# Patient Record
Sex: FEMALE | Race: WHITE | NOT HISPANIC OR LATINO | Employment: OTHER | URBAN - METROPOLITAN AREA
[De-identification: names, ages, dates, MRNs, and addresses within clinical notes are randomized per-mention and may not be internally consistent; named-entity substitution may affect disease eponyms.]

---

## 2018-04-02 ENCOUNTER — DOCUMENTATION ONLY (OUTPATIENT)
Dept: FAMILY MEDICINE | Facility: CLINIC | Age: 68
End: 2018-04-02

## 2018-04-11 ENCOUNTER — DOCUMENTATION ONLY (OUTPATIENT)
Dept: FAMILY MEDICINE | Facility: CLINIC | Age: 68
End: 2018-04-11

## 2018-04-11 NOTE — PROGRESS NOTES
Health Maintenance Due   Topic Date Due    Hepatitis C Screening  1950    Lipid Panel  1950    TETANUS VACCINE  06/01/1966    Mammogram  06/01/1988    DEXA SCAN  06/01/1988    Colonoscopy  06/01/1998    Zoster Vaccine  06/01/2008    Pneumococcal (65+) (1 of 2 - PCV13) 06/01/2013    Influenza Vaccine  08/01/2017

## 2018-04-12 ENCOUNTER — OFFICE VISIT (OUTPATIENT)
Dept: FAMILY MEDICINE | Facility: CLINIC | Age: 68
End: 2018-04-12
Payer: MEDICARE

## 2018-04-12 VITALS
TEMPERATURE: 98 F | HEIGHT: 68 IN | DIASTOLIC BLOOD PRESSURE: 84 MMHG | SYSTOLIC BLOOD PRESSURE: 126 MMHG | HEART RATE: 80 BPM | WEIGHT: 214.06 LBS | BODY MASS INDEX: 32.44 KG/M2 | RESPIRATION RATE: 16 BRPM | OXYGEN SATURATION: 97 %

## 2018-04-12 DIAGNOSIS — I10 ESSENTIAL HYPERTENSION: Primary | ICD-10-CM

## 2018-04-12 DIAGNOSIS — J20.9 ACUTE BRONCHITIS, UNSPECIFIED ORGANISM: ICD-10-CM

## 2018-04-12 DIAGNOSIS — E78.5 HYPERLIPIDEMIA, UNSPECIFIED HYPERLIPIDEMIA TYPE: ICD-10-CM

## 2018-04-12 DIAGNOSIS — Z78.0 POSTMENOPAUSAL: ICD-10-CM

## 2018-04-12 DIAGNOSIS — Z11.59 ENCOUNTER FOR HEPATITIS C SCREENING TEST FOR LOW RISK PATIENT: ICD-10-CM

## 2018-04-12 DIAGNOSIS — I48.20 CHRONIC ATRIAL FIBRILLATION: ICD-10-CM

## 2018-04-12 DIAGNOSIS — E11.9 CONTROLLED TYPE 2 DIABETES MELLITUS WITHOUT COMPLICATION, WITHOUT LONG-TERM CURRENT USE OF INSULIN: ICD-10-CM

## 2018-04-12 PROCEDURE — 3074F SYST BP LT 130 MM HG: CPT | Mod: CPTII,S$GLB,, | Performed by: INTERNAL MEDICINE

## 2018-04-12 PROCEDURE — 99203 OFFICE O/P NEW LOW 30 MIN: CPT | Mod: S$GLB,,, | Performed by: INTERNAL MEDICINE

## 2018-04-12 PROCEDURE — 3079F DIAST BP 80-89 MM HG: CPT | Mod: CPTII,S$GLB,, | Performed by: INTERNAL MEDICINE

## 2018-04-12 RX ORDER — ATORVASTATIN CALCIUM 80 MG/1
1 TABLET, FILM COATED ORAL DAILY
COMMUNITY
Start: 2018-03-06 | End: 2018-09-11 | Stop reason: SDUPTHER

## 2018-04-12 RX ORDER — APIXABAN 5 MG/1
1 TABLET, FILM COATED ORAL 2 TIMES DAILY
COMMUNITY
Start: 2018-03-21 | End: 2019-02-18 | Stop reason: SDUPTHER

## 2018-04-12 RX ORDER — TRIAMTERENE/HYDROCHLOROTHIAZID 37.5-25 MG
1 TABLET ORAL DAILY
Qty: 90 TABLET | Refills: 1 | Status: SHIPPED | OUTPATIENT
Start: 2018-04-12 | End: 2018-07-12 | Stop reason: SDUPTHER

## 2018-04-12 NOTE — PATIENT INSTRUCTIONS
Lose 5 pounds.  Suggest [facility]      Low-Salt Diet  This diet removes foods that are high in salt. It also limits the amount of salt you use when cooking. It is most often used for people with high blood pressure, edema (fluid retention), and kidney, liver, or heart disease.  Table salt contains the mineral sodium. Your body needs sodium to work normally. But too much sodium can make your health problems worse. Your healthcare provider is recommending a low-salt (also called low-sodium) diet for you. Your total daily allowance of salt is 1,500 to 2,300 milligrams (mg). It is less than 1 teaspoon of table salt. This means you can have only about 500 to 700 mg of sodium at each meal. People with certain health problems should limit salt intake to the lower end of the recommended range.    When you cook, dont add much salt. If you can cook without using salt, even better. Dont add salt to your food at the table.  When shopping, read food labels. Salt is often called sodium on the label. Choose foods that are salt-free, low salt, or very low salt. Note that foods with reduced salt may not lower your salt intake enough.    Beans, potatoes, and pasta  Ok: Dry beans, split peas, lentils, potatoes, rice, macaroni, pasta, spaghetti without added salt  Avoid: Potato chips, tortilla chips, and similar products  Breads and cereals  Ok: Low-sodium breads, rolls, cereals, and cakes; low-salt crackers, matzo crackers  Avoid: Salted crackers, pretzels, popcorn, Romanian toast, pancakes, muffins  Dairy  Ok: Milk, chocolate milk, hot chocolate mix, low-salt cheeses, and yogurt  Avoid: Processed cheese and cheese spreads; Roquefort, Camembert, and cottage cheese; buttermilk, instant breakfast drink  Desserts  Ok: Ice cream, frozen yogurt, juice bars, gelatin, cookies and pies, sugar, honey, jelly, hard candy  Avoid: Most pies, cakes and cookies prepared or processed with salt; instant pudding  Drinks  Ok: Tea, coffee, fizzy  (carbonated) drinks, juices  Avoid: Flavored coffees, electrolyte replacement drinks, sports drinks  Meats  Ok: All fresh meat, fish, poultry, low-salt tuna, eggs, egg substitute  Avoid: Smoked, pickled, brine-cured, or salted meats and fish. This includes bess, chipped beef, corned beef, hot dogs, deli meats, ham, kosher meats, salt pork, sausage, canned tuna, salted codfish, smoked salmon, herring, sardines, or anchovies.  Seasonings and spices  Ok: Most seasonings are okay. Good substitutes for salt include: fresh herb blends, hot sauce, lemon, garlic, cast, vinegar, dry mustard, parsley, cilantro, horseradish, tomato paste, regular margarine, mayonnaise, unsalted butter, cream cheese, vegetable oil, cream, low-salt salad dressing and gravy.  Avoid: Regular ketchup, relishes, pickles, soy sauce, teriyaki sauce, Worcestershire sauce, BBQ sauce, tartar sauce, meat tenderizer, chili sauce, regular gravy, regular salad dressing, salted butter  Soups  Ok: Low-salt soups and broths made with allowed foods  Avoid: Bouillon cubes, soups with smoked or salted meats, regular soup and broth  Vegetables  Ok: Most vegetables are okay; also low-salt tomato and vegetable juices  Avoid: Sauerkraut and other brine-soaked vegetables; pickles and other pickled vegetables; tomato juice, olives  © 8907-5252 [employer]. [address] [place] [address]. All rights reserved. This information is not intended as a substitute for professional medical care. Always follow your healthcare professional's instructions.      Cool mist vaporizor.  Hot fluids. Hot tea with lemon and honey.     Robitussin-DM for cough    You're ALLERGIC to lisinopril

## 2018-04-12 NOTE — PROGRESS NOTES
Subjective:       Patient ID: Eduardo Esposito is a 67 y.o. female.    Chief Complaint: Establish Care; Cough (sneezing); and ear clogged    HPI         CHIEF COMPLAINT: Cough(+).  HPI:     ONSET/TIMIN wk    ago    DURATION:               Paroxysmal: no .    QUALITY/COURSE:. better    INTENSITY/SEVERITY: Severity is #  1 (10 point scale)      The following symptoms/statements are positive if BOLD, negative otherwise.      CONTEXT/WHEN:  Tobacco_use. Smokers_in_home. Seasonal_pattern. Allergies/Hayfever. Sinusitis. Irritant_Exposure(smoke/dust/fumes). Exposure_to_others_with_similar_symptoms.        Similar_problems_in_past.   PAST TREATMENT OR EVALUATION:   previous PPD. Recent_previous_chest_x-ray. Recent_antibiotics.  Associated Symptoms:     sputum production: scant. copious. Hemoptysis.  Medical History: Past_pulmonary_infections.  Cardiovascular_disease.chronic_lung_disease.  tuberculosis. Asthma. AIDS. Gastroesophageal_reflux_disease .              CHIEF COMPLAINT: Hypertension  HPI:     ONSET:      QUALITY/COURSE:   Unchanged.     INTENSITY/SEVERITY:  Average blood pressure is .     MODIFIERS/TREATMENTS:  Taking medications: yes. .High sodium intake: no. alcohol: no      The following symptoms are positive only if BOLDED, otherwise are negative.      SYMPTOMS/RELATED: Possible medication side effects include:   Depression..  . Cough. . Constipation.    REVIEW OF SYMPTOMS: . Weight_loss . Weight_gain . Leg_cramps .Potency_problems .    TARGET ORGAN DAMAGE:: angina/ prior myocardial infarction, chronic kidney disease, heart failure, left ventricular hypertrophy, peripheral artery disease, prior coronary revascularization, retinopathy, stroke. transient ischemic attack.        CHIEF COMPLAINT: Diabetes.  HPI:     ONSET/TIMING:     QUALITY/COURSE:   unchanged..      INTENSITY/SEVERITY: . Measures blood sugar :  3 times per day.        Lowest recent . Average .     CONTEXT/WHEN: last HgbA1c  No  "results found for: HGBA1C   weights:    Wt Readings from Last 1 Encounters:   04/12/18 1107 97.1 kg (214 lb 1.1 oz)         SYMPTOMS/RELATED: . . Possible medication side effects include:     The following symptoms/statements  are present IF IN BOLD, negative otherwise.         MODIFIERS/TREATMENTS: Not_taking_medications:  .  Non-compliance_with_Diabetic_diet  .  No_eye_exam_within_last_year.  Not_practicing_good_foot_care.    REVIEW OF SYMPTOMS: . Weight_gain. Weight_loss. Neuropathy. Recurrent_infections. Skin_ulcers    Review of Systems   Constitutional: Negative for fatigue, fever and unexpected weight change.   HENT: Positive for postnasal drip. Negative for dental problem, hearing loss, nosebleeds, rhinorrhea, tinnitus, trouble swallowing and voice change.    Eyes: Negative for itching and visual disturbance.   Respiratory: Positive for cough. Negative for shortness of breath and wheezing.    Cardiovascular: Negative for chest pain and palpitations.   Gastrointestinal: Negative for abdominal pain, blood in stool, constipation, diarrhea, nausea and vomiting.   Endocrine: Negative for cold intolerance, heat intolerance, polydipsia and polyphagia.   Genitourinary: Negative for difficulty urinating and dysuria.   Musculoskeletal: Negative for arthralgias.   Allergic/Immunologic: Negative for environmental allergies and immunocompromised state.   Neurological: Negative for dizziness, seizures, weakness, numbness and headaches.   Hematological: Does not bruise/bleed easily.   Psychiatric/Behavioral: Negative for agitation, dysphoric mood, sleep disturbance and suicidal ideas. The patient is not nervous/anxious.        Objective:      Vitals:    04/12/18 1107   BP: 126/84   Pulse: 80   Resp: 16   Temp: 98.4 °F (36.9 °C)   TempSrc: Oral   SpO2: 97%   Weight: 97.1 kg (214 lb 1.1 oz)   Height: 5' 8" (1.727 m)   PainSc: 0-No pain     Physical Exam   Constitutional: She appears well-developed and well-nourished. "   Cardiovascular: Normal rate, regular rhythm and normal heart sounds.    Pulmonary/Chest: Effort normal and breath sounds normal. No respiratory distress. She has no wheezes.   Abdominal: Soft. Bowel sounds are normal. There is no tenderness.   Musculoskeletal:        Right foot: There is normal range of motion and no deformity.        Left foot: There is normal range of motion and no deformity.   Feet:   Right Foot:   Protective Sensation: 5 sites tested. 5 sites sensed.   Skin Integrity: Negative for ulcer, blister, skin breakdown, erythema, warmth, callus or dry skin.   Left Foot:   Protective Sensation: 5 sites tested. 5 sites sensed.   Skin Integrity: Negative for ulcer, blister, skin breakdown, erythema, warmth, callus or dry skin.        a1c 5.8  Assessment:       1. Essential hypertension    2. Controlled type 2 diabetes mellitus without complication, without long-term current use of insulin    3. Acute bronchitis, unspecified organism    4. Chronic atrial fibrillation    5. Hyperlipidemia, unspecified hyperlipidemia type    6. Encounter for hepatitis C screening test for low risk patient    7. Postmenopausal          Plan:     Essential hypertension  -     Comprehensive metabolic panel; Future; Expected date: 04/12/2018  -     triamterene-hydrochlorothiazide 37.5-25 mg (MAXZIDE-25) 37.5-25 mg per tablet; Take 1 tablet by mouth once daily.  Dispense: 90 tablet; Refill: 1    Controlled type 2 diabetes mellitus without complication, without long-term current use of insulin  -     Hemoglobin A1c; Future; Expected date: 04/12/2018  -     Microalbumin/creatinine urine ratio; Future    Acute bronchitis, unspecified organism    Chronic atrial fibrillation    Hyperlipidemia, unspecified hyperlipidemia type  -     Lipid panel; Future; Expected date: 04/12/2018    Encounter for hepatitis C screening test for low risk patient  -     Hepatitis C antibody; Future; Expected date: 04/12/2018    Postmenopausal  -     DXA  Bone Density Spine And Hip; Future; Expected date: 04/12/2018      Follow-up in about 3 months (around 7/12/2018).

## 2018-04-16 ENCOUNTER — TELEPHONE (OUTPATIENT)
Dept: FAMILY MEDICINE | Facility: CLINIC | Age: 68
End: 2018-04-16

## 2018-04-16 NOTE — TELEPHONE ENCOUNTER
----- Message from Sarahy Peña sent at 4/16/2018  1:39 PM CDT -----  Contact: Patient  Eduardo, patient [phone] calling to speak with office to see if triamterene is replacing triamterene-hydrochlorothiazide 37.5-25 mg (MAXZIDE-25) 37.5-25 mg per tablet, or if she needs to be taking both. Please advise. Thanks.

## 2018-04-26 DIAGNOSIS — Z12.39 BREAST CANCER SCREENING: ICD-10-CM

## 2018-05-18 DIAGNOSIS — Z12.11 COLON CANCER SCREENING: ICD-10-CM

## 2018-07-05 ENCOUNTER — HOSPITAL ENCOUNTER (OUTPATIENT)
Dept: RADIOLOGY | Facility: CLINIC | Age: 68
Discharge: HOME | End: 2018-07-05
Attending: INTERNAL MEDICINE
Payer: MEDICARE

## 2018-07-05 DIAGNOSIS — Z78.0 POSTMENOPAUSAL: ICD-10-CM

## 2018-07-05 PROCEDURE — 77080 DXA BONE DENSITY AXIAL: CPT | Mod: TC,PO

## 2018-07-05 PROCEDURE — 77080 DXA BONE DENSITY AXIAL: CPT | Mod: 26,,, | Performed by: RADIOLOGY

## 2018-07-11 ENCOUNTER — DOCUMENTATION ONLY (OUTPATIENT)
Dept: FAMILY MEDICINE | Facility: CLINIC | Age: 68
End: 2018-07-11

## 2018-07-11 NOTE — PROGRESS NOTES
Health Maintenance Due   Topic Date Due    TETANUS VACCINE  06/01/1966    Mammogram  06/01/1988    Colonoscopy  06/01/1998    Zoster Vaccine  06/01/2008    Pneumococcal (65+) (1 of 2 - PCV13) 06/01/2013

## 2018-07-12 ENCOUNTER — OFFICE VISIT (OUTPATIENT)
Dept: FAMILY MEDICINE | Facility: CLINIC | Age: 68
End: 2018-07-12
Payer: MEDICARE

## 2018-07-12 VITALS
BODY MASS INDEX: 32.21 KG/M2 | RESPIRATION RATE: 16 BRPM | SYSTOLIC BLOOD PRESSURE: 114 MMHG | WEIGHT: 212.5 LBS | HEART RATE: 80 BPM | DIASTOLIC BLOOD PRESSURE: 82 MMHG | HEIGHT: 68 IN | OXYGEN SATURATION: 98 % | TEMPERATURE: 98 F

## 2018-07-12 DIAGNOSIS — E11.69 HYPERLIPIDEMIA ASSOCIATED WITH TYPE 2 DIABETES MELLITUS: ICD-10-CM

## 2018-07-12 DIAGNOSIS — E11.59 HYPERTENSION ASSOCIATED WITH DIABETES: ICD-10-CM

## 2018-07-12 DIAGNOSIS — I15.2 HYPERTENSION ASSOCIATED WITH DIABETES: ICD-10-CM

## 2018-07-12 DIAGNOSIS — E11.9 CONTROLLED TYPE 2 DIABETES MELLITUS WITHOUT COMPLICATION, WITHOUT LONG-TERM CURRENT USE OF INSULIN: Primary | ICD-10-CM

## 2018-07-12 DIAGNOSIS — I10 ESSENTIAL HYPERTENSION: ICD-10-CM

## 2018-07-12 DIAGNOSIS — E78.5 HYPERLIPIDEMIA ASSOCIATED WITH TYPE 2 DIABETES MELLITUS: ICD-10-CM

## 2018-07-12 DIAGNOSIS — B07.9 VIRAL WARTS, UNSPECIFIED TYPE: ICD-10-CM

## 2018-07-12 DIAGNOSIS — Z23 NEED FOR 23-POLYVALENT PNEUMOCOCCAL POLYSACCHARIDE VACCINE: ICD-10-CM

## 2018-07-12 PROCEDURE — 3044F HG A1C LEVEL LT 7.0%: CPT | Mod: CPTII,S$GLB,, | Performed by: INTERNAL MEDICINE

## 2018-07-12 PROCEDURE — 99214 OFFICE O/P EST MOD 30 MIN: CPT | Mod: 25,S$GLB,, | Performed by: INTERNAL MEDICINE

## 2018-07-12 PROCEDURE — 3079F DIAST BP 80-89 MM HG: CPT | Mod: CPTII,S$GLB,, | Performed by: INTERNAL MEDICINE

## 2018-07-12 PROCEDURE — 3074F SYST BP LT 130 MM HG: CPT | Mod: CPTII,S$GLB,, | Performed by: INTERNAL MEDICINE

## 2018-07-12 PROCEDURE — G0009 ADMIN PNEUMOCOCCAL VACCINE: HCPCS | Mod: S$GLB,,, | Performed by: INTERNAL MEDICINE

## 2018-07-12 PROCEDURE — 90732 PPSV23 VACC 2 YRS+ SUBQ/IM: CPT | Mod: S$GLB,,, | Performed by: INTERNAL MEDICINE

## 2018-07-12 RX ORDER — RESVER/WINE/BFL/GRPSD/PC/C/POM 200MG-60MG
1 CAPSULE ORAL DAILY
COMMUNITY
Start: 2018-05-23 | End: 2020-01-08 | Stop reason: CLARIF

## 2018-07-12 RX ORDER — TRIAMTERENE/HYDROCHLOROTHIAZID 37.5-25 MG
1 TABLET ORAL DAILY
Qty: 90 TABLET | Refills: 1 | Status: SHIPPED | OUTPATIENT
Start: 2018-07-12 | End: 2019-02-18 | Stop reason: SDUPTHER

## 2018-07-12 NOTE — PROGRESS NOTES
Subjective:       Patient ID: Eduardo Esposito is a 68 y.o. female.    Chief Complaint: Diabetes (labs)    HPI         CHIEF COMPLAINT: Diabetes.  HPI:     ONSET/TIMING:     QUALITY/COURSE:   unchanged..      INTENSITY/SEVERITY: . Measures blood sugar : 0.       CONTEXT/WHEN: last HgbA1c    Lab Results   Component Value Date    HGBA1C 5.7 (H) 07/05/2018      weights:    Wt Readings from Last 1 Encounters:   07/12/18 1048 96.4 kg (212 lb 8.4 oz)         SYMPTOMS/RELATED: . . Possible medication side effects include:     The following symptoms/statements  are present IF IN BOLD, negative otherwise.         MODIFIERS/TREATMENTS: Not_taking_medications:  .  Non-compliance_with_Diabetic_diet  .  No_eye_exam_within_last_year.  Not_practicing_good_foot_care.    REVIEW OF SYMPTOMS: . Weight_gain. Weight_loss. Neuropathy. Recurrent_infections. Skin_ulcers          CHIEF COMPLAINT: Hypertension  HPI:     ONSET:      QUALITY/COURSE:   Unchanged.     INTENSITY/SEVERITY:  Average blood pressure is .     MODIFIERS/TREATMENTS:  Taking medications: yes. .High sodium intake: no. alcohol: no      The following symptoms are positive only if BOLDED, otherwise are negative.      SYMPTOMS/RELATED: Possible medication side effects include:   Depression..  . Cough. . Constipation.    REVIEW OF SYMPTOMS: . Weight_loss . Weight_gain . Leg_cramps ..    TARGET ORGAN DAMAGE:: angina/ prior myocardial infarction, chronic kidney disease, heart failure, left ventricular hypertrophy, peripheral artery disease, prior coronary revascularization, retinopathy, stroke. transient ischemic attack.        CHIEF COMPLAINT: Hyperlipidemia. cholesterol screening: no.   HPI:     ONSET:    MODIFIERS/TREATMENTS: . Taking medications: yes. . Non-compliance with following diet: no. .     SYMPTOMS/RELATED:Possible medication side effects include:   Myalgia: no.  .     REVIEW OF SYMPTOMS: past weights:   Wt Readings from Last 1 Encounters:   07/12/18 1048 96.4 kg  "(212 lb 8.4 oz)                                                     Last lipids: total   Lab Results   Component Value Date    CHOL 126 07/05/2018                                                                     HDL   Lab Results   Component Value Date    HDL 44 07/05/2018                                                                     LDL   Lab Results   Component Value Date    LDLCALC 62.0 (L) 07/05/2018                                                                     TRIG   Lab Results   Component Value Date    TRIG 100 07/05/2018                                                                           Review of Systems      Objective:      Vitals:    07/12/18 1048   BP: 114/82   Pulse: 80   Resp: 16   Temp: 98 °F (36.7 °C)   TempSrc: Oral   SpO2: 98%   Weight: 96.4 kg (212 lb 8.4 oz)   Height: 5' 8" (1.727 m)   PainSc: 0-No pain     Physical Exam   Constitutional: She appears well-developed and well-nourished.   Cardiovascular: Normal rate, regular rhythm and normal heart sounds.    Pulmonary/Chest: Effort normal and breath sounds normal.   Abdominal: Soft. There is no tenderness.   Neurological: She is alert.   Skin:   Callus on the left great toe.  6 mm wart on the tip of the right second toe   Psychiatric: She has a normal mood and affect. Her behavior is normal. Thought content normal.   Nursing note and vitals reviewed.        Assessment:       1. Controlled type 2 diabetes mellitus without complication, without long-term current use of insulin    2. Hypertension associated with diabetes    3. Hyperlipidemia associated with type 2 diabetes mellitus    4. Need for 23-polyvalent pneumococcal polysaccharide vaccine    5. Essential hypertension    6. Viral warts, unspecified type          Plan:       Controlled type 2 diabetes mellitus without complication, without long-term current use of insulin  -     Hemoglobin A1c; Future; Expected date: 07/12/2018  -     Microalbumin/creatinine urine ratio; " Future    Hypertension associated with diabetes  -     Comprehensive metabolic panel; Future; Expected date: 07/12/2018    Hyperlipidemia associated with type 2 diabetes mellitus  -     Lipid panel; Future; Expected date: 07/12/2018    Need for 23-polyvalent pneumococcal polysaccharide vaccine  -     (In Office Administered) Pneumococcal Polysaccharide Vaccine (23 Valent) (SQ/IM)    Essential hypertension  -     triamterene-hydrochlorothiazide 37.5-25 mg (MAXZIDE-25) 37.5-25 mg per tablet; Take 1 tablet by mouth once daily.  Dispense: 90 tablet; Refill: 1    Viral warts, unspecified type  -     Ambulatory consult to Podiatry      Follow-up in about 6 months (around 1/12/2019).

## 2018-07-12 NOTE — PATIENT INSTRUCTIONS
[email or web address]    Lose 5 pounds.  Suggest [place] diet    Avoid white carbohydrates.   Eat  a palm sized protein with each meal.       Walk for 10 minutes after you eat.  This will keep your sugars from going high at that time.

## 2018-08-21 ENCOUNTER — TELEPHONE (OUTPATIENT)
Dept: FAMILY MEDICINE | Facility: CLINIC | Age: 68
End: 2018-08-21

## 2018-08-21 NOTE — TELEPHONE ENCOUNTER
----- Message from Arelis Ontiveros sent at 8/21/2018  9:50 AM CDT -----     Pt is  Calling   For a  New ref  For  Podiatry//  When  Scheduling  Open    Ref to attach to  [facility],   Lab  Orders are attach , pt  Is  Unaware  Of  Lab orders and  The reason  They are  Attached to  Ref // please call  For  Details [phone]  Tried to  Take  A  Screen shot  Of   What  Came  Up  For  Scheduling  , but  Was  Not able  To access

## 2018-08-31 ENCOUNTER — OFFICE VISIT (OUTPATIENT)
Dept: PODIATRY | Facility: CLINIC | Age: 68
End: 2018-08-31
Payer: MEDICARE

## 2018-08-31 VITALS
WEIGHT: 209.44 LBS | HEIGHT: 68 IN | BODY MASS INDEX: 31.74 KG/M2 | HEART RATE: 68 BPM | SYSTOLIC BLOOD PRESSURE: 130 MMHG | DIASTOLIC BLOOD PRESSURE: 71 MMHG

## 2018-08-31 DIAGNOSIS — E11.49 TYPE II DIABETES MELLITUS WITH NEUROLOGICAL MANIFESTATIONS: ICD-10-CM

## 2018-08-31 DIAGNOSIS — B35.1 ONYCHOMYCOSIS DUE TO DERMATOPHYTE: Primary | ICD-10-CM

## 2018-08-31 DIAGNOSIS — B07.8 OTHER VIRAL WARTS: ICD-10-CM

## 2018-08-31 PROCEDURE — 3044F HG A1C LEVEL LT 7.0%: CPT | Mod: CPTII,S$GLB,, | Performed by: PODIATRIST

## 2018-08-31 PROCEDURE — 99203 OFFICE O/P NEW LOW 30 MIN: CPT | Mod: 25,S$GLB,, | Performed by: PODIATRIST

## 2018-08-31 PROCEDURE — 3078F DIAST BP <80 MM HG: CPT | Mod: CPTII,S$GLB,, | Performed by: PODIATRIST

## 2018-08-31 PROCEDURE — 11720 DEBRIDE NAIL 1-5: CPT | Mod: Q9,S$GLB,, | Performed by: PODIATRIST

## 2018-08-31 PROCEDURE — 99999 PR PBB SHADOW E&M-EST. PATIENT-LVL III: CPT | Mod: PBBFAC,,, | Performed by: PODIATRIST

## 2018-08-31 PROCEDURE — 3075F SYST BP GE 130 - 139MM HG: CPT | Mod: CPTII,S$GLB,, | Performed by: PODIATRIST

## 2018-08-31 RX ORDER — CICLOPIROX 80 MG/ML
SOLUTION TOPICAL NIGHTLY
Qty: 6.6 ML | Refills: 11 | Status: SHIPPED | OUTPATIENT
Start: 2018-08-31 | End: 2018-10-05

## 2018-08-31 NOTE — PROGRESS NOTES
Subjective:      Patient ID: Eduardo Esposito is a 68 y.o. female.    Chief Complaint: Nail Problem (nail fungus) and Warts (2nd toe rtfoot)    Eduardo is a 68 y.o. female who presents to the clinic for evaluation and treatment of high risk feet. Eduardo has a past medical history of Atrial fibrillation, Diabetes mellitus, Hyperlipemia, and Hypertension. The patient's chief complaint is long, thick toenails.  Gradual onset, worsening over past several weeks, aggravated by increased weight bearing, shoe gear, pressure.  No previous medical treatment.  OTC pain med not helping.     Skin lesion right 2nd toe.  Gradual onset, worsening over past several weeks, aggravated by increased weight bearing, shoe gear, pressure.  No previous medical treatment.  No self treatment.    PCP: Walter Nance MD    Date Last Seen by PCP:   Chief Complaint   Patient presents with    Nail Problem     nail fungus    Warts     2nd toe rtfoot         Current shoe gear:  Affected Foot: Casual shoes     Unaffected Foot: Casual shoes    Hemoglobin A1C   Date Value Ref Range Status   07/05/2018 5.7 (H) 4.0 - 5.6 % Final     Comment:     ADA Screening Guidelines:  5.7-6.4%  Consistent with prediabetes  >or=6.5%  Consistent with diabetes  High levels of fetal hemoglobin interfere with the HbA1C  assay. Heterozygous hemoglobin variants (HbS, HgC, etc)do  not significantly interfere with this assay.   However, presence of multiple variants may affect accuracy.         Review of Systems   Constitution: Negative for chills, diaphoresis, fever, malaise/fatigue and night sweats.   Cardiovascular: Positive for leg swelling. Negative for claudication, cyanosis and syncope.   Skin: Positive for nail changes and suspicious lesions. Negative for color change, dry skin, rash and unusual hair distribution.   Musculoskeletal: Negative for falls, joint pain, joint swelling, muscle cramps, muscle weakness and stiffness.   Gastrointestinal: Negative for  constipation, diarrhea, nausea and vomiting.   Neurological: Positive for sensory change. Negative for brief paralysis, disturbances in coordination, focal weakness, numbness, paresthesias and tremors.           Objective:      Physical Exam   Constitutional: She is oriented to person, place, and time. She appears well-developed and well-nourished. She is cooperative. No distress.   Cardiovascular:   Pulses:       Popliteal pulses are 2+ on the right side, and 2+ on the left side.        Dorsalis pedis pulses are 2+ on the right side, and 2+ on the left side.        Posterior tibial pulses are 2+ on the right side, and 2+ on the left side.   Capillary refill 3 seconds all toes/distal feet, all toes/both feet cold to touch warming proximally.      Negative lymphadenopathy bilateral popliteal fossa and tarsal tunnel.     <2+ pitting lower extremity edema bilateral.     Musculoskeletal:        Right ankle: She exhibits normal range of motion, no swelling, no ecchymosis, no deformity, no laceration and normal pulse. Achilles tendon normal. Achilles tendon exhibits no pain, no defect and normal Hu's test results.   Normal angle, base, station of gait. All ten toes without clubbing, cyanosis, or signs of ischemia.  No pain to palpation bilateral lower extremities.  Range of motion, stability, muscle strength, and muscle tone normal bilateral feet and legs.     Lymphadenopathy: No inguinal adenopathy noted on the right or left side.   Negative lymphadenopathy bilateral popliteal fossa and tarsal tunnel.    Negative lymphangitic streaking bilateral feet/ankles/legs.   Neurological: She is alert and oriented to person, place, and time. She has normal strength. She displays no atrophy and no tremor. A sensory deficit is present. She exhibits normal muscle tone. Gait normal.   Reflex Scores:       Patellar reflexes are 2+ on the right side and 2+ on the left side.       Achilles reflexes are 2+ on the right side and 2+  on the left side.  Negative tinel sign to percussion sural, superficial peroneal, deep peroneal, saphenous, and posterior tibial nerves right and left ankles and feet.    Decreased/absent vibratory sensation bilateral feet to 128Hz tuning fork.     Skin: Skin is warm, dry and intact. Capillary refill takes 2 to 3 seconds. No abrasion, no bruising, no burn, no ecchymosis, no laceration, no lesion and no rash noted. She is not diaphoretic. No cyanosis or erythema. No pallor. Nails show no clubbing.   Skin thin, shiny, atrophic, with decreased density and distribution of pedal hair bilateral, but without hyperpigmentation, suze discoloration,  ulcers, masses, nodules or cords palpated bilateral feet and legs.    Skin lesion distal medial 2nd toe Right foot is well encapsulated, verrucous surfaced, serpiginous bordered without open skin, drainage, pus, tracking, malodor, or signs infection.     Adjacent dermis unaffected.      Otherwise, skin age and health normal color, turgor, texture, and temperature bilateral feet.    Toenails 1st, 5th  bilateral are hypertrophic, dystrophic, discolored tanish brown with tan, gray crumbly subungual debris.  Tender to distal nail plate pressure, without periungual skin abnormality of each.       Psychiatric: She has a normal mood and affect.             Assessment:       Encounter Diagnoses   Name Primary?    Onychomycosis due to dermatophyte Yes    Type II diabetes mellitus with neurological manifestations     Other viral warts          Plan:       Eduardo was seen today for nail problem and warts.    Diagnoses and all orders for this visit:    Onychomycosis due to dermatophyte    Type II diabetes mellitus with neurological manifestations    Other viral warts    Other orders  -     ciclopirox (PENLAC) 8 % Soln; Apply topically nightly.      I counseled the patient on her conditions, their implications and medical management.        - Shoe inspection. Diabetic Foot Education.  Patient reminded of the importance of good nutrition and blood sugar control to help prevent podiatric complications of diabetes. Patient instructed on proper foot hygeine. We discussed wearing proper shoe gear, daily foot inspections, never walking without protective shoe gear, never putting sharp instruments to feet, routine podiatric visits at least annually.      - With patient's permission, nails were aggressively reduced and debrided x 4 to their soft tissue attachment mechanically and with electric , removing all offending nail and debris. Patient relates relief following the procedure. She will continue to monitor the areas daily, inspect her feet, wear protective shoe gear when ambulatory, moisturizer to maintain skin integrity and follow in this office p.r.n.      Discussed conservative treatment with shoes of adequate dimensions, material, and style to alleviate symptoms and delay or prevent surgical intervention.    With the patients permission, hyperkeratosis trimmed from verruca distal medial tip right 2nd toe with sterile #15 blade after cleansing with alcohol to not including dermis without bleeding encountered.  Apply compound W bid with tape occlusion at home.      Follow-up in about 2 weeks (around 9/14/2018).

## 2018-08-31 NOTE — LETTER
August 31, 2018      Walter Nance MD  [address]  [place] [address]           [facility]  [address]  [place] [address]  Phone: [phone]          Patient: Eduardo Esposito   MR Number: [id number]   YOB: 1950   Date of Visit: 8/31/2018       Dear Dr. Walter Nance:    Thank you for referring Eduardo Esposito to me for evaluation. Attached you will find relevant portions of my assessment and plan of care.    If you have questions, please do not hesitate to call me. I look forward to following Eduardo Esposito along with you.    Sincerely,    Austin Dillard, CHRISTOPHER    Enclosure  CC:  No Recipients    If you would like to receive this communication electronically, please contact [email or web address] or [phone] to request more information on [employer] access.    For providers and/or their staff who would like to refer a patient to [facility], please contact us through our one-stop-shop provider referral line, [facility], at [phone].    If you feel you have received this communication in error or would no longer like to receive these types of communications, please e-mail [email or web address]

## 2018-09-11 RX ORDER — METFORMIN HYDROCHLORIDE 500 MG/1
500 TABLET ORAL 2 TIMES DAILY WITH MEALS
Qty: 180 TABLET | Refills: 1 | Status: SHIPPED | OUTPATIENT
Start: 2018-09-11 | End: 2019-04-17 | Stop reason: SDUPTHER

## 2018-09-11 RX ORDER — ATORVASTATIN CALCIUM 80 MG/1
80 TABLET, FILM COATED ORAL DAILY
Qty: 90 TABLET | Refills: 1 | Status: SHIPPED | OUTPATIENT
Start: 2018-09-11 | End: 2019-02-18 | Stop reason: SDUPTHER

## 2018-09-11 NOTE — TELEPHONE ENCOUNTER
----- Message from Nimo Colin sent at 9/11/2018  4:48 PM CDT -----  Contact: self  Needs refill on atorvastatin (LIPITOR) 80 MG tablet and metformin (GLUCOPHAGE) 500 MG tablet. Pharmacy has contacted the office. Please call back at [phone] (home)     [facility] - [place], [place] - [address]  [address] [facility] [address] [facility] [address]  Phone: [phone] Fax: [phone]

## 2018-09-17 ENCOUNTER — OFFICE VISIT (OUTPATIENT)
Dept: PODIATRY | Facility: CLINIC | Age: 68
End: 2018-09-17
Payer: MEDICARE

## 2018-09-17 VITALS
SYSTOLIC BLOOD PRESSURE: 126 MMHG | HEART RATE: 54 BPM | BODY MASS INDEX: 31.74 KG/M2 | HEIGHT: 68 IN | WEIGHT: 209.44 LBS | DIASTOLIC BLOOD PRESSURE: 77 MMHG | RESPIRATION RATE: 20 BRPM

## 2018-09-17 DIAGNOSIS — B07.8 OTHER VIRAL WARTS: Primary | ICD-10-CM

## 2018-09-17 PROCEDURE — 99999 PR PBB SHADOW E&M-EST. PATIENT-LVL III: CPT | Mod: PBBFAC,,, | Performed by: PODIATRIST

## 2018-09-17 PROCEDURE — 3078F DIAST BP <80 MM HG: CPT | Mod: CPTII,,, | Performed by: PODIATRIST

## 2018-09-17 PROCEDURE — 3074F SYST BP LT 130 MM HG: CPT | Mod: CPTII,,, | Performed by: PODIATRIST

## 2018-09-17 PROCEDURE — 99212 OFFICE O/P EST SF 10 MIN: CPT | Mod: S$PBB,,, | Performed by: PODIATRIST

## 2018-09-17 PROCEDURE — 99213 OFFICE O/P EST LOW 20 MIN: CPT | Mod: PBBFAC,PO | Performed by: PODIATRIST

## 2018-09-17 PROCEDURE — 1101F PT FALLS ASSESS-DOCD LE1/YR: CPT | Mod: CPTII,,, | Performed by: PODIATRIST

## 2018-09-17 NOTE — PROGRESS NOTES
Subjective:      Patient ID: Eduardo Esposito is a 68 y.o. female.    Chief Complaint: Follow-up (wart Right Foot)    Eduardo is a 68 y.o. female who presents to the clinic for evaluation and treatment of high risk feet. Eduardo has a past medical history of Atrial fibrillation, Diabetes mellitus, Hyperlipemia, and Hypertension. The patient's chief complaint is long, thick toenails.  Gradual onset, worsening over past several weeks, aggravated by increased weight bearing, shoe gear, pressure.  .     Skin lesion right 2nd toe.  Gradual onset, worsening over past several weeks, aggravated by increased weight bearing, shoe gear, pressure.  Using topical compound W bid to affected area 2nd toe right with occlusion.    PCP: Walter Nance MD    Date Last Seen by PCP:   Chief Complaint   Patient presents with    Follow-up     wart Right Foot         Current shoe gear:  Affected Foot: Casual shoes     Unaffected Foot: Casual shoes    Hemoglobin A1C   Date Value Ref Range Status   07/05/2018 5.7 (H) 4.0 - 5.6 % Final     Comment:     ADA Screening Guidelines:  5.7-6.4%  Consistent with prediabetes  >or=6.5%  Consistent with diabetes  High levels of fetal hemoglobin interfere with the HbA1C  assay. Heterozygous hemoglobin variants (HbS, HgC, etc)do  not significantly interfere with this assay.   However, presence of multiple variants may affect accuracy.         Review of Systems   Constitution: Negative for chills, diaphoresis, fever, malaise/fatigue and night sweats.   Cardiovascular: Positive for leg swelling. Negative for claudication, cyanosis and syncope.   Skin: Positive for nail changes and suspicious lesions. Negative for color change, dry skin, rash and unusual hair distribution.   Musculoskeletal: Negative for falls, joint pain, joint swelling, muscle cramps, muscle weakness and stiffness.   Gastrointestinal: Negative for constipation, diarrhea, nausea and vomiting.   Neurological: Positive for sensory change.  Negative for brief paralysis, disturbances in coordination, focal weakness, numbness, paresthesias and tremors.           Objective:      Physical Exam   Constitutional: She is oriented to person, place, and time. She appears well-developed and well-nourished. She is cooperative. No distress.   Cardiovascular:   Pulses:       Popliteal pulses are 2+ on the right side, and 2+ on the left side.        Dorsalis pedis pulses are 2+ on the right side, and 2+ on the left side.        Posterior tibial pulses are 2+ on the right side, and 2+ on the left side.   Capillary refill 3 seconds all toes/distal feet, all toes/both feet cold to touch warming proximally.      Negative lymphadenopathy bilateral popliteal fossa and tarsal tunnel.     <2+ pitting lower extremity edema bilateral.     Musculoskeletal:        Right ankle: She exhibits normal range of motion, no swelling, no ecchymosis, no deformity, no laceration and normal pulse. Achilles tendon normal. Achilles tendon exhibits no pain, no defect and normal Hu's test results.   Normal angle, base, station of gait. All ten toes without clubbing, cyanosis, or signs of ischemia.  No pain to palpation bilateral lower extremities.  Range of motion, stability, muscle strength, and muscle tone normal bilateral feet and legs.     Lymphadenopathy: No inguinal adenopathy noted on the right or left side.   Negative lymphadenopathy bilateral popliteal fossa and tarsal tunnel.    Negative lymphangitic streaking bilateral feet/ankles/legs.   Neurological: She is alert and oriented to person, place, and time. She has normal strength. She displays no atrophy and no tremor. A sensory deficit is present. She exhibits normal muscle tone. Gait normal.   Reflex Scores:       Patellar reflexes are 2+ on the right side and 2+ on the left side.       Achilles reflexes are 2+ on the right side and 2+ on the left side.  Negative tinel sign to percussion sural, superficial peroneal, deep  peroneal, saphenous, and posterior tibial nerves right and left ankles and feet.    Decreased/absent vibratory sensation bilateral feet to 128Hz tuning fork.     Skin: Skin is warm, dry and intact. Capillary refill takes 2 to 3 seconds. No abrasion, no bruising, no burn, no ecchymosis, no laceration, no lesion and no rash noted. She is not diaphoretic. No cyanosis or erythema. No pallor. Nails show no clubbing.   Skin thin, shiny, atrophic, with decreased density and distribution of pedal hair bilateral, but without hyperpigmentation, suze discoloration,  ulcers, masses, nodules or cords palpated bilateral feet and legs.    Skin lesion distal medial 2nd toe Right foot is well encapsulated, verrucous surfaced, serpiginous bordered without open skin, drainage, pus, tracking, malodor, or signs infection.     Adjacent dermis unaffected.      Otherwise, skin age and health normal color, turgor, texture, and temperature bilateral feet.    Toenails 1st, 5th  bilateral are hypertrophic, dystrophic, discolored tanish brown with tan, gray crumbly subungual debris.  Tender to distal nail plate pressure, without periungual skin abnormality of each.       Psychiatric: She has a normal mood and affect.             Assessment:       Encounter Diagnosis   Name Primary?    Other viral warts Yes         Plan:       Eduardo was seen today for follow-up.    Diagnoses and all orders for this visit:    Other viral warts      I counseled the patient on her conditions, their implications and medical management.      Discussed conservative treatment with shoes of adequate dimensions, material, and style to alleviate symptoms and delay or prevent surgical intervention.    With the patients permission, hyperkeratosis trimmed from verruca distal medial tip right 2nd toe with sterile #15 blade after cleansing with alcohol to not including dermis without bleeding encountered.  Apply compound W bid with tape occlusion at home.      Follow-up in  about 2 weeks (around 10/1/2018).

## 2018-09-20 ENCOUNTER — PATIENT OUTREACH (OUTPATIENT)
Dept: ADMINISTRATIVE | Facility: HOSPITAL | Age: 68
End: 2018-09-20

## 2018-10-05 ENCOUNTER — OFFICE VISIT (OUTPATIENT)
Dept: PODIATRY | Facility: CLINIC | Age: 68
End: 2018-10-05
Payer: MEDICARE

## 2018-10-05 VITALS — WEIGHT: 208.75 LBS | HEIGHT: 66 IN | RESPIRATION RATE: 13 BRPM | BODY MASS INDEX: 33.55 KG/M2

## 2018-10-05 DIAGNOSIS — B07.8 OTHER VIRAL WARTS: Primary | ICD-10-CM

## 2018-10-05 PROCEDURE — 99213 OFFICE O/P EST LOW 20 MIN: CPT | Mod: PBBFAC,PO | Performed by: PODIATRIST

## 2018-10-05 PROCEDURE — 99999 PR PBB SHADOW E&M-EST. PATIENT-LVL III: CPT | Mod: PBBFAC,,, | Performed by: PODIATRIST

## 2018-10-05 PROCEDURE — 1101F PT FALLS ASSESS-DOCD LE1/YR: CPT | Mod: CPTII,,, | Performed by: PODIATRIST

## 2018-10-05 PROCEDURE — 99212 OFFICE O/P EST SF 10 MIN: CPT | Mod: S$PBB,,, | Performed by: PODIATRIST

## 2018-10-05 NOTE — PROGRESS NOTES
Subjective:      Patient ID: Eduardo Esposito is a 68 y.o. female.    Chief Complaint: Warts (toe)    Eduardo is a 68 y.o. female who presents to the clinic for evaluation and treatment of high risk feet. Eduardo has a past medical history of Atrial fibrillation, Diabetes mellitus, Hyperlipemia, and Hypertension. The patient's chief complaint is Skin lesion right 2nd toe.  Gradual onset, worsening over past several weeks, aggravated by increased weight bearing, shoe gear, pressure.  Using topical compound W bid to affected area 2nd toe right with occlusion.    PCP: Walter Nance MD    Date Last Seen by PCP:   Chief Complaint   Patient presents with    Warts     toe         Current shoe gear:  Affected Foot: Casual shoes     Unaffected Foot: Casual shoes    Hemoglobin A1C   Date Value Ref Range Status   07/05/2018 5.7 (H) 4.0 - 5.6 % Final     Comment:     ADA Screening Guidelines:  5.7-6.4%  Consistent with prediabetes  >or=6.5%  Consistent with diabetes  High levels of fetal hemoglobin interfere with the HbA1C  assay. Heterozygous hemoglobin variants (HbS, HgC, etc)do  not significantly interfere with this assay.   However, presence of multiple variants may affect accuracy.         Review of Systems   Constitution: Negative for chills, diaphoresis, fever, malaise/fatigue and night sweats.   Cardiovascular: Positive for leg swelling. Negative for claudication, cyanosis and syncope.   Skin: Positive for nail changes and suspicious lesions. Negative for color change, dry skin, rash and unusual hair distribution.   Musculoskeletal: Negative for falls, joint pain, joint swelling, muscle cramps, muscle weakness and stiffness.   Gastrointestinal: Negative for constipation, diarrhea, nausea and vomiting.   Neurological: Positive for sensory change. Negative for brief paralysis, disturbances in coordination, focal weakness, numbness, paresthesias and tremors.           Objective:      Physical Exam   Constitutional: She is  oriented to person, place, and time. She appears well-developed and well-nourished. She is cooperative. No distress.   Cardiovascular:   Pulses:       Popliteal pulses are 2+ on the right side, and 2+ on the left side.        Dorsalis pedis pulses are 2+ on the right side, and 2+ on the left side.        Posterior tibial pulses are 2+ on the right side, and 2+ on the left side.   Capillary refill 3 seconds all toes/distal feet, all toes/both feet cold to touch warming proximally.      Negative lymphadenopathy bilateral popliteal fossa and tarsal tunnel.     <2+ pitting lower extremity edema bilateral.     Musculoskeletal:        Right ankle: She exhibits normal range of motion, no swelling, no ecchymosis, no deformity, no laceration and normal pulse. Achilles tendon normal. Achilles tendon exhibits no pain, no defect and normal Hu's test results.   Normal angle, base, station of gait. All ten toes without clubbing, cyanosis, or signs of ischemia.  No pain to palpation bilateral lower extremities.  Range of motion, stability, muscle strength, and muscle tone normal bilateral feet and legs.     Lymphadenopathy: No inguinal adenopathy noted on the right or left side.   Negative lymphadenopathy bilateral popliteal fossa and tarsal tunnel.    Negative lymphangitic streaking bilateral feet/ankles/legs.   Neurological: She is alert and oriented to person, place, and time. She has normal strength. She displays no atrophy and no tremor. A sensory deficit is present. She exhibits normal muscle tone. Gait normal.   Reflex Scores:       Patellar reflexes are 2+ on the right side and 2+ on the left side.       Achilles reflexes are 2+ on the right side and 2+ on the left side.  Negative tinel sign to percussion sural, superficial peroneal, deep peroneal, saphenous, and posterior tibial nerves right and left ankles and feet.    Decreased/absent vibratory sensation bilateral feet to 128Hz tuning fork.     Skin: Skin is warm,  dry and intact. Capillary refill takes 2 to 3 seconds. No abrasion, no bruising, no burn, no ecchymosis, no laceration, no lesion and no rash noted. She is not diaphoretic. No cyanosis or erythema. No pallor. Nails show no clubbing.   Skin lines intact across area of Right distal medial 2nd toe  previously occupied by warty lesion.     Otherwise, skin age and health normal color, turgor, texture, and temperature bilateral feet.           Psychiatric: She has a normal mood and affect.             Assessment:       Encounter Diagnosis   Name Primary?    Other viral warts Yes         Plan:       Eduardo was seen today for warts.    Diagnoses and all orders for this visit:    Other viral warts      I counseled the patient on her conditions, their implications and medical management.      Discussed conservative treatment with shoes of adequate dimensions, material, and style to alleviate symptoms and delay or prevent surgical intervention.    Inspect feet multiple times daily for signs of occurrence/recurrence skin lesion/ulceration.      Follow-up if symptoms worsen or fail to improve.

## 2018-10-07 DIAGNOSIS — I10 ESSENTIAL HYPERTENSION: ICD-10-CM

## 2018-10-11 RX ORDER — TRIAMTERENE/HYDROCHLOROTHIAZID 37.5-25 MG
1 TABLET ORAL DAILY
Qty: 90 TABLET | Refills: 0 | Status: SHIPPED | OUTPATIENT
Start: 2018-10-11 | End: 2019-02-18 | Stop reason: SDUPTHER

## 2018-11-30 ENCOUNTER — OFFICE VISIT (OUTPATIENT)
Dept: PODIATRY | Facility: CLINIC | Age: 68
End: 2018-11-30
Payer: MEDICARE

## 2018-11-30 VITALS
HEIGHT: 66 IN | HEART RATE: 60 BPM | SYSTOLIC BLOOD PRESSURE: 142 MMHG | DIASTOLIC BLOOD PRESSURE: 83 MMHG | WEIGHT: 208 LBS | BODY MASS INDEX: 33.43 KG/M2

## 2018-11-30 DIAGNOSIS — L60.0 INGROWN NAIL: ICD-10-CM

## 2018-11-30 DIAGNOSIS — M79.675 PAIN AROUND TOENAIL, LEFT FOOT: ICD-10-CM

## 2018-11-30 DIAGNOSIS — Z01.818 PRE-OP TESTING: Primary | ICD-10-CM

## 2018-11-30 PROCEDURE — 1101F PT FALLS ASSESS-DOCD LE1/YR: CPT | Mod: CPTII,S$GLB,, | Performed by: PODIATRIST

## 2018-11-30 PROCEDURE — 3079F DIAST BP 80-89 MM HG: CPT | Mod: CPTII,S$GLB,, | Performed by: PODIATRIST

## 2018-11-30 PROCEDURE — 99999 PR PBB SHADOW E&M-EST. PATIENT-LVL III: CPT | Mod: PBBFAC,,, | Performed by: PODIATRIST

## 2018-11-30 PROCEDURE — 3077F SYST BP >= 140 MM HG: CPT | Mod: CPTII,S$GLB,, | Performed by: PODIATRIST

## 2018-11-30 PROCEDURE — 99212 OFFICE O/P EST SF 10 MIN: CPT | Mod: S$GLB,,, | Performed by: PODIATRIST

## 2018-11-30 NOTE — PROGRESS NOTES
Subjective:      Patient ID: Eduardo Esposito is a 68 y.o. female.    Chief Complaint: Nail Problem    Sharp throbbing pain left big toe.  Gradual onset, worsening over past several days, aggravated by increased weight bearing, shoe gear, pressure.  No previous medical treatment.  OTC pain med not helping. Denies trauma, surgeyrl eft hallux.  Review of Systems   Skin: Positive for nail changes.           Objective:      Physical Exam   Constitutional: She is oriented to person, place, and time. She appears well-developed and well-nourished. She is cooperative.   Oriented to time, place, and person.   Cardiovascular:   Pulses:       Dorsalis pedis pulses are 2+ on the right side, and 2+ on the left side.        Posterior tibial pulses are 1+ on the right side, and 1+ on the left side.   Capillary fill time 3 seconds.  All toes warm to touch.      Negative lower extremity edema bilateral.    Negative elevational pallor and dependent rubor bilateral.     Musculoskeletal:   Normal angle, base, station of gait. Decreased stride length, early heel off, moderately propulsive toe off bilateral.    All ten toes without clubbing, cyanosis, or signs of ischemia.      No pain to palpation bilateral lower extremities.      Range of motion, stability, muscle strength, and muscle tone are age and health appropriate normal bilateral feet and legs.       Lymphadenopathy:   Negative lymphadenopathy bilateral popliteal fossa and tarsal tunnel.  Negative lymphangitic streaking bilateral foot/ankle bilateral.     Neurological: She is alert and oriented to person, place, and time. She has normal strength. She is not disoriented. She displays no atrophy and no tremor. She exhibits normal muscle tone.   Reflex Scores:       Patellar reflexes are 2+ on the right side and 2+ on the left side.       Achilles reflexes are 2+ on the right side and 2+ on the left side.  Skin: Skin is warm, dry and intact. No abrasion, no bruising, no burn, no  ecchymosis, no laceration, no lesion, no petechiae and no rash noted. She is not diaphoretic. No cyanosis or erythema. No pallor. Nails show no clubbing.   Skin thin, atrophic, with decreased density and distribution of pedal hair bilateral, but without hyperpigmentation, suze discoloration,  ulcers, masses, nodules or cords palpated bilateral feet and legs.      Toenails 1st, 2nd, 3rd, 4th, 5th  bilateral are hypertrophic thickened 2-3 mm, dystrophic, discolored tanish brown with tan, gray crumbly subungual debris.  Tender to distal nail plate pressure, without periungual skin abnormality of each.      Visible and palpable ingrowth of toenail bilateral border left hallux with pain to palpation, without ulceration, drainage, pus, tracking, fluctuance, malodor, or cardinal signs infection.               Assessment:       Encounter Diagnoses   Name Primary?    Pre-op testing Yes    Pain around toenail, left foot     Ingrown nail          Plan:       Eduardo was seen today for nail problem.    Diagnoses and all orders for this visit:    Pre-op testing  -     US Lower Extremity Arteries Left; Future    Pain around toenail, left foot    Ingrown nail      I counseled the patient on her conditions, their implications and medical management.        Discussed debridement - declines in favor or removal of nail.    Discussed conservative treatment with shoes of adequate dimensions, material, and style to alleviate symptoms and delay or prevent surgical intervention.              Follow-up in about 1 week (around 12/7/2018) for matrixectomy left hallux total.

## 2018-12-03 ENCOUNTER — HOSPITAL ENCOUNTER (OUTPATIENT)
Dept: RADIOLOGY | Facility: CLINIC | Age: 68
Discharge: HOME | End: 2018-12-03
Attending: PODIATRIST
Payer: MEDICARE

## 2018-12-03 ENCOUNTER — TELEPHONE (OUTPATIENT)
Dept: PODIATRY | Facility: CLINIC | Age: 68
End: 2018-12-03

## 2018-12-03 DIAGNOSIS — Z01.818 PRE-OP TESTING: ICD-10-CM

## 2018-12-03 PROCEDURE — 93922 UPR/L XTREMITY ART 2 LEVELS: CPT | Mod: TC,PO

## 2018-12-03 PROCEDURE — 93926 LOWER EXTREMITY STUDY: CPT | Mod: 26,,, | Performed by: RADIOLOGY

## 2018-12-03 PROCEDURE — 93926 LOWER EXTREMITY STUDY: CPT | Mod: TC,PO

## 2018-12-03 PROCEDURE — 93922 UPR/L XTREMITY ART 2 LEVELS: CPT | Mod: 26,,, | Performed by: RADIOLOGY

## 2018-12-03 NOTE — TELEPHONE ENCOUNTER
----- Message from Maura Gordon sent at 12/3/2018  9:17 AM CST -----  Type: Needs Medical Advice    Who Called: Patient    Best Call Back Number: [phone] (home)     Additional Information: Would like another order for ultrasound of the rt leg, has one scheduled today at 2:30pm

## 2018-12-03 NOTE — TELEPHONE ENCOUNTER
Spoke with Mrs Clark she is requesting a ultrasound of her right lower leg. I explained that Dr Dillard would have to examine her before he can order the study.  If her exam was normal she may not need the study. Mrs Clark verbalized understanding. She will call tomorrow for results of today's ultrasound.

## 2018-12-10 ENCOUNTER — OFFICE VISIT (OUTPATIENT)
Dept: PODIATRY | Facility: CLINIC | Age: 68
End: 2018-12-10
Payer: MEDICARE

## 2018-12-10 VITALS
WEIGHT: 214.75 LBS | HEART RATE: 61 BPM | DIASTOLIC BLOOD PRESSURE: 77 MMHG | HEIGHT: 66 IN | SYSTOLIC BLOOD PRESSURE: 138 MMHG | BODY MASS INDEX: 34.51 KG/M2

## 2018-12-10 DIAGNOSIS — L60.0 INGROWN NAIL: Primary | ICD-10-CM

## 2018-12-10 DIAGNOSIS — M79.675 PAIN AROUND TOENAIL, LEFT FOOT: ICD-10-CM

## 2018-12-10 PROCEDURE — 1101F PT FALLS ASSESS-DOCD LE1/YR: CPT | Mod: CPTII,S$GLB,, | Performed by: PODIATRIST

## 2018-12-10 PROCEDURE — 11750 EXCISION NAIL&NAIL MATRIX: CPT | Mod: TA,S$GLB,, | Performed by: PODIATRIST

## 2018-12-10 PROCEDURE — 3078F DIAST BP <80 MM HG: CPT | Mod: CPTII,S$GLB,, | Performed by: PODIATRIST

## 2018-12-10 PROCEDURE — 99999 PR PBB SHADOW E&M-EST. PATIENT-LVL III: CPT | Mod: PBBFAC,,, | Performed by: PODIATRIST

## 2018-12-10 PROCEDURE — 3075F SYST BP GE 130 - 139MM HG: CPT | Mod: CPTII,S$GLB,, | Performed by: PODIATRIST

## 2018-12-10 PROCEDURE — 99213 OFFICE O/P EST LOW 20 MIN: CPT | Mod: 25,S$GLB,, | Performed by: PODIATRIST

## 2018-12-10 RX ORDER — TOBRAMYCIN 3 MG/ML
SOLUTION/ DROPS OPHTHALMIC
Qty: 5 ML | Refills: 3 | Status: SHIPPED | OUTPATIENT
Start: 2018-12-10 | End: 2019-02-18

## 2018-12-10 NOTE — PROGRESS NOTES
Subjective:      Patient ID: Eduardo Esposito is a 68 y.o. female.    Chief Complaint: Ingrown Toenail (left great toe)    Sharp throbbing pain left big toe.  Gradual onset, worsening over past several days, aggravated by increased weight bearing, shoe gear, pressure.  Shoe changes no help.  Vascular studies without significant disease identified. Denies trauma, surgeyrl eft hallux.  Review of Systems   Constitution: Negative for chills, diaphoresis, fever, weakness, malaise/fatigue and night sweats.   Skin: Positive for nail changes.           Objective:      Physical Exam   Constitutional: She is oriented to person, place, and time. She appears well-developed and well-nourished. She is cooperative.   Oriented to time, place, and person.   Cardiovascular:   Pulses:       Dorsalis pedis pulses are 2+ on the right side, and 2+ on the left side.        Posterior tibial pulses are 1+ on the right side, and 1+ on the left side.   Capillary fill time 3 seconds.  All toes warm to touch.      Negative lower extremity edema bilateral.    Negative elevational pallor and dependent rubor bilateral.     Musculoskeletal:   Normal angle, base, station of gait. Decreased stride length, early heel off, moderately propulsive toe off bilateral.    All ten toes without clubbing, cyanosis, or signs of ischemia.      No pain to palpation bilateral lower extremities.      Range of motion, stability, muscle strength, and muscle tone are age and health appropriate normal bilateral feet and legs.       Lymphadenopathy:   Negative lymphadenopathy bilateral popliteal fossa and tarsal tunnel.  Negative lymphangitic streaking bilateral foot/ankle bilateral.     Neurological: She is alert and oriented to person, place, and time. She has normal strength. She is not disoriented. She displays no atrophy and no tremor. She exhibits normal muscle tone.   Reflex Scores:       Patellar reflexes are 2+ on the right side and 2+ on the left side.        Achilles reflexes are 2+ on the right side and 2+ on the left side.  Skin: Skin is warm, dry and intact. No abrasion, no bruising, no burn, no ecchymosis, no laceration, no lesion, no petechiae and no rash noted. She is not diaphoretic. No cyanosis or erythema. No pallor. Nails show no clubbing.   Skin thin, atrophic, with decreased density and distribution of pedal hair bilateral, but without hyperpigmentation, suze discoloration,  ulcers, masses, nodules or cords palpated bilateral feet and legs.      Toenails 1st, 2nd, 3rd, 4th, 5th  bilateral are hypertrophic thickened 2-3 mm, dystrophic, discolored tanish brown with tan, gray crumbly subungual debris.  Tender to distal nail plate pressure, without periungual skin abnormality of each.      Visible and palpable ingrowth of toenail bilateral border left hallux with pain to palpation, without ulceration, drainage, pus, tracking, fluctuance, malodor, or cardinal signs infection.               Assessment:       Encounter Diagnoses   Name Primary?    Ingrown nail Yes    Pain around toenail, left foot          Plan:       Eduardo was seen today for ingrown toenail.    Diagnoses and all orders for this visit:    Ingrown nail    Pain around toenail, left foot    Other orders  -     tobramycin sulfate 0.3% (TOBREX) 0.3 % ophthalmic solution; 1-2 drops topically twice daily to affected toe(s).      I counseled the patient on her conditions, their implications and medical management.    Rx tobramycin drops bid.  Cover left hallux all times with light sterile dressing changing daily.    Matrixectomy left hallux total nail.    Discussed conservative treatment with shoes of adequate dimensions, material, and style to alleviate symptoms and delay or prevent surgical intervention.              No Follow-up on file.

## 2018-12-10 NOTE — PROCEDURES
Nail Removal  Date/Time: 12/10/2018 8:56 AM  Performed by: Austin Dillard DPM  Authorized by: Austin Dillard DPM     Consent Done?:  Yes (Written)    Location:  Left foot  Location detail:  Left big toe  Anesthesia:  Digital block  Local anesthetic: lidocaine 2% without epinephrine  Anesthetic total (ml):  5  Preparation:  Skin prepped with alcohol    Amount removed:  Complete  Wedge excision of skin of nail fold: No    Nail bed sutured?: No    Nail matrix removed:  Complete  Removed nail replaced and anchored: No    Dressing applied:  4x4, antibiotic ointment and dressing applied  Patient tolerance:  Patient tolerated the procedure well with no immediate complications     Time out performed prior to anesthetizing the surgical area and all patient identifiers, procedures, and site markings are in agreement.

## 2018-12-12 DIAGNOSIS — E11.9 TYPE 2 DIABETES MELLITUS WITHOUT COMPLICATION, UNSPECIFIED WHETHER LONG TERM INSULIN USE: ICD-10-CM

## 2018-12-28 ENCOUNTER — PATIENT OUTREACH (OUTPATIENT)
Dept: ADMINISTRATIVE | Facility: HOSPITAL | Age: 68
End: 2018-12-28

## 2018-12-28 NOTE — LETTER
December 28, 2018    Eduardo Esposito  [address]  [place] [address]             [facility]  [address]  [place] [address]  Phone: [phone] Dear Cathyrn [facility] is committed to your overall health and would like to ensure that you are up to date on your recommended test and/or procedures.   Walter Nance MD  has found that your chart shows you may be due for the following:    DIABETIC EYE EXAM  MAMMOGRAM    If you have had any of the above done at another facility, please let us know so that we may obtain copies from that facility.  If you have a copy of these records, please provide a copy for us to scan into your chart.  You are welcome to request that the report be faxed to us at  ([phone]).     Otherwise, please schedule these appointments at your earliest convenience by calling [phone] or going to [email or web address].    If you have an upcoming scheduled appointment, please disregard this letter.    Sincerely,  Your [facility]  MD Melani Segovia LPN Clinical Care Coordinator  [facility] [facility] [address] [place] [address]  Phone [phone]  Fax [phone]

## 2019-01-04 ENCOUNTER — OFFICE VISIT (OUTPATIENT)
Dept: PODIATRY | Facility: CLINIC | Age: 69
End: 2019-01-04
Payer: MEDICARE

## 2019-01-04 VITALS — WEIGHT: 214.75 LBS | HEIGHT: 66 IN | BODY MASS INDEX: 34.51 KG/M2

## 2019-01-04 DIAGNOSIS — Z98.890 POST-OPERATIVE STATE: Primary | ICD-10-CM

## 2019-01-04 PROCEDURE — 99999 PR PBB SHADOW E&M-EST. PATIENT-LVL II: ICD-10-PCS | Mod: PBBFAC,,, | Performed by: PODIATRIST

## 2019-01-04 PROCEDURE — 99024 POSTOP FOLLOW-UP VISIT: CPT | Mod: S$GLB,,, | Performed by: PODIATRIST

## 2019-01-04 PROCEDURE — 99999 PR PBB SHADOW E&M-EST. PATIENT-LVL II: CPT | Mod: PBBFAC,,, | Performed by: PODIATRIST

## 2019-01-04 PROCEDURE — 99024 PR POST-OP FOLLOW-UP VISIT: ICD-10-PCS | Mod: S$GLB,,, | Performed by: PODIATRIST

## 2019-01-04 NOTE — PROGRESS NOTES
Subjective:      Patient ID: Eduardo Esposito is a 68 y.o. female.    Chief Complaint: Follow-up (left great toe nail removal)    2 weeks sp matrixetomy total left hallux.  Covering all times with band aid dressings  And using topical antibiotic.  Denies signs infection.    Review of Systems   Constitution: Negative for chills, diaphoresis, fever, weakness, malaise/fatigue and night sweats.   Skin: Positive for nail changes.           Objective:      Physical Exam   Constitutional: She is oriented to person, place, and time. She appears well-developed and well-nourished. She is cooperative. No distress.   Cardiovascular:   Pulses:       Popliteal pulses are 2+ on the right side, and 2+ on the left side.        Dorsalis pedis pulses are 2+ on the right side, and 2+ on the left side.        Posterior tibial pulses are 2+ on the right side, and 2+ on the left side.   Capillary refill 3 seconds all toes/distal feet, all toes/both feet warm to touch.      Negative lymphadenopathy bilateral popliteal fossa and tarsal tunnel.      Negavie lower extremity edema bilateral.     Musculoskeletal:        Right ankle: She exhibits normal range of motion, no swelling, no ecchymosis, no deformity, no laceration and normal pulse. Achilles tendon normal. Achilles tendon exhibits no pain, no defect and normal Hu's test results.   Normal angle, base, station of gait. All ten toes without clubbing, cyanosis, or signs of ischemia.  No pain to palpation bilateral lower extremities.  Range of motion, stability, muscle strength, and muscle tone normal bilateral feet and legs.     Lymphadenopathy: No inguinal adenopathy noted on the right or left side.   Negative lymphadenopathy bilateral popliteal fossa and tarsal tunnel.    Negative lymphangitic streaking bilateral feet/ankles/legs.   Neurological: She is alert and oriented to person, place, and time. She has normal strength. She displays no atrophy and no tremor. No sensory deficit.  She exhibits normal muscle tone. Gait normal.   Reflex Scores:       Patellar reflexes are 2+ on the right side and 2+ on the left side.       Achilles reflexes are 2+ on the right side and 2+ on the left side.  Skin: Skin is warm, dry and intact. Capillary refill takes 2 to 3 seconds. No abrasion, no bruising, no burn, no ecchymosis, no laceration, no lesion and no rash noted. She is not diaphoretic. No cyanosis or erythema. No pallor. Nails show no clubbing.     Wound left hallux nailbed pink, granular  Without drainage, pus, tracking, fluctuance, malodor, or cardinal signs infection.     Otherwise, Skin is normal age and health appropriate color, turgor, texture, and temperature bilateral lower extremities without ulceration, hyperpigmentation, discoloration, masses nodules or cords palpated.  No ecchymosis, erythema, edema, or cardinal signs of infection bilateral lower extremities.     Psychiatric: She has a normal mood and affect.             Assessment:       Encounter Diagnosis   Name Primary?    Post-operative state Yes         Plan:       Eduardo was seen today for follow-up.    Diagnoses and all orders for this visit:    Post-operative state      I counseled the patient on her conditions, their implications and medical management.        Continue above care until completely closed/healed.    Discussed conservative treatment with shoes of adequate dimensions, material, and style to alleviate symptoms and delay or prevent future surgical intervention.            Follow-up if symptoms worsen or fail to improve.

## 2019-01-31 ENCOUNTER — PATIENT OUTREACH (OUTPATIENT)
Dept: ADMINISTRATIVE | Facility: HOSPITAL | Age: 69
End: 2019-01-31

## 2019-01-31 NOTE — LETTER
January 31, 2019    Eduardo Esposito  [address]  [place] [address]             [facility]  [address]  [place] [address]  Phone: [phone] Dear [facility] is committed to your overall health and would like to ensure that you are up to date on your recommended test and/or procedures.   Walter Nance MD  has found that your chart shows you may be due for the following:    YEARLY DIABETIC EYE EXAM  HEMOGLOBIN  A1C  MAMMOGRAM    If you have had any of the above done at another facility, please let us know so that we may obtain copies from that facility.  If you have a copy of these records, please provide a copy for us to scan into your chart.  You are welcome to request that the report be faxed to us at  ([phone]).     Otherwise, please schedule these appointments at your earliest convenience by calling [phone] or going to [email or web address].    If you have an upcoming scheduled appointment for the item above, please disregard this letter.    Sincerely,  Your [facility]  MD Melani Segovia LPN Clinical Care Coordinator  [facility] [address] [place] [address]  Phone [phone]  Fax [phone]

## 2019-02-04 ENCOUNTER — PATIENT OUTREACH (OUTPATIENT)
Dept: ADMINISTRATIVE | Facility: HOSPITAL | Age: 69
End: 2019-02-04

## 2019-02-04 NOTE — LETTER
February 4, 2019    Eduardo Esposito  [address]  [place] [address]             [facility]  [address]  [facility] [address]  Phone: [phone] Dear [facility] is committed to your overall health and would like to ensure that you are up to date on your recommended test and/or procedures.   Walter Nance MD  has found that your chart shows you may be due for the following:    YEARLY DIABETIC EYE EXAM  HEMOGLOBIN  A1C  MAMMOGRAM    If you have had any of the above done at another facility, please let us know so that we may obtain copies from that facility.  If you have a copy of these records, please provide a copy for us to scan into your chart.  You are welcome to request that the report be faxed to us at  ([phone]).     Otherwise, please schedule these appointments at your earliest convenience by calling [phone] or going to [email or web address].    If you have an upcoming scheduled appointment for the item above, please disregard this letter.    Sincerely,  [facility]  MD Melani Segovia LPN Clinical Care Coordinator  [facility] [address] [place] [address]  Phone [phone]  Fax [phone]

## 2019-02-18 ENCOUNTER — DOCUMENTATION ONLY (OUTPATIENT)
Dept: FAMILY MEDICINE | Facility: CLINIC | Age: 69
End: 2019-02-18

## 2019-02-18 ENCOUNTER — OFFICE VISIT (OUTPATIENT)
Dept: FAMILY MEDICINE | Facility: CLINIC | Age: 69
End: 2019-02-18
Payer: MEDICARE

## 2019-02-18 ENCOUNTER — APPOINTMENT (OUTPATIENT)
Dept: LAB | Facility: HOSPITAL | Age: 69
End: 2019-02-18
Attending: INTERNAL MEDICINE
Payer: MEDICARE

## 2019-02-18 VITALS
OXYGEN SATURATION: 98 % | BODY MASS INDEX: 34.68 KG/M2 | WEIGHT: 215.81 LBS | DIASTOLIC BLOOD PRESSURE: 86 MMHG | SYSTOLIC BLOOD PRESSURE: 136 MMHG | RESPIRATION RATE: 16 BRPM | HEART RATE: 64 BPM | HEIGHT: 66 IN

## 2019-02-18 DIAGNOSIS — Z12.39 BREAST CANCER SCREENING: ICD-10-CM

## 2019-02-18 DIAGNOSIS — I48.0 PAROXYSMAL ATRIAL FIBRILLATION: ICD-10-CM

## 2019-02-18 DIAGNOSIS — E11.9 CONTROLLED TYPE 2 DIABETES MELLITUS WITHOUT COMPLICATION, WITHOUT LONG-TERM CURRENT USE OF INSULIN: ICD-10-CM

## 2019-02-18 DIAGNOSIS — M70.61 TROCHANTERIC BURSITIS OF RIGHT HIP: Primary | ICD-10-CM

## 2019-02-18 DIAGNOSIS — I10 ESSENTIAL HYPERTENSION: ICD-10-CM

## 2019-02-18 LAB
ALBUMIN SERPL BCP-MCNC: 4.4 G/DL
ALP SERPL-CCNC: 85 U/L
ALT SERPL W/O P-5'-P-CCNC: 26 U/L
ANION GAP SERPL CALC-SCNC: 10 MMOL/L
AST SERPL-CCNC: 20 U/L
BILIRUB SERPL-MCNC: 0.9 MG/DL
BUN SERPL-MCNC: 21 MG/DL
CALCIUM SERPL-MCNC: 10.4 MG/DL
CHLORIDE SERPL-SCNC: 102 MMOL/L
CHOLEST SERPL-MCNC: 157 MG/DL
CHOLEST/HDLC SERPL: 3 {RATIO}
CO2 SERPL-SCNC: 28 MMOL/L
CREAT SERPL-MCNC: 0.7 MG/DL
EST. GFR  (AFRICAN AMERICAN): >60 ML/MIN/1.73 M^2
EST. GFR  (NON AFRICAN AMERICAN): >60 ML/MIN/1.73 M^2
GLUCOSE SERPL-MCNC: 70 MG/DL
HDLC SERPL-MCNC: 52 MG/DL
HDLC SERPL: 33.1 %
LDLC SERPL CALC-MCNC: 74.6 MG/DL
NONHDLC SERPL-MCNC: 105 MG/DL
POTASSIUM SERPL-SCNC: 4 MMOL/L
PROT SERPL-MCNC: 7.4 G/DL
SODIUM SERPL-SCNC: 140 MMOL/L
TRIGL SERPL-MCNC: 152 MG/DL

## 2019-02-18 PROCEDURE — 1101F PR PT FALLS ASSESS DOC 0-1 FALLS W/OUT INJ PAST YR: ICD-10-PCS | Mod: CPTII,S$GLB,, | Performed by: INTERNAL MEDICINE

## 2019-02-18 PROCEDURE — 3044F PR MOST RECENT HEMOGLOBIN A1C LEVEL <7.0%: ICD-10-PCS | Mod: CPTII,S$GLB,, | Performed by: INTERNAL MEDICINE

## 2019-02-18 PROCEDURE — 3079F PR MOST RECENT DIASTOLIC BLOOD PRESSURE 80-89 MM HG: ICD-10-PCS | Mod: CPTII,S$GLB,, | Performed by: INTERNAL MEDICINE

## 2019-02-18 PROCEDURE — 1101F PT FALLS ASSESS-DOCD LE1/YR: CPT | Mod: CPTII,S$GLB,, | Performed by: INTERNAL MEDICINE

## 2019-02-18 PROCEDURE — 99214 PR OFFICE/OUTPT VISIT, EST, LEVL IV, 30-39 MIN: ICD-10-PCS | Mod: S$GLB,,, | Performed by: INTERNAL MEDICINE

## 2019-02-18 PROCEDURE — 3079F DIAST BP 80-89 MM HG: CPT | Mod: CPTII,S$GLB,, | Performed by: INTERNAL MEDICINE

## 2019-02-18 PROCEDURE — 3044F HG A1C LEVEL LT 7.0%: CPT | Mod: CPTII,S$GLB,, | Performed by: INTERNAL MEDICINE

## 2019-02-18 PROCEDURE — 80061 LIPID PANEL: CPT

## 2019-02-18 PROCEDURE — 99214 OFFICE O/P EST MOD 30 MIN: CPT | Mod: S$GLB,,, | Performed by: INTERNAL MEDICINE

## 2019-02-18 PROCEDURE — 83036 HEMOGLOBIN GLYCOSYLATED A1C: CPT

## 2019-02-18 PROCEDURE — 3075F SYST BP GE 130 - 139MM HG: CPT | Mod: CPTII,S$GLB,, | Performed by: INTERNAL MEDICINE

## 2019-02-18 PROCEDURE — 3075F PR MOST RECENT SYSTOLIC BLOOD PRESS GE 130-139MM HG: ICD-10-PCS | Mod: CPTII,S$GLB,, | Performed by: INTERNAL MEDICINE

## 2019-02-18 PROCEDURE — 80053 COMPREHEN METABOLIC PANEL: CPT

## 2019-02-18 RX ORDER — ATORVASTATIN CALCIUM 80 MG/1
40 TABLET, FILM COATED ORAL DAILY
Qty: 90 TABLET | Refills: 1 | Status: SHIPPED | OUTPATIENT
Start: 2019-02-18 | End: 2019-07-15 | Stop reason: SDUPTHER

## 2019-02-18 RX ORDER — MENTHOL 5.8 MG/1
LOZENGE ORAL
COMMUNITY
Start: 2019-01-10 | End: 2019-06-05

## 2019-02-18 RX ORDER — APIXABAN 5 MG/1
5 TABLET, FILM COATED ORAL 2 TIMES DAILY
Qty: 180 TABLET | Refills: 1 | Status: SHIPPED | OUTPATIENT
Start: 2019-02-18 | End: 2019-11-21 | Stop reason: SDUPTHER

## 2019-02-18 RX ORDER — TRIAMTERENE/HYDROCHLOROTHIAZID 37.5-25 MG
1 TABLET ORAL DAILY
Qty: 90 TABLET | Refills: 1 | Status: SHIPPED | OUTPATIENT
Start: 2019-02-18 | End: 2019-07-15 | Stop reason: SDUPTHER

## 2019-02-18 NOTE — PATIENT INSTRUCTIONS
Use a cane in left hand.  Do the stretching exercises I showed you.    KT tape for the hip bursitis      Stretching exercises that I showed you    Tylenol and the occasional Motrin or Aleve.

## 2019-02-18 NOTE — PROGRESS NOTES
Subjective:       Patient ID: Eduardo Esposito is a 68 y.o. female.    Chief Complaint: Leg Pain    HPI         Lower_Extremity_Problems(+). Pain: yes. . Injury: no.   HPI:     ONSET/TIMING: . Onset   2 y   ago.     DURATION:   Intermittent         QUALITY/COURSE:    worse,. .     LOCATION:     r hip   . Radiation: no.      INTENSITY/SEVERITY:. Severity is #   8  (10 point scale).        MODIFIERS/TREATMENTS: Current_Limitations_are:  none..   Taking medications: no    Physical_Therapy: no.  Chiropractor: no.     SYMPTOMS/RELATED: . --Possible medication side effects include:.     The following symptoms/statements  are positive if BOLD, negative otherwise.      CONTEXT/WHEN: . Activity . weight bearing. Inactivity.  Sudden  Work related.  Similar_problems_in past.   . Litigation_pending . X-rays. CT . MRI      REVIEW OF SYMPTOMS:   Numbness. Weakness. Muscle_Problems. Fever. Joint_Problems. Swelling. Erythema. Weight_loss. Instability.      .         CHIEF COMPLAINT: Hypertension  HPI:     ONSET:      QUALITY/COURSE:   Unchanged.     INTENSITY/SEVERITY:  Average blood pressure is  ?.     MODIFIERS/TREATMENTS:  Taking medications: yes. .High sodium intake: no. alcohol: no      The following symptoms are positive only if BOLDED, otherwise are negative.      SYMPTOMS/RELATED: Possible medication side effects include:   Depression..  . Cough. . Constipation.    REVIEW OF SYMPTOMS: . Weight_loss . Weight_gain . Leg_cramps ..    TARGET ORGAN DAMAGE:: angina/ prior myocardial infarction, chronic kidney disease, heart failure, left ventricular hypertrophy, peripheral artery disease, prior coronary revascularization, retinopathy, stroke. transient ischemic attack.          CHIEF COMPLAINT: Diabetes.  HPI:     ONSET/TIMING:     QUALITY/COURSE:   unchanged..      INTENSITY/SEVERITY: . Measures blood sugar :  3 times per day.        Lowest recent BS ?. Average .     CONTEXT/WHEN: last HgbA1c    Lab Results   Component  "Value Date    HGBA1C 5.7 (H) 07/05/2018      weights:    Wt Readings from Last 1 Encounters:   02/18/19 1006 97.9 kg (215 lb 13.3 oz)         SYMPTOMS/RELATED: . . Possible medication side effects include:     The following symptoms/statements  are present IF IN BOLD, negative otherwise.         MODIFIERS/TREATMENTS: Not_taking_medications:  .  Non-compliance_with_Diabetic_diet  .  No_eye_exam_within_last_year.  Not_practicing_good_foot_care.    REVIEW OF SYMPTOMS: . Weight_gain. Weight_loss. Neuropathy. Recurrent_infections. Skin_ulcers        Review of Systems      Objective:      Vitals:    02/18/19 1006   BP: 136/86   Pulse: 64   Resp: 16   SpO2: 98%   Weight: 97.9 kg (215 lb 13.3 oz)   Height: 5' 6" (1.676 m)   PainSc:   8   PainLoc: Leg     Physical Exam   Constitutional: She appears well-developed and well-nourished.   Cardiovascular: Normal rate, regular rhythm and normal heart sounds.   Pulses:       Dorsalis pedis pulses are 2+ on the right side, and 2+ on the left side.   Pulmonary/Chest: Effort normal and breath sounds normal. No respiratory distress. She has no wheezes.   Abdominal: Soft. Bowel sounds are normal. There is no tenderness.   Musculoskeletal:        Right foot: There is normal range of motion and no deformity.        Left foot: There is normal range of motion and no deformity.   Very tender over greater trochanter right side.  Only 10° of straight leg raising on the right before pain.  Patient limps very badly.   Feet:   Right Foot:   Protective Sensation: 5 sites tested. 5 sites sensed.   Skin Integrity: Negative for ulcer, blister, skin breakdown, erythema, warmth, callus or dry skin.   Left Foot:   Protective Sensation: 5 sites tested. 5 sites sensed.   Skin Integrity: Negative for ulcer, blister, skin breakdown, erythema, warmth, callus or dry skin.   Neurological: She is alert.   Psychiatric: She has a normal mood and affect. Her behavior is normal. Thought content normal.   Nursing " note and vitals reviewed.         Assessment:       1. Trochanteric bursitis of right hip    2. Controlled type 2 diabetes mellitus without complication, without long-term current use of insulin    3. Essential hypertension    4. Paroxysmal atrial fibrillation          Plan:       Trochanteric bursitis of right hip  -     Ambulatory consult to Physical Therapy  -     X-Ray Hip 2 or 3 views Right; Future; Expected date: 02/18/2019    Controlled type 2 diabetes mellitus without complication, without long-term current use of insulin  -     Hemoglobin A1c; Future; Expected date: 02/18/2019  -     Lipid panel; Future; Expected date: 02/18/2019  -     Comprehensive metabolic panel; Future; Expected date: 02/18/2019  -     Ambulatory referral to Optometry  -     atorvastatin (LIPITOR) 80 MG tablet; Take 0.5 tablets (40 mg total) by mouth once daily.  Dispense: 90 tablet; Refill: 1    Essential hypertension  -     triamterene-hydrochlorothiazide 37.5-25 mg (MAXZIDE-25) 37.5-25 mg per tablet; Take 1 tablet by mouth once daily.  Dispense: 90 tablet; Refill: 1    Paroxysmal atrial fibrillation  -     ELIQUIS 5 mg Tab; Take 1 tablet (5 mg total) by mouth 2 (two) times daily.  Dispense: 180 tablet; Refill: 1      Follow-up in about 3 months (around 5/18/2019).

## 2019-02-18 NOTE — PROGRESS NOTES
Health Maintenance Due   Topic Date Due    Eye Exam  06/01/1958    TETANUS VACCINE  06/01/1966    Mammogram  06/01/1988    Hemoglobin A1c  01/05/2019    Foot Exam  04/12/2019

## 2019-02-19 LAB
ESTIMATED AVG GLUCOSE: 120 MG/DL
HBA1C MFR BLD HPLC: 5.8 %

## 2019-02-26 ENCOUNTER — HOSPITAL ENCOUNTER (OUTPATIENT)
Dept: RADIOLOGY | Facility: CLINIC | Age: 69
Discharge: HOME | End: 2019-02-26
Attending: INTERNAL MEDICINE
Payer: MEDICARE

## 2019-02-26 DIAGNOSIS — Z12.39 BREAST CANCER SCREENING: ICD-10-CM

## 2019-02-26 DIAGNOSIS — M70.61 TROCHANTERIC BURSITIS OF RIGHT HIP: ICD-10-CM

## 2019-02-26 PROCEDURE — 77067 SCR MAMMO BI INCL CAD: CPT | Mod: 26,,, | Performed by: RADIOLOGY

## 2019-02-26 PROCEDURE — 73502 X-RAY EXAM HIP UNI 2-3 VIEWS: CPT | Mod: 26,RT,S$GLB, | Performed by: RADIOLOGY

## 2019-02-26 PROCEDURE — 77067 MAMMO DIGITAL SCREENING BILAT WITH TOMOSYNTHESIS_CAD: ICD-10-PCS | Mod: 26,,, | Performed by: RADIOLOGY

## 2019-02-26 PROCEDURE — 77063 MAMMO DIGITAL SCREENING BILAT WITH TOMOSYNTHESIS_CAD: ICD-10-PCS | Mod: 26,,, | Performed by: RADIOLOGY

## 2019-02-26 PROCEDURE — 77067 SCR MAMMO BI INCL CAD: CPT | Mod: TC,PO

## 2019-02-26 PROCEDURE — 73502 X-RAY EXAM HIP UNI 2-3 VIEWS: CPT | Mod: TC,FY,PO,RT

## 2019-02-26 PROCEDURE — 77063 BREAST TOMOSYNTHESIS BI: CPT | Mod: 26,,, | Performed by: RADIOLOGY

## 2019-02-26 PROCEDURE — 73502 XR HIP 2 VIEW RIGHT: ICD-10-PCS | Mod: 26,RT,S$GLB, | Performed by: RADIOLOGY

## 2019-03-01 ENCOUNTER — TELEPHONE (OUTPATIENT)
Dept: FAMILY MEDICINE | Facility: CLINIC | Age: 69
End: 2019-03-01

## 2019-03-01 DIAGNOSIS — R92.8 ABNORMAL MAMMOGRAM: Primary | ICD-10-CM

## 2019-03-01 NOTE — TELEPHONE ENCOUNTER
----- Message from Arelis Ontiveros sent at 3/1/2019 10:52 AM CST -----    Type:  Patient Returning Call    Who Called: pt  Who Left Message for Patient:  nurse  Does the patient know what this is regarding?:   About  Hip  problems  Best Call Back Number:[phone]  Additional Information:    Calling to  Speak to the  nurse

## 2019-03-04 ENCOUNTER — TELEPHONE (OUTPATIENT)
Dept: RADIOLOGY | Facility: HOSPITAL | Age: 69
End: 2019-03-04

## 2019-03-12 ENCOUNTER — TELEPHONE (OUTPATIENT)
Dept: FAMILY MEDICINE | Facility: CLINIC | Age: 69
End: 2019-03-12

## 2019-03-12 ENCOUNTER — HOSPITAL ENCOUNTER (OUTPATIENT)
Dept: RADIOLOGY | Facility: HOSPITAL | Age: 69
Discharge: HOME | End: 2019-03-12
Attending: INTERNAL MEDICINE
Payer: MEDICARE

## 2019-03-12 DIAGNOSIS — R92.8 ABNORMAL MAMMOGRAM: ICD-10-CM

## 2019-03-12 PROCEDURE — 77061 BREAST TOMOSYNTHESIS UNI: CPT | Mod: 26,LT,, | Performed by: RADIOLOGY

## 2019-03-12 PROCEDURE — 76642 ULTRASOUND BREAST LIMITED: CPT | Mod: 26,LT,, | Performed by: RADIOLOGY

## 2019-03-12 PROCEDURE — 77061 MAMMO DIGITAL DIAGNOSTIC LEFT WITH TOMOSYNTHESIS_CAD: ICD-10-PCS | Mod: 26,LT,, | Performed by: RADIOLOGY

## 2019-03-12 PROCEDURE — 76642 US BREAST LEFT LIMITED: ICD-10-PCS | Mod: 26,LT,, | Performed by: RADIOLOGY

## 2019-03-12 PROCEDURE — 77061 BREAST TOMOSYNTHESIS UNI: CPT | Mod: TC,LT

## 2019-03-12 PROCEDURE — 77065 DX MAMMO INCL CAD UNI: CPT | Mod: 26,LT,, | Performed by: RADIOLOGY

## 2019-03-12 PROCEDURE — 77065 DX MAMMO INCL CAD UNI: CPT | Mod: TC,LT

## 2019-03-12 PROCEDURE — 76642 ULTRASOUND BREAST LIMITED: CPT | Mod: TC,LT

## 2019-03-12 PROCEDURE — 77065 MAMMO DIGITAL DIAGNOSTIC LEFT WITH TOMOSYNTHESIS_CAD: ICD-10-PCS | Mod: 26,LT,, | Performed by: RADIOLOGY

## 2019-03-12 NOTE — TELEPHONE ENCOUNTER
----- Message from Jackie Johnston sent at 3/11/2019  4:47 PM CDT -----  Contact: pt  ..Type:  Patient Returning Call    Who Called: pt  Who Left Message for Patient:  Awa  Does the patient know what this is regarding?:  n/a  Best Call Back Number:    Additional Information:  Pt returned a missed call  Please advise

## 2019-03-18 ENCOUNTER — TELEPHONE (OUTPATIENT)
Dept: RADIOLOGY | Facility: HOSPITAL | Age: 69
End: 2019-03-18

## 2019-03-18 DIAGNOSIS — R92.8 ABNORMAL MAMMOGRAM: Primary | ICD-10-CM

## 2019-03-18 NOTE — TELEPHONE ENCOUNTER
..Patient notified of diagnostic mammogram results.  Left breast biopsy is scheduled on  3/25/2019.   I sent request to Dr Royal Crystal to hold Eliquis 3-5 prior to stereotactic biopsy

## 2019-03-20 NOTE — TELEPHONE ENCOUNTER
Patient called in and said that she had been having problems with her phone, but her grandson had fixed it for her. Patient said that she has PT scheduled for next month and could have gone this month but she was waiting, as she knew she would have to pay a co-pay, so she would be ready to pay next month.

## 2019-03-22 ENCOUNTER — TELEPHONE (OUTPATIENT)
Dept: RADIOLOGY | Facility: HOSPITAL | Age: 69
End: 2019-03-22

## 2019-03-22 NOTE — TELEPHONE ENCOUNTER
..Patient notified of diagnostic mammogram results. Left breast biopsy is scheduled on  3/27/2019.   Ok to hold Eliquis 3 days prior to procedure per Dr Miguel Angel Crystal.

## 2019-03-27 ENCOUNTER — HOSPITAL ENCOUNTER (OUTPATIENT)
Dept: RADIOLOGY | Facility: HOSPITAL | Age: 69
Discharge: HOME | End: 2019-03-27
Attending: INTERNAL MEDICINE
Payer: MEDICARE

## 2019-03-27 DIAGNOSIS — R92.8 ABNORMAL MAMMOGRAM OF LEFT BREAST: ICD-10-CM

## 2019-04-04 ENCOUNTER — OFFICE VISIT (OUTPATIENT)
Dept: OPTOMETRY | Facility: CLINIC | Age: 69
End: 2019-04-04
Payer: MEDICARE

## 2019-04-04 DIAGNOSIS — H52.7 REFRACTIVE ERROR: ICD-10-CM

## 2019-04-04 DIAGNOSIS — E11.9 DIABETES MELLITUS WITHOUT OPHTHALMIC MANIFESTATIONS: Primary | ICD-10-CM

## 2019-04-04 DIAGNOSIS — H25.13 NUCLEAR SCLEROSIS, BILATERAL: ICD-10-CM

## 2019-04-04 PROCEDURE — 99999 PR PBB SHADOW E&M-EST. PATIENT-LVL II: ICD-10-PCS | Mod: PBBFAC,,, | Performed by: OPTOMETRIST

## 2019-04-04 PROCEDURE — 99999 PR PBB SHADOW E&M-EST. PATIENT-LVL II: CPT | Mod: PBBFAC,,, | Performed by: OPTOMETRIST

## 2019-04-04 PROCEDURE — 92004 COMPRE OPH EXAM NEW PT 1/>: CPT | Mod: S$GLB,,, | Performed by: OPTOMETRIST

## 2019-04-04 PROCEDURE — 92015 DETERMINE REFRACTIVE STATE: CPT | Mod: S$GLB,,, | Performed by: OPTOMETRIST

## 2019-04-04 PROCEDURE — 92004 PR EYE EXAM, NEW PATIENT,COMPREHESV: ICD-10-PCS | Mod: S$GLB,,, | Performed by: OPTOMETRIST

## 2019-04-04 PROCEDURE — 92015 PR REFRACTION: ICD-10-PCS | Mod: S$GLB,,, | Performed by: OPTOMETRIST

## 2019-04-04 NOTE — LETTER
April 4, 2019      Walter Nance MD  [address]  [place] [address]           [facility]  [address] [place] [address] [address]  [facility] [address]  Phone: [phone]          Patient: Eduardo Esposito   MR Number: [id number]   YOB: 1950   Date of Visit: 4/4/2019       Dear Dr. Walter Nance:    Thank you for referring Eduardo Esposito to me for evaluation. Attached you will find relevant portions of my assessment and plan of care.    If you have questions, please do not hesitate to call me. I look forward to following Eduardo Esposito along with you.    Sincerely,    Priyank Sevilla, OD    Enclosure  CC:  No Recipients    If you would like to receive this communication electronically, please contact [email or web address] or [phone] to request more information on [employer] Link access.    For providers and/or their staff who would like to refer a patient to [facility], please contact us through our one-stop-shop provider referral line, [facility], at [phone].    If you feel you have received this communication in error or would no longer like to receive these types of communications, please e-mail [email or web address]

## 2019-04-04 NOTE — PROGRESS NOTES
HPI     Presenting Complaint: Pt here today for yearly diabetic eye exam.    Hemoglobin A1C       Date                     Value               Ref Range             Status                02/18/2019               5.8 (H)             4.0 - 5.6 %           Final                 07/05/2018               5.7 (H)             4.0 - 5.6 %           Final              Pt states vision has been stable with current glasses.    Ophthalmic medication / drops: None    (-) No hx of eye surgery or eye disease     (-) headaches  (-) diplopia   (-) flashes / (-) floaters      Last edited by Priyank Sevilla, OD on 4/4/2019  2:41 PM. (History)            Assessment /Plan     For exam results, see Encounter Report.    Diabetes mellitus without ophthalmic manifestations    Nuclear sclerosis, bilateral    Refractive error      DM type 2 w/o ocular retinopathy OU. Discussed possible ocular affects of uncontrolled blood sugar with patient. Recommended continued strong blood sugar control and continued care with PCP. Monitor yearly.     Mild NS OU. Not yet significant. Discussed possible ocular affects of cataracts. Acceptable BCVA OU. Discussed treatment options. Surgery not recommended at this time. Monitor yearly.     Dispensed updated spectacle Rx. Discussed various spectacle lens options. Discussed adaptation period to new specs.  Demonstrated new spec Rx vs current specs in phoropter with patient satisfaction.      RTC in 1 year for comprehensive eye exam, or sooner prn.

## 2019-04-05 ENCOUNTER — CLINICAL SUPPORT (OUTPATIENT)
Dept: REHABILITATION | Facility: HOSPITAL | Age: 69
End: 2019-04-05
Attending: INTERNAL MEDICINE
Payer: MEDICARE

## 2019-04-05 DIAGNOSIS — M70.61 TROCHANTERIC BURSITIS OF RIGHT HIP: ICD-10-CM

## 2019-04-05 PROCEDURE — 97110 THERAPEUTIC EXERCISES: CPT | Mod: PN

## 2019-04-05 PROCEDURE — 97161 PT EVAL LOW COMPLEX 20 MIN: CPT | Mod: PN

## 2019-04-05 NOTE — PLAN OF CARE
TIME RECORD    Date: 04/05/2019    Start Time:  1000  Stop Time:  1050    PROCEDURES:    TIMED  Procedure Time Min.   Thera Ex Start:1030  Stop:1050 20         UNTIMED  Procedure Time Min.   PT Eval Start:1000  Stop:1030 30     Total Timed Minutes:  20  Total Timed Units:  1  Total Untimed Units:  1  Charges Billed/# of units:  2    OUTPATIENT PHYSICAL THERAPY   PATIENT EVALUATION  Onset Date: 2/18/19  Primary Diagnosis: Trochanteric bursitis right hip  Treatment Diagnosis: R hip pains  Past Medical History:   Diagnosis Date    Atrial fibrillation     4/11/13-recent dx sceduled to see cardiology Monday- Dr Lynn-dm    Diabetes mellitus     Hyperlipemia     Hypertension      Precautions: risk for falls, universal  Prior Therapy: none  Medications: Eduardo Esposito has a current medication list which includes the following prescription(s): atorvastatin, eliquis, metformin, triamterene-hydrochlorothiazide 37.5-25 mg, vitamin b-12, and vitamin d3.  Nutrition:  Obese  History of Present Illness: Progressive pain x 2 years to right hip.  Prior Level of Function: Independent  Social History: retired  and   Place of Residence (Steps/Adaptations): lives alone on a 2 story elevated home with elevator.  Functional Deficits Leading to Referral/Nature of Injury: Patient had been using the rollator x 2 months. Has near fall when right leg henrry. Unable to put on socks or shoes. Pain with walking. Very uncomfortable when lying flat on her back. There is no position of comfort or relief.  Patient Therapy Goals: to learn exercises, rid of pain and function better.    Subjective     Eduardo Esposito states she has more and more pain with no position for comfort. Has been mostly barefoot at home and wearing sandals when going out because of difficulty putting on shoes and socks. Active in community exercises and pool activities.    Pain:  Location: hip joint  Description: Throbbing, Deep and  stabbing  Activities Which Increase Pain: unknown  Activities Which Decrease Pain: pain medication and ice  Pain Scale: 5/10 at best 8/10 now  10/10 at worst    Objective     Posture: pes/genuvalgus; R shoulder lower than left, + kyphosis  Palpation: no point tenderness  Sensation: intact  Range of Motion/Strength:   ROM Right Left Pain/Dysfunction with Movement:   Hip Flexion   90 deg    110 deg      No increased pain.   Hip Extension  10 deg     15 deg    Hip ABduction   30 deg    35 deg    Hip Internal Rotation    30 deg    45 deg    Hip External rotation    45 deg    45 deg    Knee extension    -5 deg    -10 deg    Knee  Flexion    90 deg    115 deg             Flexibility: tight hamstrings  SLR  R 70 deg; L  60 deg  Gait: With AD.  Device Used -  rollator  Analysis: antalgic gait with accelerated left leg swing  Bed Mobility:Independent  Transfers: Independent  Other: FOTO 53/100  Treatment: PT Eval and thera Ex; HEP given with written/pictorial and return demonstration: core strengthening, Bridging, SLR, Hip ABD/ADD with knee bent,     Assessment     Initial Assessment (Pertinent finding, problem list and factors affecting outcome): Painful condition with inhibited weakness to lower extremities. Imaging reports R hip bony schlerosis with osteophytes formation from degenerative changes with joint space narrowing.  Will benefit from there ex and pain management to restore mobility and function.  Rehab Potiential: good    Short Term Goals (2 Weeks):   1. Patient will report pain < 4/10  >50% of the time  2. Patient will establish and active HEP  Long Term Goals (5 Weeks):   1. Patient will have pain-free function.  2. Patient approximate increased ROM towards full ROM.  3. Patient will improve gait sans AD.  4. FOTO > 61/100    Plan     Certification Period: 4/5/19 to 5/10/19  Recommended Treatment Plan: 2 times per week for 5 weeks: Gait Training, Manual Therapy, Moist Heat/ Ice, Patient Education, Self Care,  Therapeutic Activites and Therapeutic Exercise  Other Recommendations: US      Therapist: TEENA Romero CERTIFY THE NEED FOR THESE SERVICES FURNISHED UNDER THIS PLAN OF TREATMENT AND WHILE UNDER MY CARE    Physician's comments: ________________________________________________________________________________________________________________________________________________      Physician's Name: ___________________________________

## 2019-04-05 NOTE — PATIENT INSTRUCTIONS
HIP: Abduction / External Rotation (Band)        Place band around knees. Lie on side with hips and knees bent. Raise top knee up, squeezing glutes. Keep feet together. Hold ___ seconds. Use ________ band. ___ reps per set, ___ sets per day, ___ days per week      Copyright © [facility]. All rights reserved.   HIP: Adduction - Hook-Lying (Ball)        Squeeze ball between legs with knees and hips bent to 90°. Hold ___ seconds. ___ reps per set, ___ sets per day, ___ days per week      Copyright © I. All rights reserved.   Hip Abduction / Adduction: with Extended Knee (Supine)        Bring left leg out to side and return. Keep knee straight.  Repeat ____ times per set. Do ____ sets per session. Do ____ sessions per day.     [email or web address]     Copyright © [employer]. All rights reserved.   Quadriceps (Straight Leg Raises)        Acuéstese boca arriba con daisy pesa de ____ kgs alrededor del tobillo dalton.  Manteniendo la rodilla extendida, levante la pierna ____ cms.  Puede realizar el ejercicio sentado con la espalda apoyada. Mantenga la espalda erguida.  Mantenga ____ segundos. Repita ____ veces. Manohar ____ sesiones por día.  CUIDADO: Muévase lentamente, evite tironear.    Copyright © I. All rights reserved.   Quad Sets        Slowly tighten thigh muscles of straight, left leg while counting out loud to ____. Relax.  Repeat ____ times. Do ____ sessions per day.     [email or web address]://[email or web address]/293     Copyright © [employer]. All rights reserved.   SUPINE: Bridging        Place feet on surface. Tighten glutes. Raise hips up. ___ reps per set, ___ sets per day, ___ days per week      Copyright © I. All rights reserved.   Pelvic Tilt        Flatten back by tightening stomach muscles and buttocks.  Repeat ____ times per set. Do ____ sets per session. Do ____ sessions per day.     [email or web address]://[email or web address]/135     Copyright © I. All rights reserved.

## 2019-04-10 ENCOUNTER — HOSPITAL ENCOUNTER (OUTPATIENT)
Dept: RADIOLOGY | Facility: HOSPITAL | Age: 69
Discharge: HOME | End: 2019-04-10
Attending: INTERNAL MEDICINE
Payer: MEDICARE

## 2019-04-10 ENCOUNTER — TELEPHONE (OUTPATIENT)
Dept: FAMILY MEDICINE | Facility: CLINIC | Age: 69
End: 2019-04-10

## 2019-04-10 PROCEDURE — 76098 X-RAY EXAM SURGICAL SPECIMEN: CPT | Mod: TC

## 2019-04-10 PROCEDURE — 25000003 PHARM REV CODE 250: Performed by: SURGERY

## 2019-04-10 PROCEDURE — 19081 BX BREAST 1ST LESION STRTCTC: CPT | Mod: LT,,, | Performed by: SURGERY

## 2019-04-10 PROCEDURE — 19081 MAMMO BREAST STEREOTACTIC BREAST BIOPSY LEFT: ICD-10-PCS | Mod: LT,,, | Performed by: SURGERY

## 2019-04-10 PROCEDURE — 88360 TUMOR IMMUNOHISTOCHEM/MANUAL: CPT | Mod: 59 | Performed by: PATHOLOGY

## 2019-04-10 PROCEDURE — 88360 TUMOR IMMUNOHISTOCHEM/MANUAL: CPT | Mod: 26,,, | Performed by: PATHOLOGY

## 2019-04-10 PROCEDURE — 88305 TISSUE EXAM BY PATHOLOGIST: CPT | Performed by: PATHOLOGY

## 2019-04-10 PROCEDURE — 88360 TISSUE SPECIMEN TO PATHOLOGY, RADIOLOGY: ICD-10-PCS | Mod: 26,,, | Performed by: PATHOLOGY

## 2019-04-10 PROCEDURE — 88305 TISSUE SPECIMEN TO PATHOLOGY, RADIOLOGY: ICD-10-PCS | Mod: 26,,, | Performed by: PATHOLOGY

## 2019-04-10 PROCEDURE — 19081 BX BREAST 1ST LESION STRTCTC: CPT | Mod: LT

## 2019-04-10 PROCEDURE — 88305 TISSUE EXAM BY PATHOLOGIST: CPT | Mod: 26,,, | Performed by: PATHOLOGY

## 2019-04-10 PROCEDURE — 27201044 MAMMO BREAST STEREOTACTIC BREAST BIOPSY LEFT

## 2019-04-10 RX ADMIN — LIDOCAINE HYDROCHLORIDE 20 ML: 10; .005 INJECTION, SOLUTION EPIDURAL; INFILTRATION; INTRACAUDAL; PERINEURAL at 01:04

## 2019-04-10 NOTE — TELEPHONE ENCOUNTER
----- Message from Fredrick Villatoro sent at 4/10/2019  3:17 PM CDT -----  Type:  Patient Call Back    Who Called:  pt  Who Left Message for Patient:nurse    Does the patient know what this is regarding?: returning a call   Best Call Back Number:  [phone]  Additional Information:  Please call pt and leave a detailed message if there is no answer.

## 2019-04-12 ENCOUNTER — TELEPHONE (OUTPATIENT)
Dept: REHABILITATION | Facility: HOSPITAL | Age: 69
End: 2019-04-12

## 2019-04-12 ENCOUNTER — TELEPHONE (OUTPATIENT)
Dept: RADIOLOGY | Facility: HOSPITAL | Age: 69
End: 2019-04-12

## 2019-04-15 ENCOUNTER — TELEPHONE (OUTPATIENT)
Dept: FAMILY MEDICINE | Facility: CLINIC | Age: 69
End: 2019-04-15

## 2019-04-15 NOTE — TELEPHONE ENCOUNTER
----- Message from Jackie Johnston sent at 4/15/2019  8:42 AM CDT -----  Contact: pt  ..Type:  Test Results    Who Called:  pt  Name of Test (Lab/Mammo/Etc):  Biopsy   Ordering Provider:  Dr Goyo Cortez Call Back Number:    Additional Information:  Pt is requesting to speak with a nurse to get her biopsy results  Please call to advise  Thanks

## 2019-04-16 ENCOUNTER — CLINICAL SUPPORT (OUTPATIENT)
Dept: REHABILITATION | Facility: HOSPITAL | Age: 69
End: 2019-04-16
Attending: INTERNAL MEDICINE
Payer: MEDICARE

## 2019-04-16 ENCOUNTER — TELEPHONE (OUTPATIENT)
Dept: FAMILY MEDICINE | Facility: CLINIC | Age: 69
End: 2019-04-16

## 2019-04-16 DIAGNOSIS — M70.61 TROCHANTERIC BURSITIS OF RIGHT HIP: ICD-10-CM

## 2019-04-16 PROCEDURE — 97110 THERAPEUTIC EXERCISES: CPT | Mod: PN

## 2019-04-16 NOTE — PROGRESS NOTES
Name: Eduardo Esposito  Clinic Number: [id number]  Date of Treatment: 04/16/2019   Diagnosis:   Encounter Diagnosis   Name Primary?    Trochanteric bursitis of right hip        Time in: 1206  Time Out: 1300  Total Treatment Time: 54        Subjective:    Eduardo reports she has pain in her hip.  Patient reports their pain to be 5/10 on a 0-10 scale with 0 being no pain and 10 being the worst pain imaginable.    Objective  Eduardo received therapeutic exercises to develop strength, endurance, ROM, flexibility, posture and core stabilization for 54 minutes including:     nustep x 10 min L-1  HSS 3 x 20 sec seated  GSS 3 x 20 sec seated  PPT x 20 reps  Bridging x 20 reps  Ball squeezes x 20 reps  Hip abd GTB x 20 reps  TrA sets supine x 20 reps  LTR x 20 reps with SB  DKTC x 20 reps with SB  IT band stretch with strap 3 x 30 sec    Pt given HEP of above exercise/stretches.      Pt demo fair understanding of the education provided. Eduardo demonstrated fair return demonstration of activities.     Assessment:     No increased pain reported with exercises.    Pt will continue to benefit from skilled PT intervention. Medical Necessity is demonstrated by:  Fall Risk, Pain limits function of effected part for some activities, Unable to participate fully in daily activities, Requires skilled supervision to complete and progress HEP and Weakness.    Patient is making fair progress towards established goals.    Plan:  Continue with established Plan of Care towards PT goals.

## 2019-04-16 NOTE — TELEPHONE ENCOUNTER
----- Message from Arelis Ontiveros sent at 4/16/2019  9:48 AM CDT -----   Type:  Test Results    Who Called: pt  Name of Test (Lab/Mammo/Etc):   Biopsy   breast  Best Call Back Number:  [phone]  Additional Information:placed a call to pod

## 2019-04-16 NOTE — TELEPHONE ENCOUNTER
----- Message from Arelis Ontiveros sent at 4/16/2019  9:48 AM CDT -----   Type:  Test Results    Who Called: pt  Name of Test (Lab/Mammo/Etc):   Biopsy   breast  Best Call Back Number:  [phone]  Additional Information:placed a call to pod

## 2019-04-17 ENCOUNTER — OFFICE VISIT (OUTPATIENT)
Dept: FAMILY MEDICINE | Facility: CLINIC | Age: 69
End: 2019-04-17
Payer: MEDICARE

## 2019-04-17 VITALS
OXYGEN SATURATION: 95 % | HEART RATE: 80 BPM | TEMPERATURE: 98 F | RESPIRATION RATE: 16 BRPM | HEIGHT: 66 IN | WEIGHT: 215.38 LBS | DIASTOLIC BLOOD PRESSURE: 86 MMHG | SYSTOLIC BLOOD PRESSURE: 118 MMHG | BODY MASS INDEX: 34.61 KG/M2

## 2019-04-17 DIAGNOSIS — Z17.0 MALIGNANT NEOPLASM OF BREAST IN FEMALE, ESTROGEN RECEPTOR POSITIVE, UNSPECIFIED LATERALITY, UNSPECIFIED SITE OF BREAST: Primary | ICD-10-CM

## 2019-04-17 DIAGNOSIS — C50.919 MALIGNANT NEOPLASM OF BREAST IN FEMALE, ESTROGEN RECEPTOR POSITIVE, UNSPECIFIED LATERALITY, UNSPECIFIED SITE OF BREAST: Primary | ICD-10-CM

## 2019-04-17 DIAGNOSIS — E11.9 CONTROLLED TYPE 2 DIABETES MELLITUS WITHOUT COMPLICATION, WITHOUT LONG-TERM CURRENT USE OF INSULIN: ICD-10-CM

## 2019-04-17 PROCEDURE — 3044F HG A1C LEVEL LT 7.0%: CPT | Mod: CPTII,S$GLB,, | Performed by: INTERNAL MEDICINE

## 2019-04-17 PROCEDURE — 3074F PR MOST RECENT SYSTOLIC BLOOD PRESSURE < 130 MM HG: ICD-10-PCS | Mod: CPTII,S$GLB,, | Performed by: INTERNAL MEDICINE

## 2019-04-17 PROCEDURE — 1101F PR PT FALLS ASSESS DOC 0-1 FALLS W/OUT INJ PAST YR: ICD-10-PCS | Mod: CPTII,S$GLB,, | Performed by: INTERNAL MEDICINE

## 2019-04-17 PROCEDURE — 3079F PR MOST RECENT DIASTOLIC BLOOD PRESSURE 80-89 MM HG: ICD-10-PCS | Mod: CPTII,S$GLB,, | Performed by: INTERNAL MEDICINE

## 2019-04-17 PROCEDURE — 3074F SYST BP LT 130 MM HG: CPT | Mod: CPTII,S$GLB,, | Performed by: INTERNAL MEDICINE

## 2019-04-17 PROCEDURE — 99213 OFFICE O/P EST LOW 20 MIN: CPT | Mod: S$GLB,,, | Performed by: INTERNAL MEDICINE

## 2019-04-17 PROCEDURE — 99213 PR OFFICE/OUTPT VISIT, EST, LEVL III, 20-29 MIN: ICD-10-PCS | Mod: S$GLB,,, | Performed by: INTERNAL MEDICINE

## 2019-04-17 PROCEDURE — 3044F PR MOST RECENT HEMOGLOBIN A1C LEVEL <7.0%: ICD-10-PCS | Mod: CPTII,S$GLB,, | Performed by: INTERNAL MEDICINE

## 2019-04-17 PROCEDURE — 3079F DIAST BP 80-89 MM HG: CPT | Mod: CPTII,S$GLB,, | Performed by: INTERNAL MEDICINE

## 2019-04-17 PROCEDURE — 1101F PT FALLS ASSESS-DOCD LE1/YR: CPT | Mod: CPTII,S$GLB,, | Performed by: INTERNAL MEDICINE

## 2019-04-17 RX ORDER — ANASTROZOLE 1 MG/1
1 TABLET ORAL DAILY
Qty: 30 TABLET | Refills: 4 | Status: SHIPPED | OUTPATIENT
Start: 2019-04-17 | End: 2019-12-12 | Stop reason: SDUPTHER

## 2019-04-17 RX ORDER — METFORMIN HYDROCHLORIDE 500 MG/1
500 TABLET ORAL 2 TIMES DAILY WITH MEALS
Qty: 180 TABLET | Refills: 1 | Status: SHIPPED | OUTPATIENT
Start: 2019-04-17 | End: 2019-07-15 | Stop reason: SDUPTHER

## 2019-04-17 NOTE — PROGRESS NOTES
"Subjective:       Patient ID: Eduardo Esposito is a 68 y.o. female.    Chief Complaint: breast biopsy results (pt needs refill on metformin)    HPI     Mother and cousin and breast cancer.  She is worried that she has a red very cancer.  BRCA was not done.  She had a biopsy showing invasive intraductal breast cancer.  She was strongly positive for estrogen receptors, positive for progesterone receptors and indeterminate for HER2.   Review of Systems      Objective:      Vitals:    04/17/19 1422   BP: 118/86   Pulse: 80   Resp: 16   Temp: 98.2 °F (36.8 °C)   TempSrc: Oral   SpO2: 95%   Weight: 97.7 kg (215 lb 6.2 oz)   Height: 5' 6" (1.676 m)   PainSc:   8   PainLoc: Hip     Physical Exam   Constitutional: She appears well-developed and well-nourished.   Cardiovascular: Normal rate, regular rhythm and normal heart sounds.   Pulmonary/Chest: Effort normal and breath sounds normal.   Abdominal: Soft. There is no tenderness.   Neurological: She is alert.   Psychiatric: She has a normal mood and affect. Her behavior is normal. Thought content normal.   Nursing note and vitals reviewed.        Assessment:       1. Malignant neoplasm of breast in female, estrogen receptor positive, unspecified laterality, unspecified site of breast    2. Controlled type 2 diabetes mellitus without complication, without long-term current use of insulin          Plan:       Malignant neoplasm of breast in female, estrogen receptor positive, unspecified laterality, unspecified site of breast  -     anastrozole (ARIMIDEX) 1 mg Tab; Take 1 tablet (1 mg total) by mouth once daily.  Dispense: 30 tablet; Refill: 4    Controlled type 2 diabetes mellitus without complication, without long-term current use of insulin  -     metFORMIN (GLUCOPHAGE) 500 MG tablet; Take 1 tablet (500 mg total) by mouth 2 (two) times daily with meals.  Dispense: 180 tablet; Refill: 1  -     CBC auto differential; Future; Expected date: 04/17/2019  -     Comprehensive " metabolic panel; Future; Expected date: 04/17/2019  -     Hemoglobin A1c; Future; Expected date: 04/17/2019  -     Lipid panel; Future; Expected date: 04/17/2019      Follow up in about 3 months (around 7/17/2019).

## 2019-04-18 ENCOUNTER — TELEPHONE (OUTPATIENT)
Dept: SURGERY | Facility: CLINIC | Age: 69
End: 2019-04-18

## 2019-04-18 NOTE — TELEPHONE ENCOUNTER
----- Message from Courtney Lambert sent at 4/18/2019 10:55 AM CDT -----  Contact: Self  Patient is very upset with her visit with her primary when he seem to know nothing about why she was there.  He gave her some medicine that he had to google to see what the side effects could be, she is not very confident that he knows anything about what she was there for.  Now she has to wait another week before seeing Dr Howard, when a patient finds out that she has breast cancer they need to have they hands held or at least have someone that checks on them and shows the compassion and helps with directions.  Dr Nance put the patient on anastrozole and she wants confirmation that she should even take it, she is very leary.  Call back at [phone] (home) to advise if she needs to take this medicine..  Thanks

## 2019-04-18 NOTE — TELEPHONE ENCOUNTER
Spoke with patient and scheduled patient's appointment for 4/17. Patient came in and spoke with Dr. Nance about her results.

## 2019-04-22 ENCOUNTER — CLINICAL SUPPORT (OUTPATIENT)
Dept: REHABILITATION | Facility: HOSPITAL | Age: 69
End: 2019-04-22
Attending: INTERNAL MEDICINE
Payer: MEDICARE

## 2019-04-22 DIAGNOSIS — M70.61 TROCHANTERIC BURSITIS OF RIGHT HIP: ICD-10-CM

## 2019-04-22 PROCEDURE — 97110 THERAPEUTIC EXERCISES: CPT | Mod: PN

## 2019-04-22 NOTE — PROGRESS NOTES
Name: Eduardo Esposito  Clinic Number: [id number]  Date of Treatment: 04/22/2019   Diagnosis: R hip pain  Encounter Diagnosis   Name Primary?    Trochanteric bursitis of right hip      Time in: 0900  Time Out: 0945  Total Treatment Time: 45  Group Time: 0      Subjective:    Eduardo reports variable pains.  Patient reports their pain to be 7/10 on a 0-10 scale with 0 being no pain and 10 being the worst pain imaginable.    Objective    Patient received individual therapy to increase strength, endurance and flexibility with 0 patients with activities as follows:     Eduardo received therapeutic exercises to develop strength, endurance and ROM for 45 minutes including:   Nu step 10' L1  Mat Exercises  PROM/gentle stretching  Bridging  Quad sets  Hamstring sets  Hip Abduction supine/sidelying  Hip Adduction  SLR    Encouraged Home Exercises and Aquatherapy  Pt demo good understanding of the education provided. Eduardo demonstrated     Assessment:     Pt will continue to benefit from skilled PT intervention. Medical Necessity is demonstrated by:  Unable to participate fully in daily activities.    Patient is making  progress towards established goals.    New/Revised plans:: Cancel US modality in the POC. ( Patient had suspicion of CA)      Plan:  Continue with established Plan of Care towards PT goals.

## 2019-04-24 ENCOUNTER — OFFICE VISIT (OUTPATIENT)
Dept: SURGERY | Facility: CLINIC | Age: 69
End: 2019-04-24
Payer: MEDICARE

## 2019-04-24 VITALS
BODY MASS INDEX: 35.19 KG/M2 | SYSTOLIC BLOOD PRESSURE: 175 MMHG | DIASTOLIC BLOOD PRESSURE: 103 MMHG | WEIGHT: 218.06 LBS | HEART RATE: 72 BPM

## 2019-04-24 DIAGNOSIS — C50.912 MALIGNANT NEOPLASM OF LEFT FEMALE BREAST, UNSPECIFIED ESTROGEN RECEPTOR STATUS, UNSPECIFIED SITE OF BREAST: Primary | ICD-10-CM

## 2019-04-24 PROCEDURE — 1101F PR PT FALLS ASSESS DOC 0-1 FALLS W/OUT INJ PAST YR: ICD-10-PCS | Mod: CPTII,S$GLB,, | Performed by: SURGERY

## 2019-04-24 PROCEDURE — 99204 OFFICE O/P NEW MOD 45 MIN: CPT | Mod: S$GLB,,, | Performed by: SURGERY

## 2019-04-24 PROCEDURE — 3077F PR MOST RECENT SYSTOLIC BLOOD PRESSURE >= 140 MM HG: ICD-10-PCS | Mod: CPTII,S$GLB,, | Performed by: SURGERY

## 2019-04-24 PROCEDURE — 1101F PT FALLS ASSESS-DOCD LE1/YR: CPT | Mod: CPTII,S$GLB,, | Performed by: SURGERY

## 2019-04-24 PROCEDURE — 99999 PR PBB SHADOW E&M-EST. PATIENT-LVL III: CPT | Mod: PBBFAC,,, | Performed by: SURGERY

## 2019-04-24 PROCEDURE — 99204 PR OFFICE/OUTPT VISIT, NEW, LEVL IV, 45-59 MIN: ICD-10-PCS | Mod: S$GLB,,, | Performed by: SURGERY

## 2019-04-24 PROCEDURE — 3080F PR MOST RECENT DIASTOLIC BLOOD PRESSURE >= 90 MM HG: ICD-10-PCS | Mod: CPTII,S$GLB,, | Performed by: SURGERY

## 2019-04-24 PROCEDURE — 99999 PR PBB SHADOW E&M-EST. PATIENT-LVL III: ICD-10-PCS | Mod: PBBFAC,,, | Performed by: SURGERY

## 2019-04-24 PROCEDURE — 3077F SYST BP >= 140 MM HG: CPT | Mod: CPTII,S$GLB,, | Performed by: SURGERY

## 2019-04-24 PROCEDURE — 3080F DIAST BP >= 90 MM HG: CPT | Mod: CPTII,S$GLB,, | Performed by: SURGERY

## 2019-04-26 ENCOUNTER — CLINICAL SUPPORT (OUTPATIENT)
Dept: REHABILITATION | Facility: HOSPITAL | Age: 69
End: 2019-04-26
Attending: INTERNAL MEDICINE
Payer: MEDICARE

## 2019-04-26 DIAGNOSIS — M70.61 TROCHANTERIC BURSITIS OF RIGHT HIP: ICD-10-CM

## 2019-04-26 PROCEDURE — 97110 THERAPEUTIC EXERCISES: CPT | Mod: PN

## 2019-04-26 NOTE — PROGRESS NOTES
Name: Eduardo Esposito  Clinic Number: [id number]  Date of Treatment: 04/26/2019   Diagnosis: R hip pain  Encounter Diagnosis   Name Primary?    Trochanteric bursitis of right hip      Time in: 1200  Time Out: 1230  Total Treatment Time: 30  Group Time: 0      Subjective:    Eduardo reports decreased pain and improvement of symptoms.  Patient reports their pain to be 2/10 on a 0-10 scale with 0 being no pain and 10 being the worst pain imaginable.    Objective    Patient received  therapy to increase strength, endurance and flexibility  with activities as follows:     Eduardo received therapeutic exercises to develop strength, endurance and ROM for 30 minutes including:   Nu step 10' L1  Mat Exercises  PROM/gentle stretching  Bridging  Quad sets  Hamstring sets  Hip Abduction supine knee bent/straight  Hip Adduction standing  SLR    Added 2 more Home Exercises: Standing heel raises and hip abduction  Pt demo good understanding of the education provided. Eduardo demonstrated     Assessment:     Pt tolerate Rx well.  Noted marked improvement with participation and enthusiasm. Less pain.  Patient is making  progress towards established goals.    Plan:  Continue with established Plan of Care towards PT goals.

## 2019-04-26 NOTE — PATIENT INSTRUCTIONS
Mini-Squats (Standing)        Stand with support. Bend knees slightly. Tighten pelvic floor. Hold for ___ seconds. Return to straight standing. Relax for ___ seconds.  Repeat ___ times. Do ___ times a day.    Copyright © I. All rights reserved.   HIP: Abduction - Standing        Squeeze glutes. Raise leg out and slightly back.  ___ reps per set, ___ sets per day, ___ days per week Hold onto a support.    Copyright © I. All rights reserved.

## 2019-04-29 ENCOUNTER — TELEPHONE (OUTPATIENT)
Dept: SURGERY | Facility: CLINIC | Age: 69
End: 2019-04-29

## 2019-04-29 ENCOUNTER — CLINICAL SUPPORT (OUTPATIENT)
Dept: REHABILITATION | Facility: HOSPITAL | Age: 69
End: 2019-04-29
Attending: INTERNAL MEDICINE
Payer: MEDICARE

## 2019-04-29 DIAGNOSIS — M70.61 TROCHANTERIC BURSITIS OF RIGHT HIP: ICD-10-CM

## 2019-04-29 PROCEDURE — 97110 THERAPEUTIC EXERCISES: CPT | Mod: PN

## 2019-04-29 NOTE — TELEPHONE ENCOUNTER
----- Message from Arelis Ontiveros sent at 4/29/2019  7:45 AM CDT -----    Pt  Is calling to  Speak to the  Nurse about booking another  Test  , needs to  Discuss   First // please call [phone]  Please  Leave a  Voice  Mail  Message

## 2019-04-29 NOTE — PROGRESS NOTES
Name: Eduardo Esposito  Clinic Number: [id number]  Date of Treatment: 04/29/2019   Diagnosis:   Encounter Diagnosis   Name Primary?    Trochanteric bursitis of right hip        Time in: 1000  Time Out: 1055  Total Treatment Time: 55        Subjective:    Eduardo reports she is having a lot of pain today in her R hio.  Patient reports their pain to be 7/10 on a 0-10 scale with 0 being no pain and 10 being the worst pain imaginable.    Objective    Eduardo received therapeutic exercises to develop strength, endurance, ROM, flexibility, posture and core stabilization for 55 minutes including:       nustep x 10 min L-1  HSS 3 x 20 sec seated  GSS 3 x 20 sec seated  PPT x 20 reps  Bridging x 20 reps  Ball squeezes x 20 reps  Hip abd GTB x 20 reps  TrA sets supine x 20 reps  LTR x 20 reps with SB  DKTC x 20 reps with SB  IT band stretch with strap 3 x 30 sec      Pt demo good understanding of the education provided. Eduardo demonstrated good return demonstration of activities.     Assessment:   Discussed proper shoe wear with pt due to pt wearing flip flops at all times.  Pt reports she is only able to put her flip flops and is unable to get any other shoe on her feet.  Advised pt of proper foot wear for proper support to low back, hips and knees.    Pt will continue to benefit from skilled PT intervention. Medical Necessity is demonstrated by:  Fall Risk, Pain limits function of effected part for some activities, Unable to participate fully in daily activities, Requires skilled supervision to complete and progress HEP and Weakness.    Patient is making good progress towards established goals.      Plan:  Continue with established Plan of Care towards PT goals.

## 2019-04-30 ENCOUNTER — TELEPHONE (OUTPATIENT)
Dept: HEMATOLOGY/ONCOLOGY | Facility: CLINIC | Age: 69
End: 2019-04-30

## 2019-05-01 ENCOUNTER — TELEPHONE (OUTPATIENT)
Dept: FAMILY MEDICINE | Facility: CLINIC | Age: 69
End: 2019-05-01

## 2019-05-01 NOTE — TELEPHONE ENCOUNTER
----- Message from Sam Pérez sent at 5/1/2019 11:05 AM CDT -----  Contact: self   Placed call to pod, patient want to speak with a nurse to see why she needs blood work please call back at [phone] (home) [phone] (work)

## 2019-05-03 ENCOUNTER — CLINICAL SUPPORT (OUTPATIENT)
Dept: REHABILITATION | Facility: HOSPITAL | Age: 69
End: 2019-05-03
Attending: INTERNAL MEDICINE
Payer: MEDICARE

## 2019-05-03 DIAGNOSIS — M70.61 TROCHANTERIC BURSITIS OF RIGHT HIP: ICD-10-CM

## 2019-05-03 PROCEDURE — 97110 THERAPEUTIC EXERCISES: CPT | Mod: PN

## 2019-05-03 NOTE — PROGRESS NOTES
"Name: Eduardo Esposito  Clinic Number: [id number]  Date of Treatment: 05/03/2019   Diagnosis:   Encounter Diagnosis   Name Primary?    Trochanteric bursitis of right hip        Time in: 1202  Time Out: 1300  Total Treatment Time: 58      Subjective:    Eduardo reports worsening of symptoms, increased pain and "I have been doing my exercises, this has to get better.  Starting week after next I will be getting in the pool and I know that'll make me feel better."  Patient reports their pain to be 8/10 on a 0-10 scale with 0 being no pain and 10 being the worst pain imaginable.    Objective  Eduardo received therapeutic exercises to develop strength, endurance, ROM, flexibility and posture for 58 minutes including:   Nustep x 10 min L-1   HSS 3 x 30 sec seated   GSS 3 x 20 sec seated   PPT x 20 reps   Bridging x 20 reps   Ball squeezes x 20 reps   Hip abd GTB x 20 reps   SLR x 20 reps (L/R)   TrA sets supine x 20 reps   LTR x 20 reps with SB   DKTC x 20 reps with SB    Written Home Exercises Provided: Cont with HEP  Pt demo good understanding of the education provided. Eduardo demonstrated good return demonstration of activities.     Assessment:   Patient remains to have increasing pain with therex, advised to decrease reps for HEP.    Pt will continue to benefit from skilled PT intervention. Medical Necessity is demonstrated by:  Fall Risk, Continued inability to participate in vocational pursuits, Pain limits function of effected part for some activities, Unable to participate fully in daily activities, Requires skilled supervision to complete and progress HEP and Weakness.    Patient is making fair progress towards established goals.    Plan:  Continue with established Plan of Care towards PT goals.   "

## 2019-05-06 ENCOUNTER — CLINICAL SUPPORT (OUTPATIENT)
Dept: REHABILITATION | Facility: HOSPITAL | Age: 69
End: 2019-05-06
Attending: INTERNAL MEDICINE
Payer: MEDICARE

## 2019-05-06 DIAGNOSIS — M70.61 TROCHANTERIC BURSITIS OF RIGHT HIP: ICD-10-CM

## 2019-05-06 PROCEDURE — 97110 THERAPEUTIC EXERCISES: CPT | Mod: KX,PN

## 2019-05-06 NOTE — PROGRESS NOTES
Name: Eduardo Esposito  Clinic Number: [id number]  Date of Treatment: 05/06/2019   Diagnosis:   Encounter Diagnosis   Name Primary?    Trochanteric bursitis of right hip        Time in: 1107  Time Out: 1153  Total Treatment Time: 46    Subjective:    Eduardo reports slight improvement in pain level today in R hip.  Patient reports their pain to be 7/10 on a 0-10 scale with 0 being no pain and 10 being the worst pain imaginable. Walking with rollator.     Objective:    Patient received individual therapy to increase strength, endurance, ROM, flexibility, posture and core stabilization with activities as follows:     Eduardo received therapeutic exercises to develop strength, endurance, ROM, flexibility, posture and core stabilization for 46 minutes including:     NuStep L4 10' LE's only  Seated hamstring stretches 3/30s L/R  Supine ex's 10/2:     PPT     Bridges     SLR L/R     Hip aBd L/R     PTR with swiss ball     DKC with swiss ball     TRA sets with swiss ball     Ballsqueeze     Clams GTB    Continue HEP daily.    Pt demo good understanding of the education provided. Patient demonstrated good return demonstration of activities.     Assessment:     God tolerance for ex's. Fwd trunk flexion at hips posture with minimal ability to correct with vc.     Pt will continue to benefit from skilled PT intervention. Medical Necessity is demonstrated by:  Requires skilled supervision to complete and progress HEP and Weakness.    Patient is making good progress towards established goals.    Plan:    Continue with established Plan of Care towards PT goals.

## 2019-05-10 ENCOUNTER — INITIAL CONSULT (OUTPATIENT)
Dept: HEMATOLOGY/ONCOLOGY | Facility: CLINIC | Age: 69
End: 2019-05-10
Payer: MEDICARE

## 2019-05-10 ENCOUNTER — LAB VISIT (OUTPATIENT)
Dept: LAB | Facility: HOSPITAL | Age: 69
End: 2019-05-10
Attending: INTERNAL MEDICINE
Payer: MEDICARE

## 2019-05-10 ENCOUNTER — CLINICAL SUPPORT (OUTPATIENT)
Dept: REHABILITATION | Facility: HOSPITAL | Age: 69
End: 2019-05-10
Attending: INTERNAL MEDICINE
Payer: MEDICARE

## 2019-05-10 VITALS
HEART RATE: 69 BPM | SYSTOLIC BLOOD PRESSURE: 126 MMHG | RESPIRATION RATE: 20 BRPM | BODY MASS INDEX: 35.12 KG/M2 | DIASTOLIC BLOOD PRESSURE: 62 MMHG | OXYGEN SATURATION: 95 % | WEIGHT: 218.5 LBS | HEIGHT: 66 IN | TEMPERATURE: 98 F

## 2019-05-10 DIAGNOSIS — C50.912 INFILTRATING DUCTAL CARCINOMA OF LEFT BREAST: Primary | ICD-10-CM

## 2019-05-10 DIAGNOSIS — I48.20 CHRONIC ATRIAL FIBRILLATION: ICD-10-CM

## 2019-05-10 DIAGNOSIS — C50.912 INFILTRATING DUCTAL CARCINOMA OF LEFT BREAST: ICD-10-CM

## 2019-05-10 DIAGNOSIS — Z79.01 LONG TERM (CURRENT) USE OF ANTICOAGULANTS: ICD-10-CM

## 2019-05-10 PROCEDURE — 99205 PR OFFICE/OUTPT VISIT, NEW, LEVL V, 60-74 MIN: ICD-10-PCS | Mod: S$GLB,,, | Performed by: INTERNAL MEDICINE

## 2019-05-10 PROCEDURE — 97010 HOT OR COLD PACKS THERAPY: CPT | Mod: PN

## 2019-05-10 PROCEDURE — 1101F PT FALLS ASSESS-DOCD LE1/YR: CPT | Mod: CPTII,S$GLB,, | Performed by: INTERNAL MEDICINE

## 2019-05-10 PROCEDURE — 3074F SYST BP LT 130 MM HG: CPT | Mod: CPTII,S$GLB,, | Performed by: INTERNAL MEDICINE

## 2019-05-10 PROCEDURE — 36415 COLL VENOUS BLD VENIPUNCTURE: CPT

## 2019-05-10 PROCEDURE — 97110 THERAPEUTIC EXERCISES: CPT | Mod: PN

## 2019-05-10 PROCEDURE — 99999 PR PBB SHADOW E&M-EST. PATIENT-LVL III: ICD-10-PCS | Mod: PBBFAC,,, | Performed by: INTERNAL MEDICINE

## 2019-05-10 PROCEDURE — 99999 PR PBB SHADOW E&M-EST. PATIENT-LVL III: CPT | Mod: PBBFAC,,, | Performed by: INTERNAL MEDICINE

## 2019-05-10 PROCEDURE — 3074F PR MOST RECENT SYSTOLIC BLOOD PRESSURE < 130 MM HG: ICD-10-PCS | Mod: CPTII,S$GLB,, | Performed by: INTERNAL MEDICINE

## 2019-05-10 PROCEDURE — 99205 OFFICE O/P NEW HI 60 MIN: CPT | Mod: S$GLB,,, | Performed by: INTERNAL MEDICINE

## 2019-05-10 PROCEDURE — 1101F PR PT FALLS ASSESS DOC 0-1 FALLS W/OUT INJ PAST YR: ICD-10-PCS | Mod: CPTII,S$GLB,, | Performed by: INTERNAL MEDICINE

## 2019-05-10 PROCEDURE — 81162 BRCA1&2 GEN FULL SEQ DUP/DEL: CPT

## 2019-05-10 PROCEDURE — 99499 UNLISTED E&M SERVICE: CPT | Mod: S$GLB,,, | Performed by: INTERNAL MEDICINE

## 2019-05-10 PROCEDURE — 99499 RISK ADDL DX/OHS AUDIT: ICD-10-PCS | Mod: S$GLB,,, | Performed by: INTERNAL MEDICINE

## 2019-05-10 PROCEDURE — 3078F DIAST BP <80 MM HG: CPT | Mod: CPTII,S$GLB,, | Performed by: INTERNAL MEDICINE

## 2019-05-10 PROCEDURE — 3078F PR MOST RECENT DIASTOLIC BLOOD PRESSURE < 80 MM HG: ICD-10-PCS | Mod: CPTII,S$GLB,, | Performed by: INTERNAL MEDICINE

## 2019-05-10 NOTE — LETTER
May 10, 2019      Kendall Howard MD  [address]  [address]  [place] [address]           [facility]  [address], [address]  [place] [address]  Phone: [phone]          Patient: Eduardo Esposito   MR Number: [id number]   YOB: 1950   Date of Visit: 5/10/2019       Dear Dr. Kendall Howard:    Thank you for referring Eduardo Esposito to me for evaluation. Attached you will find relevant portions of my assessment and plan of care.    If you have questions, please do not hesitate to call me. I look forward to following Eduardo Esposito along with you.    Sincerely,    Laz Marin MD    Enclosure  CC:  No Recipients    If you would like to receive this communication electronically, please contact [email or web address] or [phone] to request more information on [facility] access.    For providers and/or their staff who would like to refer a patient to [facility], please contact us through our one-stop-shop provider referral line, [facility], at [phone].    If you feel you have received this communication in error or would no longer like to receive these types of communications, please e-mail [email or web address]

## 2019-05-10 NOTE — PROGRESS NOTES
HPI    A 68 years old  female accompanied by family member is here in Oncology Clinic for evaluation of breast cancer.  Patient recently had ultrasound-guided biopsy left breast found to have invasive ductal carcinoma.  Patient is ERPR positive HER2 negative by FISH.  Patient has been seen by breast surgeon.  Treatment team consider contemplating doing a lumpectomy plus radiation versus mastectomy based on further chain testing.  Patient is here for evaluation of BRCA 1/2 gene studies for above reason.  On ultrasound left breast 7 mm mass at the anterior 6:00 position.  Patient reported her Ants has breast cancer.    Social history is negative for smoking drinking and recreational drug use.    She has history of AFib is taking Eliquis 2.5 mg b.i.d..  She does not have any other medical conditions.    Review of system:   Deny chest pain shortness of breath.  Denies abdominal pain nausea vomiting or diarrhea.  Denies any fever or chills.  Denies any weight loss.  Denies any change appetite change.      Past Medical History:   Diagnosis Date    Atrial fibrillation     4/11/13-recent dx sceduled to see cardiology Monday- Dr Lynn-dm    Diabetes mellitus     Hyperlipemia     Hypertension      Social History     Socioeconomic History    Marital status:      Spouse name: Not on file    Number of children: Not on file    Years of education: Not on file    Highest education level: Not on file   Occupational History    Not on file   Social Needs    Financial resource strain: Not on file    Food insecurity:     Worry: Not on file     Inability: Not on file    Transportation needs:     Medical: Not on file     Non-medical: Not on file   Tobacco Use    Smoking status: Never Smoker    Smokeless tobacco: Never Used   Substance and Sexual Activity    Alcohol use: No     Comment: once or twice a year    Drug use: No    Sexual activity: Not on file   Lifestyle    Physical activity:     Days per  week: Not on file     Minutes per session: Not on file    Stress: Not on file   Relationships    Social connections:     Talks on phone: Not on file     Gets together: Not on file     Attends Anabaptism service: Not on file     Active member of club or organization: Not on file     Attends meetings of clubs or organizations: Not on file     Relationship status: Not on file   Other Topics Concern    Not on file   Social History Narrative    Not on file         Subjective        Objective    Physical Exam   Vitals:    05/10/19 1410   BP: 126/62   Pulse: 69   Resp: 20   Temp: 98.3 °F (36.8 °C)       Constitutional: patient is oriented to person, place, and time. patient appears well-developed and well-nourished. No distress.   HENT:  Normocephalic atraumatic pupils equal round reactive to light extraocular muscle intact.  Cardiovascular:  Regular rate  Pulmonary/Chest:  Clear to auscultate   Abdominal:  Soft nontender nondistended  Musculoskeletal: Normal range of motion. Gait is normal  No clubbing, cyanosis     Lymphadenopathy:  No evidence lymphadenopathy  Neurological: patient is alert and oriented to person, place, and time. patient has normal strength and normal reflexes. No sensory deficit. Gait normal.   Skin:  Warm dry   Psychiatric:  No rashes no lesion    CMP  Sodium   Date Value Ref Range Status   02/18/2019 140 136 - 145 mmol/L Final     Potassium   Date Value Ref Range Status   02/18/2019 4.0 3.5 - 5.1 mmol/L Final     Chloride   Date Value Ref Range Status   02/18/2019 102 95 - 110 mmol/L Final     CO2   Date Value Ref Range Status   02/18/2019 28 23 - 29 mmol/L Final     Glucose   Date Value Ref Range Status   02/18/2019 70 70 - 110 mg/dL Final     BUN, Bld   Date Value Ref Range Status   02/18/2019 21 8 - 23 mg/dL Final     Creatinine   Date Value Ref Range Status   02/18/2019 0.7 0.5 - 1.4 mg/dL Final     Calcium   Date Value Ref Range Status   02/18/2019 10.4 8.7 - 10.5 mg/dL Final     Total  Protein   Date Value Ref Range Status   02/18/2019 7.4 6.0 - 8.4 g/dL Final     Albumin   Date Value Ref Range Status   02/18/2019 4.4 3.5 - 5.2 g/dL Final     Total Bilirubin   Date Value Ref Range Status   02/18/2019 0.9 0.1 - 1.0 mg/dL Final     Comment:     For infants and newborns, interpretation of results should be based  on gestational age, weight and in agreement with clinical  observations.  Premature Infant recommended reference ranges:  Up to 24 hours.............<8.0 mg/dL  Up to 48 hours............<12.0 mg/dL  3-5 days..................<15.0 mg/dL  6-29 days.................<15.0 mg/dL       Alkaline Phosphatase   Date Value Ref Range Status   02/18/2019 85 55 - 135 U/L Final     AST   Date Value Ref Range Status   02/18/2019 20 10 - 40 U/L Final     ALT   Date Value Ref Range Status   02/18/2019 26 10 - 44 U/L Final     Anion Gap   Date Value Ref Range Status   02/18/2019 10 8 - 16 mmol/L Final     eGFR if    Date Value Ref Range Status   02/18/2019 >60 >60 mL/min/1.73 m^2 Final     eGFR if non    Date Value Ref Range Status   02/18/2019 >60 >60 mL/min/1.73 m^2 Final     Comment:     Calculation used to obtain the estimated glomerular filtration  rate (eGFR) is the CKD-EPI equation.        Lab Results   Component Value Date    WBC 9.30 04/11/2014    HGB 17.2 (H) 04/11/2014    HCT 51.8 (H) 04/11/2014    MCV 88 04/11/2014     04/11/2014     FINAL PATHOLOGIC DIAGNOSIS biopsy 04/10/2019  Left breast mass, core needle biopsy:  - Invasive ductal carcinoma, [place] grade 2 (T=3, N=3, M=1), 5mm in greatest dimension  - No lymphovascular invasion identified on sections examined  Note: Prognostic/Predictive markers for ER, FL, Her-2 and Ki-67 are ordered, the results of which will be reported  in an addendum. Dr. JORDYN Sierra has reviewed the case and agrees with the above diagnosis    Assessment Plan    [] Breast cancer ERPR positive HER2 negative by FISH.  Mass measuring  7 mm left side breast.  Denies any bone pain, denies any pulmonary symptoms and denies any GI issues.  Alkaline phosphate within normal limits.    [] Recommend surgical approach:  Lumpectomy plus radiation versus mastectomy.    [] Patient is requesting BRCA 1/2 studies.  Will place order.  At the meantime I would like patient to follow up with breast surgeon for definitive treatment.    [] Will see her after post surgery for adjuvant treatment.    [] History of a AFib on long-term anticoagulation Eliquis 2.5 mg b.i.d. follow Cardiology    Dictation software used in this note.  Expect a typo graphic error

## 2019-05-10 NOTE — PROGRESS NOTES
Name: Eduardo Espostio   Clinic Number: [id number]   Age: 68 y.o.   Diagnosis: Trochanteric Bursitis R hip  Physician: Walter Nance MD   Original Orders : PT eval and treat  Initial visit: 19  Date of Last visit: 5/10/19t  Date of Discharge Note:  5/10/19  Total Visits Received: 8  Missed Visits: 2    Subjective: Patient reports that aggravated pain sometimes make it difficult to walk.     Objective:  Treatment :    Included:Therapeutic exercise, PROM, AROM, Stretching and Ice cold packs      ROM:  R hip flexion 100 deg; ABD 30 deg; IR 10 deg, ER 20 deg    UE Strength:4/5   LE Strength: gross 3/5 pain inhibited LLE    Treatment today:  HEP reviewed. Emphasis on gentle AROM towards pathologic range with stretch. Ice cold packs  Time In: 1100  Time Out: 1200    Assessment:  Patient had receive 5 weeks of therapy with fluctuation of pain and very little relief. Strengthening Ex aggravates the condition and lessen ability to tolerate movements.  Goals Achieved: Patient will establish an active HEP.  Goals Not achieved and why:   1. Patient will report pain < 4/10  >50% of the time  2. Patient will have pain-free function.  4. Patient approximate increased ROM towards full ROM.  5. Patient will improve gait sans AD.  6. FOTO > 61/100  Condition not responding well to treatment. Needs to rest joint.    Discharge reason : No benefit from P.T. noted and POC has     Discharge plan :Continue AROM/gentle stretch as HEP at tolerable level.    Plan:  This patient is discharged from [facility] Therapy Services.

## 2019-05-10 NOTE — PATIENT INSTRUCTIONS
SUPINE: Bridging        Place feet on surface. Tighten glutes. Raise hips up. ___ reps per set, ___ sets per day, ___ days per week      Copyright © [facility]. All rights reserved.   HIP: Abduction - Supine        Move leg away from body. Keep knee and toes straight.  ___ reps per set, ___ sets per day, ___ days per week Place board under leg to decrease friction. Place skate under leg.    Copyright © [facility]. All rights reserved.   Hip Flexion, Knee Bent        Lift right leg toward chest with knee bent.  Repeat ____ times per session. Do ____ sessions per week.    Copyright © I. All rights reserved.   Hip Internal / External Rotation, Hip / Knee Straight        Gire la pierna izquierda hacia adentro, con movimiento de cadera, manteniendo la rodilla recta. Luego gire la pierna hacia afuera, con movimiento de cadera, manteniendo la rodilla recta.  Repita ___ veces/ sesión. Manohar ___ sesiones/ semana.    Copyright © I. All rights reserved.   Hip Abduction / Adduction: with Extended Knee (Supine)        Bring left leg out to side and return. Keep knee straight.  Repeat ____ times per set. Do ____ sets per session. Do ____ sessions per day.     [email or web address]     Copyright © I. All rights reserved.

## 2019-05-13 NOTE — PROGRESS NOTES
Subjective:       Patient ID: Eduardo Esposito is a 68 y.o. female.    Chief Complaint: Establish Care (discuss result )      HPI 69 yo with newly diagnosed left breast invasive CA. Mother with breast cancer in her 50s and cousin at age 71. ER/[place] positive Her2 negative.    Past Medical History:   Diagnosis Date    Atrial fibrillation     4/11/13-recent dx sceduled to see cardiology Monday- Dr Lynn-dm    Diabetes mellitus     Hyperlipemia     Hypertension      Past Surgical History:   Procedure Laterality Date    TONSILLECTOMY      TOTAL KNEE ARTHROPLASTY           Current Outpatient Medications:     atorvastatin (LIPITOR) 80 MG tablet, Take 0.5 tablets (40 mg total) by mouth once daily., Disp: 90 tablet, Rfl: 1    ELIQUIS 5 mg Tab, Take 1 tablet (5 mg total) by mouth 2 (two) times daily., Disp: 180 tablet, Rfl: 1    metFORMIN (GLUCOPHAGE) 500 MG tablet, Take 1 tablet (500 mg total) by mouth 2 (two) times daily with meals., Disp: 180 tablet, Rfl: 1    triamterene-hydrochlorothiazide 37.5-25 mg (MAXZIDE-25) 37.5-25 mg per tablet, Take 1 tablet by mouth once daily., Disp: 90 tablet, Rfl: 1    VITAMIN B-12 100 MCG tablet, , Disp: , Rfl:     VITAMIN D3 5,000 unit Tab, Take 1 tablet by mouth once daily., Disp: , Rfl:     anastrozole (ARIMIDEX) 1 mg Tab, Take 1 tablet (1 mg total) by mouth once daily., Disp: 30 tablet, Rfl: 4    Review of patient's allergies indicates:   Allergen Reactions    Lisinopril      hives       Family History   Problem Relation Age of Onset    Breast cancer Mother     Glaucoma Neg Hx     Macular degeneration Neg Hx     Retinal detachment Neg Hx      Social History     Socioeconomic History    Marital status:      Spouse name: Not on file    Number of children: Not on file    Years of education: Not on file    Highest education level: Not on file   Occupational History    Not on file   Social Needs    Financial resource strain: Not on file    Food insecurity:      Worry: Not on file     Inability: Not on file    Transportation needs:     Medical: Not on file     Non-medical: Not on file   Tobacco Use    Smoking status: Never Smoker    Smokeless tobacco: Never Used   Substance and Sexual Activity    Alcohol use: No     Comment: once or twice a year    Drug use: No    Sexual activity: Not on file   Lifestyle    Physical activity:     Days per week: Not on file     Minutes per session: Not on file    Stress: Not on file   Relationships    Social connections:     Talks on phone: Not on file     Gets together: Not on file     Attends Yarsanism service: Not on file     Active member of club or organization: Not on file     Attends meetings of clubs or organizations: Not on file     Relationship status: Not on file   Other Topics Concern    Not on file   Social History Narrative    Not on file       Review of Systems   Constitutional: Negative for activity change, chills, fever and unexpected weight change.   HENT: Negative for congestion, sore throat, trouble swallowing and voice change.    Eyes: Negative for redness and visual disturbance.   Respiratory: Negative for cough, shortness of breath and wheezing.    Cardiovascular: Negative for chest pain and palpitations.   Gastrointestinal: Negative for abdominal pain, blood in stool, nausea and vomiting.   Endocrine: Negative.    Genitourinary: Negative for dysuria, frequency and hematuria.   Musculoskeletal: Negative for arthralgias, back pain and neck pain.   Skin: Negative for rash and wound.   Allergic/Immunologic: Negative.    Neurological: Negative for dizziness, weakness and headaches.   Hematological: Negative for adenopathy.   Psychiatric/Behavioral: Negative for agitation and dysphoric mood. The patient is not nervous/anxious.      Objective:     Physical Exam   Constitutional: She is oriented to person, place, and time. She appears well-developed and well-nourished. No distress.   HENT:   Head: Normocephalic and  atraumatic.   Mouth/Throat: Oropharynx is clear and moist. No oropharyngeal exudate.   Eyes: Pupils are equal, round, and reactive to light. Conjunctivae and EOM are normal. No scleral icterus.   Neck: Normal range of motion. No thyromegaly present.   Cardiovascular: Normal rate and regular rhythm.   No murmur heard.  Pulmonary/Chest: Effort normal and breath sounds normal. She has no wheezes. She has no rales.   Left Breast Exam:  There are no palpable dominant masses.  There are no overlying skin changes.  There is no contour change of the breast.  There is no nipple discharge.  There is no significant breast tenderness.  There is no palpable axillary or supraclavicular adenopathy.    There is no abnormality detected on physical exam of the contralateral breast.   Abdominal: Soft. Bowel sounds are normal. She exhibits no distension and no mass. There is no tenderness. No hernia.   Musculoskeletal: Normal range of motion. She exhibits no edema.   Lymphadenopathy:     She has no cervical adenopathy.   Neurological: She is alert and oriented to person, place, and time. No cranial nerve deficit.   Skin: Skin is warm and dry. No rash noted. No erythema.   Psychiatric: She has a normal mood and affect. Her behavior is normal.     FINAL PATHOLOGIC DIAGNOSIS  Left breast mass, core needle biopsy:  - Invasive ductal carcinoma, [place] grade 2 (T=3, N=3, M=1), 5mm in greatest dimension  - No lymphovascular invasion identified on sections examined  Note: Prognostic/Predictive markers for ER, [place], Her-2 and Ki-67 are ordered, the results of which will be reported  in an addendum. Dr. JORDYN Sierra has reviewed the case and agrees with the above diagnosis.        Assessment:     Encounter Diagnosis   Name Primary?    Malignant neoplasm of left female breast, unspecified estrogen receptor status, unspecified site of breast Yes       Plan:      1. Refer to Oncology per patient's request prior to scheduling surgery.

## 2019-05-14 ENCOUNTER — TELEPHONE (OUTPATIENT)
Dept: SURGERY | Facility: CLINIC | Age: 69
End: 2019-05-14

## 2019-05-14 NOTE — TELEPHONE ENCOUNTER
----- Message from Caryn Cabezas sent at 5/14/2019  2:39 PM CDT -----  Contact: pt  Calling with questions about scheduling a surgery and please advise. [phone] (home)

## 2019-05-15 ENCOUNTER — TELEPHONE (OUTPATIENT)
Dept: SURGERY | Facility: CLINIC | Age: 69
End: 2019-05-15

## 2019-05-15 NOTE — TELEPHONE ENCOUNTER
----- Message from Shaneka Lauren sent at 5/14/2019  4:34 PM CDT -----  Contact: patient  Type:  Patient Returning Call    Who Called:  patient  Who Left Message for Patient:  elpidio  Does the patient know what this is regarding?:    Best Call Back Number:  [phone]  Additional Information:

## 2019-05-20 NOTE — PROGRESS NOTES
Patient, Eduardo Esposito (MRN #[id number]), presented with a recorded BMI of 35.26 kg/m^2 and a documented comorbidity(s):  - Atrial Fibrillation  to which the severe obesity is a contributing factor. This is consistent with the definition of severe obesity (BMI 35.0-39.9) with comorbidity (ICD-10 E66.01, Z68.35). The patient's severe obesity was monitored, evaluated, addressed and/or treated. This addendum to the medical record is made on 05/20/2019.

## 2019-05-29 ENCOUNTER — TELEPHONE (OUTPATIENT)
Dept: FAMILY MEDICINE | Facility: CLINIC | Age: 69
End: 2019-05-29

## 2019-05-29 NOTE — TELEPHONE ENCOUNTER
----- Message from Paco Almanza sent at 5/29/2019  2:26 PM CDT -----  Contact: Patient  Type: Needs Medical Advice    Who Called:  Patient  Best Call Back Number: [phone]  Additional Information: Patient would like to discuss receiving a lift chair and a walker. Please call to advise. Thanks!

## 2019-05-30 LAB
ANNOTATION COMMENT IMP: NORMAL
BRCA INTERPRETATION: NORMAL
BRCA RELEASED BY: NORMAL
BRCA RESULT: NORMAL
MOL DX INTERP BLD/T QL: NEGATIVE
SPECIMEN SOURCE: NORMAL
SPECIMEN SOURCE: NORMAL

## 2019-06-05 ENCOUNTER — OFFICE VISIT (OUTPATIENT)
Dept: SURGERY | Facility: CLINIC | Age: 69
End: 2019-06-05
Payer: MEDICARE

## 2019-06-05 VITALS
DIASTOLIC BLOOD PRESSURE: 76 MMHG | SYSTOLIC BLOOD PRESSURE: 122 MMHG | TEMPERATURE: 98 F | WEIGHT: 218.25 LBS | HEART RATE: 69 BPM | BODY MASS INDEX: 35.23 KG/M2 | RESPIRATION RATE: 16 BRPM

## 2019-06-05 DIAGNOSIS — C50.912 MALIGNANT NEOPLASM OF LEFT FEMALE BREAST, UNSPECIFIED ESTROGEN RECEPTOR STATUS, UNSPECIFIED SITE OF BREAST: Primary | ICD-10-CM

## 2019-06-05 PROCEDURE — 99999 PR PBB SHADOW E&M-EST. PATIENT-LVL III: ICD-10-PCS | Mod: PBBFAC,,, | Performed by: SURGERY

## 2019-06-05 PROCEDURE — 3074F SYST BP LT 130 MM HG: CPT | Mod: CPTII,S$GLB,, | Performed by: SURGERY

## 2019-06-05 PROCEDURE — 3078F PR MOST RECENT DIASTOLIC BLOOD PRESSURE < 80 MM HG: ICD-10-PCS | Mod: CPTII,S$GLB,, | Performed by: SURGERY

## 2019-06-05 PROCEDURE — 99214 PR OFFICE/OUTPT VISIT, EST, LEVL IV, 30-39 MIN: ICD-10-PCS | Mod: S$GLB,,, | Performed by: SURGERY

## 2019-06-05 PROCEDURE — 3078F DIAST BP <80 MM HG: CPT | Mod: CPTII,S$GLB,, | Performed by: SURGERY

## 2019-06-05 PROCEDURE — 99214 OFFICE O/P EST MOD 30 MIN: CPT | Mod: S$GLB,,, | Performed by: SURGERY

## 2019-06-05 PROCEDURE — 1101F PT FALLS ASSESS-DOCD LE1/YR: CPT | Mod: CPTII,S$GLB,, | Performed by: SURGERY

## 2019-06-05 PROCEDURE — 3074F PR MOST RECENT SYSTOLIC BLOOD PRESSURE < 130 MM HG: ICD-10-PCS | Mod: CPTII,S$GLB,, | Performed by: SURGERY

## 2019-06-05 PROCEDURE — 1101F PR PT FALLS ASSESS DOC 0-1 FALLS W/OUT INJ PAST YR: ICD-10-PCS | Mod: CPTII,S$GLB,, | Performed by: SURGERY

## 2019-06-05 PROCEDURE — 99999 PR PBB SHADOW E&M-EST. PATIENT-LVL III: CPT | Mod: PBBFAC,,, | Performed by: SURGERY

## 2019-06-05 NOTE — PROGRESS NOTES
Patient ID: Eduardo Esposito is a 68 y.o. female.     Chief Complaint: Establish Care (discuss result )        HPI 67 yo with newly diagnosed left breast invasive CA. Mother with breast cancer in her 50s and cousin at age 71. ER/[place] positive Her2 negative. She is seen Oncology in BRCA testing was done which was negative.  She returns today 2 proceed with scheduling of her left partial mastectomy with sentinel node biopsy.  She has had no problems since her last visit.  There have been no clinical changes.          Past Medical History:   Diagnosis Date    Atrial fibrillation       4/11/13-recent dx sceduled to see cardiology Monday- Dr Lynn-dm    Diabetes mellitus      Hyperlipemia      Hypertension              Past Surgical History:   Procedure Laterality Date    TONSILLECTOMY        TOTAL KNEE ARTHROPLASTY                Current Outpatient Medications:     atorvastatin (LIPITOR) 80 MG tablet, Take 0.5 tablets (40 mg total) by mouth once daily., Disp: 90 tablet, Rfl: 1    ELIQUIS 5 mg Tab, Take 1 tablet (5 mg total) by mouth 2 (two) times daily., Disp: 180 tablet, Rfl: 1    metFORMIN (GLUCOPHAGE) 500 MG tablet, Take 1 tablet (500 mg total) by mouth 2 (two) times daily with meals., Disp: 180 tablet, Rfl: 1    triamterene-hydrochlorothiazide 37.5-25 mg (MAXZIDE-25) 37.5-25 mg per tablet, Take 1 tablet by mouth once daily., Disp: 90 tablet, Rfl: 1    VITAMIN B-12 100 MCG tablet, , Disp: , Rfl:     VITAMIN D3 5,000 unit Tab, Take 1 tablet by mouth once daily., Disp: , Rfl:     anastrozole (ARIMIDEX) 1 mg Tab, Take 1 tablet (1 mg total) by mouth once daily., Disp: 30 tablet, Rfl: 4           Review of patient's allergies indicates:   Allergen Reactions    Lisinopril         hives               Family History   Problem Relation Age of Onset    Breast cancer Mother      Glaucoma Neg Hx      Macular degeneration Neg Hx      Retinal detachment Neg Hx        Social History               Socioeconomic  History    Marital status:        Spouse name: Not on file    Number of children: Not on file    Years of education: Not on file    Highest education level: Not on file   Occupational History    Not on file   Social Needs    Financial resource strain: Not on file    Food insecurity:       Worry: Not on file       Inability: Not on file    Transportation needs:       Medical: Not on file       Non-medical: Not on file   Tobacco Use    Smoking status: Never Smoker    Smokeless tobacco: Never Used   Substance and Sexual Activity    Alcohol use: No       Comment: once or twice a year    Drug use: No    Sexual activity: Not on file   Lifestyle    Physical activity:       Days per week: Not on file       Minutes per session: Not on file    Stress: Not on file   Relationships    Social connections:       Talks on phone: Not on file       Gets together: Not on file       Attends [employer] service: Not on file       Active member of club or organization: Not on file       Attends meetings of clubs or organizations: Not on file       Relationship status: Not on file   Other Topics Concern    Not on file   Social History Narrative    Not on file            Review of Systems   Constitutional: Negative for activity change, chills, fever and unexpected weight change.   HENT: Negative for congestion, sore throat, trouble swallowing and voice change.    Eyes: Negative for redness and visual disturbance.   Respiratory: Negative for cough, shortness of breath and wheezing.    Cardiovascular: Negative for chest pain and palpitations.   Gastrointestinal: Negative for abdominal pain, blood in stool, nausea and vomiting.   Endocrine: Negative.    Genitourinary: Negative for dysuria, frequency and hematuria.   Musculoskeletal: Negative for arthralgias, back pain and neck pain.   Skin: Negative for rash and wound.   Allergic/Immunologic: Negative.    Neurological: Negative for dizziness, weakness and headaches.    Hematological: Negative for adenopathy.   Psychiatric/Behavioral: Negative for agitation and dysphoric mood. The patient is not nervous/anxious.       Objective:      Physical Exam   Constitutional: She is oriented to person, place, and time. She appears well-developed and well-nourished. No distress.   HENT:   Head: Normocephalic and atraumatic.   Mouth/Throat: Oropharynx is clear and moist. No oropharyngeal exudate.   Eyes: Pupils are equal, round, and reactive to light. Conjunctivae and EOM are normal. No scleral icterus.   Neck: Normal range of motion. No thyromegaly present.   Cardiovascular: Normal rate and regular rhythm.   No murmur heard.  Pulmonary/Chest: Effort normal and breath sounds normal. She has no wheezes. She has no rales.   Left Breast Exam:  There are no palpable dominant masses.  There are no overlying skin changes.  There is no contour change of the breast.  There is no nipple discharge.  There is no significant breast tenderness.  There is no palpable axillary or supraclavicular adenopathy.    There is no abnormality detected on physical exam of the contralateral breast.   Abdominal: Soft. Bowel sounds are normal. She exhibits no distension and no mass. There is no tenderness. No hernia.   Musculoskeletal: Normal range of motion. She exhibits no edema.   Lymphadenopathy:     She has no cervical adenopathy.   Neurological: She is alert and oriented to person, place, and time. No cranial nerve deficit.   Skin: Skin is warm and dry. No rash noted. No erythema.   Psychiatric: She has a normal mood and affect. Her behavior is normal.      FINAL PATHOLOGIC DIAGNOSIS  Left breast mass, core needle biopsy:  - Invasive ductal carcinoma, [place] grade 2 (T=3, N=3, M=1), 5mm in greatest dimension  - No lymphovascular invasion identified on sections examined  Note: Prognostic/Predictive markers for ER, [place], Her-2 and Ki-67 are ordered, the results of which will be reported  in an addendum. Dr. COBB  Rigo has reviewed the case and agrees with the above diagnosis.           Assessment:           Encounter Diagnosis   Name Primary?    Malignant neoplasm of left female breast, unspecified estrogen receptor status, unspecified site of breast Yes         Plan:      1.  Plan to proceed with left partial mastectomy with wire localization and sentinel node biopsy.  2. Risks and benefits of the planned procedure were discussed at length with the patient.  Risks and benefits of not proceeding with the procedure were discussed as well. All questions were answered. The patient expressed clear understanding and would like to proceed with the procedure as discussed.

## 2019-06-05 NOTE — TELEPHONE ENCOUNTER
By Medicare guidelines these can only be obtained with the face-to-face visit documenting the need.

## 2019-06-05 NOTE — PATIENT INSTRUCTIONS
..PRE-OP INSTRUCTIONS    Your procedure has been scheduled at:    [facility]pre-admit nurse  [phone]      DAY thursday    DATE 06/13/19       Please call the pre-op nurse to schedule your pre-op testing and registration  Someone from the hospital will call you the evening before surgery to let you know what time you need to be at the hospital for surgery.                                               1:  DO NOT EAT OR DRINK ANYTHING AFTER MIDNIGHT THE NIGHT BEFORE SURGERY.     2:  You will need to stop any blood thinners 1 week prior to surgery.  This includes Aspirin, fish oil, Pradaxa, Coumadin, Plavix, Pletal.  Please contact the prescribing doctor to be sure it is ok to stop these medicines.    3:  Pre-admit nurse will go over your medicines and let you know which ones not to take the morning of surgery    4:  Plan to have someone drive you home after you are released from the hospital.  You WILL NOT be able to drive yourself.    5:  If you have any questions or need to change your surgery date, please call Vandana at [phone]    AFTER SURGERY:    You can shower 48 hours after surgery, REMOVE WET BANDAGES AND BANDAIDS, leave the steri- strips on.  If you have not had a bowel movement within 3 days after surgery you may take a laxative of your choice.  Do not lift anything over 5-10 pounds.    You need to have a follow up appointment 7-14 days after surgery, call the office to schedule or if you have questions or concerns.    For [facility] patients, you will receive a [facility] message with a link to schedule your post op appointment.

## 2019-06-05 NOTE — H&P (VIEW-ONLY)
Patient ID: Eduardo Esposito is a 68 y.o. female.     Chief Complaint: Establish Care (discuss result )        HPI 67 yo with newly diagnosed left breast invasive CA. Mother with breast cancer in her 50s and cousin at age 71. ER/[place] positive Her2 negative. She is seen Oncology in BRCA testing was done which was negative.  She returns today 2 proceed with scheduling of her left partial mastectomy with sentinel node biopsy.  She has had no problems since her last visit.  There have been no clinical changes.          Past Medical History:   Diagnosis Date    Atrial fibrillation       4/11/13-recent dx sceduled to see cardiology Monday- Dr Lynn-dm    Diabetes mellitus      Hyperlipemia      Hypertension              Past Surgical History:   Procedure Laterality Date    TONSILLECTOMY        TOTAL KNEE ARTHROPLASTY                Current Outpatient Medications:     atorvastatin (LIPITOR) 80 MG tablet, Take 0.5 tablets (40 mg total) by mouth once daily., Disp: 90 tablet, Rfl: 1    ELIQUIS 5 mg Tab, Take 1 tablet (5 mg total) by mouth 2 (two) times daily., Disp: 180 tablet, Rfl: 1    metFORMIN (GLUCOPHAGE) 500 MG tablet, Take 1 tablet (500 mg total) by mouth 2 (two) times daily with meals., Disp: 180 tablet, Rfl: 1    triamterene-hydrochlorothiazide 37.5-25 mg (MAXZIDE-25) 37.5-25 mg per tablet, Take 1 tablet by mouth once daily., Disp: 90 tablet, Rfl: 1    VITAMIN B-12 100 MCG tablet, , Disp: , Rfl:     VITAMIN D3 5,000 unit Tab, Take 1 tablet by mouth once daily., Disp: , Rfl:     anastrozole (ARIMIDEX) 1 mg Tab, Take 1 tablet (1 mg total) by mouth once daily., Disp: 30 tablet, Rfl: 4           Review of patient's allergies indicates:   Allergen Reactions    Lisinopril         hives               Family History   Problem Relation Age of Onset    Breast cancer Mother      Glaucoma Neg Hx      Macular degeneration Neg Hx      Retinal detachment Neg Hx        Social History               Socioeconomic  History    Marital status:        Spouse name: Not on file    Number of children: Not on file    Years of education: Not on file    Highest education level: Not on file   Occupational History    Not on file   Social Needs    Financial resource strain: Not on file    Food insecurity:       Worry: Not on file       Inability: Not on file    Transportation needs:       Medical: Not on file       Non-medical: Not on file   Tobacco Use    Smoking status: Never Smoker    Smokeless tobacco: Never Used   Substance and Sexual Activity    Alcohol use: No       Comment: once or twice a year    Drug use: No    Sexual activity: Not on file   Lifestyle    Physical activity:       Days per week: Not on file       Minutes per session: Not on file    Stress: Not on file   Relationships    Social connections:       Talks on phone: Not on file       Gets together: Not on file       Attends Baptism service: Not on file       Active member of club or organization: Not on file       Attends meetings of clubs or organizations: Not on file       Relationship status: Not on file   Other Topics Concern    Not on file   Social History Narrative    Not on file            Review of Systems   Constitutional: Negative for activity change, chills, fever and unexpected weight change.   HENT: Negative for congestion, sore throat, trouble swallowing and voice change.    Eyes: Negative for redness and visual disturbance.   Respiratory: Negative for cough, shortness of breath and wheezing.    Cardiovascular: Negative for chest pain and palpitations.   Gastrointestinal: Negative for abdominal pain, blood in stool, nausea and vomiting.   Endocrine: Negative.    Genitourinary: Negative for dysuria, frequency and hematuria.   Musculoskeletal: Negative for arthralgias, back pain and neck pain.   Skin: Negative for rash and wound.   Allergic/Immunologic: Negative.    Neurological: Negative for dizziness, weakness and headaches.    Hematological: Negative for adenopathy.   Psychiatric/Behavioral: Negative for agitation and dysphoric mood. The patient is not nervous/anxious.       Objective:      Physical Exam   Constitutional: She is oriented to person, place, and time. She appears well-developed and well-nourished. No distress.   HENT:   Head: Normocephalic and atraumatic.   Mouth/Throat: Oropharynx is clear and moist. No oropharyngeal exudate.   Eyes: Pupils are equal, round, and reactive to light. Conjunctivae and EOM are normal. No scleral icterus.   Neck: Normal range of motion. No thyromegaly present.   Cardiovascular: Normal rate and regular rhythm.   No murmur heard.  Pulmonary/Chest: Effort normal and breath sounds normal. She has no wheezes. She has no rales.   Left Breast Exam:  There are no palpable dominant masses.  There are no overlying skin changes.  There is no contour change of the breast.  There is no nipple discharge.  There is no significant breast tenderness.  There is no palpable axillary or supraclavicular adenopathy.    There is no abnormality detected on physical exam of the contralateral breast.   Abdominal: Soft. Bowel sounds are normal. She exhibits no distension and no mass. There is no tenderness. No hernia.   Musculoskeletal: Normal range of motion. She exhibits no edema.   Lymphadenopathy:     She has no cervical adenopathy.   Neurological: She is alert and oriented to person, place, and time. No cranial nerve deficit.   Skin: Skin is warm and dry. No rash noted. No erythema.   Psychiatric: She has a normal mood and affect. Her behavior is normal.      FINAL PATHOLOGIC DIAGNOSIS  Left breast mass, core needle biopsy:  - Invasive ductal carcinoma, [place] grade 2 (T=3, N=3, M=1), 5mm in greatest dimension  - No lymphovascular invasion identified on sections examined  Note: Prognostic/Predictive markers for ER, [place], Her-2 and Ki-67 are ordered, the results of which will be reported  in an addendum. Dr. COBB  Rigo has reviewed the case and agrees with the above diagnosis.           Assessment:           Encounter Diagnosis   Name Primary?    Malignant neoplasm of left female breast, unspecified estrogen receptor status, unspecified site of breast Yes         Plan:      1.  Plan to proceed with left partial mastectomy with wire localization and sentinel node biopsy.  2. Risks and benefits of the planned procedure were discussed at length with the patient.  Risks and benefits of not proceeding with the procedure were discussed as well. All questions were answered. The patient expressed clear understanding and would like to proceed with the procedure as discussed.

## 2019-06-06 RX ORDER — SODIUM CHLORIDE 9 MG/ML
INJECTION, SOLUTION INTRAVENOUS CONTINUOUS
Status: CANCELLED | OUTPATIENT
Start: 2019-06-13

## 2019-06-06 RX ORDER — LIDOCAINE HYDROCHLORIDE 10 MG/ML
1 INJECTION, SOLUTION EPIDURAL; INFILTRATION; INTRACAUDAL; PERINEURAL ONCE
Status: CANCELLED | OUTPATIENT
Start: 2019-06-13

## 2019-06-07 RX ORDER — LIDOCAINE HYDROCHLORIDE 10 MG/ML
1 INJECTION, SOLUTION EPIDURAL; INFILTRATION; INTRACAUDAL; PERINEURAL ONCE
Status: CANCELLED | OUTPATIENT
Start: 2019-06-07 | End: 2019-06-07

## 2019-06-12 ENCOUNTER — ANESTHESIA EVENT (OUTPATIENT)
Dept: SURGERY | Facility: HOSPITAL | Age: 69
End: 2019-06-12
Payer: MEDICARE

## 2019-06-12 ENCOUNTER — HOSPITAL ENCOUNTER (OUTPATIENT)
Dept: PREADMISSION TESTING | Facility: HOSPITAL | Age: 69
Discharge: HOME | End: 2019-06-12
Attending: SURGERY
Payer: MEDICARE

## 2019-06-12 DIAGNOSIS — C50.912 INFILTRATING DUCTAL CARCINOMA OF LEFT BREAST: Primary | ICD-10-CM

## 2019-06-12 DIAGNOSIS — Z01.818 PRE-OP EXAM: ICD-10-CM

## 2019-06-12 LAB
ALBUMIN SERPL BCP-MCNC: 4.4 G/DL (ref 3.5–5.2)
ALP SERPL-CCNC: 82 U/L (ref 55–135)
ALT SERPL W/O P-5'-P-CCNC: 22 U/L (ref 10–44)
ANION GAP SERPL CALC-SCNC: 13 MMOL/L (ref 8–16)
AST SERPL-CCNC: 17 U/L (ref 10–40)
BASOPHILS # BLD AUTO: 0 K/UL (ref 0–0.2)
BASOPHILS NFR BLD: 0.4 % (ref 0–1.9)
BILIRUB SERPL-MCNC: 1.1 MG/DL (ref 0.1–1)
BUN SERPL-MCNC: 24 MG/DL (ref 8–23)
CALCIUM SERPL-MCNC: 10.2 MG/DL (ref 8.7–10.5)
CHLORIDE SERPL-SCNC: 104 MMOL/L (ref 95–110)
CO2 SERPL-SCNC: 26 MMOL/L (ref 23–29)
CREAT SERPL-MCNC: 0.9 MG/DL (ref 0.5–1.4)
DIFFERENTIAL METHOD: ABNORMAL
EOSINOPHIL # BLD AUTO: 0.1 K/UL (ref 0–0.5)
EOSINOPHIL NFR BLD: 1.4 % (ref 0–8)
ERYTHROCYTE [DISTWIDTH] IN BLOOD BY AUTOMATED COUNT: 14.7 % (ref 11.5–14.5)
EST. GFR  (AFRICAN AMERICAN): >60 ML/MIN/1.73 M^2
EST. GFR  (NON AFRICAN AMERICAN): >60 ML/MIN/1.73 M^2
GLUCOSE SERPL-MCNC: 112 MG/DL (ref 70–110)
HCT VFR BLD AUTO: 48.1 % (ref 37–48.5)
HGB BLD-MCNC: 15.6 G/DL (ref 12–16)
LYMPHOCYTES # BLD AUTO: 2.9 K/UL (ref 1–4.8)
LYMPHOCYTES NFR BLD: 32.4 % (ref 18–48)
MCH RBC QN AUTO: 29 PG (ref 27–31)
MCHC RBC AUTO-ENTMCNC: 32.5 G/DL (ref 32–36)
MCV RBC AUTO: 89 FL (ref 82–98)
MONOCYTES # BLD AUTO: 0.6 K/UL (ref 0.3–1)
MONOCYTES NFR BLD: 6.6 % (ref 4–15)
NEUTROPHILS # BLD AUTO: 5.4 K/UL (ref 1.8–7.7)
NEUTROPHILS NFR BLD: 59.2 % (ref 38–73)
PLATELET # BLD AUTO: 284 K/UL (ref 150–350)
PMV BLD AUTO: 7.8 FL (ref 9.2–12.9)
POTASSIUM SERPL-SCNC: 4 MMOL/L (ref 3.5–5.1)
PROT SERPL-MCNC: 7.3 G/DL (ref 6–8.4)
RBC # BLD AUTO: 5.39 M/UL (ref 4–5.4)
SODIUM SERPL-SCNC: 143 MMOL/L (ref 136–145)
WBC # BLD AUTO: 9.1 K/UL (ref 3.9–12.7)

## 2019-06-12 PROCEDURE — 99900104 DSU ONLY-NO CHARGE-EA ADD'L HR (STAT)

## 2019-06-12 PROCEDURE — 36415 COLL VENOUS BLD VENIPUNCTURE: CPT

## 2019-06-12 PROCEDURE — 99900103 DSU ONLY-NO CHARGE-INITIAL HR (STAT)

## 2019-06-12 PROCEDURE — 80053 COMPREHEN METABOLIC PANEL: CPT

## 2019-06-12 PROCEDURE — 85025 COMPLETE CBC W/AUTO DIFF WBC: CPT

## 2019-06-12 NOTE — DISCHARGE INSTRUCTIONS
To confirm, Your doctor has instructed you that surgery is scheduled for: 6/13/2019 at 6:00 AM    Please report to [facility] the morning of surgery. You must check-in and receive a wristband before going to your procedure.    Pre-Op will call the afternoon prior to surgery between 1:00 and 6:00 PM with the final arrival time.  Phone number: [phone]    PLEASE NOTE:  The surgery schedule has many variables which may affect the time of your surgery case.  Family members should be available if your surgery time changes.  Plan to be here the day of your procedure between 4-6 hours.    MEDICATIONS:  TAKE ONLY THESE MEDICATIONS WITH A SMALL SIP OF WATER THE MORNING OF YOUR PROCEDURE:  None    NO METFORMIN NIGHT BEFORE OR MORNING OF PROCEDURE      DO NOT TAKE THESE MEDICATIONS 5-7 DAYS PRIOR to your procedure or per your surgeon's request: ASPIRIN, ALEVE, ADVIL, IBUPROFEN, FISH OIL VITAMIN E, HERBALS  (May take Tylenol)    ONLY if you are prescribed any types of blood thinners such as:  Aspirin, Coumadin, Plavix, Pradaxa, Xarelto, Aggrenox, Effient, Eliquis PER DR. MCCAIN, Marlys He, or any other, ask your surgeon whether you should stop taking them and how long before surgery you should stop.  You may also need to verify with the prescribing physician if it is ok to stop your medication.      INSTRUCTIONS IMPORTANT!!  · Do not eat or drink anything between midnight and the time of your procedure- this includes gum, mints, and candy.  · Do not smoke or drink alcoholic beverages 24 hours prior to your procedure.  · Shower the night before AND the morning of your procedure with a Chlorhexidine wash such as Hibiclens or Dial antibacterial soap from the neck down.  Do not get it on your face or in your eyes.  You may use your own shampoo and face wash. This helps your skin to be as bacteria free as possible.    · If you wear contact lenses, dentures, hearing aids or glasses,  bring a container to put them in during surgery and give to a family member for safe keeping.  Please leave all jewelry, piercing's and valuables at home.   · DO NOT remove hair from the surgery site.  Do not shave the incision site unless you are given specific instructions to do so.    · ONLY if you have been diagnosed with sleep apnea please bring your C-PAP machine.  · ONLY if you wear home oxygen please bring your portable oxygen tank the day of your procedure.  · ONLY if you have a history of OPEN HEART SURGERY you will need a clearance from your Cardiologist per Anesthesia.      · ONLY for patients requiring bowel prep, written instructions will be given by your doctor's office.  · ONLY if you have a neuro stimulator, please bring the controller with you the morning of surgery  · ONLY if a type and screen test is needed before surgery, please return:n/a  · If your doctor has scheduled you for an overnight stay, bring a small overnight bag with any personal items you need.  · Make arrangements in advance for transportation home by a responsible adult.  It is not safe to drive a vehicle during the 24 hours after anesthesia.      · Visiting hours are 10:00AM to 8:30PM.  For the safety of all patients, visitors under the age of 12 are not allowed above the first floor of the hospital.    · All [facility] facilities and properties are tobacco free.  Smoking is NOT allowed.       If you have any questions about these instructions, call Pre-Op Admit  Nursing at [phone] or the Pre-Op Day Surgery Unit at [phone].

## 2019-06-13 ENCOUNTER — HOSPITAL ENCOUNTER (OUTPATIENT)
Dept: RADIOLOGY | Facility: HOSPITAL | Age: 69
Discharge: HOME | End: 2019-06-13
Attending: SURGERY
Payer: MEDICARE

## 2019-06-13 ENCOUNTER — HOSPITAL ENCOUNTER (OUTPATIENT)
Facility: HOSPITAL | Age: 69
Discharge: HOME | End: 2019-06-13
Attending: SURGERY | Admitting: SURGERY
Payer: MEDICARE

## 2019-06-13 ENCOUNTER — ANESTHESIA (OUTPATIENT)
Dept: SURGERY | Facility: HOSPITAL | Age: 69
End: 2019-06-13
Payer: MEDICARE

## 2019-06-13 DIAGNOSIS — C50.912 MALIGNANT NEOPLASM OF LEFT FEMALE BREAST, UNSPECIFIED ESTROGEN RECEPTOR STATUS, UNSPECIFIED SITE OF BREAST: ICD-10-CM

## 2019-06-13 DIAGNOSIS — C50.912 MALIGNANT NEOPLASM OF LEFT FEMALE BREAST, UNSPECIFIED ESTROGEN RECEPTOR STATUS, UNSPECIFIED SITE OF BREAST: Primary | ICD-10-CM

## 2019-06-13 PROCEDURE — D9220A PRA ANESTHESIA: Mod: ANES,,, | Performed by: ANESTHESIOLOGY

## 2019-06-13 PROCEDURE — 38900 IO MAP OF SENT LYMPH NODE: CPT | Mod: LT,,, | Performed by: SURGERY

## 2019-06-13 PROCEDURE — 88307 TISSUE SPECIMEN TO PATHOLOGY - SURGERY: ICD-10-PCS | Mod: 26,,, | Performed by: PATHOLOGY

## 2019-06-13 PROCEDURE — 25000003 PHARM REV CODE 250: Performed by: SURGERY

## 2019-06-13 PROCEDURE — 19301 PARTIAL MASTECTOMY: CPT | Mod: LT,,, | Performed by: SURGERY

## 2019-06-13 PROCEDURE — 19281 MAMMO NEEDLE LOC WITH MAMMO GUIDANCE 1ST SITE: ICD-10-PCS | Mod: ,,, | Performed by: RADIOLOGY

## 2019-06-13 PROCEDURE — D9220A PRA ANESTHESIA: ICD-10-PCS | Mod: ANES,,, | Performed by: ANESTHESIOLOGY

## 2019-06-13 PROCEDURE — 38792 NM SENTINEL LYMPH NODE INJECTION ONLY_RAD PERFORMED: ICD-10-PCS | Mod: LT,,, | Performed by: RADIOLOGY

## 2019-06-13 PROCEDURE — A9520 TC99 TILMANOCEPT DIAG 0.5MCI: HCPCS

## 2019-06-13 PROCEDURE — 19301 PR MASTECTOMY, PARTIAL: ICD-10-PCS | Mod: LT,,, | Performed by: SURGERY

## 2019-06-13 PROCEDURE — 63600175 PHARM REV CODE 636 W HCPCS: Performed by: NURSE ANESTHETIST, CERTIFIED REGISTERED

## 2019-06-13 PROCEDURE — 38525 PR BIOPSY/REM LYMPH NODES, AXILLARY: ICD-10-PCS | Mod: 51,LT,, | Performed by: SURGERY

## 2019-06-13 PROCEDURE — 71000033 HC RECOVERY, INTIAL HOUR: Performed by: SURGERY

## 2019-06-13 PROCEDURE — 38900 PR INTRAOPERATIVE SENTINEL LYMPH NODE ID W DYE INJECTION: ICD-10-PCS | Mod: LT,,, | Performed by: SURGERY

## 2019-06-13 PROCEDURE — 76098 MAMMO BREAST SPECIMEN: ICD-10-PCS | Mod: 26,,, | Performed by: RADIOLOGY

## 2019-06-13 PROCEDURE — 25000003 PHARM REV CODE 250: Performed by: ANESTHESIOLOGY

## 2019-06-13 PROCEDURE — 99900104 DSU ONLY-NO CHARGE-EA ADD'L HR (STAT): Performed by: SURGERY

## 2019-06-13 PROCEDURE — 71000015 HC POSTOP RECOV 1ST HR: Performed by: SURGERY

## 2019-06-13 PROCEDURE — 63600175 PHARM REV CODE 636 W HCPCS: Performed by: ANESTHESIOLOGY

## 2019-06-13 PROCEDURE — D9220A PRA ANESTHESIA: Mod: CRNA,,, | Performed by: NURSE ANESTHETIST, CERTIFIED REGISTERED

## 2019-06-13 PROCEDURE — 88342 IMHCHEM/IMCYTCHM 1ST ANTB: CPT | Performed by: PATHOLOGY

## 2019-06-13 PROCEDURE — 99900103 DSU ONLY-NO CHARGE-INITIAL HR (STAT): Performed by: SURGERY

## 2019-06-13 PROCEDURE — 38792 RA TRACER ID OF SENTINL NODE: CPT | Mod: TC,LT

## 2019-06-13 PROCEDURE — 76098 X-RAY EXAM SURGICAL SPECIMEN: CPT | Mod: TC

## 2019-06-13 PROCEDURE — 25000003 PHARM REV CODE 250

## 2019-06-13 PROCEDURE — 38525 BIOPSY/REMOVAL LYMPH NODES: CPT | Mod: 51,LT,, | Performed by: SURGERY

## 2019-06-13 PROCEDURE — 38792 RA TRACER ID OF SENTINL NODE: CPT | Mod: LT,,, | Performed by: RADIOLOGY

## 2019-06-13 PROCEDURE — 37000009 HC ANESTHESIA EA ADD 15 MINS: Performed by: SURGERY

## 2019-06-13 PROCEDURE — 88307 TISSUE EXAM BY PATHOLOGIST: CPT | Mod: 26,,, | Performed by: PATHOLOGY

## 2019-06-13 PROCEDURE — 36000707: Performed by: SURGERY

## 2019-06-13 PROCEDURE — 25000003 PHARM REV CODE 250: Performed by: RADIOLOGY

## 2019-06-13 PROCEDURE — 71000039 HC RECOVERY, EACH ADD'L HOUR: Performed by: SURGERY

## 2019-06-13 PROCEDURE — D9220A PRA ANESTHESIA: ICD-10-PCS | Mod: CRNA,,, | Performed by: NURSE ANESTHETIST, CERTIFIED REGISTERED

## 2019-06-13 PROCEDURE — C1729 CATH, DRAINAGE: HCPCS | Performed by: SURGERY

## 2019-06-13 PROCEDURE — 19281 PERQ DEVICE BREAST 1ST IMAG: CPT | Mod: TC,LT,59

## 2019-06-13 PROCEDURE — 76098 X-RAY EXAM SURGICAL SPECIMEN: CPT | Mod: 26,,, | Performed by: RADIOLOGY

## 2019-06-13 PROCEDURE — 19281 PERQ DEVICE BREAST 1ST IMAG: CPT | Mod: ,,, | Performed by: RADIOLOGY

## 2019-06-13 PROCEDURE — 88341 IMHCHEM/IMCYTCHM EA ADD ANTB: CPT | Mod: 26,,, | Performed by: PATHOLOGY

## 2019-06-13 PROCEDURE — 88342 TISSUE SPECIMEN TO PATHOLOGY - SURGERY: ICD-10-PCS | Mod: 26,,, | Performed by: PATHOLOGY

## 2019-06-13 PROCEDURE — 88341 PR IHC OR ICC EACH ADD'L SINGLE ANTIBODY  STAINPR: ICD-10-PCS | Mod: 26,,, | Performed by: PATHOLOGY

## 2019-06-13 PROCEDURE — 37000008 HC ANESTHESIA 1ST 15 MINUTES: Performed by: SURGERY

## 2019-06-13 PROCEDURE — 63600175 PHARM REV CODE 636 W HCPCS: Performed by: SURGERY

## 2019-06-13 PROCEDURE — 27200651 HC AIRWAY, LMA: Performed by: NURSE ANESTHETIST, CERTIFIED REGISTERED

## 2019-06-13 PROCEDURE — 36000706: Performed by: SURGERY

## 2019-06-13 RX ORDER — BUPIVACAINE HYDROCHLORIDE AND EPINEPHRINE 2.5; 5 MG/ML; UG/ML
INJECTION, SOLUTION EPIDURAL; INFILTRATION; INTRACAUDAL; PERINEURAL
Status: DISCONTINUED | OUTPATIENT
Start: 2019-06-13 | End: 2019-06-13 | Stop reason: HOSPADM

## 2019-06-13 RX ORDER — ONDANSETRON 2 MG/ML
INJECTION INTRAMUSCULAR; INTRAVENOUS
Status: DISCONTINUED | OUTPATIENT
Start: 2019-06-13 | End: 2019-06-13

## 2019-06-13 RX ORDER — SODIUM CHLORIDE, SODIUM LACTATE, POTASSIUM CHLORIDE, CALCIUM CHLORIDE 600; 310; 30; 20 MG/100ML; MG/100ML; MG/100ML; MG/100ML
INJECTION, SOLUTION INTRAVENOUS CONTINUOUS
Status: DISCONTINUED | OUTPATIENT
Start: 2019-06-13 | End: 2019-06-13

## 2019-06-13 RX ORDER — LIDOCAINE HYDROCHLORIDE 10 MG/ML
INJECTION, SOLUTION EPIDURAL; INFILTRATION; INTRACAUDAL; PERINEURAL
Status: COMPLETED
Start: 2019-06-13 | End: 2019-06-13

## 2019-06-13 RX ORDER — CEFAZOLIN SODIUM 2 G/50ML
2 SOLUTION INTRAVENOUS
Status: COMPLETED | OUTPATIENT
Start: 2019-06-13 | End: 2019-06-13

## 2019-06-13 RX ORDER — SODIUM CHLORIDE 9 MG/ML
INJECTION, SOLUTION INTRAVENOUS CONTINUOUS
Status: DISCONTINUED | OUTPATIENT
Start: 2019-06-13 | End: 2019-06-13

## 2019-06-13 RX ORDER — HYDROCODONE BITARTRATE AND ACETAMINOPHEN 5; 325 MG/1; MG/1
1 TABLET ORAL EVERY 6 HOURS PRN
Qty: 15 TABLET | Refills: 0 | Status: SHIPPED | OUTPATIENT
Start: 2019-06-13 | End: 2019-07-23

## 2019-06-13 RX ORDER — LIDOCAINE HCL/PF 100 MG/5ML
SYRINGE (ML) INTRAVENOUS
Status: DISCONTINUED | OUTPATIENT
Start: 2019-06-13 | End: 2019-06-13

## 2019-06-13 RX ORDER — FENTANYL CITRATE 50 UG/ML
25 INJECTION, SOLUTION INTRAMUSCULAR; INTRAVENOUS EVERY 5 MIN PRN
Status: DISCONTINUED | OUTPATIENT
Start: 2019-06-13 | End: 2019-06-13 | Stop reason: HOSPADM

## 2019-06-13 RX ORDER — LIDOCAINE HYDROCHLORIDE 10 MG/ML
1 INJECTION, SOLUTION EPIDURAL; INFILTRATION; INTRACAUDAL; PERINEURAL ONCE
Status: COMPLETED | OUTPATIENT
Start: 2019-06-13 | End: 2019-06-13

## 2019-06-13 RX ORDER — METOCLOPRAMIDE HYDROCHLORIDE 5 MG/ML
10 INJECTION INTRAMUSCULAR; INTRAVENOUS EVERY 10 MIN PRN
Status: DISCONTINUED | OUTPATIENT
Start: 2019-06-13 | End: 2019-06-13 | Stop reason: HOSPADM

## 2019-06-13 RX ORDER — LIDOCAINE HYDROCHLORIDE 10 MG/ML
1 INJECTION, SOLUTION EPIDURAL; INFILTRATION; INTRACAUDAL; PERINEURAL ONCE
Status: DISCONTINUED | OUTPATIENT
Start: 2019-06-13 | End: 2019-06-13 | Stop reason: HOSPADM

## 2019-06-13 RX ORDER — SODIUM CHLORIDE, SODIUM LACTATE, POTASSIUM CHLORIDE, CALCIUM CHLORIDE 600; 310; 30; 20 MG/100ML; MG/100ML; MG/100ML; MG/100ML
INJECTION, SOLUTION INTRAVENOUS CONTINUOUS
Status: DISCONTINUED | OUTPATIENT
Start: 2019-06-13 | End: 2019-06-13 | Stop reason: HOSPADM

## 2019-06-13 RX ORDER — FENTANYL CITRATE 50 UG/ML
INJECTION, SOLUTION INTRAMUSCULAR; INTRAVENOUS
Status: DISCONTINUED | OUTPATIENT
Start: 2019-06-13 | End: 2019-06-13

## 2019-06-13 RX ORDER — MIDAZOLAM HYDROCHLORIDE 1 MG/ML
INJECTION, SOLUTION INTRAMUSCULAR; INTRAVENOUS
Status: DISCONTINUED | OUTPATIENT
Start: 2019-06-13 | End: 2019-06-13

## 2019-06-13 RX ORDER — SODIUM CHLORIDE 9 MG/ML
INJECTION, SOLUTION INTRAVENOUS CONTINUOUS
Status: DISCONTINUED | OUTPATIENT
Start: 2019-06-13 | End: 2019-06-13 | Stop reason: HOSPADM

## 2019-06-13 RX ORDER — PROPOFOL 10 MG/ML
VIAL (ML) INTRAVENOUS
Status: DISCONTINUED | OUTPATIENT
Start: 2019-06-13 | End: 2019-06-13

## 2019-06-13 RX ORDER — OXYCODONE HYDROCHLORIDE 5 MG/1
5 TABLET ORAL
Status: DISCONTINUED | OUTPATIENT
Start: 2019-06-13 | End: 2019-06-13 | Stop reason: HOSPADM

## 2019-06-13 RX ADMIN — LIDOCAINE HYDROCHLORIDE: 10 INJECTION, SOLUTION EPIDURAL; INFILTRATION; INTRACAUDAL; PERINEURAL at 06:06

## 2019-06-13 RX ADMIN — FENTANYL CITRATE 25 MCG: 50 INJECTION INTRAMUSCULAR; INTRAVENOUS at 11:06

## 2019-06-13 RX ADMIN — SODIUM CHLORIDE, SODIUM LACTATE, POTASSIUM CHLORIDE, AND CALCIUM CHLORIDE: .6; .31; .03; .02 INJECTION, SOLUTION INTRAVENOUS at 08:06

## 2019-06-13 RX ADMIN — CEFAZOLIN SODIUM 2 G: 2 SOLUTION INTRAVENOUS at 09:06

## 2019-06-13 RX ADMIN — MIDAZOLAM 2 MG: 1 INJECTION INTRAMUSCULAR; INTRAVENOUS at 09:06

## 2019-06-13 RX ADMIN — FENTANYL CITRATE 50 MCG: 50 INJECTION, SOLUTION INTRAMUSCULAR; INTRAVENOUS at 09:06

## 2019-06-13 RX ADMIN — LIDOCAINE HYDROCHLORIDE 1 ML: 10 INJECTION, SOLUTION EPIDURAL; INFILTRATION; INTRACAUDAL; PERINEURAL at 07:06

## 2019-06-13 RX ADMIN — FENTANYL CITRATE 50 MCG: 50 INJECTION, SOLUTION INTRAMUSCULAR; INTRAVENOUS at 10:06

## 2019-06-13 RX ADMIN — OXYCODONE HYDROCHLORIDE 5 MG: 5 TABLET ORAL at 11:06

## 2019-06-13 RX ADMIN — PROPOFOL 130 MG: 10 INJECTION, EMULSION INTRAVENOUS at 09:06

## 2019-06-13 RX ADMIN — ONDANSETRON 4 MG: 2 INJECTION, SOLUTION INTRAMUSCULAR; INTRAVENOUS at 10:06

## 2019-06-13 RX ADMIN — LIDOCAINE HYDROCHLORIDE 75 MG: 20 INJECTION, SOLUTION INTRAVENOUS at 09:06

## 2019-06-13 NOTE — DISCHARGE INSTRUCTIONS
"Remove dressing in 48 hours.  Keep dry until then.  Once dressing removed, ok to shower.   No tub baths, swimming pool, hot tubs.      Wear a snug fitting sports bra at all times.    Ok to use ice pack to incision site, 20 minutes at a time several times a day.        Discharge Instructions: After Your Surgery/Procedure  Youve just had surgery. During surgery you were given medicine called anesthesia to keep you relaxed and free of pain. After surgery you may have some pain or nausea. This is common. Here are some tips for feeling better and getting well after surgery.     Stay on schedule with your medication.   Going home  Your doctor or nurse will show you how to take care of yourself when you go home. He or she will also answer your questions. Have an adult family member or friend drive you home.      For your safety we recommend these precaution for the first 24 hours after your procedure:  · Do not drive or use heavy equipment.  · Do not make important decisions or sign legal papers.  · Do not drink alcohol.  · Have someone stay with you, if needed. He or she can watch for problems and help keep you safe.  · Your concentration, balance, coordination, and judgement may be impaired for many hours after anesthesia.  Use caution when ambulating or standing up.     · You may feel weak and "washed out" after anesthesia and surgery.      Subtle residual effects of general anesthesia or sedation with regional / local anesthesia can last more than 24 hours.  Rest for the remainder of the day or longer if your Doctor/Surgeon has advised you to do so.  Although you may feel normal within the first 24 hours, your reflexes and mental ability may be impaired without you realizing it.  You may feel dizzy, lightheaded or sleepy for 24 hours or longer.      Be sure to go to all follow-up visits with your doctor. And rest after your surgery for as long as your doctor tells you to.  Coping with pain  If you have pain after " surgery, pain medicine will help you feel better. Take it as told, before pain becomes severe. Also, ask your doctor or pharmacist about other ways to control pain. This might be with heat, ice, or relaxation. And follow any other instructions your surgeon or nurse gives you.  Tips for taking pain medicine  To get the best relief possible, remember these points:  · Pain medicines can upset your stomach. Taking them with a little food may help.  · Most pain relievers taken by mouth need at least 20 to 30 minutes to start to work.  · Taking medicine on a schedule can help you remember to take it. Try to time your medicine so that you can take it before starting an activity. This might be before you get dressed, go for a walk, or sit down for dinner.  · Constipation is a common side effect of pain medicines. Call your doctor before taking any medicines such as laxatives or stool softeners to help ease constipation. Also ask if you should skip any foods. Drinking lots of fluids and eating foods such as fruits and vegetables that are high in fiber can also help. Remember, do not take laxatives unless your surgeon has prescribed them.  · Drinking alcohol and taking pain medicine can cause dizziness and slow your breathing. It can even be deadly. Do not drink alcohol while taking pain medicine.  · Pain medicine can make you react more slowly to things. Do not drive or run machinery while taking pain medicine.  Your health care provider may tell you to take acetaminophen to help ease your pain. Ask him or her how much you are supposed to take each day. Acetaminophen or other pain relievers may interact with your prescription medicines or other over-the-counter (OTC) drugs. Some prescription medicines have acetaminophen and other ingredients. Using both prescription and OTC acetaminophen for pain can cause you to overdose. Read the labels on your OTC medicines with care. This will help you to clearly know the list of  ingredients, how much to take, and any warnings. It may also help you not take too much acetaminophen. If you have questions or do not understand the information, ask your pharmacist or health care provider to explain it to you before you take the OTC medicine.  Managing nausea  Some people have an upset stomach after surgery. This is often because of anesthesia, pain, or pain medicine, or the stress of surgery. These tips will help you handle nausea and eat healthy foods as you get better. If you were on a special food plan before surgery, ask your doctor if you should follow it while you get better. These tips may help:  · Do not push yourself to eat. Your body will tell you when to eat and how much.  · Start off with clear liquids and soup. They are easier to digest.  · Next try semi-solid foods, such as mashed potatoes, applesauce, and gelatin, as you feel ready.  · Slowly move to solid foods. Dont eat fatty, rich, or spicy foods at first.  · Do not force yourself to have 3 large meals a day. Instead eat smaller amounts more often.  · Take pain medicines with a small amount of solid food, such as crackers or toast, to avoid nausea.     Call your surgeon if  · You still have pain an hour after taking medicine. The medicine may not be strong enough.  · You feel too sleepy, dizzy, or groggy. The medicine may be too strong.  · You have side effects like nausea, vomiting, or skin changes, such as rash, itching, or hives.       If you have obstructive sleep apnea  You were given anesthesia medicine during surgery to keep you comfortable and free of pain. After surgery, you may have more apnea spells because of this medicine and other medicines you were given. The spells may last longer than usual.   At home:  · Keep using the continuous positive airway pressure (CPAP) device when you sleep. Unless your health care provider tells you not to, use it when you sleep, day or night. CPAP is a common device used to treat  obstructive sleep apnea.  · Talk with your provider before taking any pain medicine, muscle relaxants, or sedatives. Your provider will tell you about the possible dangers of taking these medicines.  © 8158-8821 [employer]. [address] [place] [address], [place] [address]. All rights reserved. This information is not intended as a substitute for professional medical care. Always follow your healthcare professional's instructions.          General Information:    1.  Do not drink alcoholic beverages including beer for 24 hours or as long as you are on pain medication..  2.  Do not drive a motor vehicle, operate machinery or power tools, or signs legal papers for 24 hours or as long as you are on pain medication.   3.  You may experience light-headedness, dizziness, and sleepiness following surgery. Please do not stay alone. A responsible adult should be with you for this 24 hour period.  4.  Go home and rest.    5. Progress slowly to a normal diet unless instructed.  Otherwise, begin with liquids such as soft drinks, then soup and crackers working up to solid foods. Drink plenty of nonalcoholic fluids.  6.  Certain anesthetics and pain medications produce nausea and vomiting in certain       individuals. If nausea becomes a problem at home, call you doctor.    7. A nurse will be calling you sometime after surgery. Do not be alarmed. This is our way of finding out how you are doing.    8. Several times every hour while you are awake, take 2-3 deep breaths and cough. If you had stomach surgery hold a pillow or rolled towel firmly against your stomach before you cough. This will help with any pain the cough might cause.  9. Several times every hour while you are awake, pump and flex your feet 5-6 times and do foot circles. This will help prevent blood clots.    10.Call your doctor for severe pain, bleeding, fever, or signs or symptoms of infection (pain, swelling, redness, foul odor, drainage).        Post op  instructions for prevention of DVT  What is deep vein thrombosis?  Deep vein thrombosis (DVT) is the medical term for blood clots in the deep veins of the leg.  These blood clots can be dangerous.  A DVT can block a blood vessel and keep blood from getting where it needs to go.  Another problem is that the clot can travel to other parts of the body such as the lungs.  A clot that travels to the lungs is called a pulmonary embolus (PE) and can cause serious problems with breathing which can lead to death.  Am I at risk for DVT/PE?  If you are not very active, you are at risk of DVT.  Anyone confined to bed, sitting for long periods of time, recovering from surgery, etc. increases the risk of DVT.  Other risk factors are cancer diagnosis, certain medications, estrogen replacement in any form,older age, obesity, pregnancy, smoking, history of clotting disorders, and dehydration.  How will I know if I have a DVT?   Swelling in the lower leg   Pain   Warmth, redness, hardness or bulging of the vein  If you have any of these symptoms, call your doctors office right away.  Some people will not have any symptoms until the clot moves to the lungs.  What are the symptoms of a PE?   Panting, shortness of breath, or trouble breathing   Sharp, knife-like chest pain when you breathe   Coughing or coughing up blood   Rapid heartbeat  If you have any of these symptoms or get worse quickly, call 911 for emergency treatment.  How can I prevent a DVT?   Avoid long periods of inactivity and dont cross your legs--get up and walk around every hour or so.   Stay active--walking after surgery is highly encouraged.  This means you should get out of the house and walk in the neighborhood.  Going up and down stairs will not impair healing (unless advised against such activity by your doctor).     Drink plenty of noncaffeinated, nonalcoholic fluids each day to prevent dehydration.   Wear special support stockings, if they have  been advised by your doctor.   If you travel, stop at least once an hour and walk around.   Avoid smoking (assistance with stopping is available through your healthcare provider)  Always notify your doctor if you are not able to follow the post operative instructions that are given to you at the time of discharge.  It may be necessary to prescribe one of the medications available to prevent DVT.

## 2019-06-13 NOTE — PLAN OF CARE
Discharge instructions given to pt and family who voice understanding.  Taking po fluids without nausea.  Pain 5/10 and tolerable per pt.  Surgical dressing to left breast and left axilla dry and intact

## 2019-06-13 NOTE — ANESTHESIA POSTPROCEDURE EVALUATION
Anesthesia Post Evaluation    Patient: Eduardo Esposito    Procedure(s) Performed: Procedure(s) (LRB):  LUMPECTOMY, BREAST (Left)  NEEDLE LOCALIZATION (Left)  BIOPSY, LYMPH NODE, SENTINEL (Left)  LYMPHADENECTOMY, AXILLARY (Left)    Final Anesthesia Type: general  Patient location during evaluation: PACU  Patient participation: Yes- Able to Participate  Level of consciousness: awake and alert  Post-procedure vital signs: reviewed and stable  Pain management: adequate  Airway patency: patent  PONV status at discharge: No PONV  Anesthetic complications: no      Cardiovascular status: blood pressure returned to baseline  Respiratory status: unassisted  Hydration status: euvolemic  Follow-up not needed.          Vitals Value Taken Time   /85 6/13/2019 12:40 PM   Temp 36.6 °C (97.9 °F) 6/13/2019 12:00 PM   Pulse 58 6/13/2019 12:40 PM   Resp 16 6/13/2019 12:40 PM   SpO2 100 % 6/13/2019 12:40 PM         Event Time     Out of Recovery 12:12:00          Pain/Mi Score: Pain Rating Prior to Med Admin: 4 (6/13/2019 12:00 PM)  Mi Score: 10 (6/13/2019 12:40 PM)

## 2019-06-13 NOTE — TRANSFER OF CARE
"Anesthesia Transfer of Care Note    Patient: Eduardo Esposito    Procedure(s) Performed: Procedure(s) (LRB):  LUMPECTOMY, BREAST (Left)  NEEDLE LOCALIZATION (Left)  BIOPSY, LYMPH NODE, SENTINEL (Left)  LYMPHADENECTOMY, AXILLARY (Left)    Patient location: PACU    Anesthesia Type: general    Transport from OR: Transported from OR on 2-3 L/min O2 by NC with adequate spontaneous ventilation    Post pain: adequate analgesia    Post assessment: no apparent anesthetic complications and tolerated procedure well    Post vital signs: stable    Level of consciousness: awake    Nausea/Vomiting: no nausea/vomiting    Complications: none    Transfer of care protocol was followed      Last vitals:   Visit Vitals  /66 (BP Location: Right arm, Patient Position: Sitting)   Pulse 63   Temp 36.3 °C (97.3 °F) (Skin)   Resp 18   Ht 5' 6" (1.676 m)   Wt 95.3 kg (210 lb)   Breastfeeding? No   BMI 33.89 kg/m²     "

## 2019-06-13 NOTE — NURSING
Needle loc performed successfully by Dr. Fernandes. Timeout performed. Bandage applied to left breast. Patient assisted back to preop

## 2019-06-13 NOTE — DISCHARGE SUMMARY
Discharge Summary  General Surgery      Admit Date: 6/13/2019    Discharge Date :6/13/2019    Attending Physician: Kendall Howard     Discharge Physician: Kendall Howard    Discharged Condition: good    Discharge Diagnosis: Malignant neoplasm of left female breast, unspecified estrogen receptor status, unspecified site of breast [C50.912]    Treatments/Procedures: Procedure(s) (LRB):  LUMPECTOMY, BREAST (Left)  NEEDLE LOCALIZATION (Left)  BIOPSY, LYMPH NODE, SENTINEL (Left)  LYMPHADENECTOMY, AXILLARY (Left)    Hospital Course: Uneventful; Discharged home from Recovery    Significant Diagnostic Studies: none    Disposition: Home or Self Care    Diet: Regular    Follow up: Office 10-14 days    Activity: No heavy lifting till seen in office.    Patient Instructions:   Current Discharge Medication List      START taking these medications    Details   HYDROcodone-acetaminophen (NORCO) 5-325 mg per tablet Take 1 tablet by mouth every 6 (six) hours as needed for Pain.  Qty: 15 tablet, Refills: 0         CONTINUE these medications which have NOT CHANGED    Details   triamterene-hydrochlorothiazide 37.5-25 mg (MAXZIDE-25) 37.5-25 mg per tablet Take 1 tablet by mouth once daily.  Qty: 90 tablet, Refills: 1    Associated Diagnoses: Essential hypertension      VITAMIN D3 5,000 unit Tab Take 1 tablet by mouth once daily.      anastrozole (ARIMIDEX) 1 mg Tab Take 1 tablet (1 mg total) by mouth once daily.  Qty: 30 tablet, Refills: 4    Associated Diagnoses: Malignant neoplasm of breast in female, estrogen receptor positive, unspecified laterality, unspecified site of breast      atorvastatin (LIPITOR) 80 MG tablet Take 0.5 tablets (40 mg total) by mouth once daily.  Qty: 90 tablet, Refills: 1    Associated Diagnoses: Controlled type 2 diabetes mellitus without complication, without long-term current use of insulin      ELIQUIS 5 mg Tab Take 1 tablet (5 mg total) by mouth 2 (two) times daily.  Qty: 180 tablet, Refills: 1     Associated Diagnoses: Paroxysmal atrial fibrillation      metFORMIN (GLUCOPHAGE) 500 MG tablet Take 1 tablet (500 mg total) by mouth 2 (two) times daily with meals.  Qty: 180 tablet, Refills: 1    Associated Diagnoses: Controlled type 2 diabetes mellitus without complication, without long-term current use of insulin             Discharge Procedure Orders   Diet general

## 2019-06-13 NOTE — ANESTHESIA PREPROCEDURE EVALUATION
06/13/2019  Eduardo Esposito is a 69 y.o., female.    Anesthesia Evaluation    I have reviewed the Patient Summary Reports.    I have reviewed the Nursing Notes.   I have reviewed the Medications.     Review of Systems  Anesthesia Hx:  Denies Family Hx of Anesthesia complications.   Denies Personal Hx of Anesthesia complications.   Social:  Non-Smoker    Hematology/Oncology:        Current/Recent Cancer. Breast left   Cardiovascular:   Hypertension Dysrhythmias atrial fibrillation ECG has been reviewed.    Endocrine:   Diabetes        Physical Exam  General:  Obesity    Airway/Jaw/Neck:  Airway Findings: Mouth Opening: Normal Tongue: Normal  General Airway Assessment: Adult  Mallampati: III  Improves to II with phonation.  TM Distance: Normal, at least 6 cm  Jaw/Neck Findings:  Neck ROM: Normal ROM      Dental:  Dental Findings: Molar caps, In tact   Chest/Lungs:  Chest/Lungs Findings: Clear to auscultation, Normal Respiratory Rate     Heart/Vascular:  Heart Findings:            Anesthesia Plan  Type of Anesthesia, risks & benefits discussed:  Anesthesia Type:  general  Patient's Preference:   Intra-op Monitoring Plan: standard ASA monitors  Intra-op Monitoring Plan Comments:   Post Op Pain Control Plan:   Post Op Pain Control Plan Comments:   Induction:   IV  Beta Blocker:  Patient is not currently on a Beta-Blocker (No further documentation required).       Informed Consent: Patient understands risks and agrees with Anesthesia plan.  Questions answered. Anesthesia consent signed with patient.  ASA Score: 3     Day of Surgery Review of History & Physical:    H&P update referred to the surgeon.         Ready For Surgery From Anesthesia Perspective.

## 2019-06-13 NOTE — OP NOTE
Patient: Eduardo Esposito     Date of Procedure: 6/13/2019    Procedure: 1. Left partial mastectomy with wire localization.  2. [place] node injection.  3. Excision of deep axillary sentinel node.    Surgeon: Kendall Ramon MD    Assistant: Rhonda Bird    Pre-op Diagnosis: Malignant neoplasm of left female breast, unspecified estrogen receptor status, unspecified site of breast [C50.912]     Post-op Diagnosis: Malignant neoplasm of left female breast, unspecified estrogen receptor status, unspecified site of breast [C50.912]    Findings: Breast Cancer.  Axillary sentinel node.    Specimen: Partial mastectomy.  Axillary sentinel node.    EBL: 20 cc    Complications: None      Procedure in detail:    After appropriate consent was obtained, consent forms signed and questions answered, the breast was marked and the patient was taken to the operating room.  She was positioned and general anesthesia was induced.  3 cc of Methylene Blue was injected into the subareolar tissue of the left breast and the breast was massaged in the usual fashion. The x-ray images were reviewed.  The chest was then prepped and draped in the usual sterile fashion.  Prior to the procedure the patient had had wire localization performed by radiology.  Radiolabeled colloid was injected by radiology at that time for identification of the axillary sentinel node.    The neoprobe was used to identify the site of signal in the axilla.  A skin incision was made overlying this site and dissection was performed into the axillary tissue.  One sentinel node was identified and removed.  This was sent to pathology for permanent section.  Adequate hemostasis was achieved.  Quarter percent Marcaine with epinephrine was injected for local anesthesia.  The deep tissue was reapproximated with 3-0 Vicryl suture and the skin was closed with a 4-0 Monocryl subcuticular stitch.   The x-ray images were again reviewed identifying the wire and the lesion.  Skin  incision was made at the inferior aspect of the areola and dissection was performed into the breast tissue with electrocautery.  A partial mastectomy specimen was then removed which encased the wire.  The specimen was then oriented for the pathologist with a long stitch on the lateral margin, a short stitch on the superior margin, and a double stitch on the deep margin.  The cavity was examined and adequate hemostasis was achieved.  Quarter percent Marcaine with epinephrine was injected for local anesthesia.  The deep tissue was reapproximated with 3-0 Vicryl suture and the skin was then closed with a 4-0 Monocryl subcuticular stitch.   Specimen radiograph confirmed the lesion to be within the partial mastectomy specimen.  Steri-Strips and dressings were then applied.  The patient was then awakened, extubated and transferred to the recovery room in stable condition.  She tolerated the procedure without complication.

## 2019-06-13 NOTE — INTERVAL H&P NOTE
The patient has been examined and the H&P has been reviewed:    I concur with the findings and no changes have occurred since H&P was written.    Anesthesia/Surgery risks, benefits and alternative options discussed and understood by patient/family.          Active Hospital Problems    Diagnosis  POA    Malignant neoplasm of left female breast [C50.912]  Yes      Resolved Hospital Problems   No resolved problems to display.

## 2019-06-14 ENCOUNTER — OFFICE VISIT (OUTPATIENT)
Dept: FAMILY MEDICINE | Facility: CLINIC | Age: 69
End: 2019-06-14
Payer: MEDICARE

## 2019-06-14 ENCOUNTER — DOCUMENTATION ONLY (OUTPATIENT)
Dept: FAMILY MEDICINE | Facility: CLINIC | Age: 69
End: 2019-06-14

## 2019-06-14 VITALS
DIASTOLIC BLOOD PRESSURE: 85 MMHG | RESPIRATION RATE: 16 BRPM | OXYGEN SATURATION: 100 % | HEART RATE: 58 BPM | TEMPERATURE: 98 F | HEIGHT: 66 IN | BODY MASS INDEX: 33.75 KG/M2 | SYSTOLIC BLOOD PRESSURE: 134 MMHG | WEIGHT: 210 LBS

## 2019-06-14 VITALS
HEIGHT: 66 IN | HEART RATE: 60 BPM | DIASTOLIC BLOOD PRESSURE: 60 MMHG | SYSTOLIC BLOOD PRESSURE: 108 MMHG | OXYGEN SATURATION: 95 % | BODY MASS INDEX: 35.26 KG/M2 | TEMPERATURE: 98 F | WEIGHT: 219.38 LBS

## 2019-06-14 DIAGNOSIS — M16.11 OSTEOARTHRITIS OF RIGHT HIP, UNSPECIFIED OSTEOARTHRITIS TYPE: ICD-10-CM

## 2019-06-14 DIAGNOSIS — R26.9 GAIT DISTURBANCE: ICD-10-CM

## 2019-06-14 DIAGNOSIS — E11.9 CONTROLLED TYPE 2 DIABETES MELLITUS WITHOUT COMPLICATION, WITHOUT LONG-TERM CURRENT USE OF INSULIN: ICD-10-CM

## 2019-06-14 DIAGNOSIS — M25.551 RIGHT HIP PAIN: Primary | ICD-10-CM

## 2019-06-14 PROCEDURE — 99213 OFFICE O/P EST LOW 20 MIN: CPT | Mod: S$GLB,,, | Performed by: NURSE PRACTITIONER

## 2019-06-14 PROCEDURE — 99213 PR OFFICE/OUTPT VISIT, EST, LEVL III, 20-29 MIN: ICD-10-PCS | Mod: S$GLB,,, | Performed by: NURSE PRACTITIONER

## 2019-06-14 RX ORDER — DEXTROSE 4 G
1 TABLET,CHEWABLE ORAL DAILY
Qty: 1 EACH | Refills: 0 | Status: SHIPPED | OUTPATIENT
Start: 2019-06-14 | End: 2019-11-06

## 2019-06-14 NOTE — PROGRESS NOTES
Health Maintenance Due   Topic Date Due    TETANUS VACCINE  06/01/1968    Urine Microalbumin  07/05/2019

## 2019-06-14 NOTE — PROGRESS NOTES
Subjective:       Patient ID: Eduardo Esposito is a 69 y.o. female.    Chief Complaint: Diabetes and Right hip pain  This is an established patient of Dr. Nance, but she is new to me. She is here today because she wants a tall walker. She barrowed her mother's walker and it is too short so it is difficult for her to use it.  She would also like to have a lift chair for her home as she has difficulty getting up and down, in and out of her chair and she would like a handicap parking permit as her right hip is very painful and she cannot even walk one step without pain. Refuses any steroid injections to hip and does not want any hip surgery. She has not seen an orthopedist regarding the hip, but did have imaging a few months ago.      She also has diabetes, but does not have a meter to monitor her blood sugar and would like one. She had one in the past, but it got lost.       HPI  Review of Systems   Constitutional: Negative for fatigue, fever and unexpected weight change.   HENT: Negative for congestion, hearing loss and sore throat.    Eyes: Negative for pain, redness and visual disturbance.   Respiratory: Negative for cough and shortness of breath.    Cardiovascular: Negative for chest pain and leg swelling.   Gastrointestinal: Negative for constipation, diarrhea, nausea and vomiting.   Endocrine: Negative for cold intolerance and heat intolerance.   Genitourinary: Negative for dysuria and hematuria.   Musculoskeletal: Positive for arthralgias and gait problem. Negative for myalgias.   Skin: Negative for pallor and rash.   Neurological: Negative for dizziness and headaches.   Psychiatric/Behavioral: Negative for dysphoric mood. The patient is not nervous/anxious.        Objective:     X-Ray Hip 2 or 3 views Right   Order: [id number]   Status:  Final result   Visible to patient:  Yes (Patient Portal)   Next appt:  06/19/2019 at 11:30 AM in Surgery (Kendall Howard MD)   Dx:  Trochanteric bursitis of right hip   Details      Reading Physician Reading Date Result Priority   Hernandez Elliott MD 2/26/2019       Narrative     EXAMINATION:  XR HIP 2 VIEW RIGHT    CLINICAL HISTORY:  Trochanteric bursitis, right hip    TECHNIQUE:  AP view of the pelvis and frog leg lateral view of the right hip were performed.    COMPARISON:  None    FINDINGS:  There is severe narrowing at the right hip joint with adjacent bony sclerosis and marginal osteophyte formation.  There is no fracture.  The pubic rami are intact.  There is mild left hip joint space narrowing.      Impression       Please see above.      Electronically signed by: Hernandez Elliott MD  Date: 02/26/2019  Time: 10:49            Last Resulted: 02/26/19 10:49   Order Details View Encounter Lab and Collection Details Routing Result History              Lab Results   Component Value Date    HGBA1C 5.8 (H) 02/18/2019     CMP  Sodium   Date Value Ref Range Status   06/12/2019 143 136 - 145 mmol/L Final     Potassium   Date Value Ref Range Status   06/12/2019 4.0 3.5 - 5.1 mmol/L Final     Chloride   Date Value Ref Range Status   06/12/2019 104 95 - 110 mmol/L Final     CO2   Date Value Ref Range Status   06/12/2019 26 23 - 29 mmol/L Final     Glucose   Date Value Ref Range Status   06/12/2019 112 (H) 70 - 110 mg/dL Final     BUN, Bld   Date Value Ref Range Status   06/12/2019 24 (H) 8 - 23 mg/dL Final     Creatinine   Date Value Ref Range Status   06/12/2019 0.9 0.5 - 1.4 mg/dL Final     Calcium   Date Value Ref Range Status   06/12/2019 10.2 8.7 - 10.5 mg/dL Final     Total Protein   Date Value Ref Range Status   06/12/2019 7.3 6.0 - 8.4 g/dL Final     Albumin   Date Value Ref Range Status   06/12/2019 4.4 3.5 - 5.2 g/dL Final     Total Bilirubin   Date Value Ref Range Status   06/12/2019 1.1 (H) 0.1 - 1.0 mg/dL Final     Comment:     For infants and newborns, interpretation of results should be based  on gestational age, weight and in agreement with clinical  observations.  Premature  Infant recommended reference ranges:  Up to 24 hours.............<8.0 mg/dL  Up to 48 hours............<12.0 mg/dL  3-5 days..................<15.0 mg/dL  6-29 days.................<15.0 mg/dL       Alkaline Phosphatase   Date Value Ref Range Status   06/12/2019 82 55 - 135 U/L Final     AST   Date Value Ref Range Status   06/12/2019 17 10 - 40 U/L Final     ALT   Date Value Ref Range Status   06/12/2019 22 10 - 44 U/L Final     Anion Gap   Date Value Ref Range Status   06/12/2019 13 8 - 16 mmol/L Final     eGFR if    Date Value Ref Range Status   06/12/2019 >60 >60 mL/min/1.73 m^2 Final     eGFR if non    Date Value Ref Range Status   06/12/2019 >60 >60 mL/min/1.73 m^2 Final     Comment:     Calculation used to obtain the estimated glomerular filtration  rate (eGFR) is the CKD-EPI equation.          Physical Exam   Constitutional: She is oriented to person, place, and time. She appears well-developed and well-nourished. No distress.   HENT:   Head: Normocephalic and atraumatic.   Eyes: Conjunctivae are normal. Right eye exhibits no discharge. Left eye exhibits no discharge. No scleral icterus.   Cardiovascular: Normal rate, regular rhythm and normal heart sounds. Exam reveals no gallop and no friction rub.   No murmur heard.  Pulmonary/Chest: Effort normal and breath sounds normal. No stridor. No respiratory distress. She has no wheezes. She has no rales.   Musculoskeletal: She exhibits no edema or tenderness.   No tenderness over right hip.    Neurological: She is alert and oriented to person, place, and time.   Skin: Skin is warm and dry. No rash noted. She is not diaphoretic. No pallor.   Psychiatric: She has a normal mood and affect. Her behavior is normal.   Nursing note and vitals reviewed.      Assessment:     This provider spent  15 minutes face to face with patient, more than half the time for counseling and coordination of care as noted.    1. Right hip pain    2.  Osteoarthritis of right hip, unspecified osteoarthritis type    3. Gait disturbance    4. Controlled type 2 diabetes mellitus without complication, without long-term current use of insulin        Plan:       Right hip pain  -     Ambulatory Referral to Orthopedics    Osteoarthritis of right hip, unspecified osteoarthritis type  -     Ambulatory Referral to Orthopedics    Gait disturbance  -     WALKER FOR HOME USE    Controlled type 2 diabetes mellitus without complication, without long-term current use of insulin  -     blood-glucose meter (TRUE METRIX GLUCOSE METER) Misc; 1 each by Misc.(Non-Drug; Combo Route) route once daily.  Dispense: 1 each; Refill: 0  -     blood sugar diagnostic (TRUE METRIX GLUCOSE TEST STRIP) Strp; 1 each by Misc.(Non-Drug; Combo Route) route once daily.  Dispense: 100 each; Refill: 3  -     lancets 32 gauge Misc; 1 lancet by Misc.(Non-Drug; Combo Route) route once daily.  Dispense: 100 each; Refill: 3         3 months

## 2019-06-19 ENCOUNTER — PATIENT MESSAGE (OUTPATIENT)
Dept: SURGERY | Facility: CLINIC | Age: 69
End: 2019-06-19

## 2019-06-28 ENCOUNTER — TELEPHONE (OUTPATIENT)
Dept: HEMATOLOGY/ONCOLOGY | Facility: CLINIC | Age: 69
End: 2019-06-28

## 2019-06-28 NOTE — TELEPHONE ENCOUNTER
Message left for patient to notify her that she will need treatment either way with a positive or negative Brca.

## 2019-06-28 NOTE — TELEPHONE ENCOUNTER
Message left instructing patient to call [phone] to schedule f/u appointment with Dr Marin if she wants treatment for cancer.

## 2019-06-28 NOTE — TELEPHONE ENCOUNTER
----- Message from Marcia Young sent at 6/28/2019  3:19 PM CDT -----  Contact: Patient  Type:  Patient Returning Call    Who Called: Patient  Who Left Message for Patient:  Valentina  Does the patient know what this is regarding?: to make an appt  Best Call Back Number:    Additional Information:  Sent an IM to Valentina

## 2019-07-03 ENCOUNTER — OFFICE VISIT (OUTPATIENT)
Dept: SURGERY | Facility: CLINIC | Age: 69
End: 2019-07-03
Payer: MEDICARE

## 2019-07-03 VITALS
WEIGHT: 219.38 LBS | BODY MASS INDEX: 35.41 KG/M2 | SYSTOLIC BLOOD PRESSURE: 115 MMHG | HEART RATE: 78 BPM | DIASTOLIC BLOOD PRESSURE: 70 MMHG

## 2019-07-03 DIAGNOSIS — Z98.890 POST-OPERATIVE STATE: Primary | ICD-10-CM

## 2019-07-03 PROCEDURE — 99024 POSTOP FOLLOW-UP VISIT: CPT | Mod: S$GLB,,, | Performed by: SURGERY

## 2019-07-03 PROCEDURE — 99024 PR POST-OP FOLLOW-UP VISIT: ICD-10-PCS | Mod: S$GLB,,, | Performed by: SURGERY

## 2019-07-03 PROCEDURE — 99999 PR PBB SHADOW E&M-EST. PATIENT-LVL III: CPT | Mod: PBBFAC,,, | Performed by: SURGERY

## 2019-07-03 PROCEDURE — 99999 PR PBB SHADOW E&M-EST. PATIENT-LVL III: ICD-10-PCS | Mod: PBBFAC,,, | Performed by: SURGERY

## 2019-07-03 RX ORDER — LANCETS 33 GAUGE
EACH MISCELLANEOUS
COMMUNITY
Start: 2019-06-24 | End: 2019-11-06

## 2019-07-03 RX ORDER — BLOOD-GLUCOSE METER
EACH MISCELLANEOUS
COMMUNITY
Start: 2019-06-24 | End: 2020-01-08 | Stop reason: CLARIF

## 2019-07-06 NOTE — PROGRESS NOTES
POST-OP NOTE    PROCEDURE:  Left breast partial mastectomy and sentinel node biopsy    COMPLAINTS: None.    EXAM: Incision well approximated. No drainage. No infection.      IMPRESSION: FINAL PATHOLOGIC DIAGNOSIS  1. LEFT BREAST, PARTIAL MASTECTOMY:  - Invasive ductal carcinoma, Jacinto histologic grade 2, tumor size = 1.0 cm. The surgical margins are free of  invasive carcinoma; the invasive carcinoma is 3.4 mm from the anterior (closest) margin.  - Intermediate and focal low-grade ductal carcinoma in situ with focal micropapillomas with intermediate grade  ductal carcinoma in situ. The surgical margins are free of DCIS; the DCIS is 1.4 mm from the inferior (closest)  margin.  - Fibrocystic changes with columnar cell change, focal blunt duct adenosis and usual ductal hyperplasia.  - Organizing biopsy site with fibrosis, chronic inflammation and focal necrosis.  2. LEFT AXILLARY SENTINEL LYMPH NODE (1):  - No evidence of malignancy by routine and immunohistochemistry staining.  - Sinus histiocytosis.    PLAN: FU as needed.  Refer to Oncology and Radiation Oncology.

## 2019-07-08 ENCOUNTER — OFFICE VISIT (OUTPATIENT)
Dept: ORTHOPEDICS | Facility: CLINIC | Age: 69
End: 2019-07-08
Payer: MEDICARE

## 2019-07-08 VITALS
DIASTOLIC BLOOD PRESSURE: 67 MMHG | HEART RATE: 68 BPM | SYSTOLIC BLOOD PRESSURE: 139 MMHG | WEIGHT: 219 LBS | HEIGHT: 66 IN | BODY MASS INDEX: 35.2 KG/M2

## 2019-07-08 DIAGNOSIS — M16.11 UNILATERAL PRIMARY OSTEOARTHRITIS, RIGHT HIP: Primary | ICD-10-CM

## 2019-07-08 PROCEDURE — 99204 OFFICE O/P NEW MOD 45 MIN: CPT | Mod: S$GLB,,, | Performed by: ORTHOPAEDIC SURGERY

## 2019-07-08 PROCEDURE — 99999 PR PBB SHADOW E&M-EST. PATIENT-LVL III: ICD-10-PCS | Mod: PBBFAC,,, | Performed by: ORTHOPAEDIC SURGERY

## 2019-07-08 PROCEDURE — 3075F SYST BP GE 130 - 139MM HG: CPT | Mod: CPTII,S$GLB,, | Performed by: ORTHOPAEDIC SURGERY

## 2019-07-08 PROCEDURE — 99999 PR PBB SHADOW E&M-EST. PATIENT-LVL III: CPT | Mod: PBBFAC,,, | Performed by: ORTHOPAEDIC SURGERY

## 2019-07-08 PROCEDURE — 3075F PR MOST RECENT SYSTOLIC BLOOD PRESS GE 130-139MM HG: ICD-10-PCS | Mod: CPTII,S$GLB,, | Performed by: ORTHOPAEDIC SURGERY

## 2019-07-08 PROCEDURE — 3078F DIAST BP <80 MM HG: CPT | Mod: CPTII,S$GLB,, | Performed by: ORTHOPAEDIC SURGERY

## 2019-07-08 PROCEDURE — 3078F PR MOST RECENT DIASTOLIC BLOOD PRESSURE < 80 MM HG: ICD-10-PCS | Mod: CPTII,S$GLB,, | Performed by: ORTHOPAEDIC SURGERY

## 2019-07-08 PROCEDURE — 1101F PT FALLS ASSESS-DOCD LE1/YR: CPT | Mod: CPTII,S$GLB,, | Performed by: ORTHOPAEDIC SURGERY

## 2019-07-08 PROCEDURE — 1101F PR PT FALLS ASSESS DOC 0-1 FALLS W/OUT INJ PAST YR: ICD-10-PCS | Mod: CPTII,S$GLB,, | Performed by: ORTHOPAEDIC SURGERY

## 2019-07-08 PROCEDURE — 99204 PR OFFICE/OUTPT VISIT, NEW, LEVL IV, 45-59 MIN: ICD-10-PCS | Mod: S$GLB,,, | Performed by: ORTHOPAEDIC SURGERY

## 2019-07-08 NOTE — LETTER
July 8, 2019      Gail Lovett, APRN  [address] [address]           [facility] William Perkins [address] [place] [id number]  Phone: [phone]          Patient: Eduardo Esposito   MR Number: [id number]   YOB: 1950   Date of Visit: 7/8/2019       Dear Gail Lovett:    Thank you for referring Eduardo Esposito to me for evaluation. Attached you will find relevant portions of my assessment and plan of care.    If you have questions, please do not hesitate to call me. I look forward to following Eduardo Esposito along with you.    Sincerely,    Rahul Hanson II, MD    Enclosure  CC:  No Recipients    If you would like to receive this communication electronically, please contact [email or web address] or [phone] to request more information on [facility] [employer] access.    For providers and/or their staff who would like to refer a patient to [facility], please contact us through our one-stop-shop provider referral line, [facility], at [phone].    If you feel you have received this communication in error or would no longer like to receive these types of communications, please e-mail [email or web address]

## 2019-07-08 NOTE — PROGRESS NOTES
CC:  69-year-old female presents for evaluation of right hip pain. The patient has had an insidious onset of pain in the right hip over the past year.  It has progressively gotten worse.  She currently rates her pain as an 8/10.  She has pain every day and pain that occasionally keeps her up at night.  It is interfering with daily activities.  She presents today using a rolling walker.  She has been using that for about 3 months.  She occasionally takes NSAIDs.  She has had no other treatments for her hip pain.    ROS:    Constitution: Denies chills, fever, and sweats.  HENT: Denies headaches or blurry vision.  Cardiovascular: Denies chest pain or irregular heart beat.  Respiratory: Denies cough or shortness of breath.  Gastrointestinal: Denies abdominal pain, nausea, or vomiting.  Genitourinary:  Denies urinary incontinence, bladder and kidney issues  Musculoskeletal:  Denies muscle cramps.  Positive for pain in the right hip  Neurological: Denies dizziness or focal weakness.  Psychiatric/Behavioral: Normal mental status.  Hematologic/Lymphatic: Denies bleeding problem or easy bruising/bleeding.  Skin: Denies rash or suspicious lesions.    Physical examination     Gen - No acute distress   Eyes - Extraoccular motions intact, pupils equally round and reactive to light and accommodation   ENT - normocephalic, atruamtic, oropharynx clear   Neck - Supple, no abnormal masses   Cardiovascular - regular rate and rhythm   Pulmonary - clear to auscultation bilaterally   Abdomen - soft, non-tender, non-distended, positive bowel sounds   Psych - The patient is alert and oriented x3 with normal mood and affect    Examination of the Right Hip    C-Sign positive  Logroll positive  Stenchfield positive    Pain with ROM positive    ROM:    Flexion   70  Extension   10  Abduction   50  Adduction   10  External Rotation 20  Internal Rotation 10    Flexion contracture positive    FADIR positive  FADER positive    X-rays were examined  and personally reviewed by me.  Three views of the right hip dated 02/26/2019 show severe osteoarthritis with complete loss of joint space, periarticular osteophytes, and subchondral sclerosis.    Dx:  Osteoarthritis of the right hip    Plan:  The patient states that she is not ready for hip replacement at this time. She also is not interested in intra-articular injections. She states she had those in her knee before she had her knee replaced in the did work.  She wants to know she can continue to do aquatic exercises which I think is fine.  We had a long discussion about that.  She will continue to use a rolling walker.  She can use NSAIDs p.r.n..  She will follow up with us when she is ready to discuss hip replacement surgery.

## 2019-07-09 ENCOUNTER — OFFICE VISIT (OUTPATIENT)
Dept: HEMATOLOGY/ONCOLOGY | Facility: CLINIC | Age: 69
End: 2019-07-09
Payer: MEDICARE

## 2019-07-09 VITALS
HEIGHT: 66 IN | DIASTOLIC BLOOD PRESSURE: 58 MMHG | SYSTOLIC BLOOD PRESSURE: 125 MMHG | HEART RATE: 69 BPM | BODY MASS INDEX: 35.12 KG/M2 | RESPIRATION RATE: 20 BRPM | WEIGHT: 218.5 LBS | TEMPERATURE: 98 F | OXYGEN SATURATION: 96 %

## 2019-07-09 DIAGNOSIS — C50.912 INFILTRATING DUCTAL CARCINOMA OF LEFT BREAST: Primary | ICD-10-CM

## 2019-07-09 DIAGNOSIS — C50.912 MALIGNANT NEOPLASM OF LEFT FEMALE BREAST, UNSPECIFIED ESTROGEN RECEPTOR STATUS, UNSPECIFIED SITE OF BREAST: Primary | ICD-10-CM

## 2019-07-09 DIAGNOSIS — C50.912 INFILTRATING DUCTAL CARCINOMA OF LEFT BREAST: ICD-10-CM

## 2019-07-09 DIAGNOSIS — I48.20 CHRONIC ATRIAL FIBRILLATION: Primary | ICD-10-CM

## 2019-07-09 DIAGNOSIS — C50.912 MALIGNANT NEOPLASM OF LEFT FEMALE BREAST, UNSPECIFIED ESTROGEN RECEPTOR STATUS, UNSPECIFIED SITE OF BREAST: ICD-10-CM

## 2019-07-09 PROCEDURE — 99214 OFFICE O/P EST MOD 30 MIN: CPT | Mod: S$GLB,,, | Performed by: INTERNAL MEDICINE

## 2019-07-09 PROCEDURE — 3078F PR MOST RECENT DIASTOLIC BLOOD PRESSURE < 80 MM HG: ICD-10-PCS | Mod: CPTII,S$GLB,, | Performed by: INTERNAL MEDICINE

## 2019-07-09 PROCEDURE — 3074F PR MOST RECENT SYSTOLIC BLOOD PRESSURE < 130 MM HG: ICD-10-PCS | Mod: CPTII,S$GLB,, | Performed by: INTERNAL MEDICINE

## 2019-07-09 PROCEDURE — 1101F PR PT FALLS ASSESS DOC 0-1 FALLS W/OUT INJ PAST YR: ICD-10-PCS | Mod: CPTII,S$GLB,, | Performed by: INTERNAL MEDICINE

## 2019-07-09 PROCEDURE — 99999 PR PBB SHADOW E&M-EST. PATIENT-LVL III: ICD-10-PCS | Mod: PBBFAC,,, | Performed by: INTERNAL MEDICINE

## 2019-07-09 PROCEDURE — 1101F PT FALLS ASSESS-DOCD LE1/YR: CPT | Mod: CPTII,S$GLB,, | Performed by: INTERNAL MEDICINE

## 2019-07-09 PROCEDURE — 99999 PR PBB SHADOW E&M-EST. PATIENT-LVL III: CPT | Mod: PBBFAC,,, | Performed by: INTERNAL MEDICINE

## 2019-07-09 PROCEDURE — 3074F SYST BP LT 130 MM HG: CPT | Mod: CPTII,S$GLB,, | Performed by: INTERNAL MEDICINE

## 2019-07-09 PROCEDURE — 99214 PR OFFICE/OUTPT VISIT, EST, LEVL IV, 30-39 MIN: ICD-10-PCS | Mod: S$GLB,,, | Performed by: INTERNAL MEDICINE

## 2019-07-09 PROCEDURE — 3078F DIAST BP <80 MM HG: CPT | Mod: CPTII,S$GLB,, | Performed by: INTERNAL MEDICINE

## 2019-07-09 NOTE — PROGRESS NOTES
HPI    A 68 years old  female accompanied by family member is here in Oncology Clinic for evaluation of breast cancer.  Patient recently had ultrasound-guided biopsy left breast found to have invasive ductal carcinoma.  Patient is ERPR positive HER2 negative by FISH.  Patient has been seen by breast surgeon.  Treatment team consider contemplating doing a lumpectomy plus radiation versus mastectomy based on further chain testing.  Patient is here for evaluation of BRCA 1/2 gene studies for above reason.  On ultrasound left breast 7 mm mass at the anterior 6:00 position.  Patient reported her Ants has breast cancer.    Social history is negative for smoking drinking and recreational drug use.    She has history of AFib is taking Eliquis 2.5 mg b.i.d..  She does not have any other medical conditions.    Review of system:   Deny chest pain shortness of breath.  Denies abdominal pain nausea vomiting or diarrhea.  Denies any fever or chills.  Denies any weight loss.  Denies any change appetite change.      Past Medical History:   Diagnosis Date    Atrial fibrillation     4/11/13-recent dx sceduled to see cardiology Monday- Dr Lynn-dm    Cancer     breast    Diabetes mellitus     Hyperlipemia     Hypertension      Social History     Socioeconomic History    Marital status:      Spouse name: Not on file    Number of children: Not on file    Years of education: Not on file    Highest education level: Not on file   Occupational History    Not on file   Social Needs    Financial resource strain: Not on file    Food insecurity:     Worry: Not on file     Inability: Not on file    Transportation needs:     Medical: Not on file     Non-medical: Not on file   Tobacco Use    Smoking status: Never Smoker    Smokeless tobacco: Never Used   Substance and Sexual Activity    Alcohol use: No     Comment: once or twice a year    Drug use: No    Sexual activity: Not on file   Lifestyle    Physical  activity:     Days per week: Not on file     Minutes per session: Not on file    Stress: Not on file   Relationships    Social connections:     Talks on phone: Not on file     Gets together: Not on file     Attends Spiritism service: Not on file     Active member of club or organization: Not on file     Attends meetings of clubs or organizations: Not on file     Relationship status: Not on file   Other Topics Concern    Not on file   Social History Narrative    Not on file       Objective    Physical Exam   Vitals:    07/09/19 1306   BP: (!) 125/58   Pulse: 69   Resp: 20   Temp: 98.4 °F (36.9 °C)       Constitutional: patient is oriented to person, place, and time. patient appears well-developed and well-nourished. No distress.   HENT:  Normocephalic atraumatic pupils equal round reactive to light extraocular muscle intact.  Cardiovascular:  Regular rate  Pulmonary/Chest:  Clear to auscultate   Abdominal:  Soft nontender nondistended  Musculoskeletal: Normal range of motion. Gait is normal  No clubbing, cyanosis     Lymphadenopathy:  No evidence lymphadenopathy  Neurological: patient is alert and oriented to person, place, and time. patient has normal strength and normal reflexes. No sensory deficit. Gait normal.   Skin:  Warm dry   Psychiatric:  No rashes no lesion    CMP  Sodium   Date Value Ref Range Status   06/12/2019 143 136 - 145 mmol/L Final     Potassium   Date Value Ref Range Status   06/12/2019 4.0 3.5 - 5.1 mmol/L Final     Chloride   Date Value Ref Range Status   06/12/2019 104 95 - 110 mmol/L Final     CO2   Date Value Ref Range Status   06/12/2019 26 23 - 29 mmol/L Final     Glucose   Date Value Ref Range Status   06/12/2019 112 (H) 70 - 110 mg/dL Final     BUN, Bld   Date Value Ref Range Status   06/12/2019 24 (H) 8 - 23 mg/dL Final     Creatinine   Date Value Ref Range Status   06/12/2019 0.9 0.5 - 1.4 mg/dL Final     Calcium   Date Value Ref Range Status   06/12/2019 10.2 8.7 - 10.5 mg/dL Final      Total Protein   Date Value Ref Range Status   06/12/2019 7.3 6.0 - 8.4 g/dL Final     Albumin   Date Value Ref Range Status   06/12/2019 4.4 3.5 - 5.2 g/dL Final     Total Bilirubin   Date Value Ref Range Status   06/12/2019 1.1 (H) 0.1 - 1.0 mg/dL Final     Comment:     For infants and newborns, interpretation of results should be based  on gestational age, weight and in agreement with clinical  observations.  Premature Infant recommended reference ranges:  Up to 24 hours.............<8.0 mg/dL  Up to 48 hours............<12.0 mg/dL  3-5 days..................<15.0 mg/dL  6-29 days.................<15.0 mg/dL       Alkaline Phosphatase   Date Value Ref Range Status   06/12/2019 82 55 - 135 U/L Final     AST   Date Value Ref Range Status   06/12/2019 17 10 - 40 U/L Final     ALT   Date Value Ref Range Status   06/12/2019 22 10 - 44 U/L Final     Anion Gap   Date Value Ref Range Status   06/12/2019 13 8 - 16 mmol/L Final     eGFR if    Date Value Ref Range Status   06/12/2019 >60 >60 mL/min/1.73 m^2 Final     eGFR if non    Date Value Ref Range Status   06/12/2019 >60 >60 mL/min/1.73 m^2 Final     Comment:     Calculation used to obtain the estimated glomerular filtration  rate (eGFR) is the CKD-EPI equation.        Lab Results   Component Value Date    WBC 9.10 06/12/2019    HGB 15.6 06/12/2019    HCT 48.1 06/12/2019    MCV 89 06/12/2019     06/12/2019     FINAL PATHOLOGIC DIAGNOSIS biopsy 04/10/2019  Left breast mass, core needle biopsy:  - Invasive ductal carcinoma, Jacinto grade 2 (T=3, N=3, M=1), 5mm in greatest dimension  - No lymphovascular invasion identified on sections examined  Note: Prognostic/Predictive markers for ER, LA, Her-2 and Ki-67 are ordered, the results of which will be reported  in an addendum. Dr. JORDYN Sierra has reviewed the case and agrees with the above diagnosis    Assessment Plan    [] Breast cancer ERPR positive HER2 negative by FISH.  Mass  measuring 7 mm left side breast.  Denies any bone pain, denies any pulmonary symptoms and denies any GI issues.  Alkaline phosphate within normal limits.    Status post surgical approach lumpectomy tumor is invasive ductal carcinoma size 1 cm margin free of invasive carcinoma.    Left axillary sentinel lymph nodes no evidence of malignancy.    [] Sent for radiation oncology consultation.    [] Oncotype DX study - patient is not in favor chemotherapy.  Patient is willing to have Luke in therapy and have a radiation oncology consultation.  Will place Oncotype DX studies and have patient return to clinic in 2-3 weeks after result.  Patient will likely be placed on postmenopausal Arimidex 1 mg daily for total 5 years.    [] History of a AFib on long-term anticoagulation Eliquis 2.5 mg b.i.d. follow Cardiology    Dictation software used in this note.  Expect a typo graphic error

## 2019-07-10 ENCOUNTER — CLINICAL SUPPORT (OUTPATIENT)
Dept: FAMILY MEDICINE | Facility: CLINIC | Age: 69
End: 2019-07-10
Payer: MEDICARE

## 2019-07-10 DIAGNOSIS — E11.9 CONTROLLED TYPE 2 DIABETES MELLITUS WITHOUT COMPLICATION, WITHOUT LONG-TERM CURRENT USE OF INSULIN: ICD-10-CM

## 2019-07-10 LAB
ALBUMIN SERPL BCP-MCNC: 3.9 G/DL (ref 3.5–5.2)
ALP SERPL-CCNC: 80 U/L (ref 55–135)
ALT SERPL W/O P-5'-P-CCNC: 17 U/L (ref 10–44)
ANION GAP SERPL CALC-SCNC: 11 MMOL/L (ref 8–16)
AST SERPL-CCNC: 16 U/L (ref 10–40)
BASOPHILS # BLD AUTO: 0.04 K/UL (ref 0–0.2)
BASOPHILS NFR BLD: 0.4 % (ref 0–1.9)
BILIRUB SERPL-MCNC: 0.7 MG/DL (ref 0.1–1)
BUN SERPL-MCNC: 20 MG/DL (ref 8–23)
CALCIUM SERPL-MCNC: 9.9 MG/DL (ref 8.7–10.5)
CHLORIDE SERPL-SCNC: 105 MMOL/L (ref 95–110)
CHOLEST SERPL-MCNC: 144 MG/DL (ref 120–199)
CHOLEST/HDLC SERPL: 3.1 {RATIO} (ref 2–5)
CO2 SERPL-SCNC: 27 MMOL/L (ref 23–29)
CREAT SERPL-MCNC: 0.8 MG/DL (ref 0.5–1.4)
DIFFERENTIAL METHOD: ABNORMAL
EOSINOPHIL # BLD AUTO: 0.2 K/UL (ref 0–0.5)
EOSINOPHIL NFR BLD: 2.3 % (ref 0–8)
ERYTHROCYTE [DISTWIDTH] IN BLOOD BY AUTOMATED COUNT: 13.2 % (ref 11.5–14.5)
EST. GFR  (AFRICAN AMERICAN): >60 ML/MIN/1.73 M^2
EST. GFR  (NON AFRICAN AMERICAN): >60 ML/MIN/1.73 M^2
GLUCOSE SERPL-MCNC: 92 MG/DL (ref 70–110)
HCT VFR BLD AUTO: 48.2 % (ref 37–48.5)
HDLC SERPL-MCNC: 46 MG/DL (ref 40–75)
HDLC SERPL: 31.9 % (ref 20–50)
HGB BLD-MCNC: 15.3 G/DL (ref 12–16)
IMM GRANULOCYTES # BLD AUTO: 0.1 K/UL (ref 0–0.04)
LDLC SERPL CALC-MCNC: 70.2 MG/DL (ref 63–159)
LYMPHOCYTES # BLD AUTO: 3.6 K/UL (ref 1–4.8)
LYMPHOCYTES NFR BLD: 37.8 % (ref 18–48)
MCH RBC QN AUTO: 28.5 PG (ref 27–31)
MCHC RBC AUTO-ENTMCNC: 31.7 G/DL (ref 32–36)
MCV RBC AUTO: 90 FL (ref 82–98)
MONOCYTES # BLD AUTO: 0.8 K/UL (ref 0.3–1)
MONOCYTES NFR BLD: 7.8 % (ref 4–15)
NEUTROPHILS # BLD AUTO: 4.8 K/UL (ref 1.8–7.7)
NEUTROPHILS NFR BLD: 50.7 % (ref 38–73)
NONHDLC SERPL-MCNC: 98 MG/DL
NRBC BLD-RTO: 0 /100 WBC
PLATELET # BLD AUTO: 347 K/UL (ref 150–350)
PMV BLD AUTO: 9.6 FL (ref 9.2–12.9)
POTASSIUM SERPL-SCNC: 3.9 MMOL/L (ref 3.5–5.1)
PROT SERPL-MCNC: 6.9 G/DL (ref 6–8.4)
RBC # BLD AUTO: 5.36 M/UL (ref 4–5.4)
SODIUM SERPL-SCNC: 143 MMOL/L (ref 136–145)
TRIGL SERPL-MCNC: 139 MG/DL (ref 30–150)
WBC # BLD AUTO: 9.57 K/UL (ref 3.9–12.7)

## 2019-07-10 PROCEDURE — 85025 COMPLETE CBC W/AUTO DIFF WBC: CPT

## 2019-07-10 PROCEDURE — 83036 HEMOGLOBIN GLYCOSYLATED A1C: CPT

## 2019-07-10 PROCEDURE — 80053 COMPREHEN METABOLIC PANEL: CPT

## 2019-07-10 PROCEDURE — 80061 LIPID PANEL: CPT

## 2019-07-11 LAB
ESTIMATED AVG GLUCOSE: 117 MG/DL (ref 68–131)
HBA1C MFR BLD HPLC: 5.7 % (ref 4–5.6)

## 2019-07-12 ENCOUNTER — TELEPHONE (OUTPATIENT)
Dept: HEMATOLOGY/ONCOLOGY | Facility: CLINIC | Age: 69
End: 2019-07-12

## 2019-07-12 NOTE — TELEPHONE ENCOUNTER
"Patient is scheduled for paul breast us on 7/15/19, Davina called asking "reason" for radiologist  Discussed with Dr. Marin.   Per Dr. Marin, cancel us and keep follow up  Davina will cancel and call the patient as well.  "

## 2019-07-15 DIAGNOSIS — I10 ESSENTIAL HYPERTENSION: ICD-10-CM

## 2019-07-15 DIAGNOSIS — E11.9 CONTROLLED TYPE 2 DIABETES MELLITUS WITHOUT COMPLICATION, WITHOUT LONG-TERM CURRENT USE OF INSULIN: ICD-10-CM

## 2019-07-15 RX ORDER — TRIAMTERENE/HYDROCHLOROTHIAZID 37.5-25 MG
1 TABLET ORAL DAILY
Qty: 90 TABLET | Refills: 1 | Status: SHIPPED | OUTPATIENT
Start: 2019-07-15 | End: 2019-12-16 | Stop reason: SDUPTHER

## 2019-07-15 RX ORDER — METFORMIN HYDROCHLORIDE 500 MG/1
500 TABLET ORAL 2 TIMES DAILY WITH MEALS
Qty: 180 TABLET | Refills: 1 | Status: ON HOLD | OUTPATIENT
Start: 2019-07-15 | End: 2020-01-09 | Stop reason: HOSPADM

## 2019-07-15 RX ORDER — ATORVASTATIN CALCIUM 80 MG/1
40 TABLET, FILM COATED ORAL DAILY
Qty: 90 TABLET | Refills: 1 | Status: SHIPPED | OUTPATIENT
Start: 2019-07-15 | End: 2020-02-26

## 2019-07-15 NOTE — TELEPHONE ENCOUNTER
----- Message from Rachel Lucio sent at 7/15/2019  4:52 PM CDT -----  Contact: self  Type:  Patient Returning Call    Who Called:  Patient   Who Left Message for Patient:  Nurse   Does the patient know what this is regarding?:    Best Call Back Number:  [phone] (home) [phone] (work)    Additional Information:

## 2019-07-16 ENCOUNTER — TELEPHONE (OUTPATIENT)
Dept: SURGERY | Facility: CLINIC | Age: 69
End: 2019-07-16

## 2019-07-16 NOTE — TELEPHONE ENCOUNTER
----- Message from Paco Almanza sent at 7/16/2019  9:38 AM CDT -----  Contact: Patient  Type: Needs Medical Advice    Who Called:  Patient  Best Call Back Number: [phone]  Additional Information: Patient would like to discuss procedure. Please call to advise. Thanks!

## 2019-07-17 ENCOUNTER — TELEPHONE (OUTPATIENT)
Dept: SURGERY | Facility: CLINIC | Age: 69
End: 2019-07-17

## 2019-07-17 ENCOUNTER — TELEPHONE (OUTPATIENT)
Dept: HEMATOLOGY/ONCOLOGY | Facility: CLINIC | Age: 69
End: 2019-07-17

## 2019-07-17 NOTE — TELEPHONE ENCOUNTER
Pt called and stated her incsion site was draining pt was offered appointment to be seen by surgeon on today 07/17/2019 pt refused appointment and agreed to come in on 07/24/2019 at 1100,pt will come in office for nurse to clean site and apply a dressing

## 2019-07-17 NOTE — TELEPHONE ENCOUNTER
----- Message from Lynda Benavides sent at 7/17/2019  7:44 AM CDT -----  Type: Needs Medical Advice    Who Called:  patient  Symptoms (please be specific):  na  How long has patient had these symptoms:  shaq  Pharmacy name and phone #:  [facility]q  Best Call Back Number: [phone]  Additional Information: patient is calling to say that Radiology has not called her to schedule her Radiation/please advise

## 2019-07-17 NOTE — TELEPHONE ENCOUNTER
----- Message from Lynda Benavides sent at 7/17/2019  7:35 AM CDT -----  Patient [phone] is calling to discuss her surgery/please call

## 2019-07-18 RX ORDER — SULFAMETHOXAZOLE AND TRIMETHOPRIM 400; 80 MG/1; MG/1
1 TABLET ORAL 2 TIMES DAILY
Qty: 20 TABLET | Refills: 0 | Status: SHIPPED | OUTPATIENT
Start: 2019-07-18 | End: 2019-07-28

## 2019-07-18 NOTE — TELEPHONE ENCOUNTER
----- Message from Estrellita Dale sent at 7/18/2019  8:33 AM CDT -----  Contact: pt  Pt calling saying she is not happy cause her antibiotic were not called in ,thay she say Patriica yesterday and told her it would be.Pt would like a call back as soon as possible..[phone] .

## 2019-07-19 ENCOUNTER — TELEPHONE (OUTPATIENT)
Dept: SURGERY | Facility: CLINIC | Age: 69
End: 2019-07-19

## 2019-07-19 ENCOUNTER — DOCUMENTATION ONLY (OUTPATIENT)
Dept: SURGERY | Facility: CLINIC | Age: 69
End: 2019-07-19

## 2019-07-19 NOTE — PROGRESS NOTES
Late entry from 07/17/19,pt called stating that her breast incision site was oozing and draining,offered pt to come in and this nurse would assist her with wound cleaning and a dressing change,pt agreed and came into office around 100pm as dr gill was not in office at that time,this nurse assisted pt with dressing change,advised pt that I would put in an order for antibitoics,due to the pts complaints of warmth and redness at the incision site. Redness noted but not warm to touch during dressing change. An order for bactrim was placed for the pt however any order placed in the system, will need approval from provider before pharmacy will fill script.order for bactrim was approved and filled by pharmacy on 07/18/19.

## 2019-07-22 ENCOUNTER — HOSPITAL ENCOUNTER (EMERGENCY)
Facility: HOSPITAL | Age: 69
Discharge: HOME | End: 2019-07-22
Attending: EMERGENCY MEDICINE
Payer: MEDICARE

## 2019-07-22 ENCOUNTER — OFFICE VISIT (OUTPATIENT)
Dept: RADIATION ONCOLOGY | Facility: CLINIC | Age: 69
End: 2019-07-22
Payer: MEDICARE

## 2019-07-22 ENCOUNTER — DOCUMENTATION ONLY (OUTPATIENT)
Dept: RADIATION ONCOLOGY | Facility: CLINIC | Age: 69
End: 2019-07-22

## 2019-07-22 ENCOUNTER — TELEPHONE (OUTPATIENT)
Dept: SURGERY | Facility: CLINIC | Age: 69
End: 2019-07-22

## 2019-07-22 ENCOUNTER — SOCIAL WORK (OUTPATIENT)
Dept: HEMATOLOGY/ONCOLOGY | Facility: CLINIC | Age: 69
End: 2019-07-22

## 2019-07-22 VITALS
OXYGEN SATURATION: 95 % | TEMPERATURE: 98 F | BODY MASS INDEX: 34.7 KG/M2 | WEIGHT: 215 LBS | DIASTOLIC BLOOD PRESSURE: 81 MMHG | SYSTOLIC BLOOD PRESSURE: 141 MMHG | HEART RATE: 67 BPM

## 2019-07-22 VITALS
SYSTOLIC BLOOD PRESSURE: 144 MMHG | HEART RATE: 62 BPM | OXYGEN SATURATION: 97 % | WEIGHT: 215 LBS | HEIGHT: 66 IN | RESPIRATION RATE: 18 BRPM | TEMPERATURE: 98 F | BODY MASS INDEX: 34.55 KG/M2 | DIASTOLIC BLOOD PRESSURE: 67 MMHG

## 2019-07-22 DIAGNOSIS — N61.1 LEFT BREAST ABSCESS: ICD-10-CM

## 2019-07-22 DIAGNOSIS — T81.49XA POSTOPERATIVE INFECTION OF BREAST INCISION: Primary | ICD-10-CM

## 2019-07-22 DIAGNOSIS — Z17.0 MALIGNANT NEOPLASM OF CENTRAL PORTION OF LEFT BREAST IN FEMALE, ESTROGEN RECEPTOR POSITIVE: ICD-10-CM

## 2019-07-22 DIAGNOSIS — C50.112 MALIGNANT NEOPLASM OF CENTRAL PORTION OF LEFT BREAST IN FEMALE, ESTROGEN RECEPTOR POSITIVE: ICD-10-CM

## 2019-07-22 PROCEDURE — 1101F PT FALLS ASSESS-DOCD LE1/YR: CPT | Mod: ,,, | Performed by: RADIOLOGY

## 2019-07-22 PROCEDURE — 1101F PR PT FALLS ASSESS DOC 0-1 FALLS W/OUT INJ PAST YR: ICD-10-PCS | Mod: ,,, | Performed by: RADIOLOGY

## 2019-07-22 PROCEDURE — 3077F SYST BP >= 140 MM HG: CPT | Mod: ,,, | Performed by: RADIOLOGY

## 2019-07-22 PROCEDURE — 99205 PR OFFICE/OUTPT VISIT, NEW, LEVL V, 60-74 MIN: ICD-10-PCS | Mod: ,,, | Performed by: RADIOLOGY

## 2019-07-22 PROCEDURE — 3079F DIAST BP 80-89 MM HG: CPT | Mod: ,,, | Performed by: RADIOLOGY

## 2019-07-22 PROCEDURE — 3077F PR MOST RECENT SYSTOLIC BLOOD PRESSURE >= 140 MM HG: ICD-10-PCS | Mod: ,,, | Performed by: RADIOLOGY

## 2019-07-22 PROCEDURE — 99281 EMR DPT VST MAYX REQ PHY/QHP: CPT

## 2019-07-22 PROCEDURE — 99205 OFFICE O/P NEW HI 60 MIN: CPT | Mod: ,,, | Performed by: RADIOLOGY

## 2019-07-22 PROCEDURE — 3079F PR MOST RECENT DIASTOLIC BLOOD PRESSURE 80-89 MM HG: ICD-10-PCS | Mod: ,,, | Performed by: RADIOLOGY

## 2019-07-22 NOTE — ED PROVIDER NOTES
Encounter Date: 7/22/2019    SCRIBE #1 NOTE: IChris, am scribing for, and in the presence of, Julio Cesar Claudio MD.       History     Chief Complaint   Patient presents with    Breast Problem     drainage post lumpectomy        Time seen by provider: 2:25 PM on 07/22/2019    Eduardo Esposito is a 69 y.o. female with Afib, DM, HTN, hyperlipidemia, cancer, who presents to the ED with an onset of left breast drainage. The patient had a lumpectomy performed by Dr. Howard, on 06/13/19. She states that her breast ruptured on 07/17/19, and she noticed yellow/green drainage. She was prescribed bactrim ~10 days PTA, to treat a presumed infection. Since taking bactrim, she notes reduced redness to her left breast. She reports seeing oncology, and radiology the day of arrival. The patient denies fever, or any other symptoms at this time. A PSHx of breast surgery noted. A drug allergy to lisinopril noted.    The history is provided by the patient.     Review of patient's allergies indicates:   Allergen Reactions    Lisinopril      hives     Past Medical History:   Diagnosis Date    Atrial fibrillation     4/11/13-recent dx sceduled to see cardiology Monday- Dr Lynn-dm    Cancer     breast    Diabetes mellitus     Hyperlipemia     Hypertension      Past Surgical History:   Procedure Laterality Date    BIOPSY, LYMPH NODE, SENTINEL Left 6/13/2019    [facility] OR    BREAST SURGERY      JOINT REPLACEMENT Right     LUMPECTOMY, BREAST Left 6/13/2019    [facility] OR    LYMPHADENECTOMY, AXILLARY Left 6/13/2019    [facility]    NEEDLE LOCALIZATION Left 6/13/2019    [facility] OR    TONSILLECTOMY      TOTAL KNEE ARTHROPLASTY       Family History   Problem Relation Age of Onset    Breast cancer Mother     Glaucoma Neg Hx     Macular degeneration Neg Hx     Retinal  detachment Neg Hx      Social History     Tobacco Use    Smoking status: Never Smoker    Smokeless tobacco: Never Used   Substance Use Topics    Alcohol use: No     Comment: once or twice a year    Drug use: No     Review of Systems   Constitutional: Negative for chills and fever.   HENT: Negative for nosebleeds.    Eyes: Negative for visual disturbance.   Respiratory: Negative for cough and shortness of breath.    Cardiovascular: Negative for chest pain and palpitations.   Gastrointestinal: Negative for abdominal pain, diarrhea, nausea and vomiting.   Genitourinary: Negative for dysuria and hematuria.   Musculoskeletal: Negative for back pain and neck pain.   Skin: Negative for rash.        +left breast drainage   Neurological: Negative for seizures, syncope and headaches.       Physical Exam     Initial Vitals [07/22/19 1417]   BP Pulse Resp Temp SpO2   (!) 144/67 62 18 97.9 °F (36.6 °C) 97 %      MAP       --         Physical Exam    Nursing note and vitals reviewed.  Constitutional: She appears well-developed and well-nourished. She is not diaphoretic. No distress.   HENT:   Head: Normocephalic and atraumatic.   Mouth/Throat: Oropharynx is clear and moist.   Eyes: Conjunctivae are normal.   Neck: Neck supple.   Cardiovascular: Normal rate, regular rhythm, normal heart sounds and intact distal pulses. Exam reveals no gallop and no friction rub.    No murmur heard.  Pulmonary/Chest: Breath sounds normal. She has no wheezes. She has no rhonchi. She has no rales.   Abdominal: Soft. She exhibits no distension. There is no tenderness.   Musculoskeletal: Normal range of motion.   Neurological: She is alert and oriented to person, place, and time.   Skin: No rash noted. No erythema.   Intact surgical incision at the 8 o'clock position, on the border of the areolar of the left breast. Surrounding induration, and purulent drainage at margin of wound present. Mild tenderness. Area of induration measures 3 cm.    Psychiatric: Her speech is normal.         ED Course   Procedures  Labs Reviewed - No data to display       Imaging Results    None          Medical Decision Making:   History:   Old Medical Records: I decided to obtain old medical records.            Scribe Attestation:   Scribe #1: I performed the above scribed service and the documentation accurately describes the services I performed. I attest to the accuracy of the note.    I, Dr. Julio Cesar Claudio, personally performed the services described in this documentation. All medical record entries made by the scribe were at my direction and in my presence.  I have reviewed the chart and agree that the record reflects my personal performance and is accurate and complete. Julio Cesar Claudio MD.  3:55 PM 07/22/2019    Eduardo Esposito is a 69 y.o. female presenting with left postoperative breast abscess despite prior incision and drainage with packing and ongoing Bactrim therapy.  I did speak with General surgery ultimately Dr. Gautam today.  Patient offered admission but prefers to closely follow up in the office as alternative.  I feel this is reasonable.  I doubt sepsis.  Follow up tomorrow for assessment of bedside drainage by General surgery.  Return precautions reviewed.           Clinical Impression:       ICD-10-CM ICD-9-CM   1. Postoperative infection of breast incision T81.49XA 998.59   2. Left breast abscess N61.1 611.0                                Julio Cesar Claudio MD  07/22/19 [id number]

## 2019-07-22 NOTE — PROGRESS NOTES
Prior to patient's consult with Dr. Gray, I met with her regarding her distress rating of an 8.  She stated that her rating of 8 is due to the drainage from her breast.  She stated that she was instructed by Dr. Ramon's nurse to go to the ED due to Dr. Ramon being out of the office;however, she did not go to the ED at that time.  She did not state why she did not follow-up as directed.  Patient was instructed by Dr. Gray to go to the ED after her radiation consult.  After reviewing her chart, patient did go to the ED.  I will follow-up with patient upon her return in two weeks to follow-up with her since her current distress pertains to the drainage from her breast.  She did not indicate any practical or emotional problems on the distress screening.  She did indicated fatigue, pain, and tingling in hands/feet for which she is under the care of Dr. Nance, PCP.  She did not request any psychosocial support at this time.  She was provided my contact information in the event she needs supportive services.

## 2019-07-22 NOTE — PROGRESS NOTES
Eduardo Esposito  [id number]  1950 7/22/2019  No referring provider defined for this encounter.    DIAGNOSIS: Invasive ductal carcinoma  TREATMENT SITE(S): Left breast    INTENT: CURATIVE    TREATMENT SETTING: ADJUVANT     MODALITY: PHOTON    TECHNIQUE:  3D CONFORMAL RADIOTHERAPY (3DCRT)    IMRT MEDICAL NECESSITY:IMRT MEDICAL NECESSITY: N/A     HPI: 69-year-old patient with 1 cm grade 2 invasive ductal carcinoma, 7 cm DCIS, both margins greater than 1 mm, ER and CO positive, stage [id number]    I have personally performed treatment planning for the patient, reviewing relevant history/physical and imaging. I have defined GTV, CTV, PTV and organs at risk.     In order to accomplish this plan, I am ordering:  SIMULATION: CT SIMULATION FOR PLACEMENT OF TREATMENT FIELDS    CONTRAST: none    TO ACCOMPLISH REPRODUCIBLE POSITION: VACLOC and BREAST BOARD, wing board; breath hold    DEVICES FOR BEAM SHAPING: CUSTOMIZED MLC    CUSTOMIZED BOLUS: none    IMAGING: DAILY KV/KV OBI, daily PORTs    I have ordered a weekly physics check.  SPECIAL PHYSICS CONSULT: NO  REASON: N/A    SPECIAL TREATMENT CIRCUMSTANCE: NO   Concurrent or recent administration of chemotherapeutic agents which are  known potent radiosensitizers and thus will require vigilant monitoring for  exaggerated radiation toxicities.    LABS: NONE    ANTICIPATED PRESCRIPTION: 50.4 gray to the left breast with a 10 gray boost to the tumor site, using breath-hold technique    TREATMENT: DAILY    PHYSICIAN: Rahul Gray MD

## 2019-07-22 NOTE — TELEPHONE ENCOUNTER
Advised pt to go to the nearest emergency room pt complains that breast surgical area is burning and she states the site is still oozing,pt verbalized understanding of going to the emergency room for eval and treatment

## 2019-07-22 NOTE — TELEPHONE ENCOUNTER
----- Message from Irina Chong sent at 7/22/2019 10:38 AM CDT -----  Contact: self  Type: Needs Medical Advice    Who Called:  patient  Best Call Back Number: [phone] (home) [phone] (work)  Additional Information:Would like a call back from a nurse asap

## 2019-07-22 NOTE — ED NOTES
Upon discharge, patient is AAOx4, no cardiac or respiratory complications. Follow up care reviewed with patient and has been instructed to return to the ER if needed. Patient verbalized understanding and ambulated to the lobby without difficulty. ADRIENNE LINDO    Patient has been instructed to go to Dr. Gautam's office between the hours of  9 am and noon on tomorrow. Sterile gauze applied to site and has been secured with paper tape. Patient tolerated well.

## 2019-07-22 NOTE — PROGRESS NOTES
Eduardo Esposito  [id number]  1950 7/22/2019  Laz Marin Md  [address]  [address]  [place] [address]    REASON FOR CONSULTATION: Breast cancer  TREATMENT GOAL: adjuvant    HISTORY OF PRESENT ILLNESS:   69-year-old patient was discovered to have a retro-areolar area of abnormality in the left breast injuring 6 mm on mammogram.  She subsequently underwent a core needle biopsy in April 2019 make in a diagnosis of invasive ductal carcinoma grade 2.    She says only was counseled with respect to treatment options and she elected to undergo breast conservation therapy. She underwent a partial mastectomy on 06/13/2019.    The pathology report indicates the invasive ductal carcinoma was measuring 1 cm. The margin was 3.4 mm. The tumor was grade 2. There was also EIC  and ductal carcinoma in situ measuring 6.9 cm.  The DCIS margin was also negative at 1.4 mm.  There was no lymphovascular space invasion.    The tumor was ER positive, [place] positive and HER-2/archana negative.  Her sentinel node was negative.  The tumor has been sent off for Onco typing.    Postoperatively she did develop some drainage from the lumpectomy site. She recently did have packing placed and was placed on Bactrim.    She does not report any fevers chills or night sweats.  The drainage from the lumpectomy site is decreasing.  She is diabetic with blood sugars averaging 110 to 130.    I do not see any imaging studies for staging.        Review of Systems   Constitutional: Negative for appetite change and unexpected weight change.   HENT:   Negative for mouth sores and trouble swallowing.    Respiratory: Negative for chest tightness and cough.    Cardiovascular: Negative for chest pain and leg swelling.   Gastrointestinal: Negative for abdominal distention and abdominal pain.   Genitourinary: Negative for dysuria and pelvic pain.    Musculoskeletal: Positive for arthralgias and myalgias.   Neurological: Negative for dizziness and seizures.    Hematological: Negative for adenopathy. Does not bruise/bleed easily.   Psychiatric/Behavioral: Negative for confusion. The patient is not nervous/anxious.      Past Medical History:   Diagnosis Date    Atrial fibrillation     4/11/13-recent dx sceduled to see cardiology Monday- Dr Lynn-dm    Cancer     breast    Diabetes mellitus     Hyperlipemia     Hypertension      Past Surgical History:   Procedure Laterality Date    BIOPSY, LYMPH NODE, SENTINEL Left 6/13/2019    [facility]    BREAST SURGERY      JOINT REPLACEMENT Right     LUMPECTOMY, BREAST Left 6/13/2019    [facility]    LYMPHADENECTOMY, AXILLARY Left 6/13/2019    [facility]    NEEDLE LOCALIZATION Left 6/13/2019    [facility]    TONSILLECTOMY      TOTAL KNEE ARTHROPLASTY       Social History     Socioeconomic History    Marital status:      Spouse name: Not on file    Number of children: Not on file    Years of education: Not on file    Highest education level: Not on file   Occupational History    Not on file   Social Needs    Financial resource strain: Not on file    Food insecurity:     Worry: Not on file     Inability: Not on file    Transportation needs:     Medical: Not on file     Non-medical: Not on file   Tobacco Use    Smoking status: Never Smoker    Smokeless tobacco: Never Used   Substance and Sexual Activity    Alcohol use: No     Comment: once or twice a year    Drug use: No    Sexual activity: Not on file   Lifestyle    Physical activity:     Days per week: Not on file     Minutes per session: Not on file    Stress: Not on file   Relationships    Social connections:     Talks on phone: Not on file     Gets together: Not on file     Attends [employer] service: Not on file     Active member of club or organization: Not on file     Attends meetings of clubs or  organizations: Not on file     Relationship status: Not on file   Other Topics Concern    Not on file   Social History Narrative    Not on file     Family History   Problem Relation Age of Onset    Breast cancer Mother     Glaucoma Neg Hx     Macular degeneration Neg Hx     Retinal detachment Neg Hx        PRIOR HISTORY OF CHEMOTHERAPY OR RADIOTHERAPY: Please see HPI for patients prior oncologic history.    Medication List with Changes/Refills   Current Medications    ANASTROZOLE (ARIMIDEX) 1 MG TAB    Take 1 tablet (1 mg total) by mouth once daily.    ATORVASTATIN (LIPITOR) 80 MG TABLET    Take 0.5 tablets (40 mg total) by mouth once daily.    BLOOD SUGAR DIAGNOSTIC (TRUE METRIX GLUCOSE TEST STRIP) STRP    1 each by Misc.(Non-Drug; Combo Route) route once daily.    BLOOD-GLUCOSE METER (TRUE METRIX GLUCOSE METER) MISC    1 each by Misc.(Non-Drug; Combo Route) route once daily.    ELIQUIS 5 MG TAB    Take 1 tablet (5 mg total) by mouth 2 (two) times daily.    HYDROCODONE-ACETAMINOPHEN (NORCO) 5-325 MG PER TABLET    Take 1 tablet by mouth every 6 (six) hours as needed for Pain.    LANCETS 32 GAUGE MISC    1 lancet by Misc.(Non-Drug; Combo Route) route once daily.    METFORMIN (GLUCOPHAGE) 500 MG TABLET    Take 1 tablet (500 mg total) by mouth 2 (two) times daily with meals.    SULFAMETHOXAZOLE-TRIMETHOPRIM 400-80MG (BACTRIM,SEPTRA) 400-80 MG PER TABLET    Take 1 tablet by mouth 2 (two) times daily. for 10 days    TRIAMTERENE-HYDROCHLOROTHIAZIDE 37.5-25 MG (MAXZIDE-25) 37.5-25 MG PER TABLET    Take 1 tablet by mouth once daily.    TRUE METRIX AIR GLUCOSE METER KIT        TRUEPLUS LANCETS 33 GAUGE MISC        VITAMIN D3 5,000 UNIT TAB    Take 1 tablet by mouth once daily.     Review of patient's allergies indicates:   Allergen Reactions    Lisinopril      hives       QUALITY OF LIFE: 80%- Normal Activity with Effort: Some Symptoms of Disease    Vitals:    07/22/19 1313 07/22/19 1317   BP: (!) 141/81    Pulse: 67     Temp: 98.3 °F (36.8 °C)    SpO2:  95%   Weight: 97.5 kg (215 lb)    PainSc:   6    PainLoc: Breast        PHYSICAL EXAM: Oriented alert answers questions well sensorium is sharp  GENERAL: alert; in no apparent distress.   HEAD: normocephalic, atraumatic.  EYES: pupils are equal, round, reactive to light and accommodation. Sclera anicteric. Conjunctiva not injected.   NOSE/THROAT: no nasal erythema or rhinorrhea. Oropharynx pink, without erythema, ulcerations or thrush.   Left breast-lumpectomy site showing some erythema, minimal mucus-type drainage noted, the packing is below skin surface, no palpable breast mass  Right breast-normal to palpation, no mass or nipple discharge  NECK: no cervical motion rigidity; supple with no masses.  CHEST: clear to auscultation bilaterally; no wheezes, crackles or rubs. Patient is speaking comfortably on room air with normal work of breathing without using accessory muscles of respiration.  CARDIOVASCULAR: regular rate and rhythm; no murmurs, rubs or gallops.  ABDOMEN: soft, nontender, nondistended. Bowel sounds present.   MUSCULOSKELETAL: no tenderness to palpation along the spine or scapulae. Decreased range of motion of the right hip secondary to DJD  NEUROLOGIC: cranial nerves II-XII intact bilaterally. Strength 5/5 in bilateral upper and lower extremities. No sensory deficits appreciated. Reflexes globally intact. No cerebellar signs. Normal gait.  LYMPHATIC: no cervical, supraclavicular or axillary adenopathy appreciated bilaterally.   EXTREMITIES: no clubbing, cyanosis, edema.  SKIN: no erythema, rashes, ulcerations noted.     REVIEW OF IMAGING/PATHOLOGY/LABS: Please see HPI. All images reviewed personally by dictating physician.     ASSESSMENT: 69-year-old patient with invasive ductal carcinoma of the left breast, 1 cm, ER positive [place] positive HER-2/archana negative, oncotyping pending, stage [id number], with 7 cm DCIS, margins clear on invasive and noninvasive component,  grade 2 lesion  PLAN: I discussed with her the role of breast conservation therapy in which radiation is used to treat the remaining breast tissue after partial mastectomy.  I explained to her that she does have the possibility of microscopic disease remaining behind in the breast on the left and therefore radiation is designed to eradicate this microscopic component. I explained that this was shown to be of benefit in numerous multinational trials comparing lumpectomy plus radiation to mastectomy, and the equivalency of both.    She is 4 weeks postop but does have drainage from the lumpectomy site. I do recommend this area be healed up more so before proceeding with radiation. In addition she does have Oncotyping studies pending. I explained to her that if her Onocotype score  unfavorable she may he offered chemotherapy and she was amenable to this.  If her score is favorable we will proceed with radiation after her lumpectomy site heals up and systemic antiestrogen therapy will be given after radiation.    In terms of radiation therapy I do recommend 50.4 gray to the left breast with a 10 gray boost to the lumpectomy site.  I did discuss in some cases in women age 70 and over with low-grade tumors observation can be offered however since she does have a grade 2 lesion and a very large DCIS component would recommend adjuvant radiation therapy. I also discussed hypo-fractionation but because of the large resected site I would recommend standard fractionation.      Side effects and possible complications were addressed.  I will have her follow up with us in 2 weeks for evaluation and follow-up of her oncotype score. If she does not require chemotherapy and she is healed up we will give her an appointment for simulation.    ADDENDUM:  PATIENT SEEN TODAY IN CLINIC 09/09/2019..  The area is completely healed up no further drainage noted.  We will proceed with simulation and treatment planning      We discussed the risks  and benefits of the above treatment and have gone over in detail the acute and late toxicities of radiation therapy to the left breast. The patient expressed  understanding and has signed a consent form which is included in the patients chart. The patient has our contact information and understands that they are free to contact us at any time with questions or concerns regarding radiation therapy.    DISPOSITION: RTC FOR CT SIM    TIME SPENT WITH PATIENT: I have personally seen and evaluated this patient. Greater than 50% of this time was spent discussing coordination of care and/or counseling.     PHYSICIAN: Rahul Gray MD

## 2019-07-23 ENCOUNTER — OFFICE VISIT (OUTPATIENT)
Dept: BARIATRICS | Facility: CLINIC | Age: 69
End: 2019-07-23
Payer: MEDICARE

## 2019-07-23 VITALS
SYSTOLIC BLOOD PRESSURE: 144 MMHG | BODY MASS INDEX: 34.45 KG/M2 | HEART RATE: 78 BPM | WEIGHT: 214.38 LBS | TEMPERATURE: 98 F | HEIGHT: 66 IN | DIASTOLIC BLOOD PRESSURE: 82 MMHG | RESPIRATION RATE: 16 BRPM

## 2019-07-23 DIAGNOSIS — L02.91 ABSCESS: ICD-10-CM

## 2019-07-23 DIAGNOSIS — L02.91 ABSCESS: Primary | ICD-10-CM

## 2019-07-23 PROCEDURE — 99999 PR PBB SHADOW E&M-EST. PATIENT-LVL IV: ICD-10-PCS | Mod: PBBFAC,,, | Performed by: SURGERY

## 2019-07-23 PROCEDURE — 99024 PR POST-OP FOLLOW-UP VISIT: ICD-10-PCS | Mod: S$GLB,,, | Performed by: SURGERY

## 2019-07-23 PROCEDURE — 3077F SYST BP >= 140 MM HG: CPT | Mod: CPTII,S$GLB,, | Performed by: SURGERY

## 2019-07-23 PROCEDURE — 3077F PR MOST RECENT SYSTOLIC BLOOD PRESSURE >= 140 MM HG: ICD-10-PCS | Mod: CPTII,S$GLB,, | Performed by: SURGERY

## 2019-07-23 PROCEDURE — 99999 PR PBB SHADOW E&M-EST. PATIENT-LVL IV: CPT | Mod: PBBFAC,,, | Performed by: SURGERY

## 2019-07-23 PROCEDURE — 1101F PT FALLS ASSESS-DOCD LE1/YR: CPT | Mod: CPTII,S$GLB,, | Performed by: SURGERY

## 2019-07-23 PROCEDURE — 1101F PR PT FALLS ASSESS DOC 0-1 FALLS W/OUT INJ PAST YR: ICD-10-PCS | Mod: CPTII,S$GLB,, | Performed by: SURGERY

## 2019-07-23 PROCEDURE — 87076 CULTURE ANAEROBE IDENT EACH: CPT

## 2019-07-23 PROCEDURE — 3079F DIAST BP 80-89 MM HG: CPT | Mod: CPTII,S$GLB,, | Performed by: SURGERY

## 2019-07-23 PROCEDURE — 87070 CULTURE OTHR SPECIMN AEROBIC: CPT

## 2019-07-23 PROCEDURE — 99024 POSTOP FOLLOW-UP VISIT: CPT | Mod: S$GLB,,, | Performed by: SURGERY

## 2019-07-23 PROCEDURE — 3079F PR MOST RECENT DIASTOLIC BLOOD PRESSURE 80-89 MM HG: ICD-10-PCS | Mod: CPTII,S$GLB,, | Performed by: SURGERY

## 2019-07-23 PROCEDURE — 87075 CULTR BACTERIA EXCEPT BLOOD: CPT

## 2019-07-25 ENCOUNTER — OFFICE VISIT (OUTPATIENT)
Dept: SURGERY | Facility: CLINIC | Age: 69
End: 2019-07-25
Payer: MEDICARE

## 2019-07-25 VITALS
BODY MASS INDEX: 34.39 KG/M2 | TEMPERATURE: 98 F | HEIGHT: 66 IN | SYSTOLIC BLOOD PRESSURE: 123 MMHG | WEIGHT: 214 LBS | HEART RATE: 63 BPM | DIASTOLIC BLOOD PRESSURE: 59 MMHG | RESPIRATION RATE: 16 BRPM

## 2019-07-25 DIAGNOSIS — C50.912 MALIGNANT NEOPLASM OF LEFT FEMALE BREAST, UNSPECIFIED ESTROGEN RECEPTOR STATUS, UNSPECIFIED SITE OF BREAST: Primary | ICD-10-CM

## 2019-07-25 PROCEDURE — 3078F DIAST BP <80 MM HG: CPT | Mod: CPTII,S$GLB,, | Performed by: SURGERY

## 2019-07-25 PROCEDURE — 3074F SYST BP LT 130 MM HG: CPT | Mod: CPTII,S$GLB,, | Performed by: SURGERY

## 2019-07-25 PROCEDURE — 3078F PR MOST RECENT DIASTOLIC BLOOD PRESSURE < 80 MM HG: ICD-10-PCS | Mod: CPTII,S$GLB,, | Performed by: SURGERY

## 2019-07-25 PROCEDURE — 99999 PR PBB SHADOW E&M-EST. PATIENT-LVL IV: CPT | Mod: PBBFAC,,, | Performed by: SURGERY

## 2019-07-25 PROCEDURE — 99999 PR PBB SHADOW E&M-EST. PATIENT-LVL IV: ICD-10-PCS | Mod: PBBFAC,,, | Performed by: SURGERY

## 2019-07-25 PROCEDURE — 99024 PR POST-OP FOLLOW-UP VISIT: ICD-10-PCS | Mod: S$GLB,,, | Performed by: SURGERY

## 2019-07-25 PROCEDURE — 3074F PR MOST RECENT SYSTOLIC BLOOD PRESSURE < 130 MM HG: ICD-10-PCS | Mod: CPTII,S$GLB,, | Performed by: SURGERY

## 2019-07-25 PROCEDURE — 1101F PR PT FALLS ASSESS DOC 0-1 FALLS W/OUT INJ PAST YR: ICD-10-PCS | Mod: CPTII,S$GLB,, | Performed by: SURGERY

## 2019-07-25 PROCEDURE — 99024 POSTOP FOLLOW-UP VISIT: CPT | Mod: S$GLB,,, | Performed by: SURGERY

## 2019-07-25 PROCEDURE — 1101F PT FALLS ASSESS-DOCD LE1/YR: CPT | Mod: CPTII,S$GLB,, | Performed by: SURGERY

## 2019-07-25 NOTE — PROGRESS NOTES
Drainage better  Vitals:    07/25/19 0950   BP: (!) 123/59   Pulse: 63   Resp: 16   Temp: 97.5 °F (36.4 °C)       Wound healing well scant drainage    A/P  Abscess  Sp I and d on breast  Follow up with bridget

## 2019-07-25 NOTE — PROGRESS NOTES
Initial Consult    No chief complaint on file.      History of Present Illness:  Patient is a 69 y.o. female who is referred for left breast wound infection.  Wound from recent mass excision with foul smelling drainage and erythema- spontaneously opened and drained once then closed.  Wound has now re opened again and drained.    Review of patient's allergies indicates:   Allergen Reactions    Lisinopril      hives       Current Outpatient Medications   Medication Sig Dispense Refill    atorvastatin (LIPITOR) 80 MG tablet Take 0.5 tablets (40 mg total) by mouth once daily. 90 tablet 1    blood sugar diagnostic (TRUE METRIX GLUCOSE TEST STRIP) Strp 1 each by Misc.(Non-Drug; Combo Route) route once daily. 100 each 3    blood-glucose meter (TRUE METRIX GLUCOSE METER) Misc 1 each by Misc.(Non-Drug; Combo Route) route once daily. 1 each 0    ELIQUIS 5 mg Tab Take 1 tablet (5 mg total) by mouth 2 (two) times daily. 180 tablet 1    lancets 32 gauge Misc 1 lancet by Misc.(Non-Drug; Combo Route) route once daily. 100 each 3    metFORMIN (GLUCOPHAGE) 500 MG tablet Take 1 tablet (500 mg total) by mouth 2 (two) times daily with meals. 180 tablet 1    sulfamethoxazole-trimethoprim 400-80mg (BACTRIM,SEPTRA) 400-80 mg per tablet Take 1 tablet by mouth 2 (two) times daily. for 10 days 20 tablet 0    triamterene-hydrochlorothiazide 37.5-25 mg (MAXZIDE-25) 37.5-25 mg per tablet Take 1 tablet by mouth once daily. 90 tablet 1    TRUE METRIX AIR GLUCOSE METER kit       TRUEPLUS LANCETS 33 gauge Misc       VITAMIN D3 5,000 unit Tab Take 1 tablet by mouth once daily.      anastrozole (ARIMIDEX) 1 mg Tab Take 1 tablet (1 mg total) by mouth once daily. 30 tablet 4     No current facility-administered medications for this visit.        Past Medical History:   Diagnosis Date    Atrial fibrillation     4/11/13-recent dx sceduled to see cardiology Monday- Dr Lynn-dm    Cancer     breast    Diabetes mellitus     Hyperlipemia   "   Hypertension      Past Surgical History:   Procedure Laterality Date    BIOPSY, LYMPH NODE, SENTINEL Left 6/13/2019    [facility]    BREAST SURGERY      JOINT REPLACEMENT Right     LUMPECTOMY, BREAST Left 6/13/2019    [facility]    LYMPHADENECTOMY, AXILLARY Left 6/13/2019    [facility]    NEEDLE LOCALIZATION Left 6/13/2019    [facility] at [facility] OR    TONSILLECTOMY      TOTAL KNEE ARTHROPLASTY       Family History   Problem Relation Age of Onset    Breast cancer Mother     Glaucoma Neg Hx     Macular degeneration Neg Hx     Retinal detachment Neg Hx      Social History     Tobacco Use    Smoking status: Never Smoker    Smokeless tobacco: Never Used   Substance Use Topics    Alcohol use: No     Comment: once or twice a year    Drug use: No            Review of Systems:  Review of Systems   Constitutional: Negative for fatigue, fever and unexpected weight change.   HENT: Negative for congestion, hearing loss and sore throat.    Eyes: Negative for pain, redness and visual disturbance.   Respiratory: Negative for cough and shortness of breath.    Cardiovascular: Negative for chest pain and leg swelling.   Gastrointestinal: Negative for constipation, diarrhea, nausea and vomiting.   Endocrine: Negative for cold intolerance and heat intolerance.   Genitourinary: Negative for dysuria and hematuria.   Musculoskeletal: Positive for arthralgias and gait problem. Negative for myalgias.   Skin: Positive for wound. Negative for pallor and rash.   Neurological: Negative for dizziness and headaches.   Psychiatric/Behavioral: Negative for dysphoric mood. The patient is not nervous/anxious.        Physical:     Vital Signs (Most Recent)  Temp: 97.7 °F (36.5 °C) (07/23/19 0948)  Pulse: 78 (07/23/19 0948)  Resp: 16 (07/23/19 0948)  BP: (!) 144/82 (07/23/19 0948)  5' 6" (1.676 m)  97.3 " kg (214 lb 6.4 oz)     Physical Exam:  Physical Exam   Constitutional: She is oriented to person, place, and time. She appears well-developed and well-nourished. No distress.   HENT:   Head: Atraumatic.   Eyes: EOM are normal.   Neck: Neck supple. No JVD present. No tracheal deviation present.   Cardiovascular: Normal rate and regular rhythm.   Pulmonary/Chest: Effort normal. No respiratory distress.   Abdominal: Soft. She exhibits no distension. There is no tenderness. There is no guarding.   Musculoskeletal: Normal range of motion. She exhibits no edema or deformity.   Neurological: She is alert and oriented to person, place, and time.   Skin: Skin is warm and dry. She is not diaphoretic. There is erythema. No pallor.   Left incision with mild surrounding erythema and foul smelling purulent drainage  Wound slightly open       ASSESSMENT/PLAN:        1. Abscess  CULTURE, ANAEROBE    CULTURE, AEROBIC  (SPECIFY SOURCE)     Open wound in clinic today pack with moist to dry dressing daily, return to see wound  Needs follow up with Anita

## 2019-07-25 NOTE — PROCEDURES
"Eduardo Esposito is a 69 y.o. female patient.    Temp: 97.7 °F (36.5 °C) (07/23/19 0948)  Pulse: 78 (07/23/19 0948)  Resp: 16 (07/23/19 0948)  BP: (!) 144/82 (07/23/19 0948)  Weight: 97.3 kg (214 lb 6.4 oz) (07/23/19 0948)  Height: 5' 6" (167.6 cm) (07/23/19 0948)       Incision and Drainage  Date/Time: 7/25/2019 9:51 AM  Performed by: Bernarda Gautam MD  Authorized by: Bernarda Gautam MD     Consent Done?:  Not Needed    Type:  Abscess  Body area:  Trunk  Location details:  Left breast  Anesthesia:  Local infiltration  Local anesthetic: lidocaine 1% without epinephrine  Anesthetic total (ml):  5  Scalpel size:  11  Incision type:  Single straight  Complexity:  Complex  Drainage:  Pus  Drainage amount:  Moderate  Wound treatment:  Wound left open, incision and wound packed        Bernarda Gautam  7/25/2019  "

## 2019-07-26 LAB — BACTERIA SPEC AEROBE CULT: NORMAL

## 2019-07-29 LAB — BACTERIA SPEC ANAEROBE CULT: ABNORMAL

## 2019-07-30 ENCOUNTER — PATIENT MESSAGE (OUTPATIENT)
Dept: SURGERY | Facility: CLINIC | Age: 69
End: 2019-07-30

## 2019-07-30 ENCOUNTER — OFFICE VISIT (OUTPATIENT)
Dept: HEMATOLOGY/ONCOLOGY | Facility: CLINIC | Age: 69
End: 2019-07-30
Payer: MEDICARE

## 2019-07-30 VITALS
SYSTOLIC BLOOD PRESSURE: 127 MMHG | RESPIRATION RATE: 20 BRPM | HEART RATE: 69 BPM | DIASTOLIC BLOOD PRESSURE: 66 MMHG | WEIGHT: 213.19 LBS | HEIGHT: 66 IN | BODY MASS INDEX: 34.26 KG/M2 | OXYGEN SATURATION: 97 % | TEMPERATURE: 98 F

## 2019-07-30 DIAGNOSIS — Z79.811 USE OF ANASTROZOLE (ARIMIDEX): ICD-10-CM

## 2019-07-30 DIAGNOSIS — C50.912 MALIGNANT NEOPLASM OF LEFT FEMALE BREAST, UNSPECIFIED ESTROGEN RECEPTOR STATUS, UNSPECIFIED SITE OF BREAST: ICD-10-CM

## 2019-07-30 DIAGNOSIS — Z79.01 LONG TERM (CURRENT) USE OF ANTICOAGULANTS: ICD-10-CM

## 2019-07-30 DIAGNOSIS — C50.912 INFILTRATING DUCTAL CARCINOMA OF LEFT BREAST: Primary | ICD-10-CM

## 2019-07-30 DIAGNOSIS — I48.20 CHRONIC ATRIAL FIBRILLATION: ICD-10-CM

## 2019-07-30 PROCEDURE — 99214 OFFICE O/P EST MOD 30 MIN: CPT | Mod: S$GLB,,, | Performed by: INTERNAL MEDICINE

## 2019-07-30 PROCEDURE — 3078F DIAST BP <80 MM HG: CPT | Mod: CPTII,S$GLB,, | Performed by: INTERNAL MEDICINE

## 2019-07-30 PROCEDURE — 3078F PR MOST RECENT DIASTOLIC BLOOD PRESSURE < 80 MM HG: ICD-10-PCS | Mod: CPTII,S$GLB,, | Performed by: INTERNAL MEDICINE

## 2019-07-30 PROCEDURE — 99999 PR PBB SHADOW E&M-EST. PATIENT-LVL IV: ICD-10-PCS | Mod: PBBFAC,,, | Performed by: INTERNAL MEDICINE

## 2019-07-30 PROCEDURE — 99999 PR PBB SHADOW E&M-EST. PATIENT-LVL IV: CPT | Mod: PBBFAC,,, | Performed by: INTERNAL MEDICINE

## 2019-07-30 PROCEDURE — 99214 PR OFFICE/OUTPT VISIT, EST, LEVL IV, 30-39 MIN: ICD-10-PCS | Mod: S$GLB,,, | Performed by: INTERNAL MEDICINE

## 2019-07-30 PROCEDURE — 3074F SYST BP LT 130 MM HG: CPT | Mod: CPTII,S$GLB,, | Performed by: INTERNAL MEDICINE

## 2019-07-30 PROCEDURE — 3074F PR MOST RECENT SYSTOLIC BLOOD PRESSURE < 130 MM HG: ICD-10-PCS | Mod: CPTII,S$GLB,, | Performed by: INTERNAL MEDICINE

## 2019-07-30 PROCEDURE — 1101F PT FALLS ASSESS-DOCD LE1/YR: CPT | Mod: CPTII,S$GLB,, | Performed by: INTERNAL MEDICINE

## 2019-07-30 PROCEDURE — 1101F PR PT FALLS ASSESS DOC 0-1 FALLS W/OUT INJ PAST YR: ICD-10-PCS | Mod: CPTII,S$GLB,, | Performed by: INTERNAL MEDICINE

## 2019-07-30 NOTE — PROGRESS NOTES
HPI    A 68 years old  female accompanied by family member is here in Oncology Clinic for evaluation of breast cancer.  Patient recently had ultrasound-guided biopsy left breast found to have invasive ductal carcinoma.  Patient is ERPR positive HER2 negative by FISH.  Patient has been seen by breast surgeon.  Treatment team consider contemplating doing a lumpectomy plus radiation versus mastectomy based on further chain testing.  Patient is here for evaluation of BRCA 1/2 gene studies for above reason.  On ultrasound left breast 7 mm mass at the anterior 6:00 position.  Patient reported her Ants has breast cancer.    Social history is negative for smoking drinking and recreational drug use.    She has history of AFib is taking Eliquis 2.5 mg b.i.d..  She does not have any other medical conditions.      07/30/2019:  No acute event.  Patient is here to follow-up with Oncotype DX study.  As well as to discuss endocrine therapy.  No other acute event.  Review systems below.      Review of system:   Deny chest pain shortness of breath.  Denies abdominal pain nausea vomiting or diarrhea.  Denies any fever or chills.  Denies any weight loss.  Denies any change appetite change.      Past Medical History:   Diagnosis Date    Atrial fibrillation     4/11/13-recent dx sceduled to see cardiology Monday- Dr Lynn-dm    Cancer     breast    Diabetes mellitus     Hyperlipemia     Hypertension      Social History     Socioeconomic History    Marital status:      Spouse name: Not on file    Number of children: Not on file    Years of education: Not on file    Highest education level: Not on file   Occupational History    Not on file   Social Needs    Financial resource strain: Not on file    Food insecurity:     Worry: Not on file     Inability: Not on file    Transportation needs:     Medical: Not on file     Non-medical: Not on file   Tobacco Use    Smoking status: Never Smoker    Smokeless  tobacco: Never Used   Substance and Sexual Activity    Alcohol use: No     Comment: once or twice a year    Drug use: No    Sexual activity: Not on file   Lifestyle    Physical activity:     Days per week: Not on file     Minutes per session: Not on file    Stress: Not on file   Relationships    Social connections:     Talks on phone: Not on file     Gets together: Not on file     Attends [employer] service: Not on file     Active member of club or organization: Not on file     Attends meetings of clubs or organizations: Not on file     Relationship status: Not on file   Other Topics Concern    Not on file   Social History Narrative    Not on file       Objective    Physical Exam   There were no vitals filed for this visit.    Constitutional: patient is oriented to person, place, and time. patient appears well-developed and well-nourished. No distress.   HENT:  Normocephalic atraumatic pupils equal round reactive to light extraocular muscle intact.  Cardiovascular:  Regular rate  Pulmonary/Chest:  Clear to auscultate   Abdominal:  Soft nontender nondistended  Musculoskeletal: Normal range of motion. Gait is normal  No clubbing, cyanosis     Lymphadenopathy:  No evidence lymphadenopathy  Neurological: patient is alert and oriented to person, place, and time. patient has normal strength and normal reflexes. No sensory deficit. Gait normal.   Skin:  Warm dry   Psychiatric:  No rashes no lesion    CMP  Sodium   Date Value Ref Range Status   07/10/2019 143 136 - 145 mmol/L Final     Potassium   Date Value Ref Range Status   07/10/2019 3.9 3.5 - 5.1 mmol/L Final     Chloride   Date Value Ref Range Status   07/10/2019 105 95 - 110 mmol/L Final     CO2   Date Value Ref Range Status   07/10/2019 27 23 - 29 mmol/L Final     Glucose   Date Value Ref Range Status   07/10/2019 92 70 - 110 mg/dL Final     BUN, Bld   Date Value Ref Range Status   07/10/2019 20 8 - 23 mg/dL Final     Creatinine   Date Value Ref Range Status    07/10/2019 0.8 0.5 - 1.4 mg/dL Final     Calcium   Date Value Ref Range Status   07/10/2019 9.9 8.7 - 10.5 mg/dL Final     Total Protein   Date Value Ref Range Status   07/10/2019 6.9 6.0 - 8.4 g/dL Final     Albumin   Date Value Ref Range Status   07/10/2019 3.9 3.5 - 5.2 g/dL Final     Total Bilirubin   Date Value Ref Range Status   07/10/2019 0.7 0.1 - 1.0 mg/dL Final     Comment:     For infants and newborns, interpretation of results should be based  on gestational age, weight and in agreement with clinical  observations.  Premature Infant recommended reference ranges:  Up to 24 hours.............<8.0 mg/dL  Up to 48 hours............<12.0 mg/dL  3-5 days..................<15.0 mg/dL  6-29 days.................<15.0 mg/dL       Alkaline Phosphatase   Date Value Ref Range Status   07/10/2019 80 55 - 135 U/L Final     AST   Date Value Ref Range Status   07/10/2019 16 10 - 40 U/L Final     ALT   Date Value Ref Range Status   07/10/2019 17 10 - 44 U/L Final     Anion Gap   Date Value Ref Range Status   07/10/2019 11 8 - 16 mmol/L Final     eGFR if    Date Value Ref Range Status   07/10/2019 >60 >60 mL/min/1.73 m^2 Final     eGFR if non    Date Value Ref Range Status   07/10/2019 >60 >60 mL/min/1.73 m^2 Final     Comment:     Calculation used to obtain the estimated glomerular filtration  rate (eGFR) is the CKD-EPI equation.        Lab Results   Component Value Date    WBC 9.57 07/10/2019    HGB 15.3 07/10/2019    HCT 48.2 07/10/2019    MCV 90 07/10/2019     07/10/2019     FINAL PATHOLOGIC DIAGNOSIS biopsy 04/10/2019  Left breast mass, core needle biopsy:  - Invasive ductal carcinoma, Jacinto grade 2 (T=3, N=3, M=1), 5mm in greatest dimension  - No lymphovascular invasion identified on sections examined  Note: Prognostic/Predictive markers for ER, CT, Her-2 and Ki-67 are ordered, the results of which will be reported  in an addendum. Dr. JORDYN Sierra has reviewed the case and  agrees with the above diagnosis    Assessment Plan    [] Breast cancer ERPR positive HER2 negative by FISH.  Mass measuring 7 mm left side breast.  Denies any bone pain, denies any pulmonary symptoms and denies any GI issues.  Alkaline phosphate within normal limits.    Status post surgical approach lumpectomy tumor is invasive ductal carcinoma size 1 cm margin free of invasive carcinoma.    Left axillary sentinel lymph nodes no evidence of malignancy.    [] Sent for radiation oncology consultation.    [] Patient declined chemotherapy,Oncotype DX study shows absolute chemotherapy benefit is less than 1% and distant recurrence risk at 9 years 3%.  Oncotype DX score 9. Given above information we will proceed with endocrine therapy after completion of radiation therapy.  Endocrine therapy will be Arimidex 1 mg daily x5 years.    Calcium vitamin supplements should be initiated.    [] History of a AFib on long-term anticoagulation Eliquis 2.5 mg b.i.d. follow Cardiology    [] RTC 2 months with blood work    Dictation software used in this note.  Expect a typo graphic error

## 2019-07-31 ENCOUNTER — TELEPHONE (OUTPATIENT)
Dept: FAMILY MEDICINE | Facility: CLINIC | Age: 69
End: 2019-07-31

## 2019-07-31 ENCOUNTER — TELEPHONE (OUTPATIENT)
Dept: RADIATION ONCOLOGY | Facility: CLINIC | Age: 69
End: 2019-07-31

## 2019-07-31 NOTE — TELEPHONE ENCOUNTER
----- Message from Gareth Mandujano sent at 7/31/2019  8:24 AM CDT -----  Contact: pt  Type: Needs Medical Advice    Who Called:  pt    Best Call Back Number: [phone]  Additional Information: wants to know if she should still come in on 8/6/19 due to her wound has not completely healed yet. Please call to advise.

## 2019-07-31 NOTE — TELEPHONE ENCOUNTER
----- Message from Gareth Mandujano sent at 7/31/2019  8:24 AM CDT -----  Contact: pt  Type: Needs Medical Advice    Who Called:  pt    Best Call Back Number: [phone]  Additional Information: wants to know if she should still come in on 8/6/19 due to her wound has not completely healed yet. Please call to advise.    
Received call back from patient after leaving voice mail message earlier.  Discussed with patient cancelling return appointment since wound has not healed.  Requested Mrs. Esposito contact me directly once she has seen her surgeon and once cleared from a surgical perspective.  Gave Mrs. Esposito my name and direct telephone number again.  Patient verbalized understanding.  
done

## 2019-07-31 NOTE — TELEPHONE ENCOUNTER
----- Message from Gareth Mandujano sent at 7/31/2019  8:21 AM CDT -----  Contact: pt  Type: Needs Medical Advice    Who Called:  pt    Best Call Back Number: [phone]  Additional Information: Would like to discuss why an order has not been sent to [facility] for a walker. Please call to advise.

## 2019-08-01 ENCOUNTER — TELEPHONE (OUTPATIENT)
Dept: SURGERY | Facility: CLINIC | Age: 69
End: 2019-08-01

## 2019-08-01 NOTE — TELEPHONE ENCOUNTER
----- Message from Lyubov Domínguez LPN sent at 7/31/2019  4:56 PM CDT -----  Contact: patient      ----- Message -----  From: Imtiaz Elizalde  Sent: 7/31/2019   1:25 PM  To: Anita CRUZ Jr. Staff    Type:  Patient Returning Call    Who Called:  patient  Who Left Message for Patient:  Jhonny  Does the patient know what this is regarding?:  yes  Best Call Back Number:  [phone]  Additional Information:  Requesting  A call back

## 2019-08-05 ENCOUNTER — TELEPHONE (OUTPATIENT)
Dept: SURGERY | Facility: CLINIC | Age: 69
End: 2019-08-05

## 2019-08-05 NOTE — TELEPHONE ENCOUNTER
----- Message from Carli Tang sent at 8/5/2019 11:37 AM CDT -----  Contact: patient  Type: Needs Medical Advice    Who Called:  patient  Symptoms (please be specific):  Calling to find out what to clean wound out with  Best Call Back Number: [phone]  Additional Information: n/a

## 2019-08-05 NOTE — TELEPHONE ENCOUNTER
Advised pt to lightly cover wound and do not pack wound until seen by physician,pt verbalized understanding

## 2019-08-07 ENCOUNTER — OFFICE VISIT (OUTPATIENT)
Dept: SURGERY | Facility: CLINIC | Age: 69
End: 2019-08-07
Payer: MEDICARE

## 2019-08-07 VITALS
HEART RATE: 51 BPM | DIASTOLIC BLOOD PRESSURE: 72 MMHG | SYSTOLIC BLOOD PRESSURE: 150 MMHG | HEIGHT: 66 IN | TEMPERATURE: 98 F | BODY MASS INDEX: 34.55 KG/M2 | WEIGHT: 214.94 LBS

## 2019-08-07 DIAGNOSIS — Z98.890 POST-OPERATIVE STATE: Primary | ICD-10-CM

## 2019-08-07 PROCEDURE — 99999 PR PBB SHADOW E&M-EST. PATIENT-LVL III: CPT | Mod: PBBFAC,,, | Performed by: SURGERY

## 2019-08-07 PROCEDURE — 99024 PR POST-OP FOLLOW-UP VISIT: ICD-10-PCS | Mod: S$GLB,,, | Performed by: SURGERY

## 2019-08-07 PROCEDURE — 99999 PR PBB SHADOW E&M-EST. PATIENT-LVL III: ICD-10-PCS | Mod: PBBFAC,,, | Performed by: SURGERY

## 2019-08-07 PROCEDURE — 99024 POSTOP FOLLOW-UP VISIT: CPT | Mod: S$GLB,,, | Performed by: SURGERY

## 2019-08-07 RX ORDER — LANOLIN ALCOHOL/MO/W.PET/CERES
100 CREAM (GRAM) TOPICAL DAILY
COMMUNITY
End: 2020-01-08 | Stop reason: CLARIF

## 2019-08-08 ENCOUNTER — OFFICE VISIT (OUTPATIENT)
Dept: SURGERY | Facility: CLINIC | Age: 69
End: 2019-08-08
Payer: MEDICARE

## 2019-08-08 VITALS
RESPIRATION RATE: 16 BRPM | TEMPERATURE: 98 F | WEIGHT: 214.94 LBS | BODY MASS INDEX: 34.55 KG/M2 | SYSTOLIC BLOOD PRESSURE: 130 MMHG | HEART RATE: 59 BPM | DIASTOLIC BLOOD PRESSURE: 60 MMHG | HEIGHT: 66 IN

## 2019-08-08 DIAGNOSIS — C50.912 INFILTRATING DUCTAL CARCINOMA OF LEFT BREAST: Primary | ICD-10-CM

## 2019-08-08 PROCEDURE — 3075F PR MOST RECENT SYSTOLIC BLOOD PRESS GE 130-139MM HG: ICD-10-PCS | Mod: CPTII,S$GLB,, | Performed by: SURGERY

## 2019-08-08 PROCEDURE — 99999 PR PBB SHADOW E&M-EST. PATIENT-LVL III: ICD-10-PCS | Mod: PBBFAC,,, | Performed by: SURGERY

## 2019-08-08 PROCEDURE — 3078F PR MOST RECENT DIASTOLIC BLOOD PRESSURE < 80 MM HG: ICD-10-PCS | Mod: CPTII,S$GLB,, | Performed by: SURGERY

## 2019-08-08 PROCEDURE — 99024 POSTOP FOLLOW-UP VISIT: CPT | Mod: S$GLB,,, | Performed by: SURGERY

## 2019-08-08 PROCEDURE — 1101F PR PT FALLS ASSESS DOC 0-1 FALLS W/OUT INJ PAST YR: ICD-10-PCS | Mod: CPTII,S$GLB,, | Performed by: SURGERY

## 2019-08-08 PROCEDURE — 1101F PT FALLS ASSESS-DOCD LE1/YR: CPT | Mod: CPTII,S$GLB,, | Performed by: SURGERY

## 2019-08-08 PROCEDURE — 99999 PR PBB SHADOW E&M-EST. PATIENT-LVL III: CPT | Mod: PBBFAC,,, | Performed by: SURGERY

## 2019-08-08 PROCEDURE — 3078F DIAST BP <80 MM HG: CPT | Mod: CPTII,S$GLB,, | Performed by: SURGERY

## 2019-08-08 PROCEDURE — 99024 PR POST-OP FOLLOW-UP VISIT: ICD-10-PCS | Mod: S$GLB,,, | Performed by: SURGERY

## 2019-08-08 PROCEDURE — 3075F SYST BP GE 130 - 139MM HG: CPT | Mod: CPTII,S$GLB,, | Performed by: SURGERY

## 2019-08-08 NOTE — PROGRESS NOTES
S/P I&D of lumpectomy site.  Still some drainage.  No cellulitis.  Wound irrigated and repacked.

## 2019-08-11 NOTE — PROGRESS NOTES
Breast healing, still has some foul smelling drainage  Saw bridget    Vitals:    08/08/19 1305   BP: 130/60   Pulse: (!) 59   Resp: 16   Temp: 98.2 °F (36.8 °C)       Wound healing in breast    A/P    Needs to continue to follow up with bridget for wound care and bx pathology

## 2019-08-14 ENCOUNTER — OFFICE VISIT (OUTPATIENT)
Dept: SURGERY | Facility: CLINIC | Age: 69
End: 2019-08-14
Payer: MEDICARE

## 2019-08-14 DIAGNOSIS — Z98.890 POST-OPERATIVE STATE: Primary | ICD-10-CM

## 2019-08-14 PROCEDURE — 99999 PR PBB SHADOW E&M-EST. PATIENT-LVL I: ICD-10-PCS | Mod: PBBFAC,,, | Performed by: SURGERY

## 2019-08-14 PROCEDURE — 99024 PR POST-OP FOLLOW-UP VISIT: ICD-10-PCS | Mod: S$GLB,,, | Performed by: SURGERY

## 2019-08-14 PROCEDURE — 99999 PR PBB SHADOW E&M-EST. PATIENT-LVL I: CPT | Mod: PBBFAC,,, | Performed by: SURGERY

## 2019-08-14 PROCEDURE — 99024 POSTOP FOLLOW-UP VISIT: CPT | Mod: S$GLB,,, | Performed by: SURGERY

## 2019-08-15 ENCOUNTER — TELEPHONE (OUTPATIENT)
Dept: RADIATION ONCOLOGY | Facility: CLINIC | Age: 69
End: 2019-08-15

## 2019-08-15 ENCOUNTER — TELEPHONE (OUTPATIENT)
Dept: BARIATRICS | Facility: CLINIC | Age: 69
End: 2019-08-15

## 2019-08-15 ENCOUNTER — PATIENT MESSAGE (OUTPATIENT)
Dept: SURGERY | Facility: CLINIC | Age: 69
End: 2019-08-15

## 2019-08-15 NOTE — TELEPHONE ENCOUNTER
returned call to pt, advised that dr. odonnell doesn't specialize in breasts and will have to f/u with dr. gill or another breast surgeon. pt acknowledged.

## 2019-08-15 NOTE — TELEPHONE ENCOUNTER
Spoke with patient.  Wound still healing and oozing pus.  Will inform Dr. Gray of current status.  Asked patient to call me with any updates.  Patient verbalized understanding.          ----- Message from Lynda Mckeon sent at 8/15/2019 12:48 PM CDT -----  Pt is requesting a call back concerning her wound. Pt says it is going to be a while before she comes in to be seen due to wound slowing healing.    Pt can be reached at [phone]      Thank you!

## 2019-08-15 NOTE — TELEPHONE ENCOUNTER
----- Message from Martina Zelaya sent at 8/15/2019  3:38 PM CDT -----  Type: Needs Medical Advice    Who Called:  Patient  Best Call Back Number: [phone] (home)     Additional Information: Patient had surgery with Dr. Howard. She has a post op scheduled on 8/28/19 but she is not happy with Dr. Howard and does not want to go back to see him. She is asking if your office will take Dr. Howard's place and be her doctor now. Please call to advise or schedule.

## 2019-08-15 NOTE — TELEPHONE ENCOUNTER
----- Message from Lynda Mckeon sent at 8/15/2019 12:45 PM CDT -----  Pt is calling to request her Post Op appt be changed to 1:00 p.m. On August 28 please      Pt can be reached at [phone]    Thank you!

## 2019-08-16 ENCOUNTER — TELEPHONE (OUTPATIENT)
Dept: SURGERY | Facility: CLINIC | Age: 69
End: 2019-08-16

## 2019-08-16 NOTE — TELEPHONE ENCOUNTER
----- Message from Paz Hong sent at 8/16/2019 11:18 AM CDT -----  Contact: Patient   Type: Patient Call Back    Who called: Patient     What is the request in detail: Pt is requesting a call back to discuss a personal matter.     Can the clinic reply by [facility]? No     Would the patient rather a call back or a response via [facility]? Call back     Best call back number: [phone]    Additional Information:

## 2019-08-16 NOTE — TELEPHONE ENCOUNTER
Pt spoke with RN supervisor,after speaking with supervisior pt will remain under dr care and will follow up in two weeks for her follow up visit

## 2019-08-16 NOTE — TELEPHONE ENCOUNTER
----- Message from Paz Hong sent at 8/16/2019 11:18 AM CDT -----  Contact: Patient   Type: Patient Call Back    Who called: Patient     What is the request in detail: Pt is requesting a call back to discuss a personal matter.     Can the clinic reply by [facility]? No     Would the patient rather a call back or a response via [facility]? Call back     Best call back number: [phone]    Additional Information:

## 2019-08-22 NOTE — PROGRESS NOTES
POST-OP NOTE    PROCEDURE:  Status post incision and drainage of infected lumpectomy site.    COMPLAINTS: None.    EXAM:  Still has some purulent drainage.      IMPRESSION:  Continuing wound care    PLAN: FU as needed.  Continue packing as she is doing.  Bactrim.

## 2019-08-28 ENCOUNTER — OFFICE VISIT (OUTPATIENT)
Dept: SURGERY | Facility: CLINIC | Age: 69
End: 2019-08-28
Payer: MEDICARE

## 2019-08-28 ENCOUNTER — TELEPHONE (OUTPATIENT)
Dept: RADIATION ONCOLOGY | Facility: CLINIC | Age: 69
End: 2019-08-28

## 2019-08-28 VITALS — WEIGHT: 215.63 LBS | TEMPERATURE: 98 F | BODY MASS INDEX: 34.8 KG/M2

## 2019-08-28 DIAGNOSIS — Z98.890 POST-OPERATIVE STATE: Primary | ICD-10-CM

## 2019-08-28 PROCEDURE — 99024 PR POST-OP FOLLOW-UP VISIT: ICD-10-PCS | Mod: HCNC,S$GLB,, | Performed by: SURGERY

## 2019-08-28 PROCEDURE — 99999 PR PBB SHADOW E&M-EST. PATIENT-LVL III: ICD-10-PCS | Mod: PBBFAC,HCNC,, | Performed by: SURGERY

## 2019-08-28 PROCEDURE — 99999 PR PBB SHADOW E&M-EST. PATIENT-LVL III: CPT | Mod: PBBFAC,HCNC,, | Performed by: SURGERY

## 2019-08-28 PROCEDURE — 99024 POSTOP FOLLOW-UP VISIT: CPT | Mod: HCNC,S$GLB,, | Performed by: SURGERY

## 2019-08-28 NOTE — TELEPHONE ENCOUNTER
Patient called to notify our office that she had a visit with Dr. Howard today. He would like her to wait another 1-2 weeks before starting radiation.

## 2019-09-06 DIAGNOSIS — E11.9 TYPE 2 DIABETES MELLITUS WITHOUT COMPLICATION: ICD-10-CM

## 2019-09-17 ENCOUNTER — LAB VISIT (OUTPATIENT)
Dept: LAB | Facility: HOSPITAL | Age: 69
End: 2019-09-17
Attending: INTERNAL MEDICINE
Payer: MEDICARE

## 2019-09-17 DIAGNOSIS — C50.912 MALIGNANT NEOPLASM OF LEFT FEMALE BREAST, UNSPECIFIED ESTROGEN RECEPTOR STATUS, UNSPECIFIED SITE OF BREAST: ICD-10-CM

## 2019-09-17 DIAGNOSIS — Z79.01 LONG TERM (CURRENT) USE OF ANTICOAGULANTS: ICD-10-CM

## 2019-09-17 DIAGNOSIS — Z79.811 USE OF ANASTROZOLE (ARIMIDEX): ICD-10-CM

## 2019-09-17 DIAGNOSIS — C50.912 INFILTRATING DUCTAL CARCINOMA OF LEFT BREAST: ICD-10-CM

## 2019-09-17 DIAGNOSIS — I48.20 CHRONIC ATRIAL FIBRILLATION: ICD-10-CM

## 2019-09-17 LAB
ALBUMIN SERPL BCP-MCNC: 4.5 G/DL (ref 3.5–5.2)
ALP SERPL-CCNC: 84 U/L (ref 55–135)
ALT SERPL W/O P-5'-P-CCNC: 17 U/L (ref 10–44)
ANION GAP SERPL CALC-SCNC: 11 MMOL/L (ref 8–16)
AST SERPL-CCNC: 15 U/L (ref 10–40)
BASOPHILS # BLD AUTO: 0.03 K/UL (ref 0–0.2)
BASOPHILS NFR BLD: 0.4 % (ref 0–1.9)
BILIRUB SERPL-MCNC: 1.4 MG/DL (ref 0.1–1)
BUN SERPL-MCNC: 21 MG/DL (ref 8–23)
CALCIUM SERPL-MCNC: 10 MG/DL (ref 8.7–10.5)
CHLORIDE SERPL-SCNC: 103 MMOL/L (ref 95–110)
CO2 SERPL-SCNC: 29 MMOL/L (ref 23–29)
CREAT SERPL-MCNC: 0.8 MG/DL (ref 0.5–1.4)
DIFFERENTIAL METHOD: ABNORMAL
EOSINOPHIL # BLD AUTO: 0.1 K/UL (ref 0–0.5)
EOSINOPHIL NFR BLD: 1.2 % (ref 0–8)
ERYTHROCYTE [DISTWIDTH] IN BLOOD BY AUTOMATED COUNT: 13.7 % (ref 11.5–14.5)
EST. GFR  (AFRICAN AMERICAN): >60 ML/MIN/1.73 M^2
EST. GFR  (NON AFRICAN AMERICAN): >60 ML/MIN/1.73 M^2
GLUCOSE SERPL-MCNC: 114 MG/DL (ref 70–110)
HCT VFR BLD AUTO: 49.4 % (ref 37–48.5)
HGB BLD-MCNC: 15.9 G/DL (ref 12–16)
IMM GRANULOCYTES # BLD AUTO: 0.03 K/UL (ref 0–0.04)
LYMPHOCYTES # BLD AUTO: 2.5 K/UL (ref 1–4.8)
LYMPHOCYTES NFR BLD: 33.7 % (ref 18–48)
MAGNESIUM SERPL-MCNC: 1.9 MG/DL (ref 1.6–2.6)
MCH RBC QN AUTO: 28.4 PG (ref 27–31)
MCHC RBC AUTO-ENTMCNC: 32.2 G/DL (ref 32–36)
MCV RBC AUTO: 88 FL (ref 82–98)
MONOCYTES # BLD AUTO: 0.7 K/UL (ref 0.3–1)
MONOCYTES NFR BLD: 9.1 % (ref 4–15)
NEUTROPHILS # BLD AUTO: 4.1 K/UL (ref 1.8–7.7)
NEUTROPHILS NFR BLD: 55.2 % (ref 38–73)
NRBC BLD-RTO: 0 /100 WBC
PLATELET # BLD AUTO: 282 K/UL (ref 150–350)
PMV BLD AUTO: 9.3 FL (ref 9.2–12.9)
POTASSIUM SERPL-SCNC: 3.7 MMOL/L (ref 3.5–5.1)
PROT SERPL-MCNC: 7.3 G/DL (ref 6–8.4)
RBC # BLD AUTO: 5.59 M/UL (ref 4–5.4)
SODIUM SERPL-SCNC: 143 MMOL/L (ref 136–145)
WBC # BLD AUTO: 7.5 K/UL (ref 3.9–12.7)

## 2019-09-17 PROCEDURE — 80053 COMPREHEN METABOLIC PANEL: CPT | Mod: HCNC

## 2019-09-17 PROCEDURE — 83735 ASSAY OF MAGNESIUM: CPT | Mod: HCNC

## 2019-09-17 PROCEDURE — 36415 COLL VENOUS BLD VENIPUNCTURE: CPT | Mod: HCNC

## 2019-09-17 PROCEDURE — 85025 COMPLETE CBC W/AUTO DIFF WBC: CPT | Mod: HCNC

## 2019-09-18 ENCOUNTER — TELEPHONE (OUTPATIENT)
Dept: HEMATOLOGY/ONCOLOGY | Facility: CLINIC | Age: 69
End: 2019-09-18

## 2019-09-18 NOTE — TELEPHONE ENCOUNTER
Spoke with pt about cancelling appt to see dr GUTIERREZ. Left message to relook at this since onc and rad go hand in hand. Asked to call back [phone]

## 2019-09-24 ENCOUNTER — OFFICE VISIT (OUTPATIENT)
Dept: HEMATOLOGY/ONCOLOGY | Facility: CLINIC | Age: 69
End: 2019-09-24
Payer: MEDICARE

## 2019-09-24 VITALS
TEMPERATURE: 98 F | HEART RATE: 80 BPM | RESPIRATION RATE: 20 BRPM | HEIGHT: 66 IN | BODY MASS INDEX: 34.86 KG/M2 | SYSTOLIC BLOOD PRESSURE: 127 MMHG | DIASTOLIC BLOOD PRESSURE: 70 MMHG | WEIGHT: 216.94 LBS

## 2019-09-24 DIAGNOSIS — Z09 ONCOLOGY FOLLOW-UP ENCOUNTER: ICD-10-CM

## 2019-09-24 DIAGNOSIS — C50.912 INFILTRATING DUCTAL CARCINOMA OF LEFT BREAST: Primary | ICD-10-CM

## 2019-09-24 DIAGNOSIS — I48.20 CHRONIC ATRIAL FIBRILLATION: Primary | ICD-10-CM

## 2019-09-24 PROCEDURE — 3078F PR MOST RECENT DIASTOLIC BLOOD PRESSURE < 80 MM HG: ICD-10-PCS | Mod: HCNC,CPTII,S$GLB, | Performed by: INTERNAL MEDICINE

## 2019-09-24 PROCEDURE — 99499 UNLISTED E&M SERVICE: CPT | Mod: HCNC,S$GLB,, | Performed by: INTERNAL MEDICINE

## 2019-09-24 PROCEDURE — 99213 PR OFFICE/OUTPT VISIT, EST, LEVL III, 20-29 MIN: ICD-10-PCS | Mod: HCNC,S$GLB,, | Performed by: INTERNAL MEDICINE

## 2019-09-24 PROCEDURE — 99999 PR PBB SHADOW E&M-EST. PATIENT-LVL III: ICD-10-PCS | Mod: PBBFAC,HCNC,, | Performed by: INTERNAL MEDICINE

## 2019-09-24 PROCEDURE — 3074F PR MOST RECENT SYSTOLIC BLOOD PRESSURE < 130 MM HG: ICD-10-PCS | Mod: HCNC,CPTII,S$GLB, | Performed by: INTERNAL MEDICINE

## 2019-09-24 PROCEDURE — 1101F PR PT FALLS ASSESS DOC 0-1 FALLS W/OUT INJ PAST YR: ICD-10-PCS | Mod: HCNC,CPTII,S$GLB, | Performed by: INTERNAL MEDICINE

## 2019-09-24 PROCEDURE — 99999 PR PBB SHADOW E&M-EST. PATIENT-LVL III: CPT | Mod: PBBFAC,HCNC,, | Performed by: INTERNAL MEDICINE

## 2019-09-24 PROCEDURE — 3078F DIAST BP <80 MM HG: CPT | Mod: HCNC,CPTII,S$GLB, | Performed by: INTERNAL MEDICINE

## 2019-09-24 PROCEDURE — 99499 RISK ADDL DX/OHS AUDIT: ICD-10-PCS | Mod: HCNC,S$GLB,, | Performed by: INTERNAL MEDICINE

## 2019-09-24 PROCEDURE — 3074F SYST BP LT 130 MM HG: CPT | Mod: HCNC,CPTII,S$GLB, | Performed by: INTERNAL MEDICINE

## 2019-09-24 PROCEDURE — 1101F PT FALLS ASSESS-DOCD LE1/YR: CPT | Mod: HCNC,CPTII,S$GLB, | Performed by: INTERNAL MEDICINE

## 2019-09-24 PROCEDURE — 99213 OFFICE O/P EST LOW 20 MIN: CPT | Mod: HCNC,S$GLB,, | Performed by: INTERNAL MEDICINE

## 2019-09-24 RX ORDER — ZINC GLUCONATE 50 MG
50 TABLET ORAL DAILY
COMMUNITY
End: 2020-01-08 | Stop reason: CLARIF

## 2019-09-24 RX ORDER — CIMETIDINE 200 MG
TABLET ORAL
COMMUNITY
Start: 2019-07-26 | End: 2019-12-12

## 2019-09-24 RX ORDER — ASCORBIC ACID 500 MG
500 TABLET ORAL DAILY
Status: ON HOLD | COMMUNITY
End: 2020-01-08

## 2019-09-24 NOTE — PROGRESS NOTES
HPI    A 68 years old  female accompanied by family member is here in Oncology Clinic for evaluation of breast cancer.  Patient recently had ultrasound-guided biopsy left breast found to have invasive ductal carcinoma.  Patient is ERPR positive HER2 negative by FISH.  Patient has been seen by breast surgeon.  Treatment team consider contemplating doing a lumpectomy plus radiation versus mastectomy based on further chain testing.  Patient is here for evaluation of BRCA 1/2 gene studies for above reason.  On ultrasound left breast 7 mm mass at the anterior 6:00 position.  Patient reported her Ants has breast cancer.    Social history is negative for smoking drinking and recreational drug use.    She has history of AFib is taking Eliquis 2.5 mg b.i.d..  She does not have any other medical conditions.      07/30/2019:  No acute event.  Patient is here to follow-up with Oncotype DX study.  As well as to discuss endocrine therapy.  No other acute event.  Review systems below.    09/24/2019:  Patient surgical wound healing well. No complaints.  Ongoing radiation at that time.  Expected to finish radiation by the end of October.    Review of system:   Deny chest pain shortness of breath.  Denies abdominal pain nausea vomiting or diarrhea.  Denies any fever or chills.  Denies any weight loss.  Denies any change appetite change.      Past Medical History:   Diagnosis Date    Atrial fibrillation     4/11/13-recent dx sceduled to see cardiology Monday- Dr Lynn-dm    Cancer     breast    Diabetes mellitus     Hyperlipemia     Hypertension      Social History     Socioeconomic History    Marital status:      Spouse name: Not on file    Number of children: Not on file    Years of education: Not on file    Highest education level: Not on file   Occupational History    Not on file   Social Needs    Financial resource strain: Not on file    Food insecurity:     Worry: Not on file     Inability: Not  on file    Transportation needs:     Medical: Not on file     Non-medical: Not on file   Tobacco Use    Smoking status: Never Smoker    Smokeless tobacco: Never Used   Substance and Sexual Activity    Alcohol use: No     Comment: once or twice a year    Drug use: No    Sexual activity: Not on file   Lifestyle    Physical activity:     Days per week: Not on file     Minutes per session: Not on file    Stress: Not on file   Relationships    Social connections:     Talks on phone: Not on file     Gets together: Not on file     Attends Rastafari service: Not on file     Active member of club or organization: Not on file     Attends meetings of clubs or organizations: Not on file     Relationship status: Not on file   Other Topics Concern    Not on file   Social History Narrative    Not on file       Objective    Physical Exam     Vitals:    09/24/19 1340   BP: 127/70   Pulse: 80   Resp: 20   Temp: 98.2 °F (36.8 °C)       Constitutional: patient is oriented to person, place, and time. patient appears well-developed and well-nourished. No distress.   HENT:  Normocephalic atraumatic pupils equal round reactive to light extraocular muscle intact.  Cardiovascular:  Regular rate  Pulmonary/Chest:  Clear to auscultate   Abdominal:  Soft nontender nondistended  Musculoskeletal: Normal range of motion. Gait is normal  No clubbing, cyanosis     Lymphadenopathy:  No evidence lymphadenopathy  Neurological: patient is alert and oriented to person, place, and time. patient has normal strength and normal reflexes. No sensory deficit. Gait normal.   Skin:  Warm dry   Psychiatric:  No rashes no lesion    CMP  Sodium   Date Value Ref Range Status   09/17/2019 143 136 - 145 mmol/L Final     Potassium   Date Value Ref Range Status   09/17/2019 3.7 3.5 - 5.1 mmol/L Final     Chloride   Date Value Ref Range Status   09/17/2019 103 95 - 110 mmol/L Final     CO2   Date Value Ref Range Status   09/17/2019 29 23 - 29 mmol/L Final      Glucose   Date Value Ref Range Status   09/17/2019 114 (H) 70 - 110 mg/dL Final     BUN, Bld   Date Value Ref Range Status   09/17/2019 21 8 - 23 mg/dL Final     Creatinine   Date Value Ref Range Status   09/17/2019 0.8 0.5 - 1.4 mg/dL Final     Calcium   Date Value Ref Range Status   09/17/2019 10.0 8.7 - 10.5 mg/dL Final     Total Protein   Date Value Ref Range Status   09/17/2019 7.3 6.0 - 8.4 g/dL Final     Albumin   Date Value Ref Range Status   09/17/2019 4.5 3.5 - 5.2 g/dL Final     Total Bilirubin   Date Value Ref Range Status   09/17/2019 1.4 (H) 0.1 - 1.0 mg/dL Final     Comment:     For infants and newborns, interpretation of results should be based  on gestational age, weight and in agreement with clinical  observations.  Premature Infant recommended reference ranges:  Up to 24 hours.............<8.0 mg/dL  Up to 48 hours............<12.0 mg/dL  3-5 days..................<15.0 mg/dL  6-29 days.................<15.0 mg/dL       Alkaline Phosphatase   Date Value Ref Range Status   09/17/2019 84 55 - 135 U/L Final     AST   Date Value Ref Range Status   09/17/2019 15 10 - 40 U/L Final     ALT   Date Value Ref Range Status   09/17/2019 17 10 - 44 U/L Final     Anion Gap   Date Value Ref Range Status   09/17/2019 11 8 - 16 mmol/L Final     eGFR if    Date Value Ref Range Status   09/17/2019 >60 >60 mL/min/1.73 m^2 Final     eGFR if non    Date Value Ref Range Status   09/17/2019 >60 >60 mL/min/1.73 m^2 Final     Comment:     Calculation used to obtain the estimated glomerular filtration  rate (eGFR) is the CKD-EPI equation.        Lab Results   Component Value Date    WBC 7.50 09/17/2019    HGB 15.9 09/17/2019    HCT 49.4 (H) 09/17/2019    MCV 88 09/17/2019     09/17/2019     FINAL PATHOLOGIC DIAGNOSIS biopsy 04/10/2019  Left breast mass, core needle biopsy:  - Invasive ductal carcinoma, Jacinto grade 2 (T=3, N=3, M=1), 5mm in greatest dimension  - No  lymphovascular invasion identified on sections examined  Note: Prognostic/Predictive markers for ER, [place], Her-2 and Ki-67 are ordered, the results of which will be reported  in an addendum. Dr. JORDYN Sierra has reviewed the case and agrees with the above diagnosis    Assessment Plan    [] Breast cancer ERPR positive HER2 negative by FISH.  Mass measuring 7 mm left side breast.  Denies any bone pain, denies any pulmonary symptoms and denies any GI issues.  Alkaline phosphate within normal limits.    Status post surgical approach lumpectomy tumor is invasive ductal carcinoma size 1 cm margin free of invasive carcinoma.    Left axillary sentinel lymph nodes no evidence of malignancy.    [] Patient declined chemotherapy,Oncotype DX study shows absolute chemotherapy benefit is less than 1% and distant recurrence risk at 9 years 3%.  Oncotype DX score 9. Given above information we will proceed with endocrine therapy after completion of radiation therapy.  Endocrine therapy will be Arimidex 1 mg daily x5 years.    Calcium vitamin supplements should be initiated.    [] History of a AFib on long-term anticoagulation Eliquis 2.5 mg b.i.d. follow Cardiology    []  Patient expect to completed radiation by end of October.  Patient will return to Oncology Clinic 1st week of November for blood work and initiate of Arimidex.  Patient already have of medication in her.    Dictation software used in this note.  Expect a typo graphic error

## 2019-09-26 ENCOUNTER — SOCIAL WORK (OUTPATIENT)
Dept: HEMATOLOGY/ONCOLOGY | Facility: CLINIC | Age: 69
End: 2019-09-26

## 2019-09-26 ENCOUNTER — DOCUMENTATION ONLY (OUTPATIENT)
Dept: HEMATOLOGY/ONCOLOGY | Facility: CLINIC | Age: 69
End: 2019-09-26

## 2019-09-26 NOTE — PROGRESS NOTES
NUTRITION NOTE    Arpita came into my office before her radiation treatment inquiring about recipe ideas for wt loss & diabetic diet. Currently taking metformin BID. Reported last fasting B    Provided pt with cookbook of healthy recipes, as well as copies of recipes from last month's Healthy Cooking Class. Provided pt with RD contact info & encouraged her to call with any questions/concerns.

## 2019-09-27 ENCOUNTER — TELEPHONE (OUTPATIENT)
Dept: ORTHOPEDICS | Facility: CLINIC | Age: 69
End: 2019-09-27

## 2019-09-27 NOTE — TELEPHONE ENCOUNTER
----- Message from Laney Espinoza MA sent at 9/27/2019  2:12 PM CDT -----  Contact: anne-marie Eddy calling to tell Dr Hanson to Call DR Marin [facility]   Regarding hip surgery

## 2019-09-27 NOTE — TELEPHONE ENCOUNTER
Called pt and left message advising to return call to clarify message regarding needing Dr. Oliveros to call her oncologist.

## 2019-09-30 ENCOUNTER — TELEPHONE (OUTPATIENT)
Dept: ORTHOPEDICS | Facility: CLINIC | Age: 69
End: 2019-09-30

## 2019-09-30 NOTE — TELEPHONE ENCOUNTER
Patient contacted clinic asking for provider to contact Dr. Marin for recommendation(s) for hip surgery time frame, as she is finishing chemotherapy this week.Staff message sent to Dr. Marin. Message left to inform patient that message was received and status. Deepali Cavazos LPN

## 2019-10-04 ENCOUNTER — TELEPHONE (OUTPATIENT)
Dept: FAMILY MEDICINE | Facility: CLINIC | Age: 69
End: 2019-10-04

## 2019-10-04 DIAGNOSIS — M16.11 UNILATERAL PRIMARY OSTEOARTHRITIS, RIGHT HIP: Primary | ICD-10-CM

## 2019-10-04 DIAGNOSIS — Z01.818 PREOPERATIVE TESTING: ICD-10-CM

## 2019-10-04 RX ORDER — CEFAZOLIN SODIUM 2 G/50ML
2 SOLUTION INTRAVENOUS
Status: CANCELLED | OUTPATIENT
Start: 2019-10-04

## 2019-10-04 RX ORDER — MUPIROCIN 20 MG/G
OINTMENT TOPICAL
Status: CANCELLED | OUTPATIENT
Start: 2019-10-04

## 2019-10-04 NOTE — TELEPHONE ENCOUNTER
----- Message from Deepali Cavazos LPN sent at 10/4/2019  4:02 PM CDT -----  Regarding: preoperative clearance  Hi there. This patient is being tentatively booked for a right total hip arthroplasty for 11/27/19. She will need a PCP clearance prior to her surgery. Are you able to assist with booking this? She would like to see him on 11/06/19, after 9:00 if possible.     Please let me know how to proceed.    Best regards,   Deepali Cavazos LPN ROT on behalf of LA[facility] Hanson II [facility]

## 2019-10-08 ENCOUNTER — TELEPHONE (OUTPATIENT)
Dept: ORTHOPEDICS | Facility: CLINIC | Age: 69
End: 2019-10-08

## 2019-10-08 NOTE — TELEPHONE ENCOUNTER
----- Message from Laney Espinoza MA sent at 10/8/2019  9:40 AM CDT -----  Contact: anne-marie   Wants to know if she is having total hip replacement   Wants to know recovery time  Etc   Call  Back

## 2019-10-09 ENCOUNTER — TELEPHONE (OUTPATIENT)
Dept: ORTHOPEDICS | Facility: CLINIC | Age: 69
End: 2019-10-09

## 2019-10-09 NOTE — TELEPHONE ENCOUNTER
----- Message from Anastasia Mckeon sent at 10/9/2019  2:34 PM CDT -----  Contact: self  Patient would like to know if there are any exercises for hip that she can do before her surgery in November.  Please call

## 2019-10-17 ENCOUNTER — TELEPHONE (OUTPATIENT)
Dept: FAMILY MEDICINE | Facility: CLINIC | Age: 69
End: 2019-10-17

## 2019-10-17 DIAGNOSIS — E78.5 HYPERLIPIDEMIA ASSOCIATED WITH TYPE 2 DIABETES MELLITUS: Primary | ICD-10-CM

## 2019-10-17 DIAGNOSIS — E11.69 HYPERLIPIDEMIA ASSOCIATED WITH TYPE 2 DIABETES MELLITUS: Primary | ICD-10-CM

## 2019-10-17 NOTE — TELEPHONE ENCOUNTER
----- Message from Tracy Coley sent at 10/17/2019 10:17 AM CDT -----  Contact: pt [phone]  Patient called she is asking for a Cholesterol and Try glyceride levels to be added to her labs also she is asking for a referral to see a Dr. Giorgi saul.

## 2019-10-18 NOTE — TELEPHONE ENCOUNTER
You should put wrist splints on at least at night for possible carpal tunnel and be re-examined.  I have ordered another lipid test.

## 2019-10-21 ENCOUNTER — SOCIAL WORK (OUTPATIENT)
Dept: HEMATOLOGY/ONCOLOGY | Facility: CLINIC | Age: 69
End: 2019-10-21

## 2019-10-21 DIAGNOSIS — C50.112 MALIGNANT NEOPLASM OF CENTRAL PORTION OF LEFT BREAST IN FEMALE, ESTROGEN RECEPTOR POSITIVE: Primary | ICD-10-CM

## 2019-10-21 DIAGNOSIS — Z17.0 MALIGNANT NEOPLASM OF CENTRAL PORTION OF LEFT BREAST IN FEMALE, ESTROGEN RECEPTOR POSITIVE: Primary | ICD-10-CM

## 2019-10-21 RX ORDER — SILVER SULFADIAZINE 10 G/1000G
CREAM TOPICAL 2 TIMES DAILY
Qty: 400 G | Refills: 2 | Status: SHIPPED | OUTPATIENT
Start: 2019-10-21 | End: 2020-01-08 | Stop reason: CLARIF

## 2019-10-23 ENCOUNTER — PATIENT OUTREACH (OUTPATIENT)
Dept: ADMINISTRATIVE | Facility: HOSPITAL | Age: 69
End: 2019-10-23

## 2019-10-24 NOTE — PROGRESS NOTES
Patient attended the Look Good Feel Better Workshop where she received a complimentary make-up kit and tips on how to use it.

## 2019-11-06 ENCOUNTER — LAB VISIT (OUTPATIENT)
Dept: LAB | Facility: HOSPITAL | Age: 69
End: 2019-11-06
Attending: INTERNAL MEDICINE
Payer: MEDICARE

## 2019-11-06 ENCOUNTER — OFFICE VISIT (OUTPATIENT)
Dept: FAMILY MEDICINE | Facility: CLINIC | Age: 69
End: 2019-11-06
Payer: MEDICARE

## 2019-11-06 ENCOUNTER — DOCUMENTATION ONLY (OUTPATIENT)
Dept: FAMILY MEDICINE | Facility: CLINIC | Age: 69
End: 2019-11-06

## 2019-11-06 VITALS
OXYGEN SATURATION: 98 % | HEIGHT: 66 IN | RESPIRATION RATE: 16 BRPM | HEART RATE: 60 BPM | DIASTOLIC BLOOD PRESSURE: 82 MMHG | WEIGHT: 211 LBS | TEMPERATURE: 98 F | SYSTOLIC BLOOD PRESSURE: 112 MMHG | BODY MASS INDEX: 33.91 KG/M2

## 2019-11-06 DIAGNOSIS — Z09 ONCOLOGY FOLLOW-UP ENCOUNTER: ICD-10-CM

## 2019-11-06 DIAGNOSIS — I48.20 CHRONIC ATRIAL FIBRILLATION: ICD-10-CM

## 2019-11-06 DIAGNOSIS — C50.912 INFILTRATING DUCTAL CARCINOMA OF LEFT BREAST: ICD-10-CM

## 2019-11-06 DIAGNOSIS — I48.0 PAROXYSMAL ATRIAL FIBRILLATION: ICD-10-CM

## 2019-11-06 DIAGNOSIS — M16.11 OSTEOARTHRITIS OF RIGHT HIP, UNSPECIFIED OSTEOARTHRITIS TYPE: ICD-10-CM

## 2019-11-06 DIAGNOSIS — Z01.818 PREOPERATIVE CLEARANCE: Primary | ICD-10-CM

## 2019-11-06 DIAGNOSIS — E11.9 CONTROLLED TYPE 2 DIABETES MELLITUS WITHOUT COMPLICATION, WITHOUT LONG-TERM CURRENT USE OF INSULIN: ICD-10-CM

## 2019-11-06 LAB
ALBUMIN SERPL BCP-MCNC: 4.5 G/DL (ref 3.5–5.2)
ALP SERPL-CCNC: 80 U/L (ref 55–135)
ALT SERPL W/O P-5'-P-CCNC: 18 U/L (ref 10–44)
ANION GAP SERPL CALC-SCNC: 11 MMOL/L (ref 8–16)
AST SERPL-CCNC: 16 U/L (ref 10–40)
BASOPHILS # BLD AUTO: 0.04 K/UL (ref 0–0.2)
BASOPHILS NFR BLD: 0.6 % (ref 0–1.9)
BILIRUB SERPL-MCNC: 1.3 MG/DL (ref 0.1–1)
BUN SERPL-MCNC: 17 MG/DL (ref 8–23)
CALCIUM SERPL-MCNC: 9.9 MG/DL (ref 8.7–10.5)
CHLORIDE SERPL-SCNC: 106 MMOL/L (ref 95–110)
CO2 SERPL-SCNC: 26 MMOL/L (ref 23–29)
CREAT SERPL-MCNC: 0.8 MG/DL (ref 0.5–1.4)
DIFFERENTIAL METHOD: ABNORMAL
EOSINOPHIL # BLD AUTO: 0.1 K/UL (ref 0–0.5)
EOSINOPHIL NFR BLD: 1.3 % (ref 0–8)
ERYTHROCYTE [DISTWIDTH] IN BLOOD BY AUTOMATED COUNT: 13.9 % (ref 11.5–14.5)
EST. GFR  (AFRICAN AMERICAN): >60 ML/MIN/1.73 M^2
EST. GFR  (NON AFRICAN AMERICAN): >60 ML/MIN/1.73 M^2
GLUCOSE SERPL-MCNC: 95 MG/DL (ref 70–110)
HCT VFR BLD AUTO: 50 % (ref 37–48.5)
HGB BLD-MCNC: 15.9 G/DL (ref 12–16)
IMM GRANULOCYTES # BLD AUTO: 0.02 K/UL (ref 0–0.04)
LYMPHOCYTES # BLD AUTO: 1.8 K/UL (ref 1–4.8)
LYMPHOCYTES NFR BLD: 25.6 % (ref 18–48)
MAGNESIUM SERPL-MCNC: 1.7 MG/DL (ref 1.6–2.6)
MCH RBC QN AUTO: 29.3 PG (ref 27–31)
MCHC RBC AUTO-ENTMCNC: 31.8 G/DL (ref 32–36)
MCV RBC AUTO: 92 FL (ref 82–98)
MONOCYTES # BLD AUTO: 0.7 K/UL (ref 0.3–1)
MONOCYTES NFR BLD: 9.7 % (ref 4–15)
NEUTROPHILS # BLD AUTO: 4.4 K/UL (ref 1.8–7.7)
NEUTROPHILS NFR BLD: 62.5 % (ref 38–73)
NRBC BLD-RTO: 0 /100 WBC
PLATELET # BLD AUTO: 239 K/UL (ref 150–350)
PMV BLD AUTO: 9 FL (ref 9.2–12.9)
POTASSIUM SERPL-SCNC: 3.9 MMOL/L (ref 3.5–5.1)
PROT SERPL-MCNC: 7.1 G/DL (ref 6–8.4)
RBC # BLD AUTO: 5.42 M/UL (ref 4–5.4)
SODIUM SERPL-SCNC: 143 MMOL/L (ref 136–145)
WBC # BLD AUTO: 7 K/UL (ref 3.9–12.7)

## 2019-11-06 PROCEDURE — 1101F PT FALLS ASSESS-DOCD LE1/YR: CPT | Mod: CPTII,S$GLB,, | Performed by: INTERNAL MEDICINE

## 2019-11-06 PROCEDURE — 85025 COMPLETE CBC W/AUTO DIFF WBC: CPT | Mod: HCNC

## 2019-11-06 PROCEDURE — 93005 EKG 12-LEAD: ICD-10-PCS | Mod: S$GLB,,, | Performed by: INTERNAL MEDICINE

## 2019-11-06 PROCEDURE — 3074F PR MOST RECENT SYSTOLIC BLOOD PRESSURE < 130 MM HG: ICD-10-PCS | Mod: CPTII,S$GLB,, | Performed by: INTERNAL MEDICINE

## 2019-11-06 PROCEDURE — 3074F SYST BP LT 130 MM HG: CPT | Mod: CPTII,S$GLB,, | Performed by: INTERNAL MEDICINE

## 2019-11-06 PROCEDURE — 83735 ASSAY OF MAGNESIUM: CPT | Mod: HCNC

## 2019-11-06 PROCEDURE — 93005 ELECTROCARDIOGRAM TRACING: CPT | Mod: S$GLB,,, | Performed by: INTERNAL MEDICINE

## 2019-11-06 PROCEDURE — 80053 COMPREHEN METABOLIC PANEL: CPT | Mod: HCNC

## 2019-11-06 PROCEDURE — 1101F PR PT FALLS ASSESS DOC 0-1 FALLS W/OUT INJ PAST YR: ICD-10-PCS | Mod: CPTII,S$GLB,, | Performed by: INTERNAL MEDICINE

## 2019-11-06 PROCEDURE — 3044F HG A1C LEVEL LT 7.0%: CPT | Mod: CPTII,S$GLB,, | Performed by: INTERNAL MEDICINE

## 2019-11-06 PROCEDURE — 3079F PR MOST RECENT DIASTOLIC BLOOD PRESSURE 80-89 MM HG: ICD-10-PCS | Mod: CPTII,S$GLB,, | Performed by: INTERNAL MEDICINE

## 2019-11-06 PROCEDURE — 36415 COLL VENOUS BLD VENIPUNCTURE: CPT | Mod: HCNC

## 2019-11-06 PROCEDURE — 99213 PR OFFICE/OUTPT VISIT, EST, LEVL III, 20-29 MIN: ICD-10-PCS | Mod: S$GLB,,, | Performed by: INTERNAL MEDICINE

## 2019-11-06 PROCEDURE — 93010 EKG 12-LEAD: ICD-10-PCS | Mod: S$GLB,,, | Performed by: INTERNAL MEDICINE

## 2019-11-06 PROCEDURE — 3079F DIAST BP 80-89 MM HG: CPT | Mod: CPTII,S$GLB,, | Performed by: INTERNAL MEDICINE

## 2019-11-06 PROCEDURE — 93010 ELECTROCARDIOGRAM REPORT: CPT | Mod: S$GLB,,, | Performed by: INTERNAL MEDICINE

## 2019-11-06 PROCEDURE — 3044F PR MOST RECENT HEMOGLOBIN A1C LEVEL <7.0%: ICD-10-PCS | Mod: CPTII,S$GLB,, | Performed by: INTERNAL MEDICINE

## 2019-11-06 PROCEDURE — 99213 OFFICE O/P EST LOW 20 MIN: CPT | Mod: S$GLB,,, | Performed by: INTERNAL MEDICINE

## 2019-11-06 NOTE — PROGRESS NOTES
"Subjective:      11:18 AM     Patient ID: Eduardo Esposito is a 69 y.o. female.    Chief Complaint: surgical clearance (hip,needs labs ordered)    HPI       C.C..  Surgical Clearance    Surgery:     Hip  Date of Surgery:          Patient has  tolerated anesthesia without problems in the past   Yes    Patient has chronic atrial fibrillation on Eliquis.    Hx of cad:   no  Chest pain in last 6 mo: no  Hx of lung disease :no   Pt is a smoker: no  Hx of DVT: no  Hx of PE: no    Meds and allergies reviewed.     Functional status:  Good.             Review of Systems      Objective:      Vitals:    11/06/19 1039   BP: 112/82   Pulse: 60   Resp: 16   Temp: 97.7 °F (36.5 °C)   TempSrc: Oral   SpO2: 98%   Weight: 95.7 kg (210 lb 15.7 oz)   Height: 5' 6" (1.676 m)   PainSc: 0-No pain     Physical Exam   Constitutional: She appears well-developed and well-nourished.   Cardiovascular: Normal rate and normal heart sounds.   Irregularly irregular rhythm   Pulmonary/Chest: Effort normal and breath sounds normal.   Abdominal: Soft. There is no tenderness.   Neurological: She is alert.   Psychiatric: She has a normal mood and affect. Her behavior is normal. Thought content normal.   Nursing note and vitals reviewed.        Assessment:       No diagnosis found.      Plan:       There are no diagnoses linked to this encounter.  No follow-ups on file.      "

## 2019-11-06 NOTE — LETTER
November 6, 2019        No Recipients             [facility]  [address] [address] [address]  Phone: [phone]  Fax: [phone]   Patient: Eduardo Esposito   MR Number: [id number]   YOB: 1950   Date of Visit: 11/6/2019     Dear     Radha Esposito is medically cleared for surgery.  She is on Eliquis and needs to stop that for 2 and half days before surgery..  She has mild diabetes which will need to be monitored.    Sincerely,      Walter Nance MD            CC  No Recipients    Enclosure

## 2019-11-06 NOTE — PATIENT INSTRUCTIONS
Hold all your medicines on the day of the surgery.  Do not take the Eliquis for 2 and half days before you're surgery    Check sugar once a week fasting and once a week before and 2 hrs after a meal.  Write down all results and what you ate during the meal.       Thank you for choosing [facility].     Please fill out the patient experience survey.

## 2019-11-07 ENCOUNTER — OFFICE VISIT (OUTPATIENT)
Dept: HEMATOLOGY/ONCOLOGY | Facility: CLINIC | Age: 69
End: 2019-11-07
Payer: MEDICARE

## 2019-11-07 VITALS
DIASTOLIC BLOOD PRESSURE: 64 MMHG | RESPIRATION RATE: 20 BRPM | HEART RATE: 66 BPM | HEIGHT: 66 IN | BODY MASS INDEX: 33.94 KG/M2 | WEIGHT: 211.19 LBS | SYSTOLIC BLOOD PRESSURE: 138 MMHG | TEMPERATURE: 98 F | OXYGEN SATURATION: 98 %

## 2019-11-07 DIAGNOSIS — Z09 ONCOLOGY FOLLOW-UP ENCOUNTER: Primary | ICD-10-CM

## 2019-11-07 DIAGNOSIS — C50.912 MALIGNANT NEOPLASM OF LEFT FEMALE BREAST, UNSPECIFIED ESTROGEN RECEPTOR STATUS, UNSPECIFIED SITE OF BREAST: ICD-10-CM

## 2019-11-07 DIAGNOSIS — Z79.811 USE OF ANASTROZOLE (ARIMIDEX): ICD-10-CM

## 2019-11-07 DIAGNOSIS — I48.20 CHRONIC ATRIAL FIBRILLATION: ICD-10-CM

## 2019-11-07 DIAGNOSIS — Z79.01 LONG TERM (CURRENT) USE OF ANTICOAGULANTS: ICD-10-CM

## 2019-11-07 PROCEDURE — 99214 PR OFFICE/OUTPT VISIT, EST, LEVL IV, 30-39 MIN: ICD-10-PCS | Mod: HCNC,S$GLB,, | Performed by: INTERNAL MEDICINE

## 2019-11-07 PROCEDURE — 1101F PT FALLS ASSESS-DOCD LE1/YR: CPT | Mod: HCNC,CPTII,S$GLB, | Performed by: INTERNAL MEDICINE

## 2019-11-07 PROCEDURE — 99499 UNLISTED E&M SERVICE: CPT | Mod: HCNC,S$GLB,, | Performed by: INTERNAL MEDICINE

## 2019-11-07 PROCEDURE — 99214 OFFICE O/P EST MOD 30 MIN: CPT | Mod: HCNC,S$GLB,, | Performed by: INTERNAL MEDICINE

## 2019-11-07 PROCEDURE — 99999 PR PBB SHADOW E&M-EST. PATIENT-LVL IV: CPT | Mod: PBBFAC,HCNC,, | Performed by: INTERNAL MEDICINE

## 2019-11-07 PROCEDURE — 1101F PR PT FALLS ASSESS DOC 0-1 FALLS W/OUT INJ PAST YR: ICD-10-PCS | Mod: HCNC,CPTII,S$GLB, | Performed by: INTERNAL MEDICINE

## 2019-11-07 PROCEDURE — 99499 RISK ADDL DX/OHS AUDIT: ICD-10-PCS | Mod: HCNC,S$GLB,, | Performed by: INTERNAL MEDICINE

## 2019-11-07 PROCEDURE — 99999 PR PBB SHADOW E&M-EST. PATIENT-LVL IV: ICD-10-PCS | Mod: PBBFAC,HCNC,, | Performed by: INTERNAL MEDICINE

## 2019-11-07 PROCEDURE — 3078F DIAST BP <80 MM HG: CPT | Mod: HCNC,CPTII,S$GLB, | Performed by: INTERNAL MEDICINE

## 2019-11-07 PROCEDURE — 3075F SYST BP GE 130 - 139MM HG: CPT | Mod: HCNC,CPTII,S$GLB, | Performed by: INTERNAL MEDICINE

## 2019-11-07 PROCEDURE — 3075F PR MOST RECENT SYSTOLIC BLOOD PRESS GE 130-139MM HG: ICD-10-PCS | Mod: HCNC,CPTII,S$GLB, | Performed by: INTERNAL MEDICINE

## 2019-11-07 PROCEDURE — 3078F PR MOST RECENT DIASTOLIC BLOOD PRESSURE < 80 MM HG: ICD-10-PCS | Mod: HCNC,CPTII,S$GLB, | Performed by: INTERNAL MEDICINE

## 2019-11-07 NOTE — PROGRESS NOTES
HPI    A 68 years old  female accompanied by family member is here in Oncology Clinic for evaluation of breast cancer.  Patient recently had ultrasound-guided biopsy left breast found to have invasive ductal carcinoma.  Patient is ERPR positive HER2 negative by FISH.  Patient has been seen by breast surgeon.  Treatment team consider contemplating doing a lumpectomy plus radiation versus mastectomy based on further chain testing.  Patient is here for evaluation of BRCA 1/2 gene studies for above reason.  On ultrasound left breast 7 mm mass at the anterior 6:00 position.  Patient reported her Ants has breast cancer.    Social history is negative for smoking drinking and recreational drug use.    She has history of AFib is taking Eliquis 2.5 mg b.i.d..  She does not have any other medical conditions.      07/30/2019:  No acute event.  Patient is here to follow-up with Oncotype DX study.  As well as to discuss endocrine therapy.  No other acute event.  Review systems below.    09/24/2019:  Patient surgical wound healing well. No complaints.  Ongoing radiation at that time.  Expected to finish radiation by the end of October.    11/7/2019: no report of acute events.  Patient here to follow up       Review of system:   Deny chest pain shortness of breath.  Denies abdominal pain nausea vomiting or diarrhea.  Denies any fever or chills.  Denies any weight loss.  Denies any change appetite change.      Past Medical History:   Diagnosis Date    Atrial fibrillation     4/11/13-recent dx sceduled to see cardiology Monday- Dr Lynn-dm    Cancer     breast    Diabetes mellitus     Hyperlipemia     Hypertension      Social History     Socioeconomic History    Marital status:      Spouse name: Not on file    Number of children: Not on file    Years of education: Not on file    Highest education level: Not on file   Occupational History    Not on file   Social Needs    Financial resource strain: Not  on file    Food insecurity:     Worry: Not on file     Inability: Not on file    Transportation needs:     Medical: Not on file     Non-medical: Not on file   Tobacco Use    Smoking status: Never Smoker    Smokeless tobacco: Never Used   Substance and Sexual Activity    Alcohol use: No     Comment: once or twice a year    Drug use: No    Sexual activity: Not on file   Lifestyle    Physical activity:     Days per week: Not on file     Minutes per session: Not on file    Stress: Not on file   Relationships    Social connections:     Talks on phone: Not on file     Gets together: Not on file     Attends Scientologist service: Not on file     Active member of club or organization: Not on file     Attends meetings of clubs or organizations: Not on file     Relationship status: Not on file   Other Topics Concern    Not on file   Social History Narrative    Not on file       Objective    Physical Exam     Vitals:    11/07/19 1018   BP: 138/64   Pulse: 66   Resp: 20   Temp: 98 °F (36.7 °C)         Constitutional: patient is oriented to person, place, and time. patient appears well-developed and well-nourished. No distress.   HENT:  Normocephalic atraumatic pupils equal round reactive to light extraocular muscle intact.  Cardiovascular:  Regular rate  Pulmonary/Chest:  Clear to auscultate   Abdominal:  Soft nontender nondistended  Musculoskeletal: Normal range of motion. Gait is normal  No clubbing, cyanosis     Lymphadenopathy:  No evidence lymphadenopathy  Neurological: patient is alert and oriented to person, place, and time. patient has normal strength and normal reflexes. No sensory deficit. Gait normal.   Skin:  Warm dry   Psychiatric:  No rashes no lesion    CMP  Sodium   Date Value Ref Range Status   11/06/2019 143 136 - 145 mmol/L Final     Potassium   Date Value Ref Range Status   11/06/2019 3.9 3.5 - 5.1 mmol/L Final     Chloride   Date Value Ref Range Status   11/06/2019 106 95 - 110 mmol/L Final     CO2    Date Value Ref Range Status   11/06/2019 26 23 - 29 mmol/L Final     Glucose   Date Value Ref Range Status   11/06/2019 95 70 - 110 mg/dL Final     BUN, Bld   Date Value Ref Range Status   11/06/2019 17 8 - 23 mg/dL Final     Creatinine   Date Value Ref Range Status   11/06/2019 0.8 0.5 - 1.4 mg/dL Final     Calcium   Date Value Ref Range Status   11/06/2019 9.9 8.7 - 10.5 mg/dL Final     Total Protein   Date Value Ref Range Status   11/06/2019 7.1 6.0 - 8.4 g/dL Final     Albumin   Date Value Ref Range Status   11/06/2019 4.5 3.5 - 5.2 g/dL Final     Total Bilirubin   Date Value Ref Range Status   11/06/2019 1.3 (H) 0.1 - 1.0 mg/dL Final     Comment:     For infants and newborns, interpretation of results should be based  on gestational age, weight and in agreement with clinical  observations.  Premature Infant recommended reference ranges:  Up to 24 hours.............<8.0 mg/dL  Up to 48 hours............<12.0 mg/dL  3-5 days..................<15.0 mg/dL  6-29 days.................<15.0 mg/dL       Alkaline Phosphatase   Date Value Ref Range Status   11/06/2019 80 55 - 135 U/L Final     AST   Date Value Ref Range Status   11/06/2019 16 10 - 40 U/L Final     ALT   Date Value Ref Range Status   11/06/2019 18 10 - 44 U/L Final     Anion Gap   Date Value Ref Range Status   11/06/2019 11 8 - 16 mmol/L Final     eGFR if    Date Value Ref Range Status   11/06/2019 >60 >60 mL/min/1.73 m^2 Final     eGFR if non    Date Value Ref Range Status   11/06/2019 >60 >60 mL/min/1.73 m^2 Final     Comment:     Calculation used to obtain the estimated glomerular filtration  rate (eGFR) is the CKD-EPI equation.        Lab Results   Component Value Date    WBC 7.00 11/06/2019    HGB 15.9 11/06/2019    HCT 50.0 (H) 11/06/2019    MCV 92 11/06/2019     11/06/2019     FINAL PATHOLOGIC DIAGNOSIS biopsy 04/10/2019  Left breast mass, core needle biopsy:  - Invasive ductal carcinoma, Jacinto grade 2  (T=3, N=3, M=1), 5mm in greatest dimension  - No lymphovascular invasion identified on sections examined  Note: Prognostic/Predictive markers for ER, [place], Her-2 and Ki-67 are ordered, the results of which will be reported  in an addendum. Dr. JORDYN Sierra has reviewed the case and agrees with the above diagnosis    Assessment Plan    [] Breast cancer ERPR positive HER2 negative by FISH.  Mass measuring 7 mm left side breast.  Denies any bone pain, denies any pulmonary symptoms and denies any GI issues.  Alkaline phosphate within normal limits.    Status post surgical approach lumpectomy, tumor is invasive ductal carcinoma size 1 cm margin free of invasive carcinoma.    Left axillary sentinel lymph nodes no evidence of malignancy.    [] Patient declined chemotherapy,Oncotype DX study shows absolute chemotherapy benefit is less than 1% and distant recurrence risk at 9 years 3%.  Oncotype DX score 9. Given above information we will proceed with endocrine therapy after completion of radiation therapy.  Endocrine therapy will be Arimidex 1 mg daily x5 years.    Calcium vitamin supplements should be initiated.    [] History of a AFib on long-term anticoagulation Eliquis 2.5 mg b.i.d. follow Cardiology    [] Patient was on a trip did not finish radiation therapy.  Patient still have 5 more fractions will be completed by this coming week.  Afterwards patient is scheduled for hip repair surgeries in November 27th.  Will plan for initiation of Arimidex daily in mid December after recover from surgery cold events.    Will have patient return to clinic in mid December for evaluation of Arimidex    Dictation software used in this note.  Expect a typo graphic error

## 2019-11-15 DIAGNOSIS — M16.11 UNILATERAL PRIMARY OSTEOARTHRITIS, RIGHT HIP: Primary | ICD-10-CM

## 2019-11-15 NOTE — PROGRESS NOTES
CC:  69-year-old female follows up with osteoarthritis of her right hip. We last saw the patient on 07/08/2019 where she was noted to have severe osteoarthritis of the right hip.  She currently rates her pain as an 8/10.  She has pain every day and pain that occasionally keeps her up at night.  It is interfering with daily activities.  She presents today using a rolling walker.  She has been using that for about 3 months.  She has take NSAIDs but they no longer work.  She states that steroid injections in the past have not worked for her.  Physical therapy would only make her hip pain worse based on the severity of her arthritis.    ROS:    Constitution: Denies chills, fever, and sweats.  HENT: Denies headaches or blurry vision.  Cardiovascular: Denies chest pain or irregular heart beat.  Respiratory: Denies cough or shortness of breath.  Gastrointestinal: Denies abdominal pain, nausea, or vomiting.  Genitourinary:  Denies urinary incontinence, bladder and kidney issues  Musculoskeletal:  Denies muscle cramps.  Positive for right hip pain  Neurological: Denies dizziness or focal weakness.  Psychiatric/Behavioral: Normal mental status.  Hematologic/Lymphatic: Denies bleeding problem or easy bruising/bleeding.  Skin: Denies rash or suspicious lesions.    Physical examination     Gen - No acute distress   Eyes - Extraoccular motions intact, pupils equally round and reactive to light and accommodation   ENT - normocephalic, atruamtic, oropharynx clear   Neck - Supple, no abnormal masses   Cardiovascular - regular rate and rhythm   Pulmonary - clear to auscultation bilaterally   Abdomen - soft, non-tender, non-distended, positive bowel sounds   Psych - The patient is alert and oriented x3 with normal mood and affect    Examination of the Right Lower Extremity    Skin is intact throughout  Motor in intact EHL,FHL,TA,duke  +2 DP/PT  Sensation LT intact D/P/1st    Examination of the Right Hip    C-Sign positive  Logroll  positive  Stenchfield positive    Pain with ROM positive    ROM:    Flexion   150  Extension   10  Abduction   50  Adduction   10  External Rotation 90  Internal Rotation 10    Flexion contracture positive    FADIR positive  FADER positive    X-rays were examined and personally reviewed by me.  Two views of the right hip dated 11/19/2019 are available for review.  There is severe osteoarthritis of the right hip with complete loss of joint space, periarticular osteophytes, and subchondral sclerosis.    Dx:  Severe osteoarthritis of the right hip that has failed non operative treatments    Plan:  Recommendations for right total hip arthroplasty.  Risks and benefits were explained.  The patient verbalized understanding and would like to proceed.  We will schedule that at the next available date that is convenient for her.    Answers for HPI/ROS submitted by the patient on 11/17/2019   Hip pain  unexpected weight change: No  appetite change : No  sleep disturbance: No  IMMUNOCOMPROMISED: No  nervous/ anxious: No  dysphoric mood: No  rash: No  visual disturbance: No  eye redness: No  eye pain: No  ear pain: No  tinnitus: No  hearing loss: No  sinus pressure : No  nosebleeds: No  enviro allergies: No  food allergies: No  cough: No  shortness of breath: No  sweating: No  dysuria: No  frequency: No  difficulty urinating: No  hematuria: No  painful intercourse: No  chest pain: No  palpitations: No  nausea: No  vomiting: No  diarrhea: No  blood in stool: No  constipation: No  headaches: No  dizziness: No  numbness: No  seizures: No  joint swelling: No  myalgia: No  weakness: No  back pain: No  Pain Chronicity: chronic  History of trauma: No  Onset: more than 1 year ago  Frequency: constantly  Progression since onset: unchanged  Injury mechanism: running  injury location: exercising  pain- numeric: 5/10  pain location: left hip  pain quality: sharp  Radiating Pain: No  Aggravating factors: standing  fever: No  inability to  bear weight: Yes  itching: No  joint locking: No  limited range of motion: Yes  stiffness: Yes  tingling: No  Treatments tried: exercise  physical therapy: ineffective  Improvement on treatment: significant

## 2019-11-18 DIAGNOSIS — Z01.818 PREOPERATIVE TESTING: ICD-10-CM

## 2019-11-18 DIAGNOSIS — M16.11 UNILATERAL PRIMARY OSTEOARTHRITIS, RIGHT HIP: Primary | ICD-10-CM

## 2019-11-18 DIAGNOSIS — Z01.818 PREOPERATIVE TESTING: Primary | ICD-10-CM

## 2019-11-19 ENCOUNTER — HOSPITAL ENCOUNTER (OUTPATIENT)
Dept: RADIOLOGY | Facility: HOSPITAL | Age: 69
Discharge: HOME | End: 2019-11-19
Attending: ORTHOPAEDIC SURGERY
Payer: MEDICARE

## 2019-11-19 ENCOUNTER — OFFICE VISIT (OUTPATIENT)
Dept: ORTHOPEDICS | Facility: CLINIC | Age: 69
End: 2019-11-19
Payer: MEDICARE

## 2019-11-19 VITALS
DIASTOLIC BLOOD PRESSURE: 63 MMHG | HEIGHT: 66 IN | SYSTOLIC BLOOD PRESSURE: 147 MMHG | BODY MASS INDEX: 33.91 KG/M2 | WEIGHT: 211 LBS | HEART RATE: 64 BPM

## 2019-11-19 DIAGNOSIS — M16.11 UNILATERAL PRIMARY OSTEOARTHRITIS, RIGHT HIP: Primary | ICD-10-CM

## 2019-11-19 DIAGNOSIS — M16.11 UNILATERAL PRIMARY OSTEOARTHRITIS, RIGHT HIP: ICD-10-CM

## 2019-11-19 PROCEDURE — 99999 PR PBB SHADOW E&M-EST. PATIENT-LVL III: ICD-10-PCS | Mod: PBBFAC,HCNC,, | Performed by: ORTHOPAEDIC SURGERY

## 2019-11-19 PROCEDURE — 3077F SYST BP >= 140 MM HG: CPT | Mod: HCNC,CPTII,S$GLB, | Performed by: ORTHOPAEDIC SURGERY

## 2019-11-19 PROCEDURE — 73502 X-RAY EXAM HIP UNI 2-3 VIEWS: CPT | Mod: TC,HCNC,PN,RT

## 2019-11-19 PROCEDURE — 73502 X-RAY EXAM HIP UNI 2-3 VIEWS: CPT | Mod: 26,HCNC,RT, | Performed by: RADIOLOGY

## 2019-11-19 PROCEDURE — 99214 OFFICE O/P EST MOD 30 MIN: CPT | Mod: HCNC,S$GLB,, | Performed by: ORTHOPAEDIC SURGERY

## 2019-11-19 PROCEDURE — 1101F PT FALLS ASSESS-DOCD LE1/YR: CPT | Mod: HCNC,CPTII,S$GLB, | Performed by: ORTHOPAEDIC SURGERY

## 2019-11-19 PROCEDURE — 1101F PR PT FALLS ASSESS DOC 0-1 FALLS W/OUT INJ PAST YR: ICD-10-PCS | Mod: HCNC,CPTII,S$GLB, | Performed by: ORTHOPAEDIC SURGERY

## 2019-11-19 PROCEDURE — 3077F PR MOST RECENT SYSTOLIC BLOOD PRESSURE >= 140 MM HG: ICD-10-PCS | Mod: HCNC,CPTII,S$GLB, | Performed by: ORTHOPAEDIC SURGERY

## 2019-11-19 PROCEDURE — 3078F DIAST BP <80 MM HG: CPT | Mod: HCNC,CPTII,S$GLB, | Performed by: ORTHOPAEDIC SURGERY

## 2019-11-19 PROCEDURE — 3078F PR MOST RECENT DIASTOLIC BLOOD PRESSURE < 80 MM HG: ICD-10-PCS | Mod: HCNC,CPTII,S$GLB, | Performed by: ORTHOPAEDIC SURGERY

## 2019-11-19 PROCEDURE — 99999 PR PBB SHADOW E&M-EST. PATIENT-LVL III: CPT | Mod: PBBFAC,HCNC,, | Performed by: ORTHOPAEDIC SURGERY

## 2019-11-19 PROCEDURE — 73502 XR HIP 2 VIEW RIGHT: ICD-10-PCS | Mod: 26,HCNC,RT, | Performed by: RADIOLOGY

## 2019-11-19 PROCEDURE — 99214 PR OFFICE/OUTPT VISIT, EST, LEVL IV, 30-39 MIN: ICD-10-PCS | Mod: HCNC,S$GLB,, | Performed by: ORTHOPAEDIC SURGERY

## 2019-11-20 ENCOUNTER — HOSPITAL ENCOUNTER (OUTPATIENT)
Dept: PREADMISSION TESTING | Facility: HOSPITAL | Age: 69
Discharge: HOME | End: 2019-11-20
Attending: ORTHOPAEDIC SURGERY
Payer: MEDICARE

## 2019-11-20 ENCOUNTER — HOSPITAL ENCOUNTER (OUTPATIENT)
Dept: RADIOLOGY | Facility: HOSPITAL | Age: 69
Discharge: HOME | End: 2019-11-20
Attending: ORTHOPAEDIC SURGERY
Payer: MEDICARE

## 2019-11-20 DIAGNOSIS — M16.11 UNILATERAL PRIMARY OSTEOARTHRITIS, RIGHT HIP: Primary | ICD-10-CM

## 2019-11-20 DIAGNOSIS — Z01.818 PREOPERATIVE TESTING: ICD-10-CM

## 2019-11-20 PROCEDURE — 99900103 DSU ONLY-NO CHARGE-INITIAL HR (STAT): Mod: HCNC

## 2019-11-20 PROCEDURE — 71046 X-RAY EXAM CHEST 2 VIEWS: CPT | Mod: 26,HCNC,, | Performed by: RADIOLOGY

## 2019-11-20 PROCEDURE — 99900104 DSU ONLY-NO CHARGE-EA ADD'L HR (STAT): Mod: HCNC

## 2019-11-20 PROCEDURE — 71046 X-RAY EXAM CHEST 2 VIEWS: CPT | Mod: TC,HCNC,FY

## 2019-11-20 PROCEDURE — 71046 XR CHEST PA AND LATERAL: ICD-10-PCS | Mod: 26,HCNC,, | Performed by: RADIOLOGY

## 2019-11-20 NOTE — PRE ADMISSION SCREENING
JOINT CAMP ASSESSMENT    Name Eduardo Esposito   MRN [id number]    Age/Sex 69 y.o. female    Surgeon Dr. Rahul Hanson II   Joint Camp Date 11/20/2019   Surgery Date 11/27/2019   Procedure Right Hip Arthroplasty   Insurance Payor: [employer] MEDICARE / Plan: [employer] MEDICARE HMO / Product Type: Capitation /    Care Team Patient Care Team:  Walter Nance MD as PCP - General (Family Medicine)  Eitan Myers LPN as Licensed Practical Nurse  Rahul Hanson II, MD as Consulting Physician (Orthopedic Surgery)    Pharmacy   [facility] - [place], [place] - [address]  [address] [address]  Phone: [phone] Fax: [phone]    [facility] - [place], [place] - [address]  [address]  [place] [address]  Phone: [phone] Fax: [phone]     AM-PAC Score   23   Risk Assessment Score 3     Past Medical History:   Diagnosis Date    Atrial fibrillation     4/11/13-recent dx sceduled to see cardiology Monday- Dr Lynn-dm    Cancer     breast    Diabetes mellitus     Hyperlipemia     Hypertension        Past Surgical History:   Procedure Laterality Date    AXILLARY NODE DISSECTION Left 6/13/2019    Procedure: LYMPHADENECTOMY, AXILLARY;  Surgeon: Kendall Howard MD;  Location: [facility];  Service: General;  Laterality: Left;  left lumpectomy with left axillary lymph node dissection    BREAST SURGERY      JOINT REPLACEMENT Right     NEEDLE LOCALIZATION Left 6/13/2019    Procedure: NEEDLE LOCALIZATION;  Surgeon: Kendall Howard MD;  Location: [facility] OR;  Service: General;  Laterality: Left;  left lumpectomy with wire needle localization    SENTINEL LYMPH NODE BIOPSY Left 6/13/2019    Procedure: BIOPSY, LYMPH NODE, SENTINEL;  Surgeon: Kendall Howard MD;  Location: [facility] OR;  Service: General;  Laterality: Left;  left sentinel lymph node dissection    TONSILLECTOMY      TOTAL KNEE ARTHROPLASTY           Home Enviroment     Living Arrangement:  Lives alone  Home Environment: 2-story house, elevator, tub-shower  Home Safety Concerns: unremarkable    DISCHARGE CAREGIVER/SUPPORT SYSTEM     Identified post-op caregiver: Patient has children / family / friends to help, mother.  Patient's caregiver(s) will be able to provide physical assistance. Patient will have someone to assist overnight.      Caregiver present at pre-op interview:  No      PRE-OPERATIVE FUNCTIONAL STATUS     Employment: Retired    Pre-op Functional Status: Patient is independent with mobility/ambulation, transfers, ADL's, IADL's.    Use of assistive device for ambulation: rollator  ADL: self care  ADL Limitations: difficulty with walking  Medical Restrictions: Decreased range of motions in extremities    POTENTIAL BARRIERS TO DISCHARGE/POTENTIAL POST-OP COMPLICATIONS     Patient with hx of HTN, diabetes mellitus and atrial fibrillation with long-term anticoagulation use (Eliquis). Patient states that she would like to be a same day discharge. Notified Dr. Hanson.    DISCHARGE PLAN     Expected LOS of 2 days or less for joint replacement discussed with patient. We also discussed a discharge path of HH for approximately two weeks with a transition to outpatient PT on the third week given no post-op complications.      Patient in agreement with discharge plan: Yes    Discharge to: Discharge home with home health (PT/OT) x2 weeks with transition to outpatient PT     HH:  [facility]     OP PT: [facility]     Home DME: rollator and shower chair    Needed DME at D/C: rolling walker, bedside commode, tub transfer bench and assistive device kit     Rx: Per Dr. Rahul Hanson at discharge     Meds to Beds: N/A  Patient expected to discharge on current regimen of Eliquis for DVT prophylaxis.

## 2019-11-20 NOTE — PRE ADMISSION SCREENING
Patient Name: Eduardo Esposito  YOB: 1950   MRN: [id number]     [facility]   Basic Mobility Inpatient Short Form 6 Clicks         How much difficulty does the patient currently have  Unable  A Lot  A Little  None      1. Turning over in bed (including adjusting bedclothes, sheets and blankets)?     1 []    2 []    3 [x]    4 []        2. Sitting down on and standing up from a chair with arms (e.g., wheelchair, bedside commode, etc.)     1 []  2 []  3 []     4 [x]      3. Moving from lying on back to sitting on the side of the bed?     1 []  2 []  3 []    4 [x]    How much help from another person does the patient currently need  Total  A Lot  A Little  None      4. Moving to and from a bed to a chair (including a wheelchair)?    1 []  2 []  3 []    4 [x]      5. Need to walk in hospital room?    1 []  2 []  3 []    4 [x]      6. Climbing 3-5 steps with a railing?    1 []  2 []  3 []    4 [x]       Raw Score:      23            CMS 0-100% Score:   11.20         %   Standardized Score:     56.93          CMS Modifier:         CI                                 [facility]   Basic Mobility Inpatient Short Form 6 Clicks Score Conversion Table*         *Use this form to convert -PAC Basic Mobility Inpatient Raw Scores.   [facility] Inpatient Basic Mobility Short Form Scoring Example   1. Add the number values associated with the response to each item. For example, items totals yield a Raw Score of 21.   2. Match the raw score to the t-Scale scores (t-Scale score = 50.25, SE = 4.69).   3. Find the associated CMS % (CMS % = 28.97%).   4. Locate the correct CMS Functional Modifier Code, or G Code (G code = CJ)     NOTE: Each -PAC Short Form has a separate conversion table. Make sure that you use the correct conversion table.       Instruction Manual - page 45 contains conversion table

## 2019-11-20 NOTE — DISCHARGE INSTRUCTIONS

## 2019-11-20 NOTE — PRE ADMISSION SCREENING
"               [facility] Risk Assessment Scale    Patient Name: Eduardo Esposito  YOB: 1950  MRN: [id number]            RIsk Factor Measure Recommendation Patient Data Scale/Score   BMI >40 Reconsider surgery, weight loss   Estimated body mass index is 34.06 kg/m² as calculated from the following:    Height as of 11/19/19: 5' 6" (1.676 m).    Weight as of 11/19/19: 95.7 kg (211 lb).   [] 0 = 1 - 24.9  [] 1 = 25-29.9  [x] 2 = 30-34.9  [] 3 = 35-39.9  [] 4 = 40-44.9  [] 5 = 45-99.9   Hemoglobin AIC (if applicable) >9 Delay surgery until DM under control  Refer for:  · Nutrition Therapy  · Exercise   · Medication    Lab Results   Component Value Date    HGBA1C 5.7 (H) 07/10/2019       Lab Results   Component Value Date    GLU 95 11/06/2019      [] 0 = 4.0-5.6  [x] 1 = 5.7-6.4  [] 2 = 6.5-6.9  [] 3 = 7.0-7.9  [] 4 = 8.0-8.9  [] 5 = 9.0-12   Hemoglobin (Anemia) <9 Delay surgery   Correct anemia Lab Results   Component Value Date    HGB 15.9 11/06/2019    [] 20 - <9.0                    Albumin <3 Delay surgery &Workup Lab Results   Component Value Date    ALBUMIN 4.5 11/06/2019    [] 20 - <3.0   Smoking Cessation >4 Weeks Delay Surgery  Refer to OP Cessation Class    Never Smoker [] 20 - current smoker                                _____ PPD                    Hx of MI, PE, Arrhythmia, CVA, DVT <30 Days Delay Surgery    N/A [] 20      Infection Variable Delay surgery and re-evaluate   N/A [] 20 - recent/current infection     Depression (PHQ) >10 out of 27 Delay Surgery and re-evaluate  Medication  Counseling              [x] 0     []1     []2     []3      []4      [] 5                    (1-4)      (5-9)  (10-14)  (15-19)   (20-27)     Memory Impairment & Memory loss (Mini-Cog Screening Tool) Advanced dementia and/or Parkinson's Reconsider surgery     [x] 0     []1     []2     []3     []4     [] 5     Physical Conditioning (Modified AM-PAC Per Physical Therapy at [facility]) Unable to ambulate on day of surgery " Delay surgery and re-evaluate  Pre-Rehabilitation   (PT evaluation)       [x]  0   []4       []8     []12        []16     []20       (<20%)   (<40%)   (<60%)   (<80% )    (>80%)     Home Environment/Caregiver support  (Per /Navigator Interview)    Availability of basic services and/or approprate assistance during post-operative period Delay surgery and re-evaluate  Safe home environment  Health   1 week post-surgery  Transportation  availability  Ability to obtain DME/Medications post-op    [x] 0     []1     []2     []3     []4     [] 5  [x] 0     []1     []2     []3     []4     [] 5  [x] 0     []1     []2     []3     []4     [] 5  [x] 0     []1     []2     []3     []4     [] 5         MD Contact: Dr. Rahul Hanson Comments:  Total Score:  3

## 2019-11-21 DIAGNOSIS — I48.0 PAROXYSMAL ATRIAL FIBRILLATION: ICD-10-CM

## 2019-11-21 NOTE — TELEPHONE ENCOUNTER
----- Message from Austin Sanchez sent at 11/21/2019  2:16 PM CST -----  Type:  RX Refill Request    Who Called:  Patient  Refill or New Rx:  Refill  RX Name and Strength:  ELIQUIS 5 mg Tab       How is the patient currently taking it? (ex. 1XDay):  2XDay  Is this a 30 day or 90 day RX:  30  Preferred Pharmacy with phone number:      [facility] - [place] - [address]  [place] [address]  Phone: [phone] Fax: [phone]      Local or Mail Order:  Local  Ordering Provider:   Goyo Cortez Call Back Number:  [phone]  Additional Information:

## 2019-11-22 RX ORDER — APIXABAN 5 MG/1
5 TABLET, FILM COATED ORAL 2 TIMES DAILY
Qty: 180 TABLET | Refills: 1 | Status: SHIPPED | OUTPATIENT
Start: 2019-11-22 | End: 2020-07-29 | Stop reason: SDUPTHER

## 2019-11-26 ENCOUNTER — OFFICE VISIT (OUTPATIENT)
Dept: RADIATION ONCOLOGY | Facility: CLINIC | Age: 69
End: 2019-11-26
Payer: MEDICARE

## 2019-11-26 ENCOUNTER — ANESTHESIA EVENT (OUTPATIENT)
Dept: SURGERY | Facility: HOSPITAL | Age: 69
DRG: 470 | End: 2019-11-26
Payer: MEDICARE

## 2019-11-26 ENCOUNTER — TELEPHONE (OUTPATIENT)
Dept: RADIATION ONCOLOGY | Facility: CLINIC | Age: 69
End: 2019-11-26

## 2019-11-26 VITALS — BODY MASS INDEX: 33.49 KG/M2 | WEIGHT: 207.5 LBS

## 2019-11-26 DIAGNOSIS — C50.112 MALIGNANT NEOPLASM OF CENTRAL PORTION OF LEFT BREAST IN FEMALE, ESTROGEN RECEPTOR POSITIVE: Primary | ICD-10-CM

## 2019-11-26 DIAGNOSIS — C50.912 INFILTRATING DUCTAL CARCINOMA OF LEFT BREAST: Primary | ICD-10-CM

## 2019-11-26 DIAGNOSIS — Z17.0 MALIGNANT NEOPLASM OF CENTRAL PORTION OF LEFT BREAST IN FEMALE, ESTROGEN RECEPTOR POSITIVE: Primary | ICD-10-CM

## 2019-11-26 NOTE — PROGRESS NOTES
Eduardo Espsoito  [id number]  1950 11/26/2019  [facility]  [address]  [address]  [place] [address]    DIAGNOSIS:  IA, [id number] IDC L breast,  ER+/MO+/her2(-)  REASON FOR VISIT: Routine scheduled follow-up.    HISTORY OF PRESENT ILLNESS:   69-year-old patient was discovered to have a retro-areolar area of abnormality in the left breast injuring 6 mm on mammogram.  She subsequently underwent a core needle biopsy in April 2019 make in a diagnosis of invasive ductal carcinoma grade 2.    She says only was counseled with respect to treatment options and she elected to undergo breast conservation therapy. She underwent a partial mastectomy on 06/13/2019.    The pathology report indicates the invasive ductal carcinoma was measuring 1 cm. The margin was 3.4 mm. The tumor was grade 2. There was also EIC  and ductal carcinoma in situ measuring 6.9 cm.  The DCIS margin was also negative at 1.4 mm.  There was no lymphovascular space invasion.    The tumor was ER positive, MO positive and HER-2/archana negative.  Her sentinel node was negative.  The tumor has been sent off for Oncotype, score 9.    Patient completed adjuvant radiotherapy, 5040 cGy to the left breast, 6040 cGy to the lumpectomy cavity on November 12, 2019 with expected radiation dermatitis.    INTERVAL HISTORY:   Today patient denies fatigue.  She denies fever, chills, chest pain, cough, shortness of breath or hemoptysis.  She continues to have hip discomfort and is planned for hip replacement surgery tomorrow.  For this reason initiation of antiestrogen therapy has been held by Dr. Marin.    Review of Systems   Constitutional: Negative for appetite change, chills, fever and unexpected weight change.   HENT:   Negative for lump/mass, mouth sores, sore throat, trouble swallowing and voice change.    Eyes: Negative for eye problems and icterus.   Respiratory: Negative for cough, hemoptysis and shortness of breath.    Cardiovascular: Negative for chest pain  and leg swelling.   Gastrointestinal: Negative for abdominal pain, constipation, diarrhea, nausea and vomiting.   Genitourinary: Negative for dysuria, frequency, hematuria, nocturia and vaginal bleeding.    Musculoskeletal: Positive for arthralgias. Negative for back pain, gait problem, neck pain and neck stiffness.   Neurological: Negative for extremity weakness, gait problem, headaches, numbness and seizures.   Hematological: Negative for adenopathy.     Past Medical History:   Diagnosis Date    Atrial fibrillation     4/11/13-recent dx sceduled to see cardiology Monday- Dr Lynn-dm    Cancer     breast    Diabetes mellitus     Hyperlipemia     Hypertension      Past Surgical History:   Procedure Laterality Date    AXILLARY NODE DISSECTION Left 6/13/2019    Procedure: LYMPHADENECTOMY, AXILLARY;  Surgeon: Kendall Howard MD;  Location: [facility] OR;  Service: General;  Laterality: Left;  left lumpectomy with left axillary lymph node dissection    BREAST SURGERY      JOINT REPLACEMENT Right     NEEDLE LOCALIZATION Left 6/13/2019    Procedure: NEEDLE LOCALIZATION;  Surgeon: Kendall Howard MD;  Location: [facility] OR;  Service: General;  Laterality: Left;  left lumpectomy with wire needle localization    SENTINEL LYMPH NODE BIOPSY Left 6/13/2019    Procedure: BIOPSY, LYMPH NODE, SENTINEL;  Surgeon: Kendall Howard MD;  Location: [facility] OR;  Service: General;  Laterality: Left;  left sentinel lymph node dissection    TONSILLECTOMY      TOTAL KNEE ARTHROPLASTY       Social History     Socioeconomic History    Marital status:      Spouse name: Not on file    Number of children: Not on file    Years of education: Not on file    Highest education level: Not on file   Occupational History    Not on file   Social Needs    Financial resource strain: Not on file    Food insecurity:     Worry: Not on file     Inability: Not on file    Transportation needs:     Medical: Not on file     Non-medical:  Not on file   Tobacco Use    Smoking status: Never Smoker    Smokeless tobacco: Never Used   Substance and Sexual Activity    Alcohol use: No     Comment: once or twice a year    Drug use: No    Sexual activity: Not on file   Lifestyle    Physical activity:     Days per week: Not on file     Minutes per session: Not on file    Stress: Not on file   Relationships    Social connections:     Talks on phone: Not on file     Gets together: Not on file     Attends Restorationism service: Not on file     Active member of club or organization: Not on file     Attends meetings of clubs or organizations: Not on file     Relationship status: Not on file   Other Topics Concern    Not on file   Social History Narrative    Not on file     Family History   Problem Relation Age of Onset    Breast cancer Mother     Glaucoma Neg Hx     Macular degeneration Neg Hx     Retinal detachment Neg Hx      Medication List with Changes/Refills   Current Medications    ANASTROZOLE (ARIMIDEX) 1 MG TAB    Take 1 tablet (1 mg total) by mouth once daily.    ANTACID, CALCIUM CARBONATE, 200 MG CALCIUM (500 MG) CHEWABLE TABLET        ASCORBIC ACID, VITAMIN C, (VITAMIN C) 500 MG TABLET    Take 500 mg by mouth once daily.    ATORVASTATIN (LIPITOR) 80 MG TABLET    Take 0.5 tablets (40 mg total) by mouth once daily.    BLOOD SUGAR DIAGNOSTIC (TRUE METRIX GLUCOSE TEST STRIP) STRP    1 each by Misc.(Non-Drug; Combo Route) route once daily.    CYANOCOBALAMIN (VITAMIN B-12) 1000 MCG TABLET    Take 100 mcg by mouth once daily.    ELIQUIS 5 MG TAB    Take 1 tablet (5 mg total) by mouth 2 (two) times daily.    LANCETS 32 GAUGE MISC    1 lancet by Misc.(Non-Drug; Combo Route) route once daily.    METFORMIN (GLUCOPHAGE) 500 MG TABLET    Take 1 tablet (500 mg total) by mouth 2 (two) times daily with meals.    SILVER SULFADIAZINE 1% (SILVADENE) 1 % CREAM    Apply topically 2 (two) times daily.    TRIAMTERENE-HYDROCHLOROTHIAZIDE 37.5-25 MG (MAXZIDE-25)  37.5-25 MG PER TABLET    Take 1 tablet by mouth once daily.    TRUE METRIX AIR GLUCOSE METER KIT        VITAMIN D3 5,000 UNIT TAB    Take 1 tablet by mouth once daily.    ZINC GLUCONATE 50 MG TABLET    Take 50 mg by mouth once daily.     Review of patient's allergies indicates:   Allergen Reactions    Lisinopril      hives       QUALITY OF LIFE: 90%- Able to Carry on Normal Activity: Minor Symptoms of Disease    Vitals:    11/26/19 1422   Weight: 94.1 kg (207 lb 8 oz)     Body mass index is 33.49 kg/m².    PHYSICAL EXAM:   GENERAL: alert; in no apparent distress.   HEAD: normocephalic, atraumatic.  EYES: pupils are equal, round, reactive to light and accommodation. Sclera anicteric. Conjunctiva not injected.   NOSE/THROAT: no nasal erythema or rhinorrhea. Oropharynx pink, without erythema, ulcerations or thrush.   NECK: no cervical motion rigidity; supple with no masses.  CHEST: Patient is speaking comfortably on room air with normal work of breathing without using accessory muscles of respiration.  MUSCULOSKELETAL: no tenderness to palpation along the spine or scapulae. Normal range of motion.  NEUROLOGIC: cranial nerves II-XII intact bilaterally. Strength 5/5 in bilateral upper and lower extremities. No sensory deficits appreciated.   LYMPHATIC: no left axillary adenopathy appreciated  EXTREMITIES: no clubbing, cyanosis, edema.  BREAST:  Faint hyperpigmentation [place] without signs of desquamation.  No palpable seroma or nodularity    ANCILLARY DATA: none    ASSESSMENT: 69 y.o. female with stage IA, [id number] IDC L breast,  ER+/ME+/her2(-) s/p margin negative lumpectomy followed by adjuvant radiotherapy, 6040 cGy the left breast ending 11/12/2019; AI held presently  PLAN:   Eduardo Esposito did very well with her adjuvant radiotherapy in today presents with minimal residual dermatitis.  I advised that hyperpigmentation will subside in the coming months.  I recommended moisturizers with vitamin-E and massages as  needed.  She will proceed with hip replacement tomorrow and will follow up with Dr. Marin to initiate antiestrogen therapy.  We will place order for 6 month mammogram and have her return to clinic at that time.    All questions answered and contact information provided. Patient understands free to call us anytime with any questions or concerns regarding radiation therapy.    I have personally seen and evaluated this patient. Greater than 50% of this time was spent discussing coordination of care and/or counseling.    PHYSICIAN: Nick Holliday Jr, MD

## 2019-11-27 ENCOUNTER — HOSPITAL ENCOUNTER (INPATIENT)
Facility: HOSPITAL | Age: 69
LOS: 1 days | Discharge: HOME HEALTH | DRG: 470 | End: 2019-11-27
Attending: ORTHOPAEDIC SURGERY | Admitting: ORTHOPAEDIC SURGERY
Payer: MEDICARE

## 2019-11-27 ENCOUNTER — ANESTHESIA (OUTPATIENT)
Dept: SURGERY | Facility: HOSPITAL | Age: 69
DRG: 470 | End: 2019-11-27
Payer: MEDICARE

## 2019-11-27 VITALS
TEMPERATURE: 99 F | RESPIRATION RATE: 18 BRPM | OXYGEN SATURATION: 94 % | SYSTOLIC BLOOD PRESSURE: 123 MMHG | DIASTOLIC BLOOD PRESSURE: 58 MMHG | HEART RATE: 72 BPM | BODY MASS INDEX: 33.34 KG/M2 | WEIGHT: 207.44 LBS | HEIGHT: 66 IN

## 2019-11-27 DIAGNOSIS — M16.11 UNILATERAL PRIMARY OSTEOARTHRITIS, RIGHT HIP: Primary | ICD-10-CM

## 2019-11-27 DIAGNOSIS — M16.11 UNILATERAL PRIMARY OSTEOARTHRITIS, RIGHT HIP: ICD-10-CM

## 2019-11-27 PROBLEM — E78.5 HYPERLIPIDEMIA ASSOCIATED WITH TYPE 2 DIABETES MELLITUS: Status: ACTIVE | Noted: 2019-11-27

## 2019-11-27 PROBLEM — E11.69 HYPERLIPIDEMIA ASSOCIATED WITH TYPE 2 DIABETES MELLITUS: Status: ACTIVE | Noted: 2019-11-27

## 2019-11-27 PROBLEM — I15.2 HYPERTENSION ASSOCIATED WITH DIABETES: Status: ACTIVE | Noted: 2018-04-12

## 2019-11-27 PROBLEM — E11.59 HYPERTENSION ASSOCIATED WITH DIABETES: Status: ACTIVE | Noted: 2018-04-12

## 2019-11-27 PROCEDURE — 97161 PT EVAL LOW COMPLEX 20 MIN: CPT | Mod: HCNC | Performed by: PHYSICAL THERAPIST

## 2019-11-27 PROCEDURE — 37000009 HC ANESTHESIA EA ADD 15 MINS: Mod: HCNC | Performed by: ORTHOPAEDIC SURGERY

## 2019-11-27 PROCEDURE — 25000003 PHARM REV CODE 250: Mod: HCNC | Performed by: ORTHOPAEDIC SURGERY

## 2019-11-27 PROCEDURE — 25000003 PHARM REV CODE 250: Mod: HCNC | Performed by: NURSE ANESTHETIST, CERTIFIED REGISTERED

## 2019-11-27 PROCEDURE — 71000033 HC RECOVERY, INTIAL HOUR: Mod: HCNC | Performed by: ORTHOPAEDIC SURGERY

## 2019-11-27 PROCEDURE — 97530 THERAPEUTIC ACTIVITIES: CPT | Mod: HCNC

## 2019-11-27 PROCEDURE — 37000008 HC ANESTHESIA 1ST 15 MINUTES: Mod: HCNC | Performed by: ORTHOPAEDIC SURGERY

## 2019-11-27 PROCEDURE — 36000711: Mod: HCNC | Performed by: ORTHOPAEDIC SURGERY

## 2019-11-27 PROCEDURE — 12000002 HC ACUTE/MED SURGE SEMI-PRIVATE ROOM: Mod: HCNC

## 2019-11-27 PROCEDURE — 63600175 PHARM REV CODE 636 W HCPCS: Mod: HCNC | Performed by: NURSE ANESTHETIST, CERTIFIED REGISTERED

## 2019-11-27 PROCEDURE — 97165 OT EVAL LOW COMPLEX 30 MIN: CPT | Mod: HCNC

## 2019-11-27 PROCEDURE — 27201423 OPTIME MED/SURG SUP & DEVICES STERILE SUPPLY: Mod: HCNC | Performed by: ORTHOPAEDIC SURGERY

## 2019-11-27 PROCEDURE — C1713 ANCHOR/SCREW BN/BN,TIS/BN: HCPCS | Mod: HCNC | Performed by: ORTHOPAEDIC SURGERY

## 2019-11-27 PROCEDURE — 36000710: Mod: HCNC | Performed by: ORTHOPAEDIC SURGERY

## 2019-11-27 PROCEDURE — 63600175 PHARM REV CODE 636 W HCPCS: Mod: HCNC | Performed by: ANESTHESIOLOGY

## 2019-11-27 PROCEDURE — G8987 SELF CARE CURRENT STATUS: HCPCS | Mod: CK,HCNC

## 2019-11-27 PROCEDURE — D9220A PRA ANESTHESIA: Mod: HCNC,ANES,, | Performed by: ANESTHESIOLOGY

## 2019-11-27 PROCEDURE — D9220A PRA ANESTHESIA: ICD-10-PCS | Mod: HCNC,ANES,, | Performed by: ANESTHESIOLOGY

## 2019-11-27 PROCEDURE — 99900103 DSU ONLY-NO CHARGE-INITIAL HR (STAT): Mod: HCNC | Performed by: ORTHOPAEDIC SURGERY

## 2019-11-27 PROCEDURE — 63600175 PHARM REV CODE 636 W HCPCS: Mod: HCNC | Performed by: ORTHOPAEDIC SURGERY

## 2019-11-27 PROCEDURE — D9220A PRA ANESTHESIA: ICD-10-PCS | Mod: HCNC,CRNA,, | Performed by: NURSE ANESTHETIST, CERTIFIED REGISTERED

## 2019-11-27 PROCEDURE — G8988 SELF CARE GOAL STATUS: HCPCS | Mod: CK,HCNC

## 2019-11-27 PROCEDURE — C1776 JOINT DEVICE (IMPLANTABLE): HCPCS | Mod: HCNC | Performed by: ORTHOPAEDIC SURGERY

## 2019-11-27 PROCEDURE — 27130 TOTAL HIP ARTHROPLASTY: CPT | Mod: HCNC,RT,, | Performed by: ORTHOPAEDIC SURGERY

## 2019-11-27 PROCEDURE — 97116 GAIT TRAINING THERAPY: CPT | Mod: HCNC | Performed by: PHYSICAL THERAPIST

## 2019-11-27 PROCEDURE — 97535 SELF CARE MNGMENT TRAINING: CPT | Mod: HCNC

## 2019-11-27 PROCEDURE — D9220A PRA ANESTHESIA: Mod: HCNC,CRNA,, | Performed by: NURSE ANESTHETIST, CERTIFIED REGISTERED

## 2019-11-27 PROCEDURE — G8989 SELF CARE D/C STATUS: HCPCS | Mod: CK,HCNC

## 2019-11-27 PROCEDURE — 25000003 PHARM REV CODE 250: Mod: HCNC | Performed by: ANESTHESIOLOGY

## 2019-11-27 PROCEDURE — S0020 INJECTION, BUPIVICAINE HYDRO: HCPCS | Mod: HCNC | Performed by: ANESTHESIOLOGY

## 2019-11-27 PROCEDURE — 27130 PR TOTAL HIP ARTHROPLASTY: ICD-10-PCS | Mod: HCNC,RT,, | Performed by: ORTHOPAEDIC SURGERY

## 2019-11-27 PROCEDURE — 71000039 HC RECOVERY, EACH ADD'L HOUR: Mod: HCNC | Performed by: ORTHOPAEDIC SURGERY

## 2019-11-27 DEVICE — SHELL ACE 3 HOLE REFLCT 3 52MM: Type: IMPLANTABLE DEVICE | Site: HIP | Status: FUNCTIONAL

## 2019-11-27 DEVICE — COVER REFLECTION I APEX THRD H: Type: IMPLANTABLE DEVICE | Site: HIP | Status: FUNCTIONAL

## 2019-11-27 RX ORDER — CEFAZOLIN SODIUM 2 G/50ML
2 SOLUTION INTRAVENOUS
Status: DISCONTINUED | OUTPATIENT
Start: 2019-11-27 | End: 2019-11-27 | Stop reason: HOSPADM

## 2019-11-27 RX ORDER — HYDROCODONE BITARTRATE AND ACETAMINOPHEN 7.5; 325 MG/1; MG/1
1 TABLET ORAL EVERY 6 HOURS PRN
Qty: 28 TABLET | Refills: 0 | Status: SHIPPED | OUTPATIENT
Start: 2019-11-27 | End: 2019-12-04

## 2019-11-27 RX ORDER — DOCUSATE SODIUM 100 MG/1
100 CAPSULE, LIQUID FILLED ORAL EVERY 12 HOURS
Status: DISCONTINUED | OUTPATIENT
Start: 2019-11-27 | End: 2019-11-27 | Stop reason: HOSPADM

## 2019-11-27 RX ORDER — ACETAMINOPHEN 500 MG
1 TABLET ORAL DAILY
Status: DISCONTINUED | OUTPATIENT
Start: 2019-11-27 | End: 2019-11-27 | Stop reason: HOSPADM

## 2019-11-27 RX ORDER — PROPOFOL 10 MG/ML
VIAL (ML) INTRAVENOUS
Status: DISCONTINUED | OUTPATIENT
Start: 2019-11-27 | End: 2019-11-27

## 2019-11-27 RX ORDER — ZINC SULFATE 50(220)MG
220 CAPSULE ORAL DAILY
Status: DISCONTINUED | OUTPATIENT
Start: 2019-11-28 | End: 2019-11-27 | Stop reason: HOSPADM

## 2019-11-27 RX ORDER — LIDOCAINE HCL/PF 100 MG/5ML
SYRINGE (ML) INTRAVENOUS
Status: DISCONTINUED | OUTPATIENT
Start: 2019-11-27 | End: 2019-11-27

## 2019-11-27 RX ORDER — KETAMINE HYDROCHLORIDE 100 MG/ML
INJECTION, SOLUTION INTRAMUSCULAR; INTRAVENOUS
Status: DISCONTINUED | OUTPATIENT
Start: 2019-11-27 | End: 2019-11-27

## 2019-11-27 RX ORDER — LACTULOSE 10 G/15ML
20 SOLUTION ORAL EVERY 6 HOURS PRN
Status: DISCONTINUED | OUTPATIENT
Start: 2019-11-27 | End: 2019-11-27 | Stop reason: HOSPADM

## 2019-11-27 RX ORDER — ONDANSETRON 2 MG/ML
INJECTION INTRAMUSCULAR; INTRAVENOUS
Status: DISCONTINUED | OUTPATIENT
Start: 2019-11-27 | End: 2019-11-27

## 2019-11-27 RX ORDER — LIDOCAINE HYDROCHLORIDE 10 MG/ML
0.5 INJECTION, SOLUTION EPIDURAL; INFILTRATION; INTRACAUDAL; PERINEURAL ONCE
Status: DISCONTINUED | OUTPATIENT
Start: 2019-11-27 | End: 2019-11-27 | Stop reason: HOSPADM

## 2019-11-27 RX ORDER — PHENYLEPHRINE HYDROCHLORIDE 10 MG/ML
INJECTION INTRAVENOUS
Status: DISCONTINUED | OUTPATIENT
Start: 2019-11-27 | End: 2019-11-27

## 2019-11-27 RX ORDER — ANASTROZOLE 1 MG/1
1 TABLET ORAL DAILY
Status: DISCONTINUED | OUTPATIENT
Start: 2019-11-28 | End: 2019-11-27 | Stop reason: HOSPADM

## 2019-11-27 RX ORDER — ONDANSETRON 2 MG/ML
4 INJECTION INTRAMUSCULAR; INTRAVENOUS DAILY PRN
Status: DISCONTINUED | OUTPATIENT
Start: 2019-11-27 | End: 2019-11-27 | Stop reason: HOSPADM

## 2019-11-27 RX ORDER — SODIUM CHLORIDE 0.9 % (FLUSH) 0.9 %
10 SYRINGE (ML) INJECTION
Status: DISCONTINUED | OUTPATIENT
Start: 2019-11-27 | End: 2019-11-27 | Stop reason: HOSPADM

## 2019-11-27 RX ORDER — PROMETHAZINE HYDROCHLORIDE 25 MG/1
25 TABLET ORAL EVERY 6 HOURS PRN
Status: DISCONTINUED | OUTPATIENT
Start: 2019-11-27 | End: 2019-11-27 | Stop reason: HOSPADM

## 2019-11-27 RX ORDER — MORPHINE SULFATE 2 MG/ML
2 INJECTION, SOLUTION INTRAMUSCULAR; INTRAVENOUS
Status: DISCONTINUED | OUTPATIENT
Start: 2019-11-27 | End: 2019-11-27 | Stop reason: HOSPADM

## 2019-11-27 RX ORDER — GLYCOPYRROLATE 0.2 MG/ML
INJECTION INTRAMUSCULAR; INTRAVENOUS
Status: DISCONTINUED | OUTPATIENT
Start: 2019-11-27 | End: 2019-11-27

## 2019-11-27 RX ORDER — ACETAMINOPHEN 500 MG
1000 TABLET ORAL EVERY 8 HOURS
Status: DISCONTINUED | OUTPATIENT
Start: 2019-11-27 | End: 2019-11-27 | Stop reason: HOSPADM

## 2019-11-27 RX ORDER — CEFAZOLIN SODIUM 2 G/50ML
2 SOLUTION INTRAVENOUS
Status: COMPLETED | OUTPATIENT
Start: 2019-11-27 | End: 2019-11-27

## 2019-11-27 RX ORDER — FENTANYL CITRATE 50 UG/ML
INJECTION, SOLUTION INTRAMUSCULAR; INTRAVENOUS
Status: DISCONTINUED | OUTPATIENT
Start: 2019-11-27 | End: 2019-11-27

## 2019-11-27 RX ORDER — MIDAZOLAM HYDROCHLORIDE 1 MG/ML
INJECTION, SOLUTION INTRAMUSCULAR; INTRAVENOUS
Status: DISCONTINUED | OUTPATIENT
Start: 2019-11-27 | End: 2019-11-27

## 2019-11-27 RX ORDER — ZOLPIDEM TARTRATE 5 MG/1
5 TABLET ORAL NIGHTLY PRN
Status: DISCONTINUED | OUTPATIENT
Start: 2019-11-27 | End: 2019-11-27 | Stop reason: HOSPADM

## 2019-11-27 RX ORDER — ACETAMINOPHEN 10 MG/ML
INJECTION, SOLUTION INTRAVENOUS
Status: DISCONTINUED | OUTPATIENT
Start: 2019-11-27 | End: 2019-11-27

## 2019-11-27 RX ORDER — TRIAMTERENE/HYDROCHLOROTHIAZID 37.5-25 MG
1 TABLET ORAL DAILY
Status: DISCONTINUED | OUTPATIENT
Start: 2019-11-27 | End: 2019-11-27 | Stop reason: HOSPADM

## 2019-11-27 RX ORDER — BISACODYL 10 MG
10 SUPPOSITORY, RECTAL RECTAL DAILY PRN
Status: DISCONTINUED | OUTPATIENT
Start: 2019-11-27 | End: 2019-11-27 | Stop reason: HOSPADM

## 2019-11-27 RX ORDER — TRANEXAMIC ACID 100 MG/ML
INJECTION, SOLUTION INTRAVENOUS
Status: DISCONTINUED | OUTPATIENT
Start: 2019-11-27 | End: 2019-11-27

## 2019-11-27 RX ORDER — SODIUM CHLORIDE, SODIUM LACTATE, POTASSIUM CHLORIDE, CALCIUM CHLORIDE 600; 310; 30; 20 MG/100ML; MG/100ML; MG/100ML; MG/100ML
INJECTION, SOLUTION INTRAVENOUS CONTINUOUS
Status: DISCONTINUED | OUTPATIENT
Start: 2019-11-27 | End: 2019-11-27

## 2019-11-27 RX ORDER — MUPIROCIN 20 MG/G
OINTMENT TOPICAL
Status: DISCONTINUED | OUTPATIENT
Start: 2019-11-27 | End: 2019-11-27 | Stop reason: HOSPADM

## 2019-11-27 RX ORDER — CALCIUM CARBONATE 200(500)MG
500 TABLET,CHEWABLE ORAL 2 TIMES DAILY PRN
Status: DISCONTINUED | OUTPATIENT
Start: 2019-11-27 | End: 2019-11-27 | Stop reason: HOSPADM

## 2019-11-27 RX ORDER — METFORMIN HYDROCHLORIDE 500 MG/1
500 TABLET ORAL 2 TIMES DAILY WITH MEALS
Status: DISCONTINUED | OUTPATIENT
Start: 2019-11-27 | End: 2019-11-27 | Stop reason: HOSPADM

## 2019-11-27 RX ORDER — FAMOTIDINE 20 MG/1
20 TABLET, FILM COATED ORAL 2 TIMES DAILY
Status: DISCONTINUED | OUTPATIENT
Start: 2019-11-27 | End: 2019-11-27 | Stop reason: HOSPADM

## 2019-11-27 RX ORDER — TRAMADOL HYDROCHLORIDE 50 MG/1
50 TABLET ORAL EVERY 4 HOURS PRN
Status: DISCONTINUED | OUTPATIENT
Start: 2019-11-27 | End: 2019-11-27 | Stop reason: HOSPADM

## 2019-11-27 RX ORDER — HYDROMORPHONE HYDROCHLORIDE 1 MG/ML
1 INJECTION, SOLUTION INTRAMUSCULAR; INTRAVENOUS; SUBCUTANEOUS EVERY 4 HOURS PRN
Status: DISCONTINUED | OUTPATIENT
Start: 2019-11-27 | End: 2019-11-27 | Stop reason: HOSPADM

## 2019-11-27 RX ORDER — LOPERAMIDE HYDROCHLORIDE 2 MG/1
2 CAPSULE ORAL CONTINUOUS PRN
Status: DISCONTINUED | OUTPATIENT
Start: 2019-11-27 | End: 2019-11-27 | Stop reason: HOSPADM

## 2019-11-27 RX ORDER — METOCLOPRAMIDE HYDROCHLORIDE 5 MG/ML
10 INJECTION INTRAMUSCULAR; INTRAVENOUS EVERY 10 MIN PRN
Status: DISCONTINUED | OUTPATIENT
Start: 2019-11-27 | End: 2019-11-27 | Stop reason: HOSPADM

## 2019-11-27 RX ORDER — ACETAMINOPHEN 10 MG/ML
1000 INJECTION, SOLUTION INTRAVENOUS ONCE
Status: COMPLETED | OUTPATIENT
Start: 2019-11-27 | End: 2019-11-27

## 2019-11-27 RX ORDER — BUPIVACAINE HYDROCHLORIDE 5 MG/ML
INJECTION, SOLUTION EPIDURAL; INTRACAUDAL
Status: COMPLETED | OUTPATIENT
Start: 2019-11-27 | End: 2019-11-27

## 2019-11-27 RX ORDER — MUPIROCIN 20 MG/G
1 OINTMENT TOPICAL 2 TIMES DAILY
Status: DISCONTINUED | OUTPATIENT
Start: 2019-11-27 | End: 2019-11-27 | Stop reason: HOSPADM

## 2019-11-27 RX ORDER — DEXAMETHASONE SODIUM PHOSPHATE 4 MG/ML
INJECTION, SOLUTION INTRA-ARTICULAR; INTRALESIONAL; INTRAMUSCULAR; INTRAVENOUS; SOFT TISSUE
Status: DISCONTINUED | OUTPATIENT
Start: 2019-11-27 | End: 2019-11-27

## 2019-11-27 RX ORDER — GABAPENTIN 300 MG/1
300 CAPSULE ORAL 3 TIMES DAILY
Qty: 90 CAPSULE | Refills: 11 | Status: SHIPPED | OUTPATIENT
Start: 2019-11-27 | End: 2020-01-08 | Stop reason: CLARIF

## 2019-11-27 RX ORDER — ONDANSETRON 8 MG/1
8 TABLET, ORALLY DISINTEGRATING ORAL EVERY 8 HOURS PRN
Status: DISCONTINUED | OUTPATIENT
Start: 2019-11-27 | End: 2019-11-27 | Stop reason: HOSPADM

## 2019-11-27 RX ORDER — HYDROMORPHONE HYDROCHLORIDE 2 MG/ML
0.2 INJECTION, SOLUTION INTRAMUSCULAR; INTRAVENOUS; SUBCUTANEOUS EVERY 5 MIN PRN
Status: DISCONTINUED | OUTPATIENT
Start: 2019-11-27 | End: 2019-11-27 | Stop reason: HOSPADM

## 2019-11-27 RX ORDER — PREGABALIN 75 MG/1
75 CAPSULE ORAL 2 TIMES DAILY
Status: DISCONTINUED | OUTPATIENT
Start: 2019-11-27 | End: 2019-11-27 | Stop reason: HOSPADM

## 2019-11-27 RX ORDER — KETOROLAC TROMETHAMINE 30 MG/ML
15 INJECTION, SOLUTION INTRAMUSCULAR; INTRAVENOUS EVERY 6 HOURS
Status: DISCONTINUED | OUTPATIENT
Start: 2019-11-27 | End: 2019-11-27 | Stop reason: HOSPADM

## 2019-11-27 RX ORDER — ATORVASTATIN CALCIUM 40 MG/1
40 TABLET, FILM COATED ORAL NIGHTLY
Status: DISCONTINUED | OUTPATIENT
Start: 2019-11-27 | End: 2019-11-27 | Stop reason: HOSPADM

## 2019-11-27 RX ORDER — PROPOFOL 10 MG/ML
VIAL (ML) INTRAVENOUS CONTINUOUS PRN
Status: DISCONTINUED | OUTPATIENT
Start: 2019-11-27 | End: 2019-11-27

## 2019-11-27 RX ORDER — PNV NO.95/FERROUS FUM/FOLIC AC 28MG-0.8MG
100 TABLET ORAL DAILY
Status: DISCONTINUED | OUTPATIENT
Start: 2019-11-27 | End: 2019-11-27 | Stop reason: HOSPADM

## 2019-11-27 RX ADMIN — ACETAMINOPHEN 1000 MG: 10 INJECTION, SOLUTION INTRAVENOUS at 09:11

## 2019-11-27 RX ADMIN — PHENYLEPHRINE HYDROCHLORIDE 100 MCG: 10 INJECTION INTRAVENOUS at 09:11

## 2019-11-27 RX ADMIN — CEFAZOLIN SODIUM 2 G: 2 SOLUTION INTRAVENOUS at 08:11

## 2019-11-27 RX ADMIN — Medication 100 MCG: at 02:11

## 2019-11-27 RX ADMIN — MUPIROCIN: 20 OINTMENT TOPICAL at 07:11

## 2019-11-27 RX ADMIN — TRIAMTERENE AND HYDROCHLOROTHIAZIDE 1 TABLET: 37.5; 25 TABLET ORAL at 02:11

## 2019-11-27 RX ADMIN — GLYCOPYRROLATE 0.2 MG: 0.2 INJECTION, SOLUTION INTRAMUSCULAR; INTRAVENOUS at 09:11

## 2019-11-27 RX ADMIN — DOCUSATE SODIUM 100 MG: 100 CAPSULE, LIQUID FILLED ORAL at 02:11

## 2019-11-27 RX ADMIN — PROPOFOL 50 MCG/KG/MIN: 10 INJECTION, EMULSION INTRAVENOUS at 08:11

## 2019-11-27 RX ADMIN — MIDAZOLAM 1 MG: 1 INJECTION INTRAMUSCULAR; INTRAVENOUS at 09:11

## 2019-11-27 RX ADMIN — FAMOTIDINE 20 MG: 20 TABLET ORAL at 02:11

## 2019-11-27 RX ADMIN — KETOROLAC TROMETHAMINE 15 MG: 30 INJECTION, SOLUTION INTRAMUSCULAR at 02:11

## 2019-11-27 RX ADMIN — SODIUM CHLORIDE, SODIUM LACTATE, POTASSIUM CHLORIDE, AND CALCIUM CHLORIDE: .6; .31; .03; .02 INJECTION, SOLUTION INTRAVENOUS at 09:11

## 2019-11-27 RX ADMIN — LIDOCAINE HYDROCHLORIDE 100 MG: 20 INJECTION, SOLUTION INTRAVENOUS at 09:11

## 2019-11-27 RX ADMIN — TRANEXAMIC ACID 950 MG: 100 INJECTION, SOLUTION INTRAVENOUS at 09:11

## 2019-11-27 RX ADMIN — FENTANYL CITRATE 50 MCG: 50 INJECTION, SOLUTION INTRAMUSCULAR; INTRAVENOUS at 08:11

## 2019-11-27 RX ADMIN — DEXAMETHASONE SODIUM PHOSPHATE 8 MG: 4 INJECTION, SOLUTION INTRAMUSCULAR; INTRAVENOUS at 09:11

## 2019-11-27 RX ADMIN — MUPIROCIN 1 G: 20 OINTMENT TOPICAL at 02:11

## 2019-11-27 RX ADMIN — PROPOFOL 50 MG: 10 INJECTION, EMULSION INTRAVENOUS at 08:11

## 2019-11-27 RX ADMIN — ACETAMINOPHEN 1000 MG: 10 INJECTION, SOLUTION INTRAVENOUS at 02:11

## 2019-11-27 RX ADMIN — BUPIVACAINE HYDROCHLORIDE 2.5 ML: 5 INJECTION, SOLUTION EPIDURAL; INTRACAUDAL; PERINEURAL at 08:11

## 2019-11-27 RX ADMIN — Medication 5000 UNITS: at 02:11

## 2019-11-27 RX ADMIN — ONDANSETRON 4 MG: 2 INJECTION, SOLUTION INTRAMUSCULAR; INTRAVENOUS at 09:11

## 2019-11-27 RX ADMIN — PREGABALIN 75 MG: 75 CAPSULE ORAL at 02:11

## 2019-11-27 RX ADMIN — MIDAZOLAM 1 MG: 1 INJECTION INTRAMUSCULAR; INTRAVENOUS at 08:11

## 2019-11-27 RX ADMIN — KETAMINE HYDROCHLORIDE 20 MG: 100 INJECTION, SOLUTION, CONCENTRATE INTRAMUSCULAR; INTRAVENOUS at 08:11

## 2019-11-27 RX ADMIN — SODIUM CHLORIDE, SODIUM LACTATE, POTASSIUM CHLORIDE, AND CALCIUM CHLORIDE: .6; .31; .03; .02 INJECTION, SOLUTION INTRAVENOUS at 06:11

## 2019-11-27 NOTE — ANESTHESIA PROCEDURE NOTES
Spinal    Diagnosis: hip right  Patient location during procedure: OR  Start time: 11/27/2019 8:54 AM  Timeout: 11/27/2019 8:54 AM  End time: 11/27/2019 8:58 AM    Staffing  Authorizing Provider: Estrada Smalls MD  Performing Provider: Estrada Smalls MD    Preanesthetic Checklist  Completed: patient identified, site marked, surgical consent, pre-op evaluation, timeout performed, IV checked, risks and benefits discussed and monitors and equipment checked  Spinal Block  Patient position: sitting  Prep: ChloraPrep  Patient monitoring: cardiac monitor, continuous pulse ox and frequent blood pressure checks  Approach: right paramedian  Location: L3-4  Injection technique: lumbar puncture  CSF Fluid: clear free-flowing CSF  Needle  Needle type: pencil-tip   Needle gauge: 25 G  Needle length: 3.5 in  Additional Documentation: incremental injection, negative aspiration for heme and no paresthesia on injection  Needle localization: anatomical landmarks  Assessment  Sensory level: T7   Dermatomal levels determined by alcohol wipe  Ease of block: easy  Patient's tolerance of the procedure: comfortable throughout block and no complaints  Additional Notes  Isobaric SAB

## 2019-11-27 NOTE — SUBJECTIVE & OBJECTIVE
Past Medical History:   Diagnosis Date    Atrial fibrillation     4/11/13-recent dx sceduled to see cardiology Monday- Dr Lynn-dm    Cancer     breast    Diabetes mellitus     Hyperlipemia     Hypertension        Past Surgical History:   Procedure Laterality Date    AXILLARY NODE DISSECTION Left 6/13/2019    Procedure: LYMPHADENECTOMY, AXILLARY;  Surgeon: Kendall Howard MD;  Location: [facility];  Service: General;  Laterality: Left;  left lumpectomy with left axillary lymph node dissection    BREAST SURGERY      lumpectomy left    JOINT REPLACEMENT Right     knee    NEEDLE LOCALIZATION Left 6/13/2019    Procedure: NEEDLE LOCALIZATION;  Surgeon: Kendall Howard MD;  Location: [facility];  Service: General;  Laterality: Left;  left lumpectomy with wire needle localization    SENTINEL LYMPH NODE BIOPSY Left 6/13/2019    Procedure: BIOPSY, LYMPH NODE, SENTINEL;  Surgeon: Kendall Howard MD;  Location: [facility];  Service: General;  Laterality: Left;  left sentinel lymph node dissection    TONSILLECTOMY      TOTAL KNEE ARTHROPLASTY         Review of patient's allergies indicates:   Allergen Reactions    Lisinopril      hives       No current facility-administered medications on file prior to encounter.      Current Outpatient Medications on File Prior to Encounter   Medication Sig    anastrozole (ARIMIDEX) 1 mg Tab Take 1 tablet (1 mg total) by mouth once daily.    ascorbic acid, vitamin C, (VITAMIN C) 500 MG tablet Take 500 mg by mouth once daily.    atorvastatin (LIPITOR) 80 MG tablet Take 0.5 tablets (40 mg total) by mouth once daily. (Patient taking differently: Take 40 mg by mouth every evening. )    cyanocobalamin (VITAMIN B-12) 1000 MCG tablet Take 100 mcg by mouth once daily.    metFORMIN (GLUCOPHAGE) 500 MG tablet Take 1 tablet (500 mg total) by mouth 2 (two) times daily with meals.    triamterene-hydrochlorothiazide 37.5-25 mg (MAXZIDE-25) 37.5-25 mg per tablet Take 1 tablet by mouth  once daily.    VITAMIN D3 5,000 unit Tab Take 1 tablet by mouth once daily.    zinc gluconate 50 mg tablet Take 50 mg by mouth once daily.    ANTACID, CALCIUM CARBONATE, 200 mg calcium (500 mg) chewable tablet     blood sugar diagnostic (TRUE METRIX GLUCOSE TEST STRIP) Strp 1 each by Misc.(Non-Drug; Combo Route) route once daily.    lancets 32 gauge Misc 1 lancet by Misc.(Non-Drug; Combo Route) route once daily.    TRUE METRIX AIR GLUCOSE METER kit      Family History     Problem Relation (Age of Onset)    Breast cancer Mother        Tobacco Use    Smoking status: Never Smoker    Smokeless tobacco: Never Used   Substance and Sexual Activity    Alcohol use: No     Comment: once or twice a year    Drug use: No    Sexual activity: Not on file     Review of Systems   Constitutional: Positive for activity change. Negative for appetite change, chills, diaphoresis, fatigue and fever.   HENT: Negative for ear discharge, ear pain and facial swelling.    Eyes: Negative for pain and redness.   Respiratory: Negative for cough and shortness of breath.    Cardiovascular: Negative for leg swelling.   Gastrointestinal: Negative for abdominal distention, abdominal pain, blood in stool, constipation, diarrhea, nausea and vomiting.   Endocrine: Negative for polydipsia and polyphagia.   Genitourinary: Negative for difficulty urinating, dysuria, flank pain and hematuria.   Musculoskeletal: Positive for arthralgias. Negative for neck pain and neck stiffness.        Right hip   Skin: Positive for wound. Negative for color change.        Right hip surgical dressing   Allergic/Immunologic: Negative for food allergies.   Neurological: Negative for seizures, facial asymmetry, speech difficulty and weakness.   Hematological: Bruises/bleeds easily.   Psychiatric/Behavioral: Negative for agitation, behavioral problems, confusion, dysphoric mood and suicidal ideas. The patient is not nervous/anxious.      Objective:     Vital Signs  (Most Recent):  Temp: 98.6 °F (37 °C) (11/27/19 1643)  Pulse: 72 (11/27/19 1643)  Resp: 18 (11/27/19 1643)  BP: (!) 123/58 (11/27/19 1643)  SpO2: (!) 94 % (11/27/19 1534) Vital Signs (24h Range):  Temp:  [97.3 °F (36.3 °C)-98.6 °F (37 °C)] 98.6 °F (37 °C)  Pulse:  [52-72] 72  Resp:  [15-20] 18  SpO2:  [94 %-100 %] 94 %  BP: (113-147)/(56-81) 123/58     Weight: 94.1 kg (207 lb 7.2 oz)  Body mass index is 33.48 kg/m².    Physical Exam   Constitutional: She is oriented to person, place, and time. She appears well-developed and well-nourished. No distress.   HENT:   Head: Normocephalic and atraumatic.   Eyes: Pupils are equal, round, and reactive to light. Conjunctivae and EOM are normal. Right eye exhibits no discharge. Left eye exhibits no discharge.   Neck: Normal range of motion. Neck supple. No JVD present.   Cardiovascular: Normal rate, normal heart sounds and intact distal pulses.   No murmur heard.  Irregular rhythm   Pulmonary/Chest: Effort normal and breath sounds normal. No respiratory distress. She has no wheezes. She has no rales.   Abdominal: Soft. She exhibits no distension. There is no tenderness. There is no guarding.   Bowel sounds noted   Genitourinary:   Genitourinary Comments: Not examined   Musculoskeletal: She exhibits no edema.   Right lower extremity range of motion not tested   Neurological: She is alert and oriented to person, place, and time. No cranial nerve deficit.   Skin: Skin is warm and dry. Capillary refill takes less than 2 seconds. She is not diaphoretic.   Right hip surgical dressing intact   Psychiatric: She has a normal mood and affect. Her behavior is normal. Judgment and thought content normal.         CRANIAL NERVES     CN III, IV, VI   Pupils are equal, round, and reactive to light.  Extraocular motions are normal.     Preop labs reviewed    MRSA surveillance screen is negative

## 2019-11-27 NOTE — PLAN OF CARE
Pt transferred to room 402. VSS, denies pain, dressing to R hip cdi, ice man in place to R hip, tolerated clear liquids without N/V,ANDREZ and SCD to L leg and SCD to R foot. KOKO Lorenzo saw pt in PACU. Ok per Dr. Smalls to transfer the pt to the floor. Pt transported via bed. Pt's family at the bedside. Call light within reach. Tele monitor [id number] placed on pt. Report given to JOSSELIN Mendieta.

## 2019-11-27 NOTE — PROGRESS NOTES
[facility]  Orthopedics  Progress Note    Patient Name: Eduardo Esposito  MRN: [id number]  Admission Date: 11/27/2019  Hospital Length of Stay: 0 days  Attending Provider: Rahul Hanson II, MD  Primary Care Provider: Walter Nance MD  Follow-up For: Procedure(s) (LRB):  ARTHROPLASTY, HIP REPLACEMENT (Right)    Post-Operative Day: Day of Surgery  Subjective:     Principal Problem:  Osteoarthritis right hip    Principal Orthopedic Problem:  Osteoarthritis of the right hip, status post right total hip arthroplasty    Interval History:  Patient underwent right total hip arthroplasty this morning    Review of patient's allergies indicates:   Allergen Reactions    Lisinopril      hives       Current Facility-Administered Medications   Medication    acetaminophen tablet 1,000 mg    apixaban tablet 5 mg    atorvastatin tablet 40 mg    bisacodyl suppository 10 mg    cefazolin (ANCEF) 2 gram in dextrose 5% 50 mL IVPB (premix)    cholecalciferol (vitamin D3) 125 mcg (5,000 unit) tablet 5,000 Units    cyanocobalamin tablet 100 mcg    docusate sodium capsule 100 mg    famotidine tablet 20 mg    HYDROmorphone injection 1 mg    ketorolac injection 15 mg    lactulose 20 gram/30 mL solution Soln 20 g    loperamide capsule 2 mg    metFORMIN tablet 500 mg    morphine injection 2 mg    mupirocin 2 % ointment 1 g    ondansetron disintegrating tablet 8 mg    pregabalin capsule 75 mg    promethazine tablet 25 mg    sodium chloride 0.9% flush 10 mL    traMADol tablet 50 mg    triamterene-hydrochlorothiazide 37.5-25 mg per tablet 1 tablet    zolpidem tablet 5 mg     Objective:     Vital Signs (Most Recent):  Temp: 98 °F (36.7 °C) (11/27/19 1534)  Pulse: 72 (11/27/19 1534)  Resp: 18 (11/27/19 1534)  BP: (!) 123/58 (11/27/19 1534)  SpO2: (!) 94 % (11/27/19 1534) Vital Signs (24h Range):  Temp:  [97.3 °F (36.3 °C)-98 °F (36.7 °C)] 98 °F (36.7 °C)  Pulse:  [52-72] 72  Resp:  [15-20] 18  SpO2:  [94  "%-100 %] 94 %  BP: (113-147)/(56-81) 123/58     Weight: 94.1 kg (207 lb 8 oz)  Height: 5' 6" (167.6 cm)  Body mass index is 33.49 kg/m².      Intake/Output Summary (Last 24 hours) at 11/27/2019 1618  Last data filed at 11/27/2019 1200  Gross per 24 hour   Intake 1352.17 ml   Output --   Net 1352.17 ml       General    Constitutional: She is oriented to person, place, and time. She appears well-developed and well-nourished.   HENT:   Head: Normocephalic and atraumatic.   Eyes: EOM are normal. Pupils are equal, round, and reactive to light.   Neck: Neck supple.   Cardiovascular: Normal rate, regular rhythm and normal heart sounds.    Pulmonary/Chest: Effort normal and breath sounds normal.   Abdominal: Soft. Bowel sounds are normal.   Neurological: She is alert and oriented to person, place, and time.   Psychiatric: She has a normal mood and affect. Her behavior is normal. Thought content normal.             Right Hip Exam     Comments:  Bandage is clean, dry, intact  Intact motor function distally EHL, FHL, TA, gastroc  Plus two dorsalis pedis and posterior tibial pulses  Normal sensation light touch dorsal, plantar, 1st webspace        Significant Labs: All pertinent labs within the past 24 hours have been reviewed.  None    Significant Imaging: X-Ray: I have reviewed all pertinent results/findings and my personal findings are:  There is a right total hip arthroplasty that is well fixed and in good alignment    Assessment/Plan:     Status post right total hip arthroplasty.  Patient is doing extremely well.  She has met all PT and OT goals.    Prescriptions have been sent to her pharmacy.    She can discharge home and follow-up in the office in 2 weeks.      Rahul Hanson II, MD  Orthopedics  [facility]  "

## 2019-11-27 NOTE — ANESTHESIA PREPROCEDURE EVALUATION
11/27/2019  Eduardo Esposito is a 69 y.o., female.    Pre-op Assessment    I have reviewed the Patient Summary Reports.     I have reviewed the Nursing Notes.   I have reviewed the Medications.     Review of Systems  Anesthesia Hx:  Denies Family Hx of Anesthesia complications.   Denies Personal Hx of Anesthesia complications.   Social:  Non-Smoker    Hematology/Oncology:        Current/Recent Cancer. Breast left   Cardiovascular:   Hypertension Dysrhythmias atrial fibrillation ECG has been reviewed.    Endocrine:   Diabetes        Physical Exam  General:  Obesity    Airway/Jaw/Neck:  Airway Findings: Mouth Opening: Normal Tongue: Normal  General Airway Assessment: Adult  Mallampati: III  Improves to II with phonation.  TM Distance: Normal, at least 6 cm  Jaw/Neck Findings:  Neck ROM: Normal ROM      Dental:  Dental Findings: Molar caps, In tact   Chest/Lungs:  Chest/Lungs Findings: Clear to auscultation, Normal Respiratory Rate     Heart/Vascular:  Heart Findings:            Anesthesia Plan  Type of Anesthesia, risks & benefits discussed:  Anesthesia Type:  general, spinal  Patient's Preference:   Intra-op Monitoring Plan: standard ASA monitors  Intra-op Monitoring Plan Comments:   Post Op Pain Control Plan:   Post Op Pain Control Plan Comments:   Induction:   IV  Beta Blocker:  Patient is not currently on a Beta-Blocker (No further documentation required).       Informed Consent: Patient understands risks and agrees with Anesthesia plan.  Questions answered. Anesthesia consent signed with patient.  ASA Score: 3     Day of Surgery Review of History & Physical:    H&P update referred to the surgeon.         Ready For Surgery From Anesthesia Perspective.

## 2019-11-27 NOTE — PLAN OF CARE
11/27/19 1327   Post-Acute Status   Post-Acute Authorization HME   HME Status Referrals Sent   [facility]. RN asked CM to deliver BSC, rolling walker and transfer tub bench to patient prior to dc. Patient was still in OR. CM sent message to Jennifer @ [facility] to request permission to pull BSC, RW, hip kit and transfer tub bench from [facility]. CM will follow.     210pm [facility] called to inform CM that patient does not need the equipment that he previously requested CM to provide. He will deliver the hip kit, Jennifer with [facility] notified.

## 2019-11-27 NOTE — OP NOTE
[facility]  Orthopedic Surgery Department  Operative Note    SUMMARY     Date of Procedure: 11/27/2019     Procedure: Procedure(s) (LRB):  ARTHROPLASTY, HIP REPLACEMENT (Right)     Surgeon(s) and Role:     * Rahul Hanson II, MD - Primary    Assisting Surgeon: None    First Assist:  DANNI Mcgovern    Pre-Operative Diagnosis: Unilateral primary osteoarthritis, right hip [M16.11]    Post-Operative Diagnosis: Post-Op Diagnosis Codes:     * Unilateral primary osteoarthritis, right hip [M16.11]    Anesthesia: General    Technical Procedures Used:  Right total hip arthroplasty    Description of the Findings of the Procedure:  Dictated    Significant Surgical Tasks Conducted by the Assistant(s), if Applicable:  Superficial closure and bandage application    Complications: No    Estimated Blood Loss (EBL): * No values recorded between 11/27/2019  9:31 AM and 11/27/2019 10:27 AM *           Implants:   Implant Name Type Inv. Item Serial No.  Lot No. LRB No. Used   COVER REFLECTION I APEX THRD H - [id number]  COVER REFLECTION I APEX THRD H  [employer] [id number] Right 1   SHELL ACE 3 HOLE REFLCT 3 52MM - [id number]  SHELL ACE 3 HOLE REFLCT 3 52MM  [employer] [id number] Right 1   Size 14 Standard offset Synergy Porous Plus HA Femoral Compnent     [id number] Right 1   Oxinium 36mm S/+0 12/14 taper femoral head      [id number] Right 1   36mm, 52mm OD      [id number] Right 1       Specimens:   Specimen (12h ago, onward)    None                  Condition: Good    Disposition: PACU - hemodynamically stable.    Attestation: I was present for the entire procedure.    Procedure In Detail:  The patient was brought to the operating room and placed on the table in the supine position.  The patient underwent general endotracheal intubation without complication. The patient was then rolled in the left lateral decubitus position with the right hip up and secured on the bed with 2 post.   The right lower extremity was then prepped and draped in the normal sterile fashion. A posterior approach to the hip was created.  Dissection was taken through skin and subcutaneous tissue down to the tensor fascia charissa and fascia of the gluteus jeyson.  The fascia was sharply incised and blunt finger dissection was taken through the jeyson musculature.  The bursa was swept posteriorly.  The sciatic nerve was palpated and protected throughout the remainder of the case. This led to exposure of the posterior structures of the hip. Piriformis was identified and the interval between the piriformis and the gluteus minimus was developed.  The piriformis and other short external rotators were then taken as 1 cuff of tissue along with the hip capsule and tagged with Ethibond for later repair. This left excellent exposure of the hip joint which was noted to be severely arthritic.  The hip was dislocated.  A mariluz was made 20 mm proximal to the top lesser trochanter and the femoral neck cut was made and the diseased femoral head was removed. Hohmann retractors were placed anteriorly and posteriorly to the acetabulum and the femur was allowed translated anteriorly.  A capsulotomy was performed.  The cotyloid notch was cleared of soft tissue.  The remnant of the labrum was resected.  Once we had adequate exposure the acetabulum and was noted to have some anterior osteophytes and these were taken down with a rongeur. Sequential reaming was then undertaken to size 52.  A 52 trial noted to fit and fill the acetabulum appropriately.  A 52 mm R3 cup was then impacted into place and it was fully seated the entire pelvis with rock when trying to move the cup.  A hole cover was placed along with a trial liner and we then turned attention to the femur.    A box osteotome was used open the proximal femur followed mild canal finding Reamer and then a lateralizing Reamer.  Sequential reaming was undertaken to size 14.  Sequential  broaching was undertaken to size 14.  With the 14 broach in place we plane down the calcar and then began trialing different head neck combinations.  With a standard neck and a +0 head, 36 diameter, clinically leg lengths appeared to be equal.  There was no excessive Shuck.  The hip could be flexed to 90° and internally rotated to somewhere between 70 and 80° before subluxation.  We felt this to be a good construct and decided to go with those components with a 20 degree liner to provide added stability. The trials were removed.  A 20 degree liner was impacted into the cup with the lip posterior superior.  A size 14 standard Synergy stem was then impacted in the femur.  After was fully seated 36 mm +0 Oxinium head was impacted on the neck of the stem.  The final components in place the hip was again reduced.  With the final components in place we again had clinically equal leg lengths.  There was no excessive Shuck.  At this point the hip could be flexed to 90° and internally rotated to nearly 90° before subluxation. We felt this to be a very stable construct.  The hip was then placed in neutral position irrigated with 3 L normal saline and closure was begun.    The short external rotators and hip capsule repaired as 1 cuff of tissue to the medius tendon with Ethibond suture.  The fascia was then closed with a combination of 0 Vicryl and a 0 PDS strata fix.  Closed deep to the subdermal with a 2 0 PDS strata fix and the subcuticular layer was closed with a 3 0 Monocryl suture. A Dermabond Prineo device was then placed over the skin. A sterile Bioclusive intraoperative dressing was then placed over that along with a polar Care device for postoperative pain control.  A hip abduction pillow was placed and the patient was rolled supine.  X-rays in the OR show well-fixed right total hip arthroplasty in good alignment. Patient was then awakened from anesthesia and taken recovery where she was noted to be stable  postoperatively.  Needle and lap counts were correct at the end of the case.

## 2019-11-27 NOTE — HPI
This is a 69-year-old female patient of Dr. Rahul Hanson who has a past medical history of atrial fibrillation with chronic anticoagulation therapy with apixaban, diabetes, hypertension, hyperlipidemia, left breast cancer, and osteoarthritis of the right hip.  Today patient underwent a right total hip arthroplasty.  Patient tolerated procedure well and currently post procedure.

## 2019-11-27 NOTE — INTERVAL H&P NOTE
The patient has been examined and the H&P has been reviewed:    I concur with the findings and no changes have occurred since H&P was written.    Anesthesia/Surgery risks, benefits and alternative options discussed and understood by patient/family.          Active Hospital Problems    Diagnosis  POA    Unilateral primary osteoarthritis, right hip [M16.11]  Yes      Resolved Hospital Problems   No resolved problems to display.

## 2019-11-27 NOTE — TRANSFER OF CARE
"Anesthesia Transfer of Care Note    Patient: Eduardo Esposito    Procedure(s) Performed: Procedure(s) (LRB):  ARTHROPLASTY, HIP REPLACEMENT (Right)    Patient location: PACU    Anesthesia Type: MAC and spinal    Transport from OR: Transported from OR on room air with adequate spontaneous ventilation    Post pain: adequate analgesia    Post assessment: no apparent anesthetic complications and tolerated procedure well    Post vital signs: stable    Level of consciousness: sedated    Nausea/Vomiting: no nausea/vomiting    Complications: none    Transfer of care protocol was followed      Last vitals:   Visit Vitals  BP (!) 147/81 (BP Location: Left arm, Patient Position: Lying)   Pulse 64   Temp 36.5 °C (97.7 °F) (Skin)   Resp 20   Ht 5' 6" (1.676 m)   Wt 94.1 kg (207 lb 8 oz)   SpO2 95%   Breastfeeding? No   BMI 33.49 kg/m²     "

## 2019-11-27 NOTE — PLAN OF CARE
Patient reports living alone but her mother who is 90 yo ( looks amazing and independent) will assist in her care. Patient reports having all dme needed at home, [facility] delivered hip kit to room. PCP is Dr. Nanec, pharmacy is [facility]. Patient choice disclosure signed but patient has previously chosen HH from joint camp with [facility]. [facility] spoke to [facility] and they will see patient on Friday per patient request and ok with Dr. Hanson. Post op followup added to avs. Patient to dc this evening after Dr. Hanson assesses patient.      11/27/19 1530   Discharge Assessment   Assessment Type Discharge Planning Assessment   Confirmed/corrected address and phone number on facesheet? Yes   Assessment information obtained from? Patient;Caregiver   Communicated expected length of stay with patient/caregiver yes   Prior to hospitilization cognitive status: Alert/Oriented   Prior to hospitalization functional status: Independent   Current cognitive status: Alert/Oriented   Current Functional Status: Independent   Lives With alone   Able to Return to Prior Arrangements yes   Is patient able to care for self after discharge? Yes   Patient's perception of discharge disposition home health   Readmission Within the Last 30 Days no previous admission in last 30 days   Patient currently being followed by outpatient case management? No   Patient currently receives any other outside agency services? No   Equipment Currently Used at Home none   Do you have any problems affording any of your prescribed medications? No   Is the patient taking medications as prescribed? yes   Does the patient have transportation home? Yes   Transportation Anticipated family or friend will provide   Does the patient receive services at the [facility]mad[facility] Clinic? No   Discharge Plan A Home Health   DME Needed Upon Discharge  none   Patient/Family in Agreement with Plan yes

## 2019-11-27 NOTE — DISCHARGE SUMMARY
[facility]  Department of Orthopedic Surgery  Discharge Note  Short Stay    Admit Date: 11/27/2019    Discharge Date and Time: No discharge date for patient encounter.     Attending Physician: Rahul Hanson II, MD     Discharge Provider: Rahul Hanson II    Diagnoses:  Active Hospital Problems    Diagnosis  POA    *Unilateral primary osteoarthritis, right hip [M16.11]  Yes      Resolved Hospital Problems   No resolved problems to display.       Discharged Condition: good    Hospital Course: Patient was admitted for an outpatient procedure and tolerated the procedure well with no complications.    Final Diagnoses: Same as principal problem.    Disposition: Home or Self Care    Follow up/Patient Instructions:    Medications:  Reconciled Home Medications:      Medication List      ASK your doctor about these medications    anastrozole 1 mg Tab  Commonly known as:  ARIMIDEX  Take 1 tablet (1 mg total) by mouth once daily.     Antacid (calcium carbonate) 200 mg calcium (500 mg) chewable tablet  Generic drug:  calcium carbonate     atorvastatin 80 MG tablet  Commonly known as:  LIPITOR  Take 0.5 tablets (40 mg total) by mouth once daily.     blood sugar diagnostic Strp  Commonly known as:  True Metrix Glucose Test Strip  1 each by Misc.(Non-Drug; Combo Route) route once daily.     Eliquis 5 mg Tab  Generic drug:  apixaban  Take 1 tablet (5 mg total) by mouth 2 (two) times daily.     gabapentin 300 MG capsule  Commonly known as:  NEURONTIN  Take 1 capsule (300 mg total) by mouth 3 (three) times daily.     HYDROcodone-acetaminophen 7.5-325 mg per tablet  Commonly known as:  NORCO  Take 1 tablet by mouth every 6 (six) hours as needed.     lancets 32 gauge Misc  1 lancet by Misc.(Non-Drug; Combo Route) route once daily.     metFORMIN 500 MG tablet  Commonly known as:  GLUCOPHAGE  Take 1 tablet (500 mg total) by mouth 2 (two) times daily with meals.     silver sulfADIAZINE 1% 1 % cream  Commonly known as:   SILVADENE  Apply topically 2 (two) times daily.     triamterene-hydrochlorothiazide 37.5-25 mg 37.5-25 mg per tablet  Commonly known as:  MAXZIDE-25  Take 1 tablet by mouth once daily.     True Metrix Air Glucose Meter kit  Generic drug:  blood-glucose meter     Vitamin B-12 1000 MCG tablet  Generic drug:  cyanocobalamin  Take 100 mcg by mouth once daily.     Vitamin C 500 MG tablet  Generic drug:  ascorbic acid (vitamin C)  Take 500 mg by mouth once daily.     Vitamin D3 125 mcg (5,000 unit) Tab  Generic drug:  cholecalciferol (vitamin D3)  Take 1 tablet by mouth once daily.     zinc gluconate 50 mg tablet  Take 50 mg by mouth once daily.          Discharge Procedure Orders   Ambulatory referral to Home Health   Referral Priority: Routine Referral Type: Home Health   Referral Reason: Specialty Services Required   Requested Specialty: Home Health Services   Number of Visits Requested: 1     Follow-up Information     Rahul Hanson II, MD On 11/27/2019.    Specialty:  Orthopedic Surgery  Why:  post op followup  Chantal Hanson 12/12 @ 100pm  Contact information:  [address] [place] [address] DR Faviola GARCIA [id number] [phone]                   Discharge Procedure Orders (must include Diet, Follow-up, Activity):   Discharge Procedure Orders (must include Diet, Follow-up, Activity)   Ambulatory referral to Home Health   Referral Priority: Routine Referral Type: Home Health   Referral Reason: Specialty Services Required   Requested Specialty: Home Health Services   Number of Visits Requested: 1

## 2019-11-27 NOTE — PLAN OF CARE
11/27/19 1609   Post-Acute Status   Post-Acute Authorization Home Health/Hospice   HME Status Referrals Sent   Per [facility] already set up . CM sent referral via [facility] also.

## 2019-11-27 NOTE — PT/OT/SLP EVAL
Physical Therapy Evaluation and Discharge Note    Patient Name:  Eduardo Esposito   MRN:  [id number]    Recommendations:     Discharge Recommendations:  home health PT   Discharge Equipment Recommendations: none   Barriers to discharge: None    Assessment:     Eduardo Esposito is a 69 y.o. female admitted with a medical diagnosis of <principal problem not specified>. Pt demonstrated supine exercises, and all functional mobility at Modified independent or [facility].  Pt has met all acute PT goals and is cleared to d/c home with HHPT.    Recent Surgery: Procedure(s) (LRB):  ARTHROPLASTY, HIP REPLACEMENT (Right) Day of Surgery    Plan:     During this hospitalization, patient does not require further acute PT services.  Please re-consult if situation changes.      Subjective     Chief Complaint: fair  Patient/Family Comments/goals: to go home  Pain/Comfort:  · Pain Rating 1: 0/10    Patients cultural, spiritual, Mu-ism conflicts given the current situation: no    Living Environment:  Pt lives alone normally, in a 2 story home with an elevator  Prior to admission, patients level of function was modified independent.  Equipment used at home: walker, rolling, bedside commode.  DME owned (not currently used): none.  Upon discharge, patient will have assistance from mother.    Objective:     Communicated with nurse Luong prior to session.  Patient found up in the bathroom with Ana, OT with peripheral IV, hip abduction pillow upon PT entry to room.    General Precautions: Standard, fall   Orthopedic Precautions:RLE weight bearing as tolerated, RLE posterior precautions   Braces: N/A     Exams:  · Cognitive Exam:  Patient is oriented to Person, Place, Time and Situation  · Postural Exam:  Patient presented with the following abnormalities:    · -       Rounded shoulders  · -       Forward head  · RLE ROM: WFL except hip not tested due to post- surgical restrictions  · RLE Strength: Deficits: grossly 3/5  · LLE ROM: WFL  · LLE  Strength: WFL    Functional Mobility:  · Bed Mobility:     · Supine to Sit: modified independence  · Sit to Supine: minimum assistance and education to prevent R hip internal rotation  · Transfers:     · Sit to Stand:  contact guard assistance with rolling walker  · Gait: 250ft with RW, and CGA  · Balance: fair    AM-PAC 6 CLICK MOBILITY  Total Score:20       Therapeutic Activities and Exercises:   Supine Ap's, QS, GS, R hip abduction all x 10 reps.    AM-PAC 6 CLICK MOBILITY  Total Score:20     Patient left up in chair with all lines intact, call button in reach, chair alarm on and nurse Cherise notified.    GOALS:   Multidisciplinary Problems     Physical Therapy Goals     Not on file          Multidisciplinary Problems (Resolved)        Problem: Physical Therapy Goal    Goal Priority Disciplines Outcome Goal Variances Interventions   Physical Therapy Goal   (Resolved)     PT, PT/OT Met                     History:     Past Medical History:   Diagnosis Date    Atrial fibrillation     4/11/13-recent dx sceduled to see cardiology Monday- Dr Lynn-dm    Cancer     breast    Diabetes mellitus     Hyperlipemia     Hypertension        Past Surgical History:   Procedure Laterality Date    AXILLARY NODE DISSECTION Left 6/13/2019    Procedure: LYMPHADENECTOMY, AXILLARY;  Surgeon: Kendall Howard MD;  Location: [facility] OR;  Service: General;  Laterality: Left;  left lumpectomy with left axillary lymph node dissection    BREAST SURGERY      lumpectomy left    JOINT REPLACEMENT Right     knee    NEEDLE LOCALIZATION Left 6/13/2019    Procedure: NEEDLE LOCALIZATION;  Surgeon: Kendall Howard MD;  Location: [facility] OR;  Service: General;  Laterality: Left;  left lumpectomy with wire needle localization    SENTINEL LYMPH NODE BIOPSY Left 6/13/2019    Procedure: BIOPSY, LYMPH NODE, SENTINEL;  Surgeon: Kendall Howard MD;  Location: [facility] OR;  Service: General;  Laterality: Left;  left sentinel lymph node dissection     TONSILLECTOMY      TOTAL KNEE ARTHROPLASTY         Time Tracking:     PT Received On: 11/27/19  PT Start Time: 1346     PT Stop Time: 1414  PT Total Time (min): 28 min     Billable Minutes: Evaluation 14 and Gait Training 14      Melani Mehta, PT  11/27/2019

## 2019-11-27 NOTE — PLAN OF CARE
11/27/19 1627   Final Note   Assessment Type Final Discharge Note   Anticipated Discharge Disposition Home-Health  ([facility])

## 2019-11-27 NOTE — NURSING
Dr Hanson here and pt seen. Discharge orders received and initiated. IV dc'd intact. Family at bedside. Home Health already arranged. Ready for discharge. Discharge prescriptions and instructions given. Pt and mom verbalized an understanding.

## 2019-11-27 NOTE — ANESTHESIA POSTPROCEDURE EVALUATION
Anesthesia Post Evaluation    Patient: Eduardo Esposito    Procedure(s) Performed: Procedure(s) (LRB):  ARTHROPLASTY, HIP REPLACEMENT (Right)    Final Anesthesia Type: spinal    Patient location during evaluation: PACU  Patient participation: Yes- Able to Participate  Level of consciousness: awake and alert  Post-procedure vital signs: reviewed and stable  Pain management: adequate  Airway patency: patent    PONV status at discharge: No PONV  Anesthetic complications: no      Cardiovascular status: blood pressure returned to baseline  Respiratory status: unassisted  Hydration status: euvolemic  Follow-up not needed.          Vitals Value Taken Time   /62 11/27/2019 12:07 PM   Temp 36.6 °C (97.9 °F) 11/27/2019 12:07 PM   Pulse 54 11/27/2019 12:07 PM   Resp 16 11/27/2019 12:07 PM   SpO2 98 % 11/27/2019 12:07 PM         Event Time     Out of Recovery 12:20:00          Pain/Mi Score: Pain Rating Prior to Med Admin: 8 (11/27/2019  2:58 PM)  Mi Score: 10 (11/27/2019 11:50 AM)

## 2019-11-27 NOTE — PROGRESS NOTES
Blood bank band noted with first name spelled incorrectly.  Lab notified of need for redraw and banding.

## 2019-11-27 NOTE — ASSESSMENT & PLAN NOTE
Status post right total hip arthroplasty on 11/27/2019-  Orders as per surgeon- Dr. Rahul Hansno  Pain control  Consult physical therapy and Occupational therapy  Fall precautions

## 2019-11-27 NOTE — SUBJECTIVE & OBJECTIVE
"Principal Problem:  Osteoarthritis right hip    Principal Orthopedic Problem:  Osteoarthritis of the right hip, status post right total hip arthroplasty    Interval History:  Patient underwent right total hip arthroplasty this morning    Review of patient's allergies indicates:   Allergen Reactions    Lisinopril      hives       Current Facility-Administered Medications   Medication    acetaminophen tablet 1,000 mg    apixaban tablet 5 mg    atorvastatin tablet 40 mg    bisacodyl suppository 10 mg    cefazolin (ANCEF) 2 gram in dextrose 5% 50 mL IVPB (premix)    cholecalciferol (vitamin D3) 125 mcg (5,000 unit) tablet 5,000 Units    cyanocobalamin tablet 100 mcg    docusate sodium capsule 100 mg    famotidine tablet 20 mg    HYDROmorphone injection 1 mg    ketorolac injection 15 mg    lactulose 20 gram/30 mL solution Soln 20 g    loperamide capsule 2 mg    metFORMIN tablet 500 mg    morphine injection 2 mg    mupirocin 2 % ointment 1 g    ondansetron disintegrating tablet 8 mg    pregabalin capsule 75 mg    promethazine tablet 25 mg    sodium chloride 0.9% flush 10 mL    traMADol tablet 50 mg    triamterene-hydrochlorothiazide 37.5-25 mg per tablet 1 tablet    zolpidem tablet 5 mg     Objective:     Vital Signs (Most Recent):  Temp: 98 °F (36.7 °C) (11/27/19 1534)  Pulse: 72 (11/27/19 1534)  Resp: 18 (11/27/19 1534)  BP: (!) 123/58 (11/27/19 1534)  SpO2: (!) 94 % (11/27/19 1534) Vital Signs (24h Range):  Temp:  [97.3 °F (36.3 °C)-98 °F (36.7 °C)] 98 °F (36.7 °C)  Pulse:  [52-72] 72  Resp:  [15-20] 18  SpO2:  [94 %-100 %] 94 %  BP: (113-147)/(56-81) 123/58     Weight: 94.1 kg (207 lb 8 oz)  Height: 5' 6" (167.6 cm)  Body mass index is 33.49 kg/m².      Intake/Output Summary (Last 24 hours) at 11/27/2019 1618  Last data filed at 11/27/2019 1200  Gross per 24 hour   Intake 1352.17 ml   Output --   Net 1352.17 ml       General    Constitutional: She is oriented to person, place, and time. She " appears well-developed and well-nourished.   HENT:   Head: Normocephalic and atraumatic.   Eyes: EOM are normal. Pupils are equal, round, and reactive to light.   Neck: Neck supple.   Cardiovascular: Normal rate, regular rhythm and normal heart sounds.    Pulmonary/Chest: Effort normal and breath sounds normal.   Abdominal: Soft. Bowel sounds are normal.   Neurological: She is alert and oriented to person, place, and time.   Psychiatric: She has a normal mood and affect. Her behavior is normal. Thought content normal.             Right Hip Exam     Comments:  Bandage is clean, dry, intact  Intact motor function distally EHL, FHL, TA, gastroc  Plus two dorsalis pedis and posterior tibial pulses  Normal sensation light touch dorsal, plantar, 1st webspace        Significant Labs: All pertinent labs within the past 24 hours have been reviewed.  None    Significant Imaging: X-Ray: I have reviewed all pertinent results/findings and my personal findings are:  There is a right total hip arthroplasty that is well fixed and in good alignment

## 2019-11-27 NOTE — H&P
[facility] Hospital Medicine  History & Physical    Patient Name: Eduardo Esposito  MRN: [id number]  Admission Date: 11/27/2019  Attending Physician: Jacob Ashton MD   Primary Care Provider: Walter Nance MD         Patient information was obtained from patient and Records available.     Subjective:     Principal Problem:Unilateral primary osteoarthritis, right hip, status post right hip total arthroplasty    Chief Complaint:  Right hip pain     HPI: This is a 69-year-old female patient of Dr. Rahul Hanson who has a past medical history of atrial fibrillation with chronic anticoagulation therapy with apixaban, diabetes, hypertension, hyperlipidemia, left breast cancer, and osteoarthritis of the right hip.  Today patient underwent a right total hip arthroplasty.  Patient tolerated procedure well and currently post procedure.         Past Medical History:   Diagnosis Date    Atrial fibrillation     4/11/13-recent dx sceduled to see cardiology Monday- Dr Lynn-dm    Cancer     breast    Diabetes mellitus     Hyperlipemia     Hypertension        Past Surgical History:   Procedure Laterality Date    AXILLARY NODE DISSECTION Left 6/13/2019    Procedure: LYMPHADENECTOMY, AXILLARY;  Surgeon: Kendall Howard MD;  Location: [facility] OR;  Service: General;  Laterality: Left;  left lumpectomy with left axillary lymph node dissection    BREAST SURGERY      lumpectomy left    JOINT REPLACEMENT Right     knee    NEEDLE LOCALIZATION Left 6/13/2019    Procedure: NEEDLE LOCALIZATION;  Surgeon: Kendall Howard MD;  Location: [facility] OR;  Service: General;  Laterality: Left;  left lumpectomy with wire needle localization    SENTINEL LYMPH NODE BIOPSY Left 6/13/2019    Procedure: BIOPSY, LYMPH NODE, SENTINEL;  Surgeon: Kendall Howard MD;  Location: [facility] OR;  Service: General;  Laterality: Left;  left sentinel lymph node dissection    TONSILLECTOMY      TOTAL KNEE ARTHROPLASTY         Review of  patient's allergies indicates:   Allergen Reactions    Lisinopril      hives       No current facility-administered medications on file prior to encounter.      Current Outpatient Medications on File Prior to Encounter   Medication Sig    anastrozole (ARIMIDEX) 1 mg Tab Take 1 tablet (1 mg total) by mouth once daily.    ascorbic acid, vitamin C, (VITAMIN C) 500 MG tablet Take 500 mg by mouth once daily.    atorvastatin (LIPITOR) 80 MG tablet Take 0.5 tablets (40 mg total) by mouth once daily. (Patient taking differently: Take 40 mg by mouth every evening. )    cyanocobalamin (VITAMIN B-12) 1000 MCG tablet Take 100 mcg by mouth once daily.    metFORMIN (GLUCOPHAGE) 500 MG tablet Take 1 tablet (500 mg total) by mouth 2 (two) times daily with meals.    triamterene-hydrochlorothiazide 37.5-25 mg (MAXZIDE-25) 37.5-25 mg per tablet Take 1 tablet by mouth once daily.    VITAMIN D3 5,000 unit Tab Take 1 tablet by mouth once daily.    zinc gluconate 50 mg tablet Take 50 mg by mouth once daily.    ANTACID, CALCIUM CARBONATE, 200 mg calcium (500 mg) chewable tablet     blood sugar diagnostic (TRUE METRIX GLUCOSE TEST STRIP) Strp 1 each by Misc.(Non-Drug; Combo Route) route once daily.    lancets 32 gauge Misc 1 lancet by Misc.(Non-Drug; Combo Route) route once daily.    TRUE METRIX AIR GLUCOSE METER kit      Family History     Problem Relation (Age of Onset)    Breast cancer Mother        Tobacco Use    Smoking status: Never Smoker    Smokeless tobacco: Never Used   Substance and Sexual Activity    Alcohol use: No     Comment: once or twice a year    Drug use: No    Sexual activity: Not on file       Patient seen in PACU      Review of Systems   Constitutional: Positive for activity change. Negative for appetite change, chills, diaphoresis, fatigue and fever.   HENT: Negative for ear discharge, ear pain and facial swelling.    Eyes: Negative for pain and redness.   Respiratory: Negative for cough and  shortness of breath.    Cardiovascular: Negative for leg swelling.   Gastrointestinal: Negative for abdominal distention, abdominal pain, blood in stool, constipation, diarrhea, nausea and vomiting.   Endocrine: Negative for polydipsia and polyphagia.   Genitourinary: Negative for difficulty urinating, dysuria, flank pain and hematuria.   Musculoskeletal: Positive for arthralgias. Negative for neck pain and neck stiffness.        Right hip   Skin: Positive for wound. Negative for color change.        Right hip surgical dressing   Allergic/Immunologic: Negative for food allergies.   Neurological: Negative for seizures, facial asymmetry, speech difficulty and weakness.   Hematological: Bruises/bleeds easily.   Psychiatric/Behavioral: Negative for agitation, behavioral problems, confusion, dysphoric mood and suicidal ideas. The patient is not nervous/anxious.      Objective:     Vital Signs (Most Recent):  Temp: 98.6 °F (37 °C) (11/27/19 1643)  Pulse: 72 (11/27/19 1643)  Resp: 18 (11/27/19 1643)  BP: (!) 123/58 (11/27/19 1643)  SpO2: (!) 94 % (11/27/19 1534) Vital Signs (24h Range):  Temp:  [97.3 °F (36.3 °C)-98.6 °F (37 °C)] 98.6 °F (37 °C)  Pulse:  [52-72] 72  Resp:  [15-20] 18  SpO2:  [94 %-100 %] 94 %  BP: (113-147)/(56-81) 123/58     Weight: 94.1 kg (207 lb 7.2 oz)  Body mass index is 33.48 kg/m².    Physical Exam   Constitutional: She is oriented to person, place, and time. She appears well-developed and well-nourished. No distress.   HENT:   Head: Normocephalic and atraumatic.   Eyes: Pupils are equal, round, and reactive to light. Conjunctivae and EOM are normal. Right eye exhibits no discharge. Left eye exhibits no discharge.   Neck: Normal range of motion. Neck supple. No JVD present.   Cardiovascular: Normal rate, normal heart sounds and intact distal pulses.   No murmur heard.  Irregular rhythm   Pulmonary/Chest: Effort normal and breath sounds normal. No respiratory distress. She has no wheezes. She has no  rales.   Abdominal: Soft. She exhibits no distension. There is no tenderness. There is no guarding.   Bowel sounds noted   Genitourinary:   Genitourinary Comments: Not examined   Musculoskeletal: She exhibits no edema.   Right lower extremity range of motion not tested   Neurological: She is alert and oriented to person, place, and time. No cranial nerve deficit.   Skin: Skin is warm and dry. Capillary refill takes less than 2 seconds. She is not diaphoretic.   Right hip surgical dressing intact   Psychiatric: She has a normal mood and affect. Her behavior is normal. Judgment and thought content normal.         CRANIAL NERVES     CN III, IV, VI   Pupils are equal, round, and reactive to light.  Extraocular motions are normal.     Preop labs reviewed    MRSA surveillance screen is negative    Assessment/Plan:     * Unilateral primary osteoarthritis, right hip  Status post right total hip arthroplasty on 11/27/2019-  Orders as per surgeon- Dr. Rahul Hanson  Pain control  Consult physical therapy and Occupational therapy  Fall precautions      Hyperlipidemia associated with type 2 diabetes mellitus  Continue home medication      Malignant neoplasm of left female breast  Continue home medication      Long term (current) use of anticoagulants        Hypertension associated with diabetes  Continue home medications      Controlled type 2 diabetes mellitus without complication, without long-term current use of insulin  Diabetic diet  Accu-Cheks with correctional sliding scale insulin  Continue home medications      Chronic atrial fibrillation  With chronic anticoagulation therapy of apixaban-  Telemetry  Continue home medications        VTE Risk Mitigation (From admission, onward)         Ordered     apixaban tablet 5 mg  2 times daily      11/27/19 1230     IP VTE HIGH RISK PATIENT  Once      11/27/19 1231     Place ANDREZ hose  Until discontinued      11/27/19 1231     Place sequential compression device  Until discontinued       11/27/19 1231               Time spent seeing patient( greater than 1/2 spent in direct contact) : 54 minutes      BOBY Morton  Department of Hospital Medicine   [facility]

## 2019-11-27 NOTE — HOSPITAL COURSE
Patient has had an excellent hospital course.  She has progressed rapidly and met all goals with physical therapy and occupational therapy.  She basically is function independently in ready to discharge home this afternoon.

## 2019-11-28 NOTE — PT/OT/SLP EVAL
Occupational Therapy   Evaluation and Discharge Note    Name: Eduardo Esposito  MRN: [id number]  Admitting Diagnosis:  Unilateral primary osteoarthritis, right hip Day of Surgery    Recommendations:     Discharge Recommendations: home, home health OT  Discharge Equipment Recommendations:  none, other (see comments)(Patient already owns bedside commode and transfer tub bench; tub/shower combo also has 2 grab bars)  Barriers to discharge:  None, Other (Comment)(Patient's mother will be staying with her for the next 2 weeks as primary caregiver)    Assessment:     Eduardo Esposito is a 69 y.o. female with a medical diagnosis of Unilateral primary osteoarthritis, right hip. At this time, patient is functioning at their prior level of function and does not require further acute OT services.     Plan:     During this hospitalization, patient does not require further acute OT services.  Please re-consult if situation changes.    · Plan of Care Reviewed with: patient, mother    Subjective     Chief Complaint: None  Patient/Family Comments/goals: To be discharged home today    Occupational Profile:  Living Environment: Patient lives in 2-story home with elevator present; tub/shower combo with 2 grab bars present; pt also already obtained a bedside commode (already positioned over toilet) and transfer tub bench (pt reports that used TTB this morning with Supervision of mother to shower prior to her surgery   Previous level of function: Independent   Equipment Used at home:  none  Assistance upon Discharge: Patient's mother will be staying with her as primary caregiver for the next 2 weeks     Pain/Comfort:  · Pain Rating 1: 7/10  · Location - Side 1: Right  · Location - Orientation 1: generalized  · Location 1: hip  · Pain Addressed 1: Reposition, Cessation of Activity, Nurse notified  · Pain Rating Post-Intervention 1: 7/10(Pt reporting pain at end of session)    Patients cultural, spiritual, Anabaptist conflicts given the  current situation: no    Objective:     Communicated with: NurseCherise prior to/post session.  Patient found HOB elevated with bed alarm, hip abduction pillow, peripheral IV, SCD upon OT entry to room.    General Precautions: Standard, fall   Orthopedic Precautions:RLE weight bearing as tolerated, RLE posterior precautions   Braces: N/A     Occupational Performance:    Bed Mobility:  Step by step verbal instruction for sequence and UE/LE positioning in adherence with posterior hip precautions   · Patient completed Rolling/Turning to Left with  minimum assistance  · Patient completed Supine to Sit with minimum assistance    Functional Mobility/Transfers:  · Patient completed Sit <> Stand Transfer with contact guard assistance and minimum assistance  with  rolling walker   · Patient completed Bed <> Chair Transfer using Stand Pivot technique with stand by assistance with rolling walker  · Patient completed Toilet Transfer Stand Pivot technique with stand by assistance and contact guard assistance with  rolling walker and bedside commode positioned over toilet  · Functional Mobility: Patient ambulating with CGA and RW EOB > toilet  With step by step verbal instruction for sequencing/correct use of walker in adherence with posterior hip precautions - OTR demonstrating prior to pt's performance and providing verbal instruction as needed to avoid internal rotation when step, turn pivoting to/from sit surfaces as well as positioning of UE/LE to ensure adherence to posterior hip precautions during all sit/stands - pt demonstrates correct sequence/technique     Activities of Daily Living:  · Lower Body Dressing: stand by assistance once hip kit provided, OTR providing demonstration prior to patient's performance of use to complete LB dressing tasks - instructed on use of reacher to thread R LE through clothing and instructed on sandra dressing techniques - pt demonstrates don/doffing robel pants with SBA  · Toileting: stand by  assistance and contact guard assistance OTR providing instruction for correct/safe techniques when managing clothing (unilateral UE support of walker to maintain stability) as well as positioning for hygiene to ensure adherence with posterior hip precautions - pt demonstrates with SBA    Cognitive/Visual Perceptual:  Cognitive/Psychosocial Skills:     -       Oriented to: Person, Place, Time and Situation   -       Follows Commands/attention:Follows multistep  commands  -       Safety awareness/insight to disability: intact     Physical Exam:  Postural examination/scapula alignment:    -       No postural abnormalities identified  Edema:  mild to moderate of R LE  Upper Extremity Strength:    -       Right Upper Extremity: WFL  -       Left Upper Extremity: WFL    AMPAC 6 Click ADL:  AMPAC Total Score: 18    Treatment & Education:  - OTR providing education/ instruction regarding posterior hip precautions and providing verbal application with demonstration for functional application during ADL and functional transfers; OTR instructing patient not to sit on her recliner as she reports is at a low height; OTR reinforcing functional application of posterior hip precaution - patient and her mother verbalize and demonstrate understanding of all education/instruction provided and in agreement when appropriate   - OTR providing education/instruction regarding safety awareness/fall prevention in home environment and step by step education/instruction regarding bathing process - remain seated for bathing until dorothy/buttocks cleaning and use of non-slip mat on tub floor and bench - pt and her mother verbalize understanding and in agreement   - OTr reinforcing OT role/purpose and application in home health setting - pt/mother verbalize/demonstrate understanding and in agreement   Education:    Patient left up in chair with all lines intact, call button in reach, chair alarm on, nurseCherise notified and patient's mother  present    GOALS:   Multidisciplinary Problems     Occupational Therapy Goals     Not on file                History:     Past Medical History:   Diagnosis Date    Atrial fibrillation     4/11/13-recent dx sceduled to see cardiology Monday- Dr Lynn-dm    Cancer     breast    Diabetes mellitus     Hyperlipemia     Hypertension        Past Surgical History:   Procedure Laterality Date    AXILLARY NODE DISSECTION Left 6/13/2019    Procedure: LYMPHADENECTOMY, AXILLARY;  Surgeon: Kendall Howard MD;  Location: [facility];  Service: General;  Laterality: Left;  left lumpectomy with left axillary lymph node dissection    BREAST SURGERY      lumpectomy left    JOINT REPLACEMENT Right     knee    NEEDLE LOCALIZATION Left 6/13/2019    Procedure: NEEDLE LOCALIZATION;  Surgeon: Kendall Howard MD;  Location: [facility] OR;  Service: General;  Laterality: Left;  left lumpectomy with wire needle localization    SENTINEL LYMPH NODE BIOPSY Left 6/13/2019    Procedure: BIOPSY, LYMPH NODE, SENTINEL;  Surgeon: Kendall Howard MD;  Location: [facility];  Service: General;  Laterality: Left;  left sentinel lymph node dissection    TONSILLECTOMY      TOTAL KNEE ARTHROPLASTY         Time Tracking:     OT Date of Treatment: 11/27/19  OT Start Time: 1300  OT Stop Time: 1345  OT Total Time (min): 45 min    Billable Minutes:Evaluation 15  Self Care/Home Management 10  Therapeutic Activity 20    HOWARD Hathaway LOTR  11/27/2019

## 2019-11-29 ENCOUNTER — TELEPHONE (OUTPATIENT)
Dept: ORTHOPEDICS | Facility: CLINIC | Age: 69
End: 2019-11-29

## 2019-11-29 ENCOUNTER — PATIENT OUTREACH (OUTPATIENT)
Dept: ADMINISTRATIVE | Facility: CLINIC | Age: 69
End: 2019-11-29

## 2019-11-29 PROCEDURE — G0180 MD CERTIFICATION HHA PATIENT: HCPCS | Mod: ,,, | Performed by: ORTHOPAEDIC SURGERY

## 2019-11-29 PROCEDURE — G0180 PR HOME HEALTH MD CERTIFICATION: ICD-10-PCS | Mod: ,,, | Performed by: ORTHOPAEDIC SURGERY

## 2019-11-29 NOTE — TELEPHONE ENCOUNTER
Returned Boone's call, advised that it is ok to change island dressing to new dressing due to bleeding.  No s/s of infection.  Pt going up and down stairs a lot, believed to be cause of bleeding.  Encouraged him to call back if needed.

## 2019-11-29 NOTE — TELEPHONE ENCOUNTER
----- Message from Braden Conde sent at 11/29/2019  9:34 AM CST -----  Contact: Meagan metzger [facility], [phone]  Wound issue, bleeding, please call ASAP

## 2019-12-05 ENCOUNTER — EXTERNAL HOME HEALTH (OUTPATIENT)
Dept: HOME HEALTH SERVICES | Facility: HOSPITAL | Age: 69
End: 2019-12-05
Payer: MEDICARE

## 2019-12-09 DIAGNOSIS — M16.11 UNILATERAL PRIMARY OSTEOARTHRITIS, RIGHT HIP: Primary | ICD-10-CM

## 2019-12-11 ENCOUNTER — LAB VISIT (OUTPATIENT)
Dept: LAB | Facility: HOSPITAL | Age: 69
End: 2019-12-11
Attending: INTERNAL MEDICINE
Payer: MEDICARE

## 2019-12-11 DIAGNOSIS — Z79.01 LONG TERM (CURRENT) USE OF ANTICOAGULANTS: ICD-10-CM

## 2019-12-11 DIAGNOSIS — Z09 ONCOLOGY FOLLOW-UP ENCOUNTER: ICD-10-CM

## 2019-12-11 DIAGNOSIS — Z79.811 USE OF ANASTROZOLE (ARIMIDEX): ICD-10-CM

## 2019-12-11 DIAGNOSIS — I48.20 CHRONIC ATRIAL FIBRILLATION: ICD-10-CM

## 2019-12-11 DIAGNOSIS — C50.912 MALIGNANT NEOPLASM OF LEFT FEMALE BREAST, UNSPECIFIED ESTROGEN RECEPTOR STATUS, UNSPECIFIED SITE OF BREAST: ICD-10-CM

## 2019-12-11 LAB
ALBUMIN SERPL BCP-MCNC: 3.7 G/DL (ref 3.5–5.2)
ALP SERPL-CCNC: 123 U/L (ref 55–135)
ALT SERPL W/O P-5'-P-CCNC: 15 U/L (ref 10–44)
ANION GAP SERPL CALC-SCNC: 12 MMOL/L (ref 8–16)
AST SERPL-CCNC: 14 U/L (ref 10–40)
BASOPHILS # BLD AUTO: 0.03 K/UL (ref 0–0.2)
BASOPHILS NFR BLD: 0.3 % (ref 0–1.9)
BILIRUB SERPL-MCNC: 0.7 MG/DL (ref 0.1–1)
BUN SERPL-MCNC: 16 MG/DL (ref 8–23)
CALCIUM SERPL-MCNC: 10.1 MG/DL (ref 8.7–10.5)
CHLORIDE SERPL-SCNC: 100 MMOL/L (ref 95–110)
CO2 SERPL-SCNC: 28 MMOL/L (ref 23–29)
CREAT SERPL-MCNC: 0.7 MG/DL (ref 0.5–1.4)
DIFFERENTIAL METHOD: ABNORMAL
EOSINOPHIL # BLD AUTO: 0.1 K/UL (ref 0–0.5)
EOSINOPHIL NFR BLD: 1.6 % (ref 0–8)
ERYTHROCYTE [DISTWIDTH] IN BLOOD BY AUTOMATED COUNT: 13.3 % (ref 11.5–14.5)
EST. GFR  (AFRICAN AMERICAN): >60 ML/MIN/1.73 M^2
EST. GFR  (NON AFRICAN AMERICAN): >60 ML/MIN/1.73 M^2
GLUCOSE SERPL-MCNC: 76 MG/DL (ref 70–110)
HCT VFR BLD AUTO: 43.3 % (ref 37–48.5)
HGB BLD-MCNC: 13.5 G/DL (ref 12–16)
IMM GRANULOCYTES # BLD AUTO: 0.13 K/UL (ref 0–0.04)
LYMPHOCYTES # BLD AUTO: 2.1 K/UL (ref 1–4.8)
LYMPHOCYTES NFR BLD: 23.1 % (ref 18–48)
MCH RBC QN AUTO: 28.1 PG (ref 27–31)
MCHC RBC AUTO-ENTMCNC: 31.2 G/DL (ref 32–36)
MCV RBC AUTO: 90 FL (ref 82–98)
MONOCYTES # BLD AUTO: 0.8 K/UL (ref 0.3–1)
MONOCYTES NFR BLD: 8.9 % (ref 4–15)
NEUTROPHILS # BLD AUTO: 5.7 K/UL (ref 1.8–7.7)
NEUTROPHILS NFR BLD: 64.6 % (ref 38–73)
NRBC BLD-RTO: 0 /100 WBC
PLATELET # BLD AUTO: 429 K/UL (ref 150–350)
PMV BLD AUTO: 8.8 FL (ref 9.2–12.9)
POTASSIUM SERPL-SCNC: 3.6 MMOL/L (ref 3.5–5.1)
PROT SERPL-MCNC: 7.1 G/DL (ref 6–8.4)
RBC # BLD AUTO: 4.8 M/UL (ref 4–5.4)
SODIUM SERPL-SCNC: 140 MMOL/L (ref 136–145)
WBC # BLD AUTO: 8.88 K/UL (ref 3.9–12.7)

## 2019-12-11 PROCEDURE — 85025 COMPLETE CBC W/AUTO DIFF WBC: CPT | Mod: HCNC

## 2019-12-11 PROCEDURE — 36415 COLL VENOUS BLD VENIPUNCTURE: CPT | Mod: HCNC

## 2019-12-11 PROCEDURE — 80053 COMPREHEN METABOLIC PANEL: CPT | Mod: HCNC

## 2019-12-12 ENCOUNTER — OFFICE VISIT (OUTPATIENT)
Dept: HEMATOLOGY/ONCOLOGY | Facility: CLINIC | Age: 69
End: 2019-12-12
Payer: MEDICARE

## 2019-12-12 ENCOUNTER — OFFICE VISIT (OUTPATIENT)
Dept: ORTHOPEDICS | Facility: CLINIC | Age: 69
End: 2019-12-12
Payer: MEDICARE

## 2019-12-12 ENCOUNTER — HOSPITAL ENCOUNTER (OUTPATIENT)
Dept: RADIOLOGY | Facility: HOSPITAL | Age: 69
Discharge: HOME | End: 2019-12-12
Attending: ORTHOPAEDIC SURGERY
Payer: MEDICARE

## 2019-12-12 VITALS
TEMPERATURE: 98 F | OXYGEN SATURATION: 97 % | BODY MASS INDEX: 33.17 KG/M2 | DIASTOLIC BLOOD PRESSURE: 56 MMHG | RESPIRATION RATE: 16 BRPM | HEIGHT: 66 IN | HEART RATE: 77 BPM | SYSTOLIC BLOOD PRESSURE: 127 MMHG | WEIGHT: 206.38 LBS

## 2019-12-12 VITALS
DIASTOLIC BLOOD PRESSURE: 66 MMHG | BODY MASS INDEX: 33.17 KG/M2 | HEIGHT: 66 IN | WEIGHT: 206.38 LBS | HEART RATE: 80 BPM | SYSTOLIC BLOOD PRESSURE: 128 MMHG

## 2019-12-12 DIAGNOSIS — M16.11 UNILATERAL PRIMARY OSTEOARTHRITIS, RIGHT HIP: ICD-10-CM

## 2019-12-12 DIAGNOSIS — C50.112 MALIGNANT NEOPLASM OF CENTRAL PORTION OF LEFT BREAST IN FEMALE, ESTROGEN RECEPTOR POSITIVE: Primary | ICD-10-CM

## 2019-12-12 DIAGNOSIS — Z17.0 MALIGNANT NEOPLASM OF BREAST IN FEMALE, ESTROGEN RECEPTOR POSITIVE, UNSPECIFIED LATERALITY, UNSPECIFIED SITE OF BREAST: ICD-10-CM

## 2019-12-12 DIAGNOSIS — Z96.641 HISTORY OF RIGHT HIP REPLACEMENT: Primary | ICD-10-CM

## 2019-12-12 DIAGNOSIS — E11.9 CONTROLLED TYPE 2 DIABETES MELLITUS WITHOUT COMPLICATION, WITHOUT LONG-TERM CURRENT USE OF INSULIN: ICD-10-CM

## 2019-12-12 DIAGNOSIS — E11.59 HYPERTENSION ASSOCIATED WITH DIABETES: ICD-10-CM

## 2019-12-12 DIAGNOSIS — C50.912 INFILTRATING DUCTAL CARCINOMA OF LEFT BREAST: Primary | ICD-10-CM

## 2019-12-12 DIAGNOSIS — Z17.0 MALIGNANT NEOPLASM OF CENTRAL PORTION OF LEFT BREAST IN FEMALE, ESTROGEN RECEPTOR POSITIVE: Primary | ICD-10-CM

## 2019-12-12 DIAGNOSIS — Z79.01 LONG TERM (CURRENT) USE OF ANTICOAGULANTS: ICD-10-CM

## 2019-12-12 DIAGNOSIS — I48.20 CHRONIC ATRIAL FIBRILLATION: ICD-10-CM

## 2019-12-12 DIAGNOSIS — I15.2 HYPERTENSION ASSOCIATED WITH DIABETES: ICD-10-CM

## 2019-12-12 DIAGNOSIS — C50.919 MALIGNANT NEOPLASM OF BREAST IN FEMALE, ESTROGEN RECEPTOR POSITIVE, UNSPECIFIED LATERALITY, UNSPECIFIED SITE OF BREAST: ICD-10-CM

## 2019-12-12 PROCEDURE — 3044F PR MOST RECENT HEMOGLOBIN A1C LEVEL <7.0%: ICD-10-PCS | Mod: HCNC,CPTII,S$GLB, | Performed by: INTERNAL MEDICINE

## 2019-12-12 PROCEDURE — 73502 XR HIP 2 VIEW RIGHT: ICD-10-PCS | Mod: 26,HCNC,RT, | Performed by: RADIOLOGY

## 2019-12-12 PROCEDURE — 3074F PR MOST RECENT SYSTOLIC BLOOD PRESSURE < 130 MM HG: ICD-10-PCS | Mod: HCNC,CPTII,S$GLB, | Performed by: INTERNAL MEDICINE

## 2019-12-12 PROCEDURE — 99999 PR PBB SHADOW E&M-EST. PATIENT-LVL III: ICD-10-PCS | Mod: PBBFAC,HCNC,, | Performed by: ORTHOPAEDIC SURGERY

## 2019-12-12 PROCEDURE — 1159F PR MEDICATION LIST DOCUMENTED IN MEDICAL RECORD: ICD-10-PCS | Mod: HCNC,S$GLB,, | Performed by: INTERNAL MEDICINE

## 2019-12-12 PROCEDURE — 3078F PR MOST RECENT DIASTOLIC BLOOD PRESSURE < 80 MM HG: ICD-10-PCS | Mod: HCNC,CPTII,S$GLB, | Performed by: INTERNAL MEDICINE

## 2019-12-12 PROCEDURE — 1101F PT FALLS ASSESS-DOCD LE1/YR: CPT | Mod: HCNC,CPTII,S$GLB, | Performed by: INTERNAL MEDICINE

## 2019-12-12 PROCEDURE — 99215 PR OFFICE/OUTPT VISIT, EST, LEVL V, 40-54 MIN: ICD-10-PCS | Mod: HCNC,S$GLB,, | Performed by: INTERNAL MEDICINE

## 2019-12-12 PROCEDURE — 3044F HG A1C LEVEL LT 7.0%: CPT | Mod: HCNC,CPTII,S$GLB, | Performed by: INTERNAL MEDICINE

## 2019-12-12 PROCEDURE — 99215 OFFICE O/P EST HI 40 MIN: CPT | Mod: HCNC,S$GLB,, | Performed by: INTERNAL MEDICINE

## 2019-12-12 PROCEDURE — 1159F MED LIST DOCD IN RCRD: CPT | Mod: HCNC,S$GLB,, | Performed by: INTERNAL MEDICINE

## 2019-12-12 PROCEDURE — 99024 PR POST-OP FOLLOW-UP VISIT: ICD-10-PCS | Mod: HCNC,S$GLB,, | Performed by: ORTHOPAEDIC SURGERY

## 2019-12-12 PROCEDURE — 73502 X-RAY EXAM HIP UNI 2-3 VIEWS: CPT | Mod: 26,HCNC,RT, | Performed by: RADIOLOGY

## 2019-12-12 PROCEDURE — 3074F SYST BP LT 130 MM HG: CPT | Mod: HCNC,CPTII,S$GLB, | Performed by: INTERNAL MEDICINE

## 2019-12-12 PROCEDURE — 99024 POSTOP FOLLOW-UP VISIT: CPT | Mod: HCNC,S$GLB,, | Performed by: ORTHOPAEDIC SURGERY

## 2019-12-12 PROCEDURE — 99999 PR PBB SHADOW E&M-EST. PATIENT-LVL III: ICD-10-PCS | Mod: PBBFAC,HCNC,, | Performed by: INTERNAL MEDICINE

## 2019-12-12 PROCEDURE — 73502 X-RAY EXAM HIP UNI 2-3 VIEWS: CPT | Mod: TC,HCNC,PN,RT

## 2019-12-12 PROCEDURE — 1125F PR PAIN SEVERITY QUANTIFIED, PAIN PRESENT: ICD-10-PCS | Mod: HCNC,S$GLB,, | Performed by: INTERNAL MEDICINE

## 2019-12-12 PROCEDURE — 1125F AMNT PAIN NOTED PAIN PRSNT: CPT | Mod: HCNC,S$GLB,, | Performed by: INTERNAL MEDICINE

## 2019-12-12 PROCEDURE — 3078F DIAST BP <80 MM HG: CPT | Mod: HCNC,CPTII,S$GLB, | Performed by: INTERNAL MEDICINE

## 2019-12-12 PROCEDURE — 1101F PR PT FALLS ASSESS DOC 0-1 FALLS W/OUT INJ PAST YR: ICD-10-PCS | Mod: HCNC,CPTII,S$GLB, | Performed by: INTERNAL MEDICINE

## 2019-12-12 PROCEDURE — 99999 PR PBB SHADOW E&M-EST. PATIENT-LVL III: CPT | Mod: PBBFAC,HCNC,, | Performed by: ORTHOPAEDIC SURGERY

## 2019-12-12 PROCEDURE — 99999 PR PBB SHADOW E&M-EST. PATIENT-LVL III: CPT | Mod: PBBFAC,HCNC,, | Performed by: INTERNAL MEDICINE

## 2019-12-12 RX ORDER — ANASTROZOLE 1 MG/1
1 TABLET ORAL DAILY
Qty: 90 TABLET | Refills: 10 | Status: SHIPPED | OUTPATIENT
Start: 2019-12-12 | End: 2020-07-16 | Stop reason: SDUPTHER

## 2019-12-12 NOTE — PROGRESS NOTES
HPI    A 68 years old  female accompanied by family member is here in Oncology Clinic for evaluation of breast cancer.  Patient recently had ultrasound-guided biopsy left breast found to have invasive ductal carcinoma.  Patient is ERPR positive HER2 negative by FISH.  Patient has been seen by breast surgeon.  Treatment team consider contemplating doing a lumpectomy plus radiation versus mastectomy based on further chain testing.  Patient is here for evaluation of BRCA 1/2 gene studies for above reason.  On ultrasound left breast 7 mm mass at the anterior 6:00 position.  Patient reported her Ants has breast cancer.    Social history is negative for smoking drinking and recreational drug use.    She has history of AFib is taking Eliquis 2.5 mg b.i.d..  She does not have any other medical conditions.      07/30/2019:  No acute event.  Patient is here to follow-up with Oncotype DX study.  As well as to discuss endocrine therapy.  No other acute event.  Review systems below.    09/24/2019:  Patient surgical wound healing well. No complaints.  Ongoing radiation at that time.  Expected to finish radiation by the end of October.    11/7/2019: no report of acute events.  Patient here to follow up     12/12/2019:  No acute events.  Patient here to follow-up.  To start of Arimidex.       Review of system:   Deny chest pain shortness of breath.  Denies abdominal pain nausea vomiting or diarrhea.  Denies any fever or chills.  Denies any weight loss.  Denies any change appetite change.      Past Medical History:   Diagnosis Date    Atrial fibrillation     4/11/13-recent dx sceduled to see cardiology Monday- Dr Lynn-dm    Cancer     breast    Diabetes mellitus     Hyperlipemia     Hypertension      Social History     Socioeconomic History    Marital status:      Spouse name: Not on file    Number of children: Not on file    Years of education: Not on file    Highest education level: Not on file    Occupational History    Not on file   Social Needs    Financial resource strain: Not on file    Food insecurity:     Worry: Not on file     Inability: Not on file    Transportation needs:     Medical: Not on file     Non-medical: Not on file   Tobacco Use    Smoking status: Never Smoker    Smokeless tobacco: Never Used   Substance and Sexual Activity    Alcohol use: No     Comment: once or twice a year    Drug use: No    Sexual activity: Not on file   Lifestyle    Physical activity:     Days per week: Not on file     Minutes per session: Not on file    Stress: Not on file   Relationships    Social connections:     Talks on phone: Not on file     Gets together: Not on file     Attends Hinduism service: Not on file     Active member of club or organization: Not on file     Attends meetings of clubs or organizations: Not on file     Relationship status: Not on file   Other Topics Concern    Not on file   Social History Narrative    Not on file       Objective    Physical Exam     There were no vitals filed for this visit.      Constitutional: patient is oriented to person, place, and time. patient appears well-developed and well-nourished. No distress.   HENT:  Normocephalic atraumatic pupils equal round reactive to light extraocular muscle intact.  Cardiovascular:  Regular rate  Pulmonary/Chest:  Clear to auscultate   Abdominal:  Soft nontender nondistended  Musculoskeletal: Normal range of motion. Gait is normal  No clubbing, cyanosis     Lymphadenopathy:  No evidence lymphadenopathy  Neurological: patient is alert and oriented to person, place, and time. patient has normal strength and normal reflexes. No sensory deficit. Gait normal.   Skin:  Warm dry   Psychiatric:  No rashes no lesion    CMP  Sodium   Date Value Ref Range Status   12/11/2019 140 136 - 145 mmol/L Final     Potassium   Date Value Ref Range Status   12/11/2019 3.6 3.5 - 5.1 mmol/L Final     Chloride   Date Value Ref Range Status    12/11/2019 100 95 - 110 mmol/L Final     CO2   Date Value Ref Range Status   12/11/2019 28 23 - 29 mmol/L Final     Glucose   Date Value Ref Range Status   12/11/2019 76 70 - 110 mg/dL Final     BUN, Bld   Date Value Ref Range Status   12/11/2019 16 8 - 23 mg/dL Final     Creatinine   Date Value Ref Range Status   12/11/2019 0.7 0.5 - 1.4 mg/dL Final     Calcium   Date Value Ref Range Status   12/11/2019 10.1 8.7 - 10.5 mg/dL Final     Total Protein   Date Value Ref Range Status   12/11/2019 7.1 6.0 - 8.4 g/dL Final     Albumin   Date Value Ref Range Status   12/11/2019 3.7 3.5 - 5.2 g/dL Final     Total Bilirubin   Date Value Ref Range Status   12/11/2019 0.7 0.1 - 1.0 mg/dL Final     Comment:     For infants and newborns, interpretation of results should be based  on gestational age, weight and in agreement with clinical  observations.  Premature Infant recommended reference ranges:  Up to 24 hours.............<8.0 mg/dL  Up to 48 hours............<12.0 mg/dL  3-5 days..................<15.0 mg/dL  6-29 days.................<15.0 mg/dL       Alkaline Phosphatase   Date Value Ref Range Status   12/11/2019 123 55 - 135 U/L Final     AST   Date Value Ref Range Status   12/11/2019 14 10 - 40 U/L Final     ALT   Date Value Ref Range Status   12/11/2019 15 10 - 44 U/L Final     Anion Gap   Date Value Ref Range Status   12/11/2019 12 8 - 16 mmol/L Final     eGFR if    Date Value Ref Range Status   12/11/2019 >60 >60 mL/min/1.73 m^2 Final     eGFR if non    Date Value Ref Range Status   12/11/2019 >60 >60 mL/min/1.73 m^2 Final     Comment:     Calculation used to obtain the estimated glomerular filtration  rate (eGFR) is the CKD-EPI equation.        Lab Results   Component Value Date    WBC 8.88 12/11/2019    HGB 13.5 12/11/2019    HCT 43.3 12/11/2019    MCV 90 12/11/2019     (H) 12/11/2019     FINAL PATHOLOGIC DIAGNOSIS biopsy 04/10/2019  Left breast mass, core needle biopsy:  -  Invasive ductal carcinoma, Jacinto grade 2 (T=3, N=3, M=1), 5mm in greatest dimension  - No lymphovascular invasion identified on sections examined  Note: Prognostic/Predictive markers for ER, TX, Her-2 and Ki-67 are ordered, the results of which will be reported  in an addendum. Dr. JORDYN Sierra has reviewed the case and agrees with the above diagnosis    Assessment Plan    [] Breast cancer ERPR positive HER2 negative by FISH.  Mass measuring 7 mm left side breast.  Denies any bone pain, denies any pulmonary symptoms and denies any GI issues.  Alkaline phosphate within normal limits.    Status post surgical approach lumpectomy, tumor is invasive ductal carcinoma size 1 cm margin free of invasive carcinoma.    Left axillary sentinel lymph nodes no evidence of malignancy.    Patient declined chemotherapy,Oncotype DX study shows absolute chemotherapy benefit is less than 1% and distant recurrence risk at 9 years 3%.  Oncotype DX score 9. Given above information we will proceed with endocrine therapy after completion of radiation therapy.  Endocrine therapy will be Arimidex 1 mg daily x5 years.    DEXA scan 4/18 - shows no significant bone density lost    [] History of a AFib on long-term anticoagulation Eliquis 2.5 mg b.i.d. follow Cardiology    [] Patient successfully completed radiation with interruptions due to vacation trips.  Patient also completed hip surgery on November 27th.  Now patient return to oncology clinic for evaluation start of Arimidex.  Patient have postsurgical follow-up with orthopedic this afternoon.    I feel patient may safely resume Arimidex 1 mg p.o. daily starting next week.  She will follow-up with orthopedics this afternoon.  Went over side effect Arimidex with patient today.  Patient will return to clinic in 4 weeks with repeat blood work.    Thereafter will plan to have patient follow-up in oncology clinic every 3 months for the 1st year    Dictation software used in this note.  Expect a  typo graphic error

## 2019-12-16 ENCOUNTER — OFFICE VISIT (OUTPATIENT)
Dept: FAMILY MEDICINE | Facility: CLINIC | Age: 69
End: 2019-12-16
Payer: MEDICARE

## 2019-12-16 VITALS
DIASTOLIC BLOOD PRESSURE: 70 MMHG | OXYGEN SATURATION: 97 % | BODY MASS INDEX: 33.34 KG/M2 | HEART RATE: 70 BPM | HEIGHT: 66 IN | RESPIRATION RATE: 16 BRPM | WEIGHT: 207.44 LBS | TEMPERATURE: 98 F | SYSTOLIC BLOOD PRESSURE: 112 MMHG

## 2019-12-16 DIAGNOSIS — E78.5 HYPERLIPIDEMIA ASSOCIATED WITH TYPE 2 DIABETES MELLITUS: ICD-10-CM

## 2019-12-16 DIAGNOSIS — R73.9 HYPERGLYCEMIA: ICD-10-CM

## 2019-12-16 DIAGNOSIS — I48.0 PAROXYSMAL ATRIAL FIBRILLATION: ICD-10-CM

## 2019-12-16 DIAGNOSIS — E11.69 HYPERLIPIDEMIA ASSOCIATED WITH TYPE 2 DIABETES MELLITUS: ICD-10-CM

## 2019-12-16 DIAGNOSIS — I10 ESSENTIAL HYPERTENSION: Primary | ICD-10-CM

## 2019-12-16 PROCEDURE — 99499 UNLISTED E&M SERVICE: CPT | Mod: S$GLB,,, | Performed by: INTERNAL MEDICINE

## 2019-12-16 PROCEDURE — 3078F PR MOST RECENT DIASTOLIC BLOOD PRESSURE < 80 MM HG: ICD-10-PCS | Mod: CPTII,S$GLB,, | Performed by: INTERNAL MEDICINE

## 2019-12-16 PROCEDURE — 3074F PR MOST RECENT SYSTOLIC BLOOD PRESSURE < 130 MM HG: ICD-10-PCS | Mod: CPTII,S$GLB,, | Performed by: INTERNAL MEDICINE

## 2019-12-16 PROCEDURE — 1101F PT FALLS ASSESS-DOCD LE1/YR: CPT | Mod: CPTII,S$GLB,, | Performed by: INTERNAL MEDICINE

## 2019-12-16 PROCEDURE — 3044F HG A1C LEVEL LT 7.0%: CPT | Mod: CPTII,S$GLB,, | Performed by: INTERNAL MEDICINE

## 2019-12-16 PROCEDURE — 3078F DIAST BP <80 MM HG: CPT | Mod: CPTII,S$GLB,, | Performed by: INTERNAL MEDICINE

## 2019-12-16 PROCEDURE — 99214 PR OFFICE/OUTPT VISIT, EST, LEVL IV, 30-39 MIN: ICD-10-PCS | Mod: S$GLB,,, | Performed by: INTERNAL MEDICINE

## 2019-12-16 PROCEDURE — 99214 OFFICE O/P EST MOD 30 MIN: CPT | Mod: S$GLB,,, | Performed by: INTERNAL MEDICINE

## 2019-12-16 PROCEDURE — 3074F SYST BP LT 130 MM HG: CPT | Mod: CPTII,S$GLB,, | Performed by: INTERNAL MEDICINE

## 2019-12-16 PROCEDURE — 3044F PR MOST RECENT HEMOGLOBIN A1C LEVEL <7.0%: ICD-10-PCS | Mod: CPTII,S$GLB,, | Performed by: INTERNAL MEDICINE

## 2019-12-16 PROCEDURE — 1101F PR PT FALLS ASSESS DOC 0-1 FALLS W/OUT INJ PAST YR: ICD-10-PCS | Mod: CPTII,S$GLB,, | Performed by: INTERNAL MEDICINE

## 2019-12-16 PROCEDURE — 99499 RISK ADDL DX/OHS AUDIT: ICD-10-PCS | Mod: S$GLB,,, | Performed by: INTERNAL MEDICINE

## 2019-12-16 PROCEDURE — 1126F AMNT PAIN NOTED NONE PRSNT: CPT | Mod: S$GLB,,, | Performed by: INTERNAL MEDICINE

## 2019-12-16 PROCEDURE — 1159F MED LIST DOCD IN RCRD: CPT | Mod: S$GLB,,, | Performed by: INTERNAL MEDICINE

## 2019-12-16 PROCEDURE — 1159F PR MEDICATION LIST DOCUMENTED IN MEDICAL RECORD: ICD-10-PCS | Mod: S$GLB,,, | Performed by: INTERNAL MEDICINE

## 2019-12-16 PROCEDURE — 1126F PR PAIN SEVERITY QUANTIFIED, NO PAIN PRESENT: ICD-10-PCS | Mod: S$GLB,,, | Performed by: INTERNAL MEDICINE

## 2019-12-16 RX ORDER — TRIAMTERENE/HYDROCHLOROTHIAZID 37.5-25 MG
1 TABLET ORAL DAILY
Qty: 90 TABLET | Refills: 1 | Status: SHIPPED | OUTPATIENT
Start: 2019-12-16 | End: 2020-02-26

## 2019-12-16 NOTE — PATIENT INSTRUCTIONS
Lose 5 pounds.  Suggest [facility]        Low-Salt Diet  This diet removes foods that are high in salt. It also limits the amount of salt you use when cooking. It is most often used for people with high blood pressure, edema (fluid retention), and kidney, liver, or heart disease.  Table salt contains the mineral sodium. Your body needs sodium to work normally. But too much sodium can make your health problems worse. Your healthcare provider is recommending a low-salt (also called low-sodium) diet for you. Your total daily allowance of salt is 1,500 to 2,300 milligrams (mg). It is less than 1 teaspoon of table salt. This means you can have only about 500 to 700 mg of sodium at each meal. People with certain health problems should limit salt intake to the lower end of the recommended range.    When you cook, don´t add much salt. If you can cook without using salt, even better. Don´t add salt to your food at the table.  When shopping, read food labels. Salt is often called sodium on the label. Choose foods that are salt-free, low salt, or very low salt. Note that foods with reduced salt may not lower your salt intake enough.    Beans, potatoes, and pasta  Ok: Dry beans, split peas, lentils, potatoes, rice, macaroni, pasta, spaghetti without added salt  Avoid: Potato chips, tortilla chips, and similar products  Breads and cereals  Ok: Low-sodium breads, rolls, cereals, and cakes; low-salt crackers, matzo crackers  Avoid: Salted crackers, pretzels, popcorn, Kinyarwanda toast, pancakes, muffins  Dairy  Ok: Milk, chocolate milk, hot chocolate mix, low-salt cheeses, and yogurt  Avoid: Processed cheese and cheese spreads; Roquefort, Camembert, and cottage cheese; buttermilk, instant breakfast drink  Desserts  Ok: Ice cream, frozen yogurt, juice bars, gelatin, cookies and pies, sugar, honey, jelly, hard candy  Avoid: Most pies, cakes and cookies prepared or processed with salt; instant pudding  Drinks  Ok: Tea, coffee, fizzy  (carbonated) drinks, juices  Avoid: Flavored coffees, electrolyte replacement drinks, sports drinks  Meats  Ok: All fresh meat, fish, poultry, low-salt tuna, eggs, egg substitute  Avoid: Smoked, pickled, brine-cured, or salted meats and fish. This includes bess, chipped beef, corned beef, hot dogs, deli meats, ham, kosher meats, salt pork, sausage, canned tuna, salted codfish, smoked salmon, herring, sardines, or anchovies.  Seasonings and spices  Ok: Most seasonings are okay. Good substitutes for salt include: fresh herb blends, hot sauce, lemon, garlic, cast, vinegar, dry mustard, parsley, cilantro, horseradish, tomato paste, regular margarine, mayonnaise, unsalted butter, cream cheese, vegetable oil, cream, low-salt salad dressing and gravy.  Avoid: Regular ketchup, relishes, pickles, soy sauce, teriyaki sauce, Worcestershire sauce, BBQ sauce, tartar sauce, meat tenderizer, chili sauce, regular gravy, regular salad dressing, salted butter  Soups  Ok: Low-salt soups and broths made with allowed foods  Avoid: Bouillon cubes, soups with smoked or salted meats, regular soup and broth  Vegetables  Ok: Most vegetables are okay; also low-salt tomato and vegetable juices  Avoid: Sauerkraut and other brine-soaked vegetables; pickles and other pickled vegetables; tomato juice, olives  © 0474-3042 [employer]. [address] [place] [address]. All rights reserved. This information is not intended as a substitute for professional medical care. Always follow your healthcare professional's instructions.      Thank you for choosing [facility].     Please fill out the patient experience survey.

## 2019-12-16 NOTE — PROGRESS NOTES
Subjective:      2:13 PM     Patient ID: Eduardo Esposito is a 69 y.o. female.    Chief Complaint: Hypertension (refills)    HPI     The patient has had type 2 diabetes the having lost over 10 lb her hemoglobin A1c was last 5.7.      CHIEF COMPLAINT: Hyperlipidemia. cholesterol screening: no.   HPI:     ONSET:    MODIFIERS/TREATMENTS: . Taking medications: yes. . Non-compliance with following diet: no. .     SYMPTOMS/RELATED:Possible medication side effects include:   Myalgia: no.  .     REVIEW OF SYMPTOMS: past weights:   Wt Readings from Last 1 Encounters:   12/16/19 1350 94.1 kg (207 lb 7.3 oz)                                                     Last lipids: total   Lab Results   Component Value Date    CHOL 144 07/10/2019    CHOL 157 02/18/2019    CHOL 126 07/05/2018                                                                     HDL   Lab Results   Component Value Date    HDL 46 07/10/2019    HDL 52 02/18/2019    HDL 44 07/05/2018                                                                     LDL   Lab Results   Component Value Date    LDLCALC 70.2 07/10/2019    LDLCALC 74.6 02/18/2019    LDLCALC 62.0 (L) 07/05/2018                                                                     TRIG   Lab Results   Component Value Date    TRIG 139 07/10/2019    TRIG 152 (H) 02/18/2019    TRIG 100 07/05/2018                                                                     The patient has paroxysmal atrial fibrillation.  She has had no palpitations and she denies any bleeding from anywhere.  She has had no weakness or numbness anywhere.  No chest pain or shortness of breath      CHIEF COMPLAINT: Hypertension  HPI:     ONSET:      QUALITY/COURSE:   Unchanged.     INTENSITY/SEVERITY:  Average blood pressure is 110/70.      MODIFIERS/TREATMENTS:  Taking medications: yes. .High sodium intake: no. alcohol: no      The following symptoms are positive only if BOLDED, otherwise are negative.      SYMPTOMS/RELATED:  "Possible medication side effects include:   Depression..  . Cough. . Constipation.    REVIEW OF SYMPTOMS: . Weight_loss . Weight_gain . Leg_cramps ..    TARGET ORGAN DAMAGE:: angina/ prior myocardial infarction, chronic kidney disease, heart failure, left ventricular hypertrophy, peripheral artery disease, prior coronary revascularization, retinopathy, stroke. transient ischemic attack.      Review of Systems   Constitutional: Positive for fatigue. Negative for diaphoresis.   Eyes: Negative for visual disturbance.   Respiratory: Negative for chest tightness and shortness of breath.    Cardiovascular: Negative for chest pain.   Endocrine: Positive for polyuria. Negative for polydipsia and polyphagia.   Skin: Negative for pallor.   Neurological: Negative for dizziness, tremors, seizures, syncope, speech difficulty, weakness and headaches.   Psychiatric/Behavioral: Negative for confusion and dysphoric mood. The patient is not nervous/anxious.          Objective:      Vitals:    12/16/19 1350   BP: 112/70   Pulse: 70   Resp: 16   Temp: 98.1 °F (36.7 °C)   TempSrc: Oral   SpO2: 97%   Weight: 94.1 kg (207 lb 7.3 oz)   Height: 5' 6" (1.676 m)   PainSc: 0-No pain     Physical Exam   Constitutional: She appears well-developed and well-nourished.   Cardiovascular: Normal rate, regular rhythm and normal heart sounds.   Pulmonary/Chest: Effort normal and breath sounds normal.   Abdominal: Soft. There is no tenderness.   Neurological: She is alert.   Psychiatric: She has a normal mood and affect. Her behavior is normal. Thought content normal.   Nursing note and vitals reviewed.        Assessment:       1. Essential hypertension    2. Paroxysmal atrial fibrillation    3. Hyperlipidemia associated with type 2 diabetes mellitus    4. Hyperglycemia          Plan:       Essential hypertension  -     triamterene-hydrochlorothiazide 37.5-25 mg (MAXZIDE-25) 37.5-25 mg per tablet; Take 1 tablet by mouth once daily.  Dispense: 90 tablet; " Refill: 1  -     Comprehensive metabolic panel; Future; Expected date: 12/16/2019    Paroxysmal atrial fibrillation    Hyperlipidemia associated with type 2 diabetes mellitus    Hyperglycemia  -     Hemoglobin A1c; Future; Expected date: 12/16/2019      Follow up in about 3 months (around 3/16/2020).

## 2019-12-18 ENCOUNTER — TELEPHONE (OUTPATIENT)
Dept: ORTHOPEDICS | Facility: CLINIC | Age: 69
End: 2019-12-18

## 2019-12-18 NOTE — TELEPHONE ENCOUNTER
----- Message from Braden Conde sent at 12/18/2019  1:22 PM CST -----  Contact: Etta with  - [phone]  Needs discharge orders.

## 2019-12-18 NOTE — TELEPHONE ENCOUNTER
Attempted to contact Etta with [facility] with no answer and no voicemail setup. All orders are available in Epic.

## 2020-01-03 DIAGNOSIS — Z96.641 HISTORY OF RIGHT HIP REPLACEMENT: Primary | ICD-10-CM

## 2020-01-06 NOTE — HPI
69-year-old female presented today for right total hip arthroplasty.  Patient underwent right total hip arthroplasty this morning without complication.   Detail Level: Simple Instructions: This plan will send the code FBSE to the PM system.  DO NOT or CHANGE the price. Price (Do Not Change): 0.00

## 2020-01-07 NOTE — PROGRESS NOTES
CC:  69-year-old female follows up status post right total hip arthroplasty. Date of surgery was 11/27/2019.    Examination of the Right Lower Extremity    Skin is intact throughout  Motor in intact EHL,FHL,TA,duke  +2 DP/PT  Sensation LT intact D/P/1st    Examination of the Right Hip    C-Sign negative  Logroll negative  Stenchfield negative    Pain with ROM negative    ROM:    Flexion   120  Extension   10  Abduction   50  Adduction   10  External Rotation 50  Internal Rotation 10    Flexion contracture negative    FADIR negative  FADER negative    X-rays were examined and personally reviewed by me.  Today, 01/09/2020, show a right total hip arthroplasty that is well fixed and in good alignment.    Dx:  Status post right total hip arthroplasty, stable and doing well    Plan:  Continue to progress activity as tolerated.  Follow-up in 6 weeks with x-rays.

## 2020-01-08 ENCOUNTER — HOSPITAL ENCOUNTER (OUTPATIENT)
Facility: HOSPITAL | Age: 70
Discharge: HOME | End: 2020-01-09
Attending: EMERGENCY MEDICINE | Admitting: HOSPITALIST
Payer: MEDICARE

## 2020-01-08 DIAGNOSIS — R00.1 BRADYCARDIA: ICD-10-CM

## 2020-01-08 DIAGNOSIS — E11.69 HYPERLIPIDEMIA ASSOCIATED WITH TYPE 2 DIABETES MELLITUS: ICD-10-CM

## 2020-01-08 DIAGNOSIS — E78.5 HYPERLIPIDEMIA ASSOCIATED WITH TYPE 2 DIABETES MELLITUS: ICD-10-CM

## 2020-01-08 DIAGNOSIS — R55 NEAR SYNCOPE: Primary | ICD-10-CM

## 2020-01-08 DIAGNOSIS — R55 SYNCOPE: ICD-10-CM

## 2020-01-08 LAB
ALBUMIN SERPL BCP-MCNC: 4 G/DL (ref 3.5–5.2)
ALP SERPL-CCNC: 99 U/L (ref 55–135)
ALT SERPL W/O P-5'-P-CCNC: 15 U/L (ref 10–44)
ANION GAP SERPL CALC-SCNC: 10 MMOL/L (ref 8–16)
AST SERPL-CCNC: 17 U/L (ref 10–40)
BASOPHILS # BLD AUTO: 0.03 K/UL (ref 0–0.2)
BASOPHILS NFR BLD: 0.3 % (ref 0–1.9)
BILIRUB SERPL-MCNC: 1.1 MG/DL (ref 0.1–1)
BUN SERPL-MCNC: 17 MG/DL (ref 8–23)
CALCIUM SERPL-MCNC: 9.8 MG/DL (ref 8.7–10.5)
CHLORIDE SERPL-SCNC: 99 MMOL/L (ref 95–110)
CO2 SERPL-SCNC: 29 MMOL/L (ref 23–29)
CREAT SERPL-MCNC: 0.7 MG/DL (ref 0.5–1.4)
DIFFERENTIAL METHOD: ABNORMAL
EOSINOPHIL # BLD AUTO: 0.1 K/UL (ref 0–0.5)
EOSINOPHIL NFR BLD: 0.8 % (ref 0–8)
ERYTHROCYTE [DISTWIDTH] IN BLOOD BY AUTOMATED COUNT: 14.2 % (ref 11.5–14.5)
EST. GFR  (AFRICAN AMERICAN): >60 ML/MIN/1.73 M^2
EST. GFR  (NON AFRICAN AMERICAN): >60 ML/MIN/1.73 M^2
GLUCOSE SERPL-MCNC: 126 MG/DL (ref 70–110)
HCT VFR BLD AUTO: 45.3 % (ref 37–48.5)
HGB BLD-MCNC: 14 G/DL (ref 12–16)
IMM GRANULOCYTES # BLD AUTO: 0.07 K/UL (ref 0–0.04)
LIPASE SERPL-CCNC: 26 U/L (ref 4–60)
LYMPHOCYTES # BLD AUTO: 1.3 K/UL (ref 1–4.8)
LYMPHOCYTES NFR BLD: 14.7 % (ref 18–48)
MCH RBC QN AUTO: 27.9 PG (ref 27–31)
MCHC RBC AUTO-ENTMCNC: 30.9 G/DL (ref 32–36)
MCV RBC AUTO: 90 FL (ref 82–98)
MONOCYTES # BLD AUTO: 0.5 K/UL (ref 0.3–1)
MONOCYTES NFR BLD: 5.7 % (ref 4–15)
NEUTROPHILS # BLD AUTO: 6.8 K/UL (ref 1.8–7.7)
NEUTROPHILS NFR BLD: 77.7 % (ref 38–73)
NRBC BLD-RTO: 0 /100 WBC
PLATELET # BLD AUTO: 251 K/UL (ref 150–350)
PMV BLD AUTO: 9.2 FL (ref 9.2–12.9)
POCT GLUCOSE: 110 MG/DL (ref 70–110)
POCT GLUCOSE: 99 MG/DL (ref 70–110)
POTASSIUM SERPL-SCNC: 3.7 MMOL/L (ref 3.5–5.1)
PROT SERPL-MCNC: 7 G/DL (ref 6–8.4)
RBC # BLD AUTO: 5.02 M/UL (ref 4–5.4)
SODIUM SERPL-SCNC: 138 MMOL/L (ref 136–145)
WBC # BLD AUTO: 8.73 K/UL (ref 3.9–12.7)

## 2020-01-08 PROCEDURE — 93005 ELECTROCARDIOGRAM TRACING: CPT | Mod: HCNC

## 2020-01-08 PROCEDURE — 25000003 PHARM REV CODE 250: Mod: HCNC | Performed by: HOSPITALIST

## 2020-01-08 PROCEDURE — 83036 HEMOGLOBIN GLYCOSYLATED A1C: CPT | Mod: HCNC

## 2020-01-08 PROCEDURE — 36415 COLL VENOUS BLD VENIPUNCTURE: CPT | Mod: HCNC

## 2020-01-08 PROCEDURE — 63600175 PHARM REV CODE 636 W HCPCS: Mod: HCNC | Performed by: HOSPITALIST

## 2020-01-08 PROCEDURE — G0378 HOSPITAL OBSERVATION PER HR: HCPCS | Mod: HCNC

## 2020-01-08 PROCEDURE — 99285 EMERGENCY DEPT VISIT HI MDM: CPT | Mod: 25,HCNC

## 2020-01-08 PROCEDURE — 83690 ASSAY OF LIPASE: CPT | Mod: HCNC

## 2020-01-08 PROCEDURE — 63600175 PHARM REV CODE 636 W HCPCS: Mod: HCNC | Performed by: EMERGENCY MEDICINE

## 2020-01-08 PROCEDURE — 96360 HYDRATION IV INFUSION INIT: CPT | Mod: HCNC

## 2020-01-08 PROCEDURE — 85025 COMPLETE CBC W/AUTO DIFF WBC: CPT | Mod: HCNC

## 2020-01-08 PROCEDURE — 96361 HYDRATE IV INFUSION ADD-ON: CPT | Performed by: HOSPITALIST

## 2020-01-08 PROCEDURE — 80053 COMPREHEN METABOLIC PANEL: CPT | Mod: HCNC

## 2020-01-08 RX ORDER — GLUCAGON 1 MG
1 KIT INJECTION
Status: DISCONTINUED | OUTPATIENT
Start: 2020-01-08 | End: 2020-01-09 | Stop reason: HOSPADM

## 2020-01-08 RX ORDER — TRIAMTERENE AND HYDROCHLOROTHIAZIDE 37.5; 25 MG/1; MG/1
1 CAPSULE ORAL DAILY
Status: DISCONTINUED | OUTPATIENT
Start: 2020-01-09 | End: 2020-01-08

## 2020-01-08 RX ORDER — IBUPROFEN 200 MG
24 TABLET ORAL
Status: DISCONTINUED | OUTPATIENT
Start: 2020-01-08 | End: 2020-01-09 | Stop reason: HOSPADM

## 2020-01-08 RX ORDER — ONDANSETRON 4 MG/1
8 TABLET, ORALLY DISINTEGRATING ORAL EVERY 8 HOURS PRN
Status: DISCONTINUED | OUTPATIENT
Start: 2020-01-08 | End: 2020-01-09 | Stop reason: HOSPADM

## 2020-01-08 RX ORDER — SODIUM CHLORIDE 9 MG/ML
INJECTION, SOLUTION INTRAVENOUS CONTINUOUS
Status: ACTIVE | OUTPATIENT
Start: 2020-01-08 | End: 2020-01-09

## 2020-01-08 RX ORDER — INSULIN ASPART 100 [IU]/ML
0-5 INJECTION, SOLUTION INTRAVENOUS; SUBCUTANEOUS
Status: DISCONTINUED | OUTPATIENT
Start: 2020-01-08 | End: 2020-01-09 | Stop reason: HOSPADM

## 2020-01-08 RX ORDER — ANASTROZOLE 1 MG/1
1 TABLET ORAL DAILY
Status: DISCONTINUED | OUTPATIENT
Start: 2020-01-09 | End: 2020-01-09 | Stop reason: HOSPADM

## 2020-01-08 RX ORDER — ACETAMINOPHEN 500 MG
1000 TABLET ORAL EVERY 6 HOURS PRN
Status: DISCONTINUED | OUTPATIENT
Start: 2020-01-08 | End: 2020-01-09 | Stop reason: HOSPADM

## 2020-01-08 RX ORDER — OXYCODONE HYDROCHLORIDE 10 MG/1
10 TABLET ORAL EVERY 6 HOURS PRN
Status: DISCONTINUED | OUTPATIENT
Start: 2020-01-08 | End: 2020-01-09 | Stop reason: HOSPADM

## 2020-01-08 RX ORDER — ATORVASTATIN CALCIUM 40 MG/1
40 TABLET, FILM COATED ORAL NIGHTLY
Status: DISCONTINUED | OUTPATIENT
Start: 2020-01-08 | End: 2020-01-09 | Stop reason: HOSPADM

## 2020-01-08 RX ORDER — GABAPENTIN 300 MG/1
300 CAPSULE ORAL 3 TIMES DAILY
Status: DISCONTINUED | OUTPATIENT
Start: 2020-01-08 | End: 2020-01-08

## 2020-01-08 RX ORDER — PNV NO.95/FERROUS FUM/FOLIC AC 28MG-0.8MG
100 TABLET ORAL DAILY
Status: DISCONTINUED | OUTPATIENT
Start: 2020-01-09 | End: 2020-01-09 | Stop reason: HOSPADM

## 2020-01-08 RX ORDER — SODIUM CHLORIDE 0.9 % (FLUSH) 0.9 %
10 SYRINGE (ML) INJECTION
Status: DISCONTINUED | OUTPATIENT
Start: 2020-01-08 | End: 2020-01-09 | Stop reason: HOSPADM

## 2020-01-08 RX ORDER — ACETAMINOPHEN 500 MG
1 TABLET ORAL DAILY
Status: DISCONTINUED | OUTPATIENT
Start: 2020-01-09 | End: 2020-01-09 | Stop reason: HOSPADM

## 2020-01-08 RX ORDER — IBUPROFEN 200 MG
16 TABLET ORAL
Status: DISCONTINUED | OUTPATIENT
Start: 2020-01-08 | End: 2020-01-09 | Stop reason: HOSPADM

## 2020-01-08 RX ADMIN — APIXABAN 5 MG: 2.5 TABLET, FILM COATED ORAL at 09:01

## 2020-01-08 RX ADMIN — SODIUM CHLORIDE 1000 ML: 0.9 INJECTION, SOLUTION INTRAVENOUS at 01:01

## 2020-01-08 RX ADMIN — SODIUM CHLORIDE: 0.9 INJECTION, SOLUTION INTRAVENOUS at 04:01

## 2020-01-08 RX ADMIN — ATORVASTATIN CALCIUM 40 MG: 40 TABLET, FILM COATED ORAL at 09:01

## 2020-01-08 NOTE — ED NOTES
Care assumed.  Pt is awake, alert and oriented. Dr. Garduno at bedside for exam.  Pt has PIV to right forearm.  Pt reports an episode of dizziness and emesis pta. Pt states feels better now.

## 2020-01-08 NOTE — PLAN OF CARE
01/08/20 1529   PRICE Message   Medicare Outpatient and Observation Notification regarding financial responsibility Given to patient/caregiver;Explained to patient/caregiver;Signed/date by patient/caregiver   Date PRICE was signed 01/08/20   Time PRICE was signed 1528

## 2020-01-08 NOTE — ED PROVIDER NOTES
"Encounter Date: 1/8/2020    SCRIBE #1 NOTE: I, Donadilcia Valente, am scribing for, and in the presence of, Bryon Garduno MD.       History     Chief Complaint   Patient presents with    Dizziness     C/o near syncope while eating lunch; reports emesis x 1 and states she now feels better       Time seen by provider: 12:48 PM on 01/08/2020    Eduardo Esposito is a 69 y.o. female with PMHx of A-fib, DM, HTN, and breast cancer who presents to the ED via EMS for evaluation of light headedness with an episode of nausea and vomiting. Preceding light headedness, patient reports "feeling clammy". She endorses eating cereal and peaches ~x6-7 hours ago. She denies measuring her blood sugar today. Patient endorses having a similar episode of lightheadedness x12-15 years ago. She denies current chest pain, SOB, abdominal pain, weakness, numbness, facial droop, speech or vision changes, and urinary symptoms. Patient is presently on a blood thinner (eliquis) for A-fib and Metformin. She reports blood sugar is typically 200. She mentions started Gabapentin and Anastrozole x1 month ago. She has no other medical concerns or complaints at this moment. SHx includes lumpectomy and right hip arthroplasty. Lisinopril allergy noted.    The history is provided by the patient.     Review of patient's allergies indicates:   Allergen Reactions    Lisinopril      hives     Past Medical History:   Diagnosis Date    Atrial fibrillation     4/11/13-recent dx sceduled to see cardiology Monday- Dr Lynn-dm    Cancer     breast    Diabetes mellitus     Hyperlipemia     Hypertension      Past Surgical History:   Procedure Laterality Date    AXILLARY NODE DISSECTION Left 6/13/2019    Procedure: LYMPHADENECTOMY, AXILLARY;  Surgeon: Kendall Howard MD;  Location: [facility];  Service: General;  Laterality: Left;  left lumpectomy with left axillary lymph node dissection    BREAST SURGERY      lumpectomy left    HIP REPLACEMENT ARTHROPLASTY Right " 11/27/2019    Procedure: ARTHROPLASTY, HIP REPLACEMENT;  Surgeon: Rahul Hanson II, MD;  Location: [facility] OR;  Service: Orthopedics;  Laterality: Right;    JOINT REPLACEMENT Right     knee    NEEDLE LOCALIZATION Left 6/13/2019    Procedure: NEEDLE LOCALIZATION;  Surgeon: Kendall Howard MD;  Location: [facility] OR;  Service: General;  Laterality: Left;  left lumpectomy with wire needle localization    SENTINEL LYMPH NODE BIOPSY Left 6/13/2019    Procedure: BIOPSY, LYMPH NODE, SENTINEL;  Surgeon: Kendall Howard MD;  [facility]: [facility];  Service: General;  Laterality: Left;  left sentinel lymph node dissection    TONSILLECTOMY      TOTAL KNEE ARTHROPLASTY       Family History   Problem Relation Age of Onset    Breast cancer Mother     Glaucoma Neg Hx     Macular degeneration Neg Hx     Retinal detachment Neg Hx      Social History     Tobacco Use    Smoking status: Never Smoker    Smokeless tobacco: Never Used   Substance Use Topics    Alcohol use: No     Comment: once or twice a year    Drug use: No     Review of Systems   Constitutional: Positive for diaphoresis. Negative for activity change, chills and fever.   HENT: Negative for congestion.    Respiratory: Negative for cough and shortness of breath.    Cardiovascular: Negative for chest pain.   Gastrointestinal: Positive for nausea and vomiting. Negative for abdominal pain.   Genitourinary: Negative for decreased urine volume, difficulty urinating, dysuria and hematuria.   Musculoskeletal: Negative for back pain, gait problem, joint swelling and myalgias.   Skin: Negative for pallor and rash.   Neurological: Positive for light-headedness. Negative for weakness and numbness.   Hematological: Bruises/bleeds easily (Eliquis).       Physical Exam     Initial Vitals [01/08/20 1243]   BP Pulse Resp Temp SpO2   (!) 119/57 (!) 52 16 97.6 °F (36.4 °C) 97 %      MAP       --         Physical Exam    Nursing note and vitals reviewed.  Constitutional: She  appears well-developed. She is not diaphoretic. No distress.   HENT:   Head: Normocephalic and atraumatic.   Nose: Nose normal.   Eyes: EOM are normal. No scleral icterus.   Neck: Normal range of motion. Neck supple.   Cardiovascular: Normal heart sounds and intact distal pulses. An irregularly irregular rhythm present.  Bradycardia present.  Exam reveals no gallop and no friction rub.    No murmur heard.  Pulmonary/Chest: Breath sounds normal. No stridor. No respiratory distress. She has no wheezes. She has no rhonchi. She has no rales.   Abdominal: Soft. Bowel sounds are normal. There is no tenderness.   Musculoskeletal: Normal range of motion.   Neurological: She is alert and oriented to person, place, and time. She has normal strength. No cranial nerve deficit or sensory deficit. She exhibits normal muscle tone.   Cranial nerves II through XII grossly intact. Finger to nose normal. Tone normal. Sensation intact to light touch to BUE and BLE. No pronator or leg drift. Strength 5/5 bilaterally upper and lower extremities. Speech and cognition is normal.  No focal neurologic deficit.   Skin: Skin is warm and dry. Capillary refill takes less than 2 seconds. No rash noted.   Psychiatric: She has a normal mood and affect.         ED Course   Procedures  Labs Reviewed   CBC W/ AUTO DIFFERENTIAL - Abnormal; Notable for the following components:       Result Value    Mean Corpuscular Hemoglobin Conc 30.9 (*)     Immature Grans (Abs) 0.07 (*)     Gran% 77.7 (*)     Lymph% 14.7 (*)     All other components within normal limits   COMPREHENSIVE METABOLIC PANEL - Abnormal; Notable for the following components:    Glucose 126 (*)     Total Bilirubin 1.1 (*)     All other components within normal limits   LIPASE     EKG Readings: (Independently Interpreted)   Initial Reading: No STEMI.   Afib with slow ventricular response, 53 BPM.   Normal QRS intervals. Borderline prolonged QTc intervals.  Ventricular rate slightly decreased  compared to previous EKG 11/2016.          Imaging Results    None          Medical Decision Making:   History:   Old Medical Records: I decided to obtain old medical records.  Independently Interpreted Test(s):   I have ordered and independently interpreted EKG Reading(s) - see prior notes  Clinical Tests:   Lab Tests: Reviewed and Ordered  Medical Tests: Reviewed and Ordered  Other:   I have discussed this case with another health care provider.            Scribe Attestation:   Scribe #1: I performed the above scribed service and the documentation accurately describes the services I performed. I attest to the accuracy of the note.        Attending Attestation:     Physician Attestation for Scribe:    I, Dr. Bryon Garduno, personally performed the services described in this documentation.   All medical record entries made by the scribe were at my direction and in my presence.   I have reviewed the chart and agree that the record is accurate and complete.   Bryon Garduno MD  10:13 PM 01/08/2020     DISCLAIMER: This note was prepared with [employer] voice recognition transcription software. Garbled syntax, mangled pronouns, and other bizarre constructions may be attributed to that software system.            ED Course as of Jan 08 2214 Wed Jan 08, 2020   1318 69-year-old female past medical history of diabetes, atrial fibrillation on Eliquis, hypertension, hyperlipidemia and breast cancer status post lumpectomy and radiation presents today with near syncope.  Patient's bedside glucose per EMS was 134.  Afebrile.  Bradycardic to 52 otherwise stable vital signs. Elderly but nontoxic appearing.  Nonfocal neurologic exam.    [BD]   1445 Labs all reassuring.  Patient's symptoms likely secondary to transient episode of hypoglycemia however given new bradycardia in the setting of near-syncope will admit further workup and management as well as overnight monitoring.    [BD]   1440 Patient accepted by   [facility].    [BD]      ED Course User Index  [BD] Bryon Garduno MD        Clinical Impression:       ICD-10-CM ICD-9-CM   1. Bradycardia R00.1 427.89   2. Near syncope R55 780.2   3. Syncope R55 780.2         Disposition:   Disposition: Admitted                     Bryon Garduno MD  01/08/20 5635

## 2020-01-09 ENCOUNTER — OFFICE VISIT (OUTPATIENT)
Dept: ORTHOPEDICS | Facility: CLINIC | Age: 70
End: 2020-01-09
Payer: MEDICARE

## 2020-01-09 ENCOUNTER — HOSPITAL ENCOUNTER (OUTPATIENT)
Dept: RADIOLOGY | Facility: HOSPITAL | Age: 70
Discharge: HOME | End: 2020-01-09
Attending: ORTHOPAEDIC SURGERY
Payer: MEDICARE

## 2020-01-09 VITALS
TEMPERATURE: 97 F | HEIGHT: 66 IN | SYSTOLIC BLOOD PRESSURE: 144 MMHG | OXYGEN SATURATION: 98 % | BODY MASS INDEX: 33.27 KG/M2 | HEART RATE: 52 BPM | WEIGHT: 207 LBS | RESPIRATION RATE: 18 BRPM | DIASTOLIC BLOOD PRESSURE: 72 MMHG

## 2020-01-09 VITALS
BODY MASS INDEX: 33.27 KG/M2 | HEART RATE: 56 BPM | SYSTOLIC BLOOD PRESSURE: 121 MMHG | HEIGHT: 66 IN | DIASTOLIC BLOOD PRESSURE: 58 MMHG | WEIGHT: 207 LBS

## 2020-01-09 DIAGNOSIS — Z96.641 HISTORY OF RIGHT HIP REPLACEMENT: ICD-10-CM

## 2020-01-09 DIAGNOSIS — Z96.641 HISTORY OF RIGHT HIP REPLACEMENT: Primary | ICD-10-CM

## 2020-01-09 PROBLEM — E11.649 TYPE 2 DIABETES MELLITUS WITH HYPOGLYCEMIA, WITHOUT LONG-TERM CURRENT USE OF INSULIN: Status: ACTIVE | Noted: 2018-04-12

## 2020-01-09 LAB
ESTIMATED AVG GLUCOSE: 111 MG/DL (ref 68–131)
HBA1C MFR BLD HPLC: 5.5 % (ref 4–5.6)
POCT GLUCOSE: 84 MG/DL (ref 70–110)

## 2020-01-09 PROCEDURE — 73502 X-RAY EXAM HIP UNI 2-3 VIEWS: CPT | Mod: 26,HCNC,RT, | Performed by: RADIOLOGY

## 2020-01-09 PROCEDURE — 99024 PR POST-OP FOLLOW-UP VISIT: ICD-10-PCS | Mod: HCNC,S$GLB,, | Performed by: ORTHOPAEDIC SURGERY

## 2020-01-09 PROCEDURE — 96361 HYDRATE IV INFUSION ADD-ON: CPT | Performed by: HOSPITALIST

## 2020-01-09 PROCEDURE — 99999 PR PBB SHADOW E&M-EST. PATIENT-LVL III: ICD-10-PCS | Mod: PBBFAC,HCNC,, | Performed by: ORTHOPAEDIC SURGERY

## 2020-01-09 PROCEDURE — 73502 XR HIP 2 VIEW RIGHT: ICD-10-PCS | Mod: 26,HCNC,RT, | Performed by: RADIOLOGY

## 2020-01-09 PROCEDURE — 25000003 PHARM REV CODE 250: Mod: HCNC | Performed by: HOSPITALIST

## 2020-01-09 PROCEDURE — G0378 HOSPITAL OBSERVATION PER HR: HCPCS | Mod: HCNC

## 2020-01-09 PROCEDURE — 73502 X-RAY EXAM HIP UNI 2-3 VIEWS: CPT | Mod: TC,HCNC,PN,RT

## 2020-01-09 PROCEDURE — 99999 PR PBB SHADOW E&M-EST. PATIENT-LVL III: CPT | Mod: PBBFAC,HCNC,, | Performed by: ORTHOPAEDIC SURGERY

## 2020-01-09 PROCEDURE — 99024 POSTOP FOLLOW-UP VISIT: CPT | Mod: HCNC,S$GLB,, | Performed by: ORTHOPAEDIC SURGERY

## 2020-01-09 RX ADMIN — ANASTROZOLE 1 MG: 1 TABLET, COATED ORAL at 08:01

## 2020-01-09 RX ADMIN — APIXABAN 5 MG: 2.5 TABLET, FILM COATED ORAL at 08:01

## 2020-01-09 NOTE — ASSESSMENT & PLAN NOTE
Atrial Fibrillation controlled currently with currently bradycardia. not on heart rate control at home. [id number] score 4. Anticoagulation indicated currently. Anticoagulation done with eliquis

## 2020-01-09 NOTE — PLAN OF CARE
Hospital follow up scheduled with PCP       01/09/20 0946   Discharge Assessment   Assessment Type Discharge Planning Reassessment

## 2020-01-09 NOTE — ASSESSMENT & PLAN NOTE
Chronic, controlled.  Latest blood pressure and vitals reviewed-   Temp:  [96.1 °F (35.6 °C)-97.6 °F (36.4 °C)]   Pulse:  [47-67]   Resp:  [16-18]   BP: (119-139)/(56-71)   SpO2:  [94 %-100 %] .   Home meds for hypertension were reviewed and noted below. Hospital anti-hypertensive changes were made as shown below.  Hypertension Medications             triamterene-hydrochlorothiazide 37.5-25 mg (MAXZIDE-25) 37.5-25 mg per tablet Take 1 tablet by mouth once daily.      Hospital Medications         Will utilize p.r.n. blood pressure medication only if patient's blood pressure greater than  180/110 and she develops symptoms such as worsening chest pain or shortness of breath.

## 2020-01-09 NOTE — PLAN OF CARE
01/09/20 1206   Final Note   Assessment Type Final Discharge Note   Anticipated Discharge Disposition Home   Hospital Follow Up  Appt(s) scheduled? Yes

## 2020-01-09 NOTE — ASSESSMENT & PLAN NOTE
Patient's FSGs are controlled on current hypoglycemics.   Last A1c reviewed-   Lab Results   Component Value Date    HGBA1C 5.7 (H) 07/10/2019     Most recent fingerstick glucose reviewed-   Recent Labs   Lab 01/08/20  1628 01/08/20  2143   POCTGLUCOSE 110 99     Current correctional scale  Low  Maintain anti-hyperglycemic dose as follows-   Antihyperglycemics (From admission, onward)    Start     Stop Route Frequency Ordered    01/08/20 1612  insulin aspart U-100 pen 0-5 Units      -- SubQ Before meals & nightly PRN 01/08/20 1549

## 2020-01-09 NOTE — PLAN OF CARE
CM met with pt bedside to complete discharge assessment. Pt verified information on facesheet as correct. Reports that her daughter Melani is her POA. PCP is dr. Nance. Pharm is [facility]. Pt reports living alone at listed address, reports having help from friends and family. Denies hh/hd. DME- walker, cane, sc, bsc, walker. transportation home will be provided by family. DC plan is home with no needs.     01/09/20 1901   Discharge Assessment   Assessment Type Discharge Planning Assessment   Confirmed/corrected address and phone number on facesheet? Yes   Assessment information obtained from? Patient   Communicated expected length of stay with patient/caregiver yes   Prior to hospitilization cognitive status: Alert/Oriented   Prior to hospitalization functional status: Independent;Assistive Equipment   Current cognitive status: Alert/Oriented   Current Functional Status: Independent;Assistive Equipment   Lives With alone   Able to Return to Prior Arrangements yes   Is patient able to care for self after discharge? Yes   Patient's perception of discharge disposition home or selfcare   Readmission Within the Last 30 Days no previous admission in last 30 days   Patient currently being followed by outpatient case management? No   Patient currently receives any other outside agency services? No   Equipment Currently Used at Home cane, quad;bedside commode;shower chair;glucometer;walker, rolling   Do you have any problems affording any of your prescribed medications? No   Is the patient taking medications as prescribed? yes   Does the patient have transportation home? Yes   Transportation Anticipated family or friend will provide   Does the patient receive services at the [facility]? No   Discharge Plan A Home   DME Needed Upon Discharge  none   Patient/Family in Agreement with Plan yes

## 2020-01-09 NOTE — ASSESSMENT & PLAN NOTE
Patient is chronically on statin.will continue for now. Monitor clinically. Last LDL was   Lab Results   Component Value Date    LDLCALC 70.2 07/10/2019

## 2020-01-09 NOTE — HPI
Ms. Esposito presented to ER with complaints of lightheadedness and N/V.  She ate sweet cereal and fruit about 6 hours prior to presenting in triage area.  She did not measure her blood sugar.  No chest pain, SOB, weakness, numbness, facial weakness, slurring of speech.  Had similar episode about twelve years ago.  Medical problems include atrial fibrillation, DM, and hypertension.

## 2020-01-09 NOTE — PLAN OF CARE
Patient alert and oriented resting in bed. NAD. Denies pain or SOB. VSS. Normal SR. Up with standby assistance. Room air. Bed alarm active. Plan of care reviewed with patient. Verbalizes understanding.Call light in reach. Pt free from fall or injury. Will monitor.

## 2020-01-09 NOTE — PLAN OF CARE
Voicemail left on Kirstin Epstein's ([employer]ANES[employer]) cell phone, notifying of DC orders and needing a ride home.

## 2020-01-09 NOTE — SUBJECTIVE & OBJECTIVE
Past Medical History:   Diagnosis Date    Atrial fibrillation     4/11/13-recent dx sceduled to see cardiology Monday- Dr Lynn-dm    Cancer     breast    Diabetes mellitus     Hyperlipemia     Hypertension        Past Surgical History:   Procedure Laterality Date    AXILLARY NODE DISSECTION Left 6/13/2019    Procedure: LYMPHADENECTOMY, AXILLARY;  Surgeon: Kendall Howard MD;  Location: [facility] OR;  Service: General;  Laterality: Left;  left lumpectomy with left axillary lymph node dissection    BREAST SURGERY      lumpectomy left    HIP REPLACEMENT ARTHROPLASTY Right 11/27/2019    Procedure: ARTHROPLASTY, HIP REPLACEMENT;  Surgeon: Rahul Hanson II, MD;  Location: [facility] OR;  Service: Orthopedics;  Laterality: Right;    JOINT REPLACEMENT Right     knee    NEEDLE LOCALIZATION Left 6/13/2019    Procedure: NEEDLE LOCALIZATION;  Surgeon: Kendall Howard MD;  Location: [facility] OR;  Service: General;  Laterality: Left;  left lumpectomy with wire needle localization    SENTINEL LYMPH NODE BIOPSY Left 6/13/2019    Procedure: BIOPSY, LYMPH NODE, SENTINEL;  Surgeon: Kendall Howard MD;  Location[facility];  Service: General;  Laterality: Left;  left sentinel lymph node dissection    TONSILLECTOMY      TOTAL KNEE ARTHROPLASTY         Review of patient's allergies indicates:   Allergen Reactions    Lisinopril      hives       No current facility-administered medications on file prior to encounter.      Current Outpatient Medications on File Prior to Encounter   Medication Sig    anastrozole (ARIMIDEX) 1 mg Tab Take 1 tablet (1 mg total) by mouth once daily.    atorvastatin (LIPITOR) 80 MG tablet Take 0.5 tablets (40 mg total) by mouth once daily. (Patient taking differently: Take 40 mg by mouth every evening. )    ELIQUIS 5 mg Tab Take 1 tablet (5 mg total) by mouth 2 (two) times daily.    metFORMIN (GLUCOPHAGE) 500 MG tablet Take 1 tablet (500 mg total) by mouth 2 (two) times daily with meals. (Patient  taking differently: Take 250 mg by mouth 2 (two) times daily with meals. )    triamterene-hydrochlorothiazide 37.5-25 mg (MAXZIDE-25) 37.5-25 mg per tablet Take 1 tablet by mouth once daily.    [DISCONTINUED] ascorbic acid, vitamin C, (VITAMIN C) 500 MG tablet Take 500 mg by mouth once daily.    [DISCONTINUED] blood sugar diagnostic (TRUE METRIX GLUCOSE TEST STRIP) Strp 1 each by Misc.(Non-Drug; Combo Route) route once daily.    [DISCONTINUED] cyanocobalamin (VITAMIN B-12) 1000 MCG tablet Take 100 mcg by mouth once daily.    [DISCONTINUED] FLUZONE HIGH-DOSE 2019-20, PF, 180 mcg/0.5 mL Syrg TO BE ADMINISTERED BY PHARMACIST FOR IMMUNIZATION    [DISCONTINUED] gabapentin (NEURONTIN) 300 MG capsule Take 1 capsule (300 mg total) by mouth 3 (three) times daily.    [DISCONTINUED] lancets 32 gauge Misc 1 lancet by Misc.(Non-Drug; Combo Route) route once daily.    [DISCONTINUED] silver sulfADIAZINE 1% (SILVADENE) 1 % cream Apply topically 2 (two) times daily.    [DISCONTINUED] TRUE METRIX AIR GLUCOSE METER kit     [DISCONTINUED] VITAMIN D3 5,000 unit Tab Take 1 tablet by mouth once daily.    [DISCONTINUED] zinc gluconate 50 mg tablet Take 50 mg by mouth once daily.     Family History     Problem Relation (Age of Onset)    Breast cancer Mother        Tobacco Use    Smoking status: Never Smoker    Smokeless tobacco: Never Used   Substance and Sexual Activity    Alcohol use: No     Comment: once or twice a year    Drug use: No    Sexual activity: Not on file     Review of Systems   Constitutional: Negative for diaphoresis, fatigue and fever.   HENT: Negative for congestion, ear discharge and postnasal drip.    Eyes: Negative for photophobia and visual disturbance.   Respiratory: Negative for cough and shortness of breath.    Cardiovascular: Negative for chest pain and leg swelling.   Gastrointestinal: Positive for nausea and vomiting. Negative for abdominal distention, abdominal pain, blood in stool and diarrhea.    Endocrine: Negative for polyphagia and polyuria.   Genitourinary: Negative for dysuria, flank pain, hematuria and urgency.   Musculoskeletal: Positive for arthralgias. Negative for back pain and joint swelling.   Skin: Negative for color change and rash.   Neurological: Positive for dizziness and light-headedness. Negative for speech difficulty and numbness.   Psychiatric/Behavioral: Negative for agitation and confusion.     Objective:     Vital Signs (Most Recent):  Temp: 97.6 °F (36.4 °C) (01/08/20 1940)  Pulse: (!) 56 (01/08/20 1940)  Resp: 18 (01/08/20 1940)  BP: (!) 125/56 (01/08/20 1940)  SpO2: (!) 94 % (01/08/20 1940) Vital Signs (24h Range):  Temp:  [96.1 °F (35.6 °C)-97.6 °F (36.4 °C)] 97.6 °F (36.4 °C)  Pulse:  [47-67] 56  Resp:  [16-18] 18  SpO2:  [94 %-100 %] 94 %  BP: (119-139)/(56-71) 125/56     Weight: 93.9 kg (207 lb)  Body mass index is 33.41 kg/m².    Physical Exam   Constitutional: She is oriented to person, place, and time. She appears well-developed and well-nourished.   HENT:   Head: Normocephalic and atraumatic.   Right Ear: External ear normal.   Left Ear: External ear normal.   Mouth/Throat: Oropharynx is clear and moist.   Eyes: Pupils are equal, round, and reactive to light. Conjunctivae and EOM are normal.   Neck: Normal range of motion. Neck supple. No JVD present.   Cardiovascular: Normal rate, regular rhythm, normal heart sounds and intact distal pulses.   No murmur heard.  Pulmonary/Chest: Effort normal and breath sounds normal. She has no wheezes.   Abdominal: Soft. Bowel sounds are normal. There is no tenderness.   Musculoskeletal: Normal range of motion.   Neurological: She is alert and oriented to person, place, and time. Coordination normal.   Skin: Skin is warm and dry. Capillary refill takes 2 to 3 seconds.   Psychiatric: She has a normal mood and affect. Her behavior is normal. Judgment normal.   Nursing note and vitals reviewed.        CRANIAL NERVES     CN III, IV, VI    Pupils are equal, round, and reactive to light.  Extraocular motions are normal.        Significant Labs:   BMP:   Recent Labs   Lab 01/08/20  1321   *      K 3.7   CL 99   CO2 29   BUN 17   CREATININE 0.7   CALCIUM 9.8     CBC:   Recent Labs   Lab 01/08/20  1322   WBC 8.73   HGB 14.0   HCT 45.3          Significant Imaging: I have reviewed and interpreted all pertinent imaging results/findings within the past 24 hours.

## 2020-01-10 ENCOUNTER — HOSPITAL ENCOUNTER (OUTPATIENT)
Facility: HOSPITAL | Age: 70
Discharge: HOME | End: 2020-01-11
Attending: EMERGENCY MEDICINE | Admitting: HOSPITALIST
Payer: MEDICARE

## 2020-01-10 DIAGNOSIS — R00.1 BRADYCARDIA: ICD-10-CM

## 2020-01-10 DIAGNOSIS — R53.1 WEAKNESS: ICD-10-CM

## 2020-01-10 DIAGNOSIS — E87.6 HYPOKALEMIA: ICD-10-CM

## 2020-01-10 DIAGNOSIS — R55 NEAR SYNCOPE: ICD-10-CM

## 2020-01-10 DIAGNOSIS — A08.4 VIRAL GASTROENTERITIS: Primary | ICD-10-CM

## 2020-01-10 DIAGNOSIS — R07.9 CHEST PAIN: ICD-10-CM

## 2020-01-10 LAB
ALBUMIN SERPL BCP-MCNC: 3.7 G/DL (ref 3.5–5.2)
ALP SERPL-CCNC: 81 U/L (ref 55–135)
ALT SERPL W/O P-5'-P-CCNC: 15 U/L (ref 10–44)
ANION GAP SERPL CALC-SCNC: 12 MMOL/L (ref 8–16)
AST SERPL-CCNC: 15 U/L (ref 10–40)
BASOPHILS # BLD AUTO: 0.03 K/UL (ref 0–0.2)
BASOPHILS NFR BLD: 0.3 % (ref 0–1.9)
BILIRUB SERPL-MCNC: 0.6 MG/DL (ref 0.1–1)
BUN SERPL-MCNC: 19 MG/DL (ref 8–23)
CALCIUM SERPL-MCNC: 8.8 MG/DL (ref 8.7–10.5)
CHLORIDE SERPL-SCNC: 109 MMOL/L (ref 95–110)
CO2 SERPL-SCNC: 20 MMOL/L (ref 23–29)
CREAT SERPL-MCNC: 0.7 MG/DL (ref 0.5–1.4)
DIFFERENTIAL METHOD: ABNORMAL
EOSINOPHIL # BLD AUTO: 0.1 K/UL (ref 0–0.5)
EOSINOPHIL NFR BLD: 1.2 % (ref 0–8)
ERYTHROCYTE [DISTWIDTH] IN BLOOD BY AUTOMATED COUNT: 14.3 % (ref 11.5–14.5)
EST. GFR  (AFRICAN AMERICAN): >60 ML/MIN/1.73 M^2
EST. GFR  (NON AFRICAN AMERICAN): >60 ML/MIN/1.73 M^2
GLUCOSE SERPL-MCNC: 147 MG/DL (ref 70–110)
HCT VFR BLD AUTO: 40.5 % (ref 37–48.5)
HGB BLD-MCNC: 12.7 G/DL (ref 12–16)
IMM GRANULOCYTES # BLD AUTO: 0.05 K/UL (ref 0–0.04)
LYMPHOCYTES # BLD AUTO: 1.6 K/UL (ref 1–4.8)
LYMPHOCYTES NFR BLD: 16.1 % (ref 18–48)
MCH RBC QN AUTO: 28.2 PG (ref 27–31)
MCHC RBC AUTO-ENTMCNC: 31.4 G/DL (ref 32–36)
MCV RBC AUTO: 90 FL (ref 82–98)
MONOCYTES # BLD AUTO: 0.5 K/UL (ref 0.3–1)
MONOCYTES NFR BLD: 5.4 % (ref 4–15)
NEUTROPHILS # BLD AUTO: 7.5 K/UL (ref 1.8–7.7)
NEUTROPHILS NFR BLD: 76.5 % (ref 38–73)
NRBC BLD-RTO: 0 /100 WBC
PLATELET # BLD AUTO: 215 K/UL (ref 150–350)
PMV BLD AUTO: 9.3 FL (ref 9.2–12.9)
POTASSIUM SERPL-SCNC: 3.1 MMOL/L (ref 3.5–5.1)
PROT SERPL-MCNC: 6.1 G/DL (ref 6–8.4)
RBC # BLD AUTO: 4.5 M/UL (ref 4–5.4)
SODIUM SERPL-SCNC: 141 MMOL/L (ref 136–145)
WBC # BLD AUTO: 9.81 K/UL (ref 3.9–12.7)

## 2020-01-10 PROCEDURE — G0378 HOSPITAL OBSERVATION PER HR: HCPCS | Mod: HCNC

## 2020-01-10 PROCEDURE — 85025 COMPLETE CBC W/AUTO DIFF WBC: CPT | Mod: HCNC

## 2020-01-10 PROCEDURE — 96374 THER/PROPH/DIAG INJ IV PUSH: CPT | Mod: HCNC

## 2020-01-10 PROCEDURE — 96361 HYDRATE IV INFUSION ADD-ON: CPT | Mod: HCNC

## 2020-01-10 PROCEDURE — 93005 ELECTROCARDIOGRAM TRACING: CPT | Mod: HCNC

## 2020-01-10 PROCEDURE — 36415 COLL VENOUS BLD VENIPUNCTURE: CPT | Mod: HCNC

## 2020-01-10 PROCEDURE — 80053 COMPREHEN METABOLIC PANEL: CPT | Mod: HCNC

## 2020-01-10 PROCEDURE — 63600175 PHARM REV CODE 636 W HCPCS: Mod: HCNC | Performed by: EMERGENCY MEDICINE

## 2020-01-10 PROCEDURE — 99285 EMERGENCY DEPT VISIT HI MDM: CPT | Mod: 25,HCNC

## 2020-01-10 PROCEDURE — 85610 PROTHROMBIN TIME: CPT | Mod: HCNC

## 2020-01-10 PROCEDURE — 25000003 PHARM REV CODE 250: Mod: HCNC | Performed by: EMERGENCY MEDICINE

## 2020-01-10 RX ORDER — SODIUM CHLORIDE 9 MG/ML
1000 INJECTION, SOLUTION INTRAVENOUS
Status: COMPLETED | OUTPATIENT
Start: 2020-01-10 | End: 2020-01-10

## 2020-01-10 RX ORDER — METOCLOPRAMIDE HYDROCHLORIDE 5 MG/ML
10 INJECTION INTRAMUSCULAR; INTRAVENOUS
Status: COMPLETED | OUTPATIENT
Start: 2020-01-10 | End: 2020-01-10

## 2020-01-10 RX ORDER — POTASSIUM CHLORIDE 20 MEQ/1
40 TABLET, EXTENDED RELEASE ORAL
Status: COMPLETED | OUTPATIENT
Start: 2020-01-10 | End: 2020-01-10

## 2020-01-10 RX ORDER — DIPHENOXYLATE HYDROCHLORIDE AND ATROPINE SULFATE 2.5; .025 MG/1; MG/1
2 TABLET ORAL
Status: COMPLETED | OUTPATIENT
Start: 2020-01-10 | End: 2020-01-10

## 2020-01-10 RX ADMIN — POTASSIUM CHLORIDE 40 MEQ: 1500 TABLET, EXTENDED RELEASE ORAL at 11:01

## 2020-01-10 RX ADMIN — DIPHENOXYLATE HYDROCHLORIDE AND ATROPINE SULFATE 2 TABLET: 2.5; .025 TABLET ORAL at 10:01

## 2020-01-10 RX ADMIN — METOCLOPRAMIDE 10 MG: 5 INJECTION, SOLUTION INTRAMUSCULAR; INTRAVENOUS at 09:01

## 2020-01-10 RX ADMIN — SODIUM CHLORIDE 1000 ML: 0.9 INJECTION, SOLUTION INTRAVENOUS at 09:01

## 2020-01-10 NOTE — HOSPITAL COURSE
Patient monitor closely during observation stay.  Troponin trended is negative x2.  Orthostatic vitals negative.  She was initiated on IV fluids and home blood pressure medication was held in addition to home diabetic medications.  Her blood sugar has improved to baseline and she is feeling better.  Denies dizziness.  She states she is ready to go home.  She denies any further symptoms.  Discussed with patient her blood sugar is below 100 and hemoglobin A1c is 5.5 with discontinue metformin.  Keep blood pressure diary to monitor blood pressures at home.  Patient may be able to decrease or discontinue hypertension medications    .  Physical exam  Chest clear auscultation heart regular rate and rhythm. Patient is ambulating in room without assistance  Using cane

## 2020-01-10 NOTE — DISCHARGE SUMMARY
"[facility] Medicine  Discharge Summary      Patient Name: Eduardo Esposito  MRN: [id number]  Admission Date: 1/8/2020  Hospital Length of Stay: 0 days  Discharge Date and Time: 1/9/2020 11:19 AM  Attending Physician: No att. providers found   Discharging Provider: Nimo Herr NP  Primary Care Provider: Walter Nance MD      HPI:   Eduardo Esposito is a 69 y.o. female with PMHx of A-fib, DM, HTN, and breast cancer who presents to the ED via EMS for evaluation of light headedness with an episode of nausea and vomiting. Preceding light headedness, patient reports "feeling clammy". She endorses eating cereal and peaches ~x6-7 hours ago. She denies measuring her blood sugar today. Patient endorses having a similar episode of lightheadedness x12-15 years ago. She denies current chest pain, SOB, abdominal pain, weakness, numbness, facial droop, speech or vision changes, and urinary symptoms. Patient is presently on a blood thinner (eliquis) for A-fib and Metformin. She reports blood sugar is typically 200. She mentions started Gabapentin and Anastrozole x1 month ago. She has no other medical concerns or complaints at this moment. SHx includes lumpectomy and right hip arthroplasty. Lisinopril allergy noted.    * No surgery found *      Hospital Course:   Patient monitor closely during observation stay.  Troponin trended is negative x2.  Orthostatic vitals negative.  She was initiated on IV fluids and home blood pressure medication was held in addition to home diabetic medications.  Her blood sugar has improved to baseline and she is feeling better.  Denies dizziness.  She states she is ready to go home.  She denies any further symptoms.  Discussed with patient her blood sugar is below 100 and hemoglobin A1c is 5.5 with discontinue metformin.  Keep blood pressure diary to monitor blood pressures at home.  Patient may be able to decrease or discontinue hypertension medications    .  Physical " exam  Chest clear auscultation heart regular rate and rhythm. Patient is ambulating in room without assistance  Using cane     Consults:     * Hypertension associated with diabetes  Chronic, controlled.  Latest blood pressure and vitals reviewed-   Temp:  [96.1 °F (35.6 °C)-97.6 °F (36.4 °C)]   Pulse:  [47-67]   Resp:  [16-18]   BP: (119-139)/(56-71)   SpO2:  [94 %-100 %] .   Home meds for hypertension were reviewed and noted below. Hospital anti-hypertensive changes were made as shown below.  Hypertension Medications             triamterene-hydrochlorothiazide 37.5-25 mg (MAXZIDE-25) 37.5-25 mg per tablet Take 1 tablet by mouth once daily.      Hospital Medications         Will utilize p.r.n. blood pressure medication only if patient's blood pressure greater than  180/110 and she develops symptoms such as worsening chest pain or shortness of breath.      Vasovagal near-syncope  Likely secondary to vasovagal versus hypoglycemia. Check orthostatic vital. Telemetry. Fall precautions.         Hyperlipidemia associated with type 2 diabetes mellitus   Patient is chronically on statin.will continue for now. Monitor clinically. Last LDL was   Lab Results   Component Value Date    LDLCALC 70.2 07/10/2019            Type 2 diabetes mellitus with hypoglycemia, without long-term current use of insulin  Patient's FSGs are controlled on current hypoglycemics.   Last A1c reviewed-   Lab Results   Component Value Date    HGBA1C 5.7 (H) 07/10/2019     Most recent fingerstick glucose reviewed-   Recent Labs   Lab 01/08/20  1628 01/08/20  2143   POCTGLUCOSE 110 99     Current correctional scale  Low  Maintain anti-hyperglycemic dose as follows-   Antihyperglycemics (From admission, onward)    Start     Stop Route Frequency Ordered    01/08/20 1612  insulin aspart U-100 pen 0-5 Units      -- SubQ Before meals & nightly PRN 01/08/20 1549              Chronic atrial fibrillation  Atrial Fibrillation controlled currently with currently  bradycardia. not on heart rate control at home. ASUSE3VBBc score 4. Anticoagulation indicated currently. Anticoagulation done with eliquis            Final Active Diagnoses:    Diagnosis Date Noted POA    PRINCIPAL PROBLEM:  Hypertension associated with diabetes [E11.59, I10] 04/12/2018 Yes    Vasovagal near-syncope [R55] 01/08/2020 Yes    Hyperlipidemia associated with type 2 diabetes mellitus [E11.69, E78.5] 11/27/2019 Yes    Chronic atrial fibrillation [I48.20] 04/12/2018 Yes    Type 2 diabetes mellitus with hypoglycemia, without long-term current use of insulin [E11.649] 04/12/2018 Yes      Problems Resolved During this Admission:       Discharged Condition: stable    Disposition: Home or Self Care    Follow Up:  Follow-up Information     Walter Nance MD In 1 week.    Specialties:  Family Medicine, Internal Medicine  Why:  hospital follow up  Contact information:  [address]  [facility] [address]  [phone]                 Patient Instructions:      Ambulatory referral to Outpatient Case Management   Referral Priority: Routine Referral Type: Consultation   Referral Reason: Specialty Services Required   Number of Visits Requested: 1     Diet diabetic     Notify your health care provider if you experience any of the following:  increased confusion or weakness     Notify your health care provider if you experience any of the following:  persistent dizziness, light-headedness, or visual disturbances     Notify your health care provider if you experience any of the following:  worsening rash     Notify your health care provider if you experience any of the following:  severe persistent headache     Notify your health care provider if you experience any of the following:  difficulty breathing or increased cough     Notify your health care provider if you experience any of the following:  redness, tenderness, or signs of infection (pain, swelling, redness, odor or green/yellow discharge around incision  site)     Notify your health care provider if you experience any of the following:  severe uncontrolled pain     Notify your health care provider if you experience any of the following:  persistent nausea and vomiting or diarrhea     Notify your health care provider if you experience any of the following:  temperature >100.4     Activity as tolerated       Significant Diagnostic Studies: Labs:   BMP: No results for input(s): GLU, NA, K, CL, CO2, BUN, CREATININE, CALCIUM, MG in the last 48 hours., CMP No results for input(s): NA, K, CL, CO2, GLU, BUN, CREATININE, CALCIUM, PROT, ALBUMIN, BILITOT, ALKPHOS, AST, ALT, ANIONGAP, ESTGFRAFRICA, EGFRNONAA in the last 48 hours., Lipid Panel   Lab Results   Component Value Date    CHOL 144 07/10/2019    HDL 46 07/10/2019    LDLCALC 70.2 07/10/2019    TRIG 139 07/10/2019    CHOLHDL 31.9 07/10/2019   , Troponin No results for input(s): TROPONINI in the last 168 hours. and A1C:   Recent Labs   Lab 01/08/20  1321   HGBA1C 5.5     Radiology: X-Ray: CXR: X-Ray Chest 1 View (CXR): No results found for this visit on 01/08/20.    Pending Diagnostic Studies:     None         Medications:  Reconciled Home Medications:      Medication List      CHANGE how you take these medications    atorvastatin 80 MG tablet  Commonly known as:  LIPITOR  Take 0.5 tablets (40 mg total) by mouth once daily.  What changed:  when to take this        CONTINUE taking these medications    anastrozole 1 mg Tab  Commonly known as:  ARIMIDEX  Take 1 tablet (1 mg total) by mouth once daily.     Eliquis 5 mg Tab  Generic drug:  apixaban  Take 1 tablet (5 mg total) by mouth 2 (two) times daily.     triamterene-hydrochlorothiazide 37.5-25 mg 37.5-25 mg per tablet  Commonly known as:  MAXZIDE-25  Take 1 tablet by mouth once daily.        STOP taking these medications    metFORMIN 500 MG tablet  Commonly known as:  GLUCOPHAGE     Vitamin C 500 MG tablet  Generic drug:  ascorbic acid (vitamin C)            Indwelling  Lines/Drains at time of discharge:   Lines/Drains/Airways     None                 Time spent on the discharge of patient: 65 minutes  Patient was seen and examined on the date of discharge and determined to be suitable for discharge.         Nimo Herr NP  Department of Hospital Medicine  [facility]

## 2020-01-11 VITALS
DIASTOLIC BLOOD PRESSURE: 74 MMHG | HEIGHT: 66 IN | RESPIRATION RATE: 17 BRPM | SYSTOLIC BLOOD PRESSURE: 134 MMHG | OXYGEN SATURATION: 94 % | TEMPERATURE: 97 F | WEIGHT: 216.06 LBS | HEART RATE: 52 BPM | BODY MASS INDEX: 34.72 KG/M2

## 2020-01-11 PROBLEM — R00.1 BRADYCARDIA: Status: ACTIVE | Noted: 2020-01-11

## 2020-01-11 PROBLEM — E87.6 HYPOKALEMIA: Status: ACTIVE | Noted: 2020-01-11

## 2020-01-11 LAB
ALBUMIN SERPL BCP-MCNC: 3.3 G/DL (ref 3.5–5.2)
ALP SERPL-CCNC: 72 U/L (ref 55–135)
ALT SERPL W/O P-5'-P-CCNC: 11 U/L (ref 10–44)
ANION GAP SERPL CALC-SCNC: 8 MMOL/L (ref 8–16)
AST SERPL-CCNC: 13 U/L (ref 10–40)
BASOPHILS # BLD AUTO: 0.02 K/UL (ref 0–0.2)
BASOPHILS NFR BLD: 0.3 % (ref 0–1.9)
BILIRUB SERPL-MCNC: 0.7 MG/DL (ref 0.1–1)
BUN SERPL-MCNC: 14 MG/DL (ref 8–23)
CALCIUM SERPL-MCNC: 8.4 MG/DL (ref 8.7–10.5)
CHLORIDE SERPL-SCNC: 111 MMOL/L (ref 95–110)
CO2 SERPL-SCNC: 23 MMOL/L (ref 23–29)
CREAT SERPL-MCNC: 0.6 MG/DL (ref 0.5–1.4)
DIFFERENTIAL METHOD: ABNORMAL
EOSINOPHIL # BLD AUTO: 0 K/UL (ref 0–0.5)
EOSINOPHIL NFR BLD: 0.5 % (ref 0–8)
ERYTHROCYTE [DISTWIDTH] IN BLOOD BY AUTOMATED COUNT: 14.3 % (ref 11.5–14.5)
EST. GFR  (AFRICAN AMERICAN): >60 ML/MIN/1.73 M^2
EST. GFR  (NON AFRICAN AMERICAN): >60 ML/MIN/1.73 M^2
GLUCOSE SERPL-MCNC: 110 MG/DL (ref 70–110)
HCT VFR BLD AUTO: 37 % (ref 37–48.5)
HGB BLD-MCNC: 11.8 G/DL (ref 12–16)
IMM GRANULOCYTES # BLD AUTO: 0.03 K/UL (ref 0–0.04)
INR PPP: 1.1 (ref 0.8–1.2)
LYMPHOCYTES # BLD AUTO: 1.2 K/UL (ref 1–4.8)
LYMPHOCYTES NFR BLD: 18.6 % (ref 18–48)
MAGNESIUM SERPL-MCNC: 1.7 MG/DL (ref 1.6–2.6)
MCH RBC QN AUTO: 28.4 PG (ref 27–31)
MCHC RBC AUTO-ENTMCNC: 31.9 G/DL (ref 32–36)
MCV RBC AUTO: 89 FL (ref 82–98)
MONOCYTES # BLD AUTO: 0.4 K/UL (ref 0.3–1)
MONOCYTES NFR BLD: 6.3 % (ref 4–15)
NEUTROPHILS # BLD AUTO: 4.9 K/UL (ref 1.8–7.7)
NEUTROPHILS NFR BLD: 73.8 % (ref 38–73)
NRBC BLD-RTO: 0 /100 WBC
PLATELET # BLD AUTO: 205 K/UL (ref 150–350)
PMV BLD AUTO: 9.4 FL (ref 9.2–12.9)
POTASSIUM SERPL-SCNC: 3.8 MMOL/L (ref 3.5–5.1)
PROT SERPL-MCNC: 5.5 G/DL (ref 6–8.4)
PROTHROMBIN TIME: 11.4 SEC (ref 9–12.5)
RBC # BLD AUTO: 4.16 M/UL (ref 4–5.4)
SODIUM SERPL-SCNC: 142 MMOL/L (ref 136–145)
T4 FREE SERPL-MCNC: 0.85 NG/DL (ref 0.71–1.51)
TSH SERPL DL<=0.005 MIU/L-ACNC: 0.3 UIU/ML (ref 0.4–4)
WBC # BLD AUTO: 6.62 K/UL (ref 3.9–12.7)

## 2020-01-11 PROCEDURE — 96361 HYDRATE IV INFUSION ADD-ON: CPT

## 2020-01-11 PROCEDURE — G0378 HOSPITAL OBSERVATION PER HR: HCPCS | Mod: HCNC

## 2020-01-11 PROCEDURE — 63600175 PHARM REV CODE 636 W HCPCS: Mod: HCNC | Performed by: NURSE PRACTITIONER

## 2020-01-11 PROCEDURE — 36415 COLL VENOUS BLD VENIPUNCTURE: CPT | Mod: HCNC

## 2020-01-11 PROCEDURE — 85025 COMPLETE CBC W/AUTO DIFF WBC: CPT | Mod: HCNC

## 2020-01-11 PROCEDURE — 84439 ASSAY OF FREE THYROXINE: CPT | Mod: HCNC

## 2020-01-11 PROCEDURE — 25000003 PHARM REV CODE 250: Mod: HCNC | Performed by: NURSE PRACTITIONER

## 2020-01-11 PROCEDURE — 80053 COMPREHEN METABOLIC PANEL: CPT | Mod: HCNC

## 2020-01-11 PROCEDURE — 83735 ASSAY OF MAGNESIUM: CPT | Mod: HCNC

## 2020-01-11 PROCEDURE — 84443 ASSAY THYROID STIM HORMONE: CPT | Mod: HCNC

## 2020-01-11 RX ORDER — GLUCAGON 1 MG
1 KIT INJECTION
Status: DISCONTINUED | OUTPATIENT
Start: 2020-01-11 | End: 2020-01-11 | Stop reason: HOSPADM

## 2020-01-11 RX ORDER — ACETAMINOPHEN 325 MG/1
650 TABLET ORAL EVERY 4 HOURS PRN
Status: DISCONTINUED | OUTPATIENT
Start: 2020-01-11 | End: 2020-01-11 | Stop reason: HOSPADM

## 2020-01-11 RX ORDER — IBUPROFEN 200 MG
16 TABLET ORAL
Status: DISCONTINUED | OUTPATIENT
Start: 2020-01-11 | End: 2020-01-11 | Stop reason: HOSPADM

## 2020-01-11 RX ORDER — LANOLIN ALCOHOL/MO/W.PET/CERES
800 CREAM (GRAM) TOPICAL
Status: DISCONTINUED | OUTPATIENT
Start: 2020-01-11 | End: 2020-01-11 | Stop reason: HOSPADM

## 2020-01-11 RX ORDER — ONDANSETRON 2 MG/ML
4 INJECTION INTRAMUSCULAR; INTRAVENOUS EVERY 6 HOURS PRN
Status: DISCONTINUED | OUTPATIENT
Start: 2020-01-11 | End: 2020-01-11 | Stop reason: HOSPADM

## 2020-01-11 RX ORDER — POTASSIUM CHLORIDE 20 MEQ/15ML
40 SOLUTION ORAL
Status: DISCONTINUED | OUTPATIENT
Start: 2020-01-11 | End: 2020-01-11 | Stop reason: HOSPADM

## 2020-01-11 RX ORDER — ANASTROZOLE 1 MG/1
1 TABLET ORAL DAILY
Status: DISCONTINUED | OUTPATIENT
Start: 2020-01-11 | End: 2020-01-11 | Stop reason: HOSPADM

## 2020-01-11 RX ORDER — ATORVASTATIN CALCIUM 40 MG/1
40 TABLET, FILM COATED ORAL NIGHTLY
Status: DISCONTINUED | OUTPATIENT
Start: 2020-01-11 | End: 2020-01-11 | Stop reason: HOSPADM

## 2020-01-11 RX ORDER — TRIAMTERENE/HYDROCHLOROTHIAZID 37.5-25 MG
1 TABLET ORAL DAILY
Status: DISCONTINUED | OUTPATIENT
Start: 2020-01-11 | End: 2020-01-11 | Stop reason: HOSPADM

## 2020-01-11 RX ORDER — IBUPROFEN 200 MG
24 TABLET ORAL
Status: DISCONTINUED | OUTPATIENT
Start: 2020-01-11 | End: 2020-01-11 | Stop reason: HOSPADM

## 2020-01-11 RX ORDER — SODIUM CHLORIDE 0.9 % (FLUSH) 0.9 %
10 SYRINGE (ML) INJECTION
Status: DISCONTINUED | OUTPATIENT
Start: 2020-01-11 | End: 2020-01-11 | Stop reason: HOSPADM

## 2020-01-11 RX ORDER — SODIUM CHLORIDE 9 MG/ML
INJECTION, SOLUTION INTRAVENOUS CONTINUOUS
Status: DISCONTINUED | OUTPATIENT
Start: 2020-01-11 | End: 2020-01-11 | Stop reason: HOSPADM

## 2020-01-11 RX ADMIN — SODIUM CHLORIDE: 0.9 INJECTION, SOLUTION INTRAVENOUS at 01:01

## 2020-01-11 RX ADMIN — TRIAMTERENE AND HYDROCHLOROTHIAZIDE 1 TABLET: 37.5; 25 TABLET ORAL at 10:01

## 2020-01-11 RX ADMIN — SODIUM CHLORIDE: 0.9 INJECTION, SOLUTION INTRAVENOUS at 10:01

## 2020-01-11 RX ADMIN — APIXABAN 5 MG: 2.5 TABLET, FILM COATED ORAL at 10:01

## 2020-01-11 RX ADMIN — ATORVASTATIN CALCIUM 40 MG: 40 TABLET, FILM COATED ORAL at 01:01

## 2020-01-11 RX ADMIN — ANASTROZOLE 1 MG: 1 TABLET, COATED ORAL at 10:01

## 2020-01-11 NOTE — ASSESSMENT & PLAN NOTE
CMP  Sodium   Date Value Ref Range Status   01/10/2020 141 136 - 145 mmol/L Final     Potassium   Date Value Ref Range Status   01/10/2020 3.1 (L) 3.5 - 5.1 mmol/L Final     Chloride   Date Value Ref Range Status   01/10/2020 109 95 - 110 mmol/L Final     CO2   Date Value Ref Range Status   01/10/2020 20 (L) 23 - 29 mmol/L Final     Glucose   Date Value Ref Range Status   01/10/2020 147 (H) 70 - 110 mg/dL Final     BUN, Bld   Date Value Ref Range Status   01/10/2020 19 8 - 23 mg/dL Final     Creatinine   Date Value Ref Range Status   01/10/2020 0.7 0.5 - 1.4 mg/dL Final     Calcium   Date Value Ref Range Status   01/10/2020 8.8 8.7 - 10.5 mg/dL Final     Total Protein   Date Value Ref Range Status   01/10/2020 6.1 6.0 - 8.4 g/dL Final     Albumin   Date Value Ref Range Status   01/10/2020 3.7 3.5 - 5.2 g/dL Final     Total Bilirubin   Date Value Ref Range Status   01/10/2020 0.6 0.1 - 1.0 mg/dL Final     Comment:     For infants and newborns, interpretation of results should be based  on gestational age, weight and in agreement with clinical  observations.  Premature Infant recommended reference ranges:  Up to 24 hours.............<8.0 mg/dL  Up to 48 hours............<12.0 mg/dL  3-5 days..................<15.0 mg/dL  6-29 days.................<15.0 mg/dL       Alkaline Phosphatase   Date Value Ref Range Status   01/10/2020 81 55 - 135 U/L Final     AST   Date Value Ref Range Status   01/10/2020 15 10 - 40 U/L Final     ALT   Date Value Ref Range Status   01/10/2020 15 10 - 44 U/L Final     Anion Gap   Date Value Ref Range Status   01/10/2020 12 8 - 16 mmol/L Final     eGFR if    Date Value Ref Range Status   01/10/2020 >60 >60 mL/min/1.73 m^2 Final     eGFR if non    Date Value Ref Range Status   01/10/2020 >60 >60 mL/min/1.73 m^2 Final     Comment:     Calculation used to obtain the estimated glomerular filtration  rate (eGFR) is the CKD-EPI equation.        Patient noted to have  hypokalemia upon admission, was given 40 mEq of p.o. potassium in emergency room.  Trend with daily CMP.  Utilize p.r.n. electrolyte replacements. Continuous telemetry monitoring.

## 2020-01-11 NOTE — ASSESSMENT & PLAN NOTE
Vasovagal versus bradycardia induced versus dehydration from gastroenteritis?  Concern for patient having persistent bradycardia with a heart rate ranging between 45 and 55 beats per minute.  Blood pressure within normal limits.  Patient denies actual syncope but does appreciate feeling faint and lightheaded prior to arrival to emergency room.  Cardiology consult.  Patient was given 1 L normal saline bolus in emergency room, will continue Light IV fluid hydration upon admission.  Fall precautions.  Continuous telemetry monitoring.  Orthostatic vital signs pending.

## 2020-01-11 NOTE — CONSULTS
[facility]  Cardiology  Consult Note    Patient Name: Eduardo Esposito  MRN: [id number]  Admission Date: 1/10/2020  Hospital Length of Stay: 0 days  Code Status: Full Code   Attending Provider: Nury Bueno MD   Consulting Provider: Stephanie Umana MD  Primary Care Physician: Walter Nance MD  Principal Problem:Bradycardia    Patient information was obtained from patient, past medical records and ER records.     Inpatient consult to Cardiology  Consult performed by: Stephanie Umana MD  Consult ordered by: Lynette Miller NP        Subjective:     REASON FOR CONSULT:  Bradycardia     HPI:  69-year-old female with a past medical history significant for chronic atrial fibrillation, diabetes mellitus, hypertension, dyslipidemia presented to the hospital with weakness and and dizziness.  She denies any syncopal episodes.  She reportedly has been having the spells with the past couple of days.  He heart rate on telemetry monitor was noted to be in the high 40s to low 50s.  She reportedly has a history of low heart rate.  Patient states that she took her blood pressure at home when she felt this way and her blood pressure was within normal limits.  She does not recall what her heart rate was.  She denies any chest pain.  She denies any palpitations.  She denies any bleeding issues.  She reportedly has not been eating good.  She had diarrhea.    Past Medical History:   Diagnosis Date    Atrial fibrillation     4/11/13-recent dx sceduled to see cardiology Monday- Dr Lynn-dm    Cancer     breast    Diabetes mellitus     Hyperlipemia     Hypertension        Past Surgical History:   Procedure Laterality Date    AXILLARY NODE DISSECTION Left 6/13/2019    Procedure: LYMPHADENECTOMY, AXILLARY;  Surgeon: Kendall Howard MD;  Location: [facility];  Service: General;  Laterality: Left;  left lumpectomy with left axillary lymph node dissection    BREAST SURGERY      lumpectomy left     HIP REPLACEMENT ARTHROPLASTY Right 11/27/2019    Procedure: ARTHROPLASTY, HIP REPLACEMENT;  Surgeon: Rahul Hanson II, MD;  Location: [facility] OR;  Service: Orthopedics;  Laterality: Right;    JOINT REPLACEMENT Right     knee    NEEDLE LOCALIZATION Left 6/13/2019    Procedure: NEEDLE LOCALIZATION;  Surgeon: Kendall Howard MD;  Location: [facility];  Service: General;  Laterality: Left;  left lumpectomy with wire needle localization    SENTINEL LYMPH NODE BIOPSY Left 6/13/2019    Procedure: BIOPSY, LYMPH NODE, SENTINEL;  Surgeon: Kendall Howard MD;  Location: [facility] OR;  Service: General;  Laterality: Left;  left sentinel lymph node dissection    TONSILLECTOMY      TOTAL KNEE ARTHROPLASTY         Review of patient's allergies indicates:   Allergen Reactions    Lisinopril      hives       No current facility-administered medications on file prior to encounter.      Current Outpatient Medications on File Prior to Encounter   Medication Sig    anastrozole (ARIMIDEX) 1 mg Tab Take 1 tablet (1 mg total) by mouth once daily.    atorvastatin (LIPITOR) 80 MG tablet Take 0.5 tablets (40 mg total) by mouth once daily. (Patient taking differently: Take 40 mg by mouth every evening. )    ELIQUIS 5 mg Tab Take 1 tablet (5 mg total) by mouth 2 (two) times daily.    triamterene-hydrochlorothiazide 37.5-25 mg (MAXZIDE-25) 37.5-25 mg per tablet Take 1 tablet by mouth once daily.       Scheduled Meds:   anastrozole  1 mg Oral Daily    apixaban  5 mg Oral BID    atorvastatin  40 mg Oral QHS    triamterene-hydrochlorothiazide 37.5-25 mg  1 tablet Oral Daily     Continuous Infusions:   sodium chloride 0.9% 125 mL/hr at 01/11/20 1014     PRN Meds:.acetaminophen, dextrose 50%, dextrose 50%, glucagon (human recombinant), glucose, glucose, magnesium oxide, magnesium oxide, ondansetron, potassium chloride 10%, potassium chloride 10%, potassium chloride 10%, sodium chloride 0.9%    Family History     Problem Relation (Age of  Onset)    Breast cancer Mother          Tobacco Use    Smoking status: Never Smoker    Smokeless tobacco: Never Used   Substance and Sexual Activity    Alcohol use: No     Comment: once or twice a year    Drug use: No    Sexual activity: Not on file       ROS   No significant headaches or sore throat or runny nose.   No recent changes in vision.   No recent changes in hearing.  No dysphagia or odynophagia.  Reports dizziness.   Denies any cough or hemoptysis.   Denies any abdominal pain, nausea, vomiting, diarrhea or constipation.   Denies any dysuria or polyuria.   Denies any fevers or chills.   Denies any recent significant weight changes.   Denies bleeding diathesis  Objective:     Vital Signs (Most Recent):  Temp: 96.9 °F (36.1 °C) (01/11/20 0758)  Pulse: (!) 52 (01/11/20 0758)  Resp: 17 (01/11/20 0758)  BP: 134/74 (01/11/20 0758)  SpO2: (!) 94 % (01/11/20 0758) Vital Signs (24h Range):  Temp:  [96.2 °F (35.7 °C)-96.9 °F (36.1 °C)] 96.9 °F (36.1 °C)  Pulse:  [51-58] 52  Resp:  [14-18] 17  SpO2:  [94 %-100 %] 94 %  BP: (134-182)/(72-84) 134/74     Weight: 98 kg (216 lb 0.8 oz)  Body mass index is 34.87 kg/m².    SpO2: (!) 94 %         Intake/Output Summary (Last 24 hours) at 1/11/2020 1147  Last data filed at 1/11/2020 0600  Gross per 24 hour   Intake 1668.75 ml   Output 350 ml   Net 1318.75 ml       Lines/Drains/Airways     Peripheral Intravenous Line                 Peripheral IV - Single Lumen 01/10/20 2100 20 G Right Forearm less than 1 day                Physical Exam  HEENT: Normocephalic, atraumatic, PERRL, Conjunctiva pink, no scleral icterus.   CVS: S1S2+, Irregular, no murmurs, rubs or gallops, JVP: Normal.  LUNGS: Clear  ABDOMEN: Soft, NT, BS+  EXTREMITIES: No cyanosis, clubbing.+ edema  NEURO: AAO X 3.     Significant Labs:   BMP:   Recent Labs   Lab 01/10/20  2206 01/11/20  0550   * 110    142   K 3.1* 3.8    111*   CO2 20* 23   BUN 19 14   CREATININE 0.7 0.6   CALCIUM 8.8  8.4*   MG  --  1.7   , CMP   Recent Labs   Lab 01/10/20  2206 01/11/20  0550    142   K 3.1* 3.8    111*   CO2 20* 23   * 110   BUN 19 14   CREATININE 0.7 0.6   CALCIUM 8.8 8.4*   PROT 6.1 5.5*   ALBUMIN 3.7 3.3*   BILITOT 0.6 0.7   ALKPHOS 81 72   AST 15 13   ALT 15 11   ANIONGAP 12 8   ESTGFRAFRICA >60 >60   EGFRNONAA >60 >60   , CBC   Recent Labs   Lab 01/10/20  2206 01/11/20  0550   WBC 9.81 6.62   HGB 12.7 11.8*   HCT 40.5 37.0    205   , INR   Recent Labs   Lab 01/10/20  2206   INR 1.1   , Lipid Panel No results for input(s): CHOL, HDL, LDLCALC, TRIG, CHOLHDL in the last 48 hours. and Troponin No results for input(s): TROPONINI in the last 48 hours.    Significant Imaging: Reviewed  Assessment and Plan:     IMPRESSION:  1. Presyncope.  Etiology is multifactorial including medications (gabapentin, HCTZ), dehydration, abdominal symptoms with nausea vomiting and diarrhea and possibly also Bradycardia.  Telemetry showed heart rates in the 40s to low 50s.  No significant pauses were noted.  2.  History of diabetes mellitus with reportedly hypoglycemia and had metformin was discontinued.  3.  Diarrhea.  Resolved.  4.  Atrial fibrillation.  Chronic.  Slow ventricular response.  5.  Status post right knee replacement.    RECOMMENDATIONS:   1.  She was advised to eat on a regular basis.  2.  In my opinion she would need a Holter monitor times 48 hours or Zio XT Patch to evaluate for any significant bradycardia as an outpatient.  She can follow up with her primary cardiologist Dr. Crystal to have this done.  3.  Avoid AV michael blocking and negative chronotropic agents.  4.  Check TSH could be added to the blood drawn this morning.  5.  She was advised to follow up with Dr. Crystal and have above workup done.    Thank you for your consult. I will sign off. Please contact us if you have any additional questions.    Stephanie Umana MD  Cardiology   [facility]

## 2020-01-11 NOTE — PLAN OF CARE
Pt is calm and cooperative, family at bedside during admission, pt agrees with plan of care, no complaints of nausea diarrhea sob or chest pain since admission from er, pt ambulated with walker well, hip precautions maintained, tele monitor in place, pt agrees to use call light for all needs, pt rested well, contact precautions in place and followed.

## 2020-01-11 NOTE — SUBJECTIVE & OBJECTIVE
Past Medical History:   Diagnosis Date    Atrial fibrillation     4/11/13-recent dx sceduled to see cardiology Monday- Dr Lynn-dm    Cancer     breast    Diabetes mellitus     Hyperlipemia     Hypertension        Past Surgical History:   Procedure Laterality Date    AXILLARY NODE DISSECTION Left 6/13/2019    Procedure: LYMPHADENECTOMY, AXILLARY;  Surgeon: Kendall Howard MD;  Location: [facility];  Service: General;  Laterality: Left;  left lumpectomy with left axillary lymph node dissection    BREAST SURGERY      lumpectomy left    HIP REPLACEMENT ARTHROPLASTY Right 11/27/2019    Procedure: ARTHROPLASTY, HIP REPLACEMENT;  Surgeon: Rahul Hanson II, MD;  Location: [facility] OR;  Service: Orthopedics;  Laterality: Right;    JOINT REPLACEMENT Right     knee    NEEDLE LOCALIZATION Left 6/13/2019    Procedure: NEEDLE LOCALIZATION;  Surgeon: Kendall Howard MD;  Location: [facility];  Service: General;  Laterality: Left;  left lumpectomy with wire needle localization    SENTINEL LYMPH NODE BIOPSY Left 6/13/2019    Procedure: BIOPSY, LYMPH NODE, SENTINEL;  Surgeon: Kendall Howard MD;  Location: [facility];  Service: General;  Laterality: Left;  left sentinel lymph node dissection    TONSILLECTOMY      TOTAL KNEE ARTHROPLASTY         Review of patient's allergies indicates:   Allergen Reactions    Lisinopril      hives       No current facility-administered medications on file prior to encounter.      Current Outpatient Medications on File Prior to Encounter   Medication Sig    anastrozole (ARIMIDEX) 1 mg Tab Take 1 tablet (1 mg total) by mouth once daily.    atorvastatin (LIPITOR) 80 MG tablet Take 0.5 tablets (40 mg total) by mouth once daily. (Patient taking differently: Take 40 mg by mouth every evening. )    ELIQUIS 5 mg Tab Take 1 tablet (5 mg total) by mouth 2 (two) times daily.    triamterene-hydrochlorothiazide 37.5-25 mg (MAXZIDE-25) 37.5-25 mg per tablet Take 1 tablet by mouth once daily.      Family History     Problem Relation (Age of Onset)    Breast cancer Mother        Tobacco Use    Smoking status: Never Smoker    Smokeless tobacco: Never Used   Substance and Sexual Activity    Alcohol use: No     Comment: once or twice a year    Drug use: No    Sexual activity: Not on file     Review of Systems   Constitutional: Positive for diaphoresis. Negative for activity change, appetite change, chills, fatigue and fever.   HENT: Negative for congestion, sinus pressure, sinus pain and sore throat.    Eyes: Negative for photophobia and visual disturbance.   Respiratory: Negative for cough, choking, chest tightness, shortness of breath and wheezing.    Cardiovascular: Positive for leg swelling. Negative for chest pain and palpitations.        Chronic   Gastrointestinal: Positive for diarrhea, nausea and vomiting. Negative for abdominal distention, abdominal pain, blood in stool and constipation.   Genitourinary: Negative for difficulty urinating, dysuria, flank pain and hematuria.   Musculoskeletal: Negative for back pain, gait problem and joint swelling.   Skin: Negative for rash and wound.   Neurological: Positive for dizziness, weakness and light-headedness. Negative for syncope, numbness and headaches.   Psychiatric/Behavioral: Negative for confusion. The patient is not nervous/anxious.      Objective:     Vital Signs (Most Recent):  Temp: 96.4 °F (35.8 °C) (01/11/20 0016)  Pulse: (!) 58 (01/11/20 0016)  Resp: 18 (01/11/20 0016)  BP: (!) 145/79 (01/11/20 0016)  SpO2: 96 % (01/11/20 0016) Vital Signs (24h Range):  Temp:  [96.4 °F (35.8 °C)] 96.4 °F (35.8 °C)  Pulse:  [51-58] 58  Resp:  [16-18] 18  SpO2:  [95 %-100 %] 96 %  BP: (143-182)/(72-84) 145/79     Weight: 98.4 kg (217 lb)  Body mass index is 35.02 kg/m².    Physical Exam   Constitutional: She is oriented to person, place, and time. She appears well-developed and well-nourished. No distress.   Pale appearing female.   HENT:   Head:  Normocephalic and atraumatic.   Eyes: Pupils are equal, round, and reactive to light. EOM are normal. Right eye exhibits no discharge. Left eye exhibits no discharge.   Neck: Normal range of motion. No JVD present.   Cardiovascular: Intact distal pulses.   No murmur heard.  Irregular rhythm, irregular rate noted. Patient's heart rate noted to be 53 beats per minute on bedside continuous cardiac monitor.  Patient has history of atrial fibrillation.   Pulmonary/Chest: Effort normal and breath sounds normal. No respiratory distress. She has no wheezes. She has no rales. She exhibits no tenderness.   Patient on room air during my initial physical exam.   Abdominal: Soft. Bowel sounds are normal. She exhibits no distension. There is no tenderness. There is no rebound and no guarding.   Genitourinary:   Genitourinary Comments: Exam Deferred      Musculoskeletal: Normal range of motion. She exhibits edema. She exhibits no tenderness or deformity.   Plus three pitting edema noted to bilateral lower extremities.   Neurological: She is alert and oriented to person, place, and time. No cranial nerve deficit or sensory deficit.   Skin: Skin is warm and dry. Capillary refill takes 2 to 3 seconds. No rash noted. She is not diaphoretic. No erythema.   Varicose veins noted to bilateral lower extremities.    Psychiatric: She has a normal mood and affect. Her behavior is normal. Judgment and thought content normal.   Nursing note and vitals reviewed.        CRANIAL NERVES     CN III, IV, VI   Pupils are equal, round, and reactive to light.  Extraocular motions are normal.        Significant Labs:   CBC:   Recent Labs   Lab 01/10/20  2206   WBC 9.81   HGB 12.7   HCT 40.5        CMP:   Recent Labs   Lab 01/10/20  2206      K 3.1*      CO2 20*   *   BUN 19   CREATININE 0.7   CALCIUM 8.8   PROT 6.1   ALBUMIN 3.7   BILITOT 0.6   ALKPHOS 81   AST 15   ALT 15   ANIONGAP 12   EGFRNONAA >60       Significant Imaging:  I have reviewed all pertinent imaging results/findings within the past 24 hours.     EKG was performed on January 10, 2020, impression as below:  Atrial fibrillation with slow ventricular response with a competing junctional pacemaker  Left axis deviation  Abnormal ECG  When compared with ECG of 08-JAN-2020 13:05,  No significant change was found  I personally reviewed imaging from EKG performed on January 10, 2020 and I agree with above impression.  No STEMI noted.

## 2020-01-11 NOTE — ASSESSMENT & PLAN NOTE
Chronic, controlled.  Will continue home medication for hyperlipidemia.    Lab Results   Component Value Date    CHOL 144 07/10/2019    CHOL 157 02/18/2019    CHOL 126 07/05/2018     Lab Results   Component Value Date    HDL 46 07/10/2019    HDL 52 02/18/2019    HDL 44 07/05/2018     Lab Results   Component Value Date    LDLCALC 70.2 07/10/2019    LDLCALC 74.6 02/18/2019    LDLCALC 62.0 (L) 07/05/2018     Lab Results   Component Value Date    TRIG 139 07/10/2019    TRIG 152 (H) 02/18/2019    TRIG 100 07/05/2018     Lab Results   Component Value Date    CHOLHDL 31.9 07/10/2019    CHOLHDL 33.1 02/18/2019    CHOLHDL 34.9 07/05/2018

## 2020-01-11 NOTE — ASSESSMENT & PLAN NOTE
Noted -concern for bradycardia being cause for presyncopal like episodes.  Orthostatic vital signs pending.  Continuous telemetry monitoring.  Continuation upon medications.  Cardiology consult.  Light IV fluid hydration.

## 2020-01-11 NOTE — H&P
"[facility] Medicine  History & Physical    Patient Name: Eduardo Espoisto  MRN: [id number]  Admission Date: 1/10/2020  Attending Physician: Magda Maldonado MD   Primary Care Provider: Walter Nance MD         Patient information was obtained from patient, relative(s), past medical records and ER records.     Subjective:     Principal Problem:Bradycardia    Chief Complaint:   Chief Complaint   Patient presents with    Emesis     x 3 episodes. Denies pain. Became "cold and clammy" at [employer] this evening.     Diarrhea        HPI: Eduardo Esposito is a 69-year-old female with a past medical history of atrial fibrillation on home Eliquis, essential hypertension, hyperlipidemia, and history of breast cancer who presents to the emergency room tonight with reports of vomiting, diarrhea, and presyncopal episode.  Patient states she was recently admitted to [facility] for nausea and vomiting that initially, started on Wednesday on January 8, 2020 and she was kept in the hospital overnight and discharged home on Thursday January 9, 2020.  Patient states tonight she was at a meeting and at approximately 8:20 p.m. she became white and clammy.  Patient states she began to then vomit continuously and EMS was called.  Upon EMS arrival patient felt as if she needs to have a bowel movement in 1 to the bathroom and experienced diarrhea.  Patient also reports that she felt faint."  Patient denies loss of consciousness, chest pain, shortness of breath, fever, recent travel, sick contacts, abdominal pain, or falling.  Patient reports compliance with her home medication regimen.  In the emergency room patient noted to have bradycardia in which she states she was told she had during her previous admission and was told to follow up with cardiologist as an outpatient, in which she has not done yet since she was just discharged this week.  Patient states Dr. Crystal is her cardiologist.  Patient states " she underwent a right hip replacement per Dr. Hanson in November of 2019, states it is healing well.  Patient placed in observation on January 10th, 2020 at approximately 11:00 p.m..  Patient states that she lives alone, uses a walker for ambulatory assistive device.  Denies any supplemental oxygen.  Patient accompanied by her daughter and a sister who were at bedside during my initial interview and physical exam.    Past Medical History:   Diagnosis Date    Atrial fibrillation     4/11/13-recent dx sceduled to see cardiology Monday- Dr Lynn-dm    Cancer     breast    Diabetes mellitus     Hyperlipemia     Hypertension        Past Surgical History:   Procedure Laterality Date    AXILLARY NODE DISSECTION Left 6/13/2019    Procedure: LYMPHADENECTOMY, AXILLARY;  Surgeon: Kendall Howard MD;  Location: [facility] OR;  Service: General;  Laterality: Left;  left lumpectomy with left axillary lymph node dissection    BREAST SURGERY      lumpectomy left    HIP REPLACEMENT ARTHROPLASTY Right 11/27/2019    Procedure: ARTHROPLASTY, HIP REPLACEMENT;  Surgeon: Rahul Hanson II, MD;  Location: [facility] OR;  Service: Orthopedics;  Laterality: Right;    JOINT REPLACEMENT Right     knee    NEEDLE LOCALIZATION Left 6/13/2019    Procedure: NEEDLE LOCALIZATION;  Surgeon: Kendall Howard MD;  Location: [facility] OR;  Service: General;  Laterality: Left;  left lumpectomy with wire needle localization    SENTINEL LYMPH NODE BIOPSY Left 6/13/2019    Procedure: BIOPSY, LYMPH NODE, SENTINEL;  Surgeon: Kendall Howard MD;  Location: [facility] OR;  Service: General;  Laterality: Left;  left sentinel lymph node dissection    TONSILLECTOMY      TOTAL KNEE ARTHROPLASTY         Review of patient's allergies indicates:   Allergen Reactions    Lisinopril      hives       No current facility-administered medications on file prior to encounter.      Current Outpatient Medications on File Prior to Encounter   Medication Sig    anastrozole  (ARIMIDEX) 1 mg Tab Take 1 tablet (1 mg total) by mouth once daily.    atorvastatin (LIPITOR) 80 MG tablet Take 0.5 tablets (40 mg total) by mouth once daily. (Patient taking differently: Take 40 mg by mouth every evening. )    ELIQUIS 5 mg Tab Take 1 tablet (5 mg total) by mouth 2 (two) times daily.    triamterene-hydrochlorothiazide 37.5-25 mg (MAXZIDE-25) 37.5-25 mg per tablet Take 1 tablet by mouth once daily.     Family History     Problem Relation (Age of Onset)    Breast cancer Mother        Tobacco Use    Smoking status: Never Smoker    Smokeless tobacco: Never Used   Substance and Sexual Activity    Alcohol use: No     Comment: once or twice a year    Drug use: No    Sexual activity: Not on file     Review of Systems   Constitutional: Positive for diaphoresis. Negative for activity change, appetite change, chills, fatigue and fever.   HENT: Negative for congestion, sinus pressure, sinus pain and sore throat.    Eyes: Negative for photophobia and visual disturbance.   Respiratory: Negative for cough, choking, chest tightness, shortness of breath and wheezing.    Cardiovascular: Positive for leg swelling. Negative for chest pain and palpitations.        Chronic   Gastrointestinal: Positive for diarrhea, nausea and vomiting. Negative for abdominal distention, abdominal pain, blood in stool and constipation.   Genitourinary: Negative for difficulty urinating, dysuria, flank pain and hematuria.   Musculoskeletal: Negative for back pain, gait problem and joint swelling.   Skin: Negative for rash and wound.   Neurological: Positive for dizziness, weakness and light-headedness. Negative for syncope, numbness and headaches.   Psychiatric/Behavioral: Negative for confusion. The patient is not nervous/anxious.      Objective:     Vital Signs (Most Recent):  Temp: 96.4 °F (35.8 °C) (01/11/20 0016)  Pulse: (!) 58 (01/11/20 0016)  Resp: 18 (01/11/20 0016)  BP: (!) 145/79 (01/11/20 0016)  SpO2: 96 % (01/11/20  0016) Vital Signs (24h Range):  Temp:  [96.4 °F (35.8 °C)] 96.4 °F (35.8 °C)  Pulse:  [51-58] 58  Resp:  [16-18] 18  SpO2:  [95 %-100 %] 96 %  BP: (143-182)/(72-84) 145/79     Weight: 98.4 kg (217 lb)  Body mass index is 35.02 kg/m².    Physical Exam   Constitutional: She is oriented to person, place, and time. She appears well-developed and well-nourished. No distress.   Pale appearing female.   HENT:   Head: Normocephalic and atraumatic.   Eyes: Pupils are equal, round, and reactive to light. EOM are normal. Right eye exhibits no discharge. Left eye exhibits no discharge.   Neck: Normal range of motion. No JVD present.   Cardiovascular: Intact distal pulses.   No murmur heard.  Irregular rhythm, irregular rate noted. Patient's heart rate noted to be 53 beats per minute on bedside continuous cardiac monitor.  Patient has history of atrial fibrillation.   Pulmonary/Chest: Effort normal and breath sounds normal. No respiratory distress. She has no wheezes. She has no rales. She exhibits no tenderness.   Patient on room air during my initial physical exam.   Abdominal: Soft. Bowel sounds are normal. She exhibits no distension. There is no tenderness. There is no rebound and no guarding.   Genitourinary:   Genitourinary Comments: Exam Deferred      Musculoskeletal: Normal range of motion. She exhibits edema. She exhibits no tenderness or deformity.   Plus three pitting edema noted to bilateral lower extremities.   Neurological: She is alert and oriented to person, place, and time. No cranial nerve deficit or sensory deficit.   Skin: Skin is warm and dry. Capillary refill takes 2 to 3 seconds. No rash noted. She is not diaphoretic. No erythema.   Varicose veins noted to bilateral lower extremities.    Psychiatric: She has a normal mood and affect. Her behavior is normal. Judgment and thought content normal.   Nursing note and vitals reviewed.        CRANIAL NERVES     CN III, IV, VI   Pupils are equal, round, and  reactive to light.  Extraocular motions are normal.        Significant Labs:   CBC:   Recent Labs   Lab 01/10/20  2206   WBC 9.81   HGB 12.7   HCT 40.5        CMP:   Recent Labs   Lab 01/10/20  2206      K 3.1*      CO2 20*   *   BUN 19   CREATININE 0.7   CALCIUM 8.8   PROT 6.1   ALBUMIN 3.7   BILITOT 0.6   ALKPHOS 81   AST 15   ALT 15   ANIONGAP 12   EGFRNONAA >60       Significant Imaging: I have reviewed all pertinent imaging results/findings within the past 24 hours.     EKG was performed on January 10, 2020, impression as below:  Atrial fibrillation with slow ventricular response with a competing junctional pacemaker  Left axis deviation  Abnormal ECG  When compared with ECG of 08-JAN-2020 13:05,  No significant change was found  I personally reviewed imaging from EKG performed on January 10, 2020 and I agree with above impression.  No STEMI noted.    Assessment/Plan:     * Bradycardia  Noted -concern for bradycardia being cause for presyncopal like episodes.  Orthostatic vital signs pending.  Continuous telemetry monitoring.  Continuation upon medications.  Cardiology consult.  Light IV fluid hydration.      Hypokalemia  CMP  Sodium   Date Value Ref Range Status   01/10/2020 141 136 - 145 mmol/L Final     Potassium   Date Value Ref Range Status   01/10/2020 3.1 (L) 3.5 - 5.1 mmol/L Final     Chloride   Date Value Ref Range Status   01/10/2020 109 95 - 110 mmol/L Final     CO2   Date Value Ref Range Status   01/10/2020 20 (L) 23 - 29 mmol/L Final     Glucose   Date Value Ref Range Status   01/10/2020 147 (H) 70 - 110 mg/dL Final     BUN, Bld   Date Value Ref Range Status   01/10/2020 19 8 - 23 mg/dL Final     Creatinine   Date Value Ref Range Status   01/10/2020 0.7 0.5 - 1.4 mg/dL Final     Calcium   Date Value Ref Range Status   01/10/2020 8.8 8.7 - 10.5 mg/dL Final     Total Protein   Date Value Ref Range Status   01/10/2020 6.1 6.0 - 8.4 g/dL Final     Albumin   Date Value Ref  Range Status   01/10/2020 3.7 3.5 - 5.2 g/dL Final     Total Bilirubin   Date Value Ref Range Status   01/10/2020 0.6 0.1 - 1.0 mg/dL Final     Comment:     For infants and newborns, interpretation of results should be based  on gestational age, weight and in agreement with clinical  observations.  Premature Infant recommended reference ranges:  Up to 24 hours.............<8.0 mg/dL  Up to 48 hours............<12.0 mg/dL  3-5 days..................<15.0 mg/dL  6-29 days.................<15.0 mg/dL       Alkaline Phosphatase   Date Value Ref Range Status   01/10/2020 81 55 - 135 U/L Final     AST   Date Value Ref Range Status   01/10/2020 15 10 - 40 U/L Final     ALT   Date Value Ref Range Status   01/10/2020 15 10 - 44 U/L Final     Anion Gap   Date Value Ref Range Status   01/10/2020 12 8 - 16 mmol/L Final     eGFR if    Date Value Ref Range Status   01/10/2020 >60 >60 mL/min/1.73 m^2 Final     eGFR if non    Date Value Ref Range Status   01/10/2020 >60 >60 mL/min/1.73 m^2 Final     Comment:     Calculation used to obtain the estimated glomerular filtration  rate (eGFR) is the CKD-EPI equation.        Patient noted to have hypokalemia upon admission, was given 40 mEq of p.o. potassium in emergency room.  Trend with daily CMP.  Utilize p.r.n. electrolyte replacements. Continuous telemetry monitoring.      Viral gastroenteritis  IV fluid hydration.  P.r.n. antiemetics.  Stool studies ordered. CDiff infection less likely, patient denies recent antibiotic use.       Vasovagal near-syncope  Vasovagal versus bradycardia induced versus dehydration from gastroenteritis?  Concern for patient having persistent bradycardia with a heart rate ranging between 45 and 55 beats per minute.  Blood pressure within normal limits.  Patient denies actual syncope but does appreciate feeling faint and lightheaded prior to arrival to emergency room.  Cardiology consult.  Patient was given 1 L normal saline  bolus in emergency room, will continue Light IV fluid hydration upon admission.  Fall precautions.  Continuous telemetry monitoring.  Orthostatic vital signs pending.      Hyperlipidemia associated with type 2 diabetes mellitus  Chronic, controlled.  Will continue home medication for hyperlipidemia.    Lab Results   Component Value Date    CHOL 144 07/10/2019    CHOL 157 02/18/2019    CHOL 126 07/05/2018     Lab Results   Component Value Date    HDL 46 07/10/2019    HDL 52 02/18/2019    HDL 44 07/05/2018     Lab Results   Component Value Date    LDLCALC 70.2 07/10/2019    LDLCALC 74.6 02/18/2019    LDLCALC 62.0 (L) 07/05/2018     Lab Results   Component Value Date    TRIG 139 07/10/2019    TRIG 152 (H) 02/18/2019    TRIG 100 07/05/2018     Lab Results   Component Value Date    CHOLHDL 31.9 07/10/2019    CHOLHDL 33.1 02/18/2019    CHOLHDL 34.9 07/05/2018           Malignant neoplasm of left female breast  Noted, patient states in June she had a lumpectomy with lymph node removal.  Patient states she currently takes a daily maintenance cancer pill (ARIMIDEX). Will continue home medication.      Long term (current) use of anticoagulants  Chronic - has atrial fibrillation. Continue home medications. Continuous telemetry monitoring.       Hypertension associated with diabetes  Hypertension is chronic, controlled.  Continue home medications.  Continuous telemetry monitoring.    BP Readings from Last 3 Encounters:   01/11/20 (!) 145/79   01/09/20 (!) 121/58   01/09/20 (!) 144/72         Patient has a history of diabetes that used to be controlled with p.o. metformin.  Patient denies the use of metformin at this time.  Recent hemoglobin A1c as below.    Lab Results   Component Value Date    HGBA1C 5.5 01/08/2020           Chronic atrial fibrillation  Noted, continuous telemetry monitoring.  Continue home medications.      VTE Risk Mitigation (From admission, onward)         Ordered     apixaban tablet 5 mg  2 times daily       01/11/20 0042     IP VTE HIGH RISK PATIENT  Once      01/11/20 0042     Place sequential compression device  Until discontinued      01/11/20 0042     Place ANDREZ hose  Until discontinued      01/11/20 0042               Time spent seeing patient( greater than 1/2 spent in direct contact) : 65 minutes     Lynette Miller NP  Department of Hospital Medicine   [facility]

## 2020-01-11 NOTE — PLAN OF CARE
01/11/20 1250   Final Note   Assessment Type Final Discharge Note   Anticipated Discharge Disposition Home

## 2020-01-11 NOTE — HPI
"Eduardo Esposito is a 69-year-old female with a past medical history of atrial fibrillation on home Eliquis, essential hypertension, hyperlipidemia, and history of breast cancer who presents to the emergency room tonight with reports of vomiting, diarrhea, and presyncopal episode.  Patient states she was recently admitted to [facility] for nausea and vomiting that initially, started on Wednesday on January 8, 2020 and she was kept in the hospital overnight and discharged home on Thursday January 9, 2020.  Patient states tonight she was at a meeting and at approximately 8:20 p.m. she became white and clammy.  Patient states she began to then vomit continuously and EMS was called.  Upon EMS arrival patient felt as if she needs to have a bowel movement in 1 to the bathroom and experienced diarrhea.  Patient also reports that she felt faint."  Patient denies loss of consciousness, chest pain, shortness of breath, fever, recent travel, sick contacts, abdominal pain, or falling.  Patient reports compliance with her home medication regimen.  In the emergency room patient noted to have bradycardia in which she states she was told she had during her previous admission and was told to follow up with cardiologist as an outpatient, in which she has not done yet since she was just discharged this week.  Patient states Dr. Crystal is her cardiologist.  Patient states she underwent a right hip replacement per Dr. Hanson in November of 2019, states it is healing well.  Patient placed in observation on January 10th, 2020 at approximately 11:00 p.m..  Patient states that she lives alone, uses a walker for ambulatory assistive device.  Denies any supplemental oxygen.  Patient accompanied by her daughter and a sister who were at bedside during my initial interview and physical exam.  "

## 2020-01-11 NOTE — ASSESSMENT & PLAN NOTE
Hypertension is chronic, controlled.  Continue home medications.  Continuous telemetry monitoring.    BP Readings from Last 3 Encounters:   01/11/20 (!) 145/79   01/09/20 (!) 121/58   01/09/20 (!) 144/72         Patient has a history of diabetes that used to be controlled with p.o. metformin.  Patient denies the use of metformin at this time.  Recent hemoglobin A1c as below.    Lab Results   Component Value Date    HGBA1C 5.5 01/08/2020

## 2020-01-11 NOTE — ED PROVIDER NOTES
"Encounter Date: 1/10/2020    SCRIBE #1 NOTE: I, Dona Valente, am scribing for, and in the presence of, Esa James MD.       History     Chief Complaint   Patient presents with    Emesis     x 3 episodes. Denies pain. Became "cold and clammy" at [employer] this evening.     Diarrhea       Time seen by provider: 9:24 PM on 01/10/2020    Eduardo Esposito is a 69 y.o. female who presents to the ED via EMS for evaluation of weakness, N/V/D, and lightheadedness that occurred immediately PTA. She reports near syncope. Patient was discharged from the hospital this afternoon after admission for syncope and bradycardia. Patient denies chest pain or racing heart beat today. She mentions having a history of Afib. Patient has no other medical concerns or complaints at this moment. She denies onset of any other new symptoms. She is presently on Eliquis. Other PMHx includes DM and HTN. SHx includes lumpectomy and right hip arthroplasty. Lisinopril allergy noted.     The history is provided by the patient.     Review of patient's allergies indicates:   Allergen Reactions    Lisinopril      hives     Past Medical History:   Diagnosis Date    Atrial fibrillation     4/11/13-recent dx sceduled to see cardiology Monday- Dr Lynn-dm    Cancer     breast    Diabetes mellitus     Hyperlipemia     Hypertension      Past Surgical History:   Procedure Laterality Date    AXILLARY NODE DISSECTION Left 6/13/2019    Procedure: LYMPHADENECTOMY, AXILLARY;  Surgeon: Kendall Howard MD;  Location: [facility] OR;  Service: General;  Laterality: Left;  left lumpectomy with left axillary lymph node dissection    BREAST SURGERY      lumpectomy left    HIP REPLACEMENT ARTHROPLASTY Right 11/27/2019    Procedure: ARTHROPLASTY, HIP REPLACEMENT;  Surgeon: Rahul Hanson II, MD;  Location: [facility] OR;  Service: Orthopedics;  Laterality: Right;    JOINT REPLACEMENT Right     knee    NEEDLE LOCALIZATION Left 6/13/2019    Procedure: NEEDLE " LOCALIZATION;  Surgeon: Kendall Howard MD;  Location: [facility] OR;  Service: General;  Laterality: Left;  left lumpectomy with wire needle localization    SENTINEL LYMPH NODE BIOPSY Left 6/13/2019    Procedure: BIOPSY, LYMPH NODE, SENTINEL;  Surgeon: Kendall Howard MD;  Location: [facility] OR;  Service: General;  Laterality: Left;  left sentinel lymph node dissection    TONSILLECTOMY      TOTAL KNEE ARTHROPLASTY       Family History   Problem Relation Age of Onset    Breast cancer Mother     Glaucoma Neg Hx     Macular degeneration Neg Hx     Retinal detachment Neg Hx      Social History     Tobacco Use    Smoking status: Never Smoker    Smokeless tobacco: Never Used   Substance Use Topics    Alcohol use: No     Comment: once or twice a year    Drug use: No     Review of Systems   Constitutional: Negative for fatigue and fever.   Respiratory: Negative for cough and shortness of breath.    Cardiovascular: Negative for chest pain, palpitations and leg swelling.   Gastrointestinal: Positive for diarrhea, nausea and vomiting. Negative for abdominal pain.   Genitourinary: Negative for difficulty urinating, dysuria and frequency.   Musculoskeletal: Negative for back pain, gait problem and joint swelling.   Skin: Negative for pallor and rash.   Neurological: Positive for syncope (near syncope), weakness and light-headedness.   Hematological: Bruises/bleeds easily (Eliquis).   Psychiatric/Behavioral: The patient is not nervous/anxious.        Physical Exam     Initial Vitals [01/10/20 2124]   BP Pulse Resp Temp SpO2   (!) 182/84 (!) 51 16 -- 97 %      MAP       --         Physical Exam    Nursing note and vitals reviewed.  Constitutional: She appears well-developed and well-nourished.   HENT:   Head: Normocephalic and atraumatic.   Eyes: Conjunctivae are normal.   Neck: Normal range of motion. Neck supple.   Cardiovascular: Normal rate, regular rhythm and normal heart sounds. Exam reveals no gallop and no  friction rub.    No murmur heard.  Pulmonary/Chest: Effort normal and breath sounds normal. No respiratory distress. She has no wheezes. She has no rhonchi. She has no rales.   Equal, bilateral breath sounds noted without wheezing.    Abdominal: Soft. She exhibits no distension. There is no tenderness.   No palpable abdominal tenderness noted.     Musculoskeletal: Normal range of motion.   Neurological: She is alert and oriented to person, place, and time.   Skin: Skin is warm and dry. No erythema.   Psychiatric: She has a normal mood and affect.         ED Course   Procedures  Labs Reviewed   CBC W/ AUTO DIFFERENTIAL   COMPREHENSIVE METABOLIC PANEL     EKG Readings: (Independently Interpreted)   Rhythm: Atrial Fibrillation. Heart Rate: 54. Conduction: Normal. ST Segments: Normal ST Segments. Axis: Left Axis Deviation. Clinical Impression: Atrial Fibrillation       Imaging Results    None          Medical Decision Making:   History:   Old Medical Records: I decided to obtain old medical records.  Clinical Tests:   Lab Tests: Reviewed and Ordered  Medical Tests: Reviewed and Ordered  ED Management:  69-year-old female returns with nausea vomiting and has now developed diarrhea.  She reports that she had a near syncopal episode at the onset of emesis.  She has unexplained bradycardia with associated atrial fibrillation concerning for possible sick sinus syndrome.  She has symptomatic improvement with antiemetics and antidiarrheals.  She is found to have a potassium of 3.1 which will be replenished with oral potassium supplements.  She will be admitted for cardiac monitoring and cardiology consultation.  Other:   I have discussed this case with another health care provider.       APC / Resident Notes:   I, Dr. Esa James III, personally performed the services described in this documentation. All medical record entries made by the scribe were at my direction and in my presence.  I have reviewed the chart and agree  that the record reflects my personal performance and is accurate and complete       Scribe Attestation:   Scribe #1: I performed the above scribed service and the documentation accurately describes the services I performed. I attest to the accuracy of the note.                  Clinical Impression:       ICD-10-CM ICD-9-CM   1. Viral gastroenteritis A08.4 008.8   2. Weakness R53.1 780.79   3. Near syncope R55 780.2   4. Hypokalemia E87.6 276.8   5. Bradycardia R00.1 427.89                             Esa James III, MD  01/10/20 4453

## 2020-01-11 NOTE — ASSESSMENT & PLAN NOTE
Noted, patient states in June she had a lumpectomy with lymph node removal.  Patient states she currently takes a daily maintenance cancer pill (ARIMIDEX). Will continue home medication.

## 2020-01-11 NOTE — PLAN OF CARE
Pt lying in bed with family at bedside. VS stable, no nausea or diarrhea present at this time. Patient's heart rate is 42-60's. Cardiologist cleared patient for discharge but will need to follow up with her cardiologist to have halter monitor placed to evaluate for possible pacemaker.

## 2020-01-11 NOTE — PLAN OF CARE
SW met w/ pt for assessment.  Pt states she was recently d/c from [facility] following R hip replacement.  Pt not currently active w/ home health services or any other community based services to assist w/ Independent living.  Pt lives alone, uses her cane & rollator, is independent w/ ADL and self care, can drive self to appointments and has family support system in place.  Pt does not anticipate any d/c needs at present time.  Pt expected to d/c home today.       01/11/20 1005   Discharge Assessment   Assessment Type Discharge Planning Assessment   Confirmed/corrected address and phone number on facesheet? Yes   Assessment information obtained from? Patient   Communicated expected length of stay with patient/caregiver yes   Prior to hospitilization cognitive status: Alert/Oriented   Prior to hospitalization functional status: Independent;Assistive Equipment   Current Functional Status: Independent;Assistive Equipment   Facility Arrived From: home   Lives With alone   Able to Return to Prior Arrangements yes   Is patient able to care for self after discharge? Yes   Who are your caregiver(s) and their phone number(s)? daughter: Melani Epstein [phone]   Patient's perception of discharge disposition home or selfcare   Readmission Within the Last 30 Days no previous admission in last 30 days   Patient currently being followed by outpatient case management? No   Patient currently receives any other outside agency services? No   Equipment Currently Used at Home cane, straight;rollator;cane, quad;3-in-1 commode   Part D Coverage Yes   Do you have any problems affording any of your prescribed medications? No   Is the patient taking medications as prescribed? yes   Does the patient have transportation home? Yes   Transportation Anticipated family or friend will provide   Dialysis Name and Scheduled days N/A   Does the patient receive services at the [facility]madin Clinic? No   Discharge Plan A Home   Discharge Plan  B Home   DME Needed Upon Discharge  none   Patient/Family in Agreement with Plan yes

## 2020-01-11 NOTE — PLAN OF CARE
01/11/20 0917   PRICE Message   Medicare Outpatient and Observation Notification regarding financial responsibility Given to patient/caregiver;Explained to patient/caregiver;Signed/date by patient/caregiver   Date PRICE was signed 01/11/20   Time PRICE was signed 0917

## 2020-01-11 NOTE — DISCHARGE SUMMARY
"[facility] Hospital Medicine  Discharge Summary      Patient Name: Eduardo Esposito  MRN: [id number]  Admission Date: 1/10/2020  Hospital Length of Stay: 0 days  Discharge Date and Time: 1/11/2020  2:46 PM  Attending Physician: Alfreda att. providers found   Discharging Provider: Nimo Herr NP  Primary Care Provider: Walter Nance MD      HPI:   Eduardo Esposito is a 69-year-old female with a past medical history of atrial fibrillation on home Eliquis, essential hypertension, hyperlipidemia, and history of breast cancer who presents to the emergency room tonight with reports of vomiting, diarrhea, and presyncopal episode.  Patient states she was recently admitted to [facility] for nausea and vomiting that initially, started on Wednesday on January 8, 2020 and she was kept in the hospital overnight and discharged home on Thursday January 9, 2020.  Patient states tonight she was at a meeting and at approximately 8:20 p.m. she became white and clammy.  Patient states she began to then vomit continuously and EMS was called.  Upon EMS arrival patient felt as if she needs to have a bowel movement in 1 to the bathroom and experienced diarrhea.  Patient also reports that she felt faint."  Patient denies loss of consciousness, chest pain, shortness of breath, fever, recent travel, sick contacts, abdominal pain, or falling.  Patient reports compliance with her home medication regimen.  In the emergency room patient noted to have bradycardia in which she states she was told she had during her previous admission and was told to follow up with cardiologist as an outpatient, in which she has not done yet since she was just discharged this week.  Patient states Dr. Crystal is her cardiologist.  Patient states she underwent a right hip replacement per Dr. Hanson in November of 2019, states it is healing well.  Patient placed in observation on January 10th, 2020 at approximately 11:00 p.m..  Patient " states that she lives alone, uses a walker for ambulatory assistive device.  Denies any supplemental oxygen.  Patient accompanied by her daughter and a sister who were at bedside during my initial interview and physical exam.    * No surgery found *      Hospital Course:   Patient monitor closely during observation stay.  She was placed on telemetry and heart rate remained in the 50s.  She states she is feeling better and has not had any further episodes of diarrhea or lightheadedness during admission.  Cardiology consulted.  Cardiology recommends following up with her cardiologist as scheduled.  Patient would benefit from cardiac monitor to further evaluate atrial fibrillation with bradycardia.  Patient is stable for discharge from a cardiology standpoint.    Physical exam heart irregular irregular with systolic murmur slow ventricular response.  Abdomen soft nontender nondistended.  Peripheral edema +1 patient with moderate subcutaneous tissue.     Consults:   Consults (From admission, onward)        Status Ordering Provider     Inpatient consult to Cardiology  Once     Provider:  Stephanie Umana MD    Completed DONNA LUCIANO          No new Assessment & Plan notes have been filed under this hospital service since the last note was generated.  Service: Hospital Medicine    Final Active Diagnoses:    Diagnosis Date Noted POA    PRINCIPAL PROBLEM:  Bradycardia [R00.1] 01/11/2020 Yes    Hypokalemia [E87.6] 01/11/2020 Yes    Viral gastroenteritis [A08.4] 01/10/2020 Yes    Vasovagal near-syncope [R55] 01/08/2020 Yes    Hyperlipidemia associated with type 2 diabetes mellitus [E11.69, E78.5] 11/27/2019 Yes    Malignant neoplasm of left female breast [C50.912] 06/13/2019 Yes    Long term (current) use of anticoagulants [Z79.01] 05/10/2019 Not Applicable    Chronic atrial fibrillation [I48.20] 04/12/2018 Yes    Hypertension associated with diabetes [E11.59, I10] 04/12/2018 Yes      Problems Resolved  During this Admission:       Discharged Condition: stable    Disposition: Home or Self Care    Follow Up:  Follow-up Information     Walter Nance MD In 1 week.    Specialties:  Family Medicine, Internal Medicine  Contact information:  [address] [facility] [address]  [phone]             Royal Crystal Jr, MD In 1 week.    Specialty:  Cardiology  Contact information:  [address] [facility] [address]  [phone]             Walter Nance MD In 1 week.    Specialties:  Family Medicine, Internal Medicine  Why:  hospital follow up  Contact information:  [address] [facility] [address]  [phone]                 Patient Instructions:      Diet Cardiac     Notify your health care provider if you experience any of the following:  increased confusion or weakness     Notify your health care provider if you experience any of the following:  persistent dizziness, light-headedness, or visual disturbances     Notify your health care provider if you experience any of the following:  difficulty breathing or increased cough     Notify your health care provider if you experience any of the following:  redness, tenderness, or signs of infection (pain, swelling, redness, odor or green/yellow discharge around incision site)     Notify your health care provider if you experience any of the following:  severe uncontrolled pain     Notify your health care provider if you experience any of the following:  persistent nausea and vomiting or diarrhea     Notify your health care provider if you experience any of the following:  temperature >100.4     Notify your health care provider if you experience any of the following:  severe persistent headache     Notify your health care provider if you experience any of the following:  worsening rash     Activity as tolerated       Significant Diagnostic Studies: Labs:   BMP:   Recent Labs   Lab 01/10/20  2206 01/11/20  0550   * 110    142   K 3.1* 3.8    111*    CO2 20* 23   BUN 19 14   CREATININE 0.7 0.6   CALCIUM 8.8 8.4*   MG  --  1.7    and CMP   Recent Labs   Lab 01/10/20  2206 01/11/20  0550    142   K 3.1* 3.8    111*   CO2 20* 23   * 110   BUN 19 14   CREATININE 0.7 0.6   CALCIUM 8.8 8.4*   PROT 6.1 5.5*   ALBUMIN 3.7 3.3*   BILITOT 0.6 0.7   ALKPHOS 81 72   AST 15 13   ALT 15 11   ANIONGAP 12 8   ESTGFRAFRICA >60 >60   EGFRNONAA >60 >60       Pending Diagnostic Studies:     Procedure Component Value Units Date/Time    EKG 12-lead [[id number]]     Order Status:  Sent Lab Status:  No result          Medications:  Reconciled Home Medications:      Medication List      CHANGE how you take these medications    atorvastatin 80 MG tablet  Commonly known as:  LIPITOR  Take 0.5 tablets (40 mg total) by mouth once daily.  What changed:  when to take this        CONTINUE taking these medications    anastrozole 1 mg Tab  Commonly known as:  ARIMIDEX  Take 1 tablet (1 mg total) by mouth once daily.     Eliquis 5 mg Tab  Generic drug:  apixaban  Take 1 tablet (5 mg total) by mouth 2 (two) times daily.     triamterene-hydrochlorothiazide 37.5-25 mg 37.5-25 mg per tablet  Commonly known as:  MAXZIDE-25  Take 1 tablet by mouth once daily.            Indwelling Lines/Drains at time of discharge:   Lines/Drains/Airways     None                 Time spent on the discharge of patient: 60 minutes  Patient was seen and examined on the date of discharge and determined to be suitable for discharge.     Discussed outpatient plan with Cardiology.    Nimo Herr NP  Department of Hospital Medicine  [facility]

## 2020-01-11 NOTE — ED NOTES
Pt presents to the ED with complaints of generalized weakness with vomiting x 3 and diarrhea x 1 which began PTA while at [facility]. She reports she was recently admitted for same complaints, discharged on 1/8/20 and was noting improvement upon discharge. CBG per . Pt notes she has not taken her Anti-HTN medication today. Bed rails are up and call light is within patient reach. Will continue to monitor. Family at the bedside.

## 2020-01-11 NOTE — NURSING
Pt ready for discharge, instructions discussed with patient and family. Follow up appointments noted in discharge. IV and telemetry monitor off. VS stable, pt leaving via wheelchair with family to discharge home.

## 2020-01-11 NOTE — ASSESSMENT & PLAN NOTE
IV fluid hydration.  P.r.n. antiemetics.  Stool studies ordered. CDiff infection less likely, patient denies recent antibiotic use.

## 2020-01-11 NOTE — HOSPITAL COURSE
Patient monitor closely during observation stay.  She was placed on telemetry and heart rate remained in the 50s.  She states she is feeling better and has not had any further episodes of diarrhea or lightheadedness during admission.  Cardiology consulted.  Cardiology recommends following up with her cardiologist as scheduled.  Patient would benefit from cardiac monitor to further evaluate atrial fibrillation with bradycardia.  Patient is stable for discharge from a cardiology standpoint.    Physical exam heart irregular irregular with systolic murmur slow ventricular response.  Abdomen soft nontender nondistended.  Peripheral edema +1 patient with moderate subcutaneous tissue.

## 2020-01-13 ENCOUNTER — TELEPHONE (OUTPATIENT)
Dept: MEDSURG UNIT | Facility: HOSPITAL | Age: 70
End: 2020-01-13

## 2020-01-14 ENCOUNTER — OUTPATIENT CASE MANAGEMENT (OUTPATIENT)
Dept: ADMINISTRATIVE | Facility: OTHER | Age: 70
End: 2020-01-14

## 2020-01-14 NOTE — LETTER
January 24, 2020           Dear Ms. Arpita,     Welcome to [facility] Complex Care Management Program. It was a pleasure talking with you today. My name is Amy Stearns, RN and I look forward to being your Care Manager. My goal is to help you function at the healthiest and highest level possible. You can contact me directly at [phone] from 8:00am to 4:40pm, Monday through Friday.    As an [facility] patient with [employer], some of the services we may be able to provide include:      Development of an individualized care plan with a Registered Nurse    Connection with a    Connection with available resources and services     Coordinate communication among your care team members    Provide coaching and education    Help you understand your doctors treatment plan   Help you obtain information about your insurance coverage.     All services provided by [facility]s Complex Care Managers and other care team members are coordinated with and communicated to your primary care team.    As part of your enrollment, you will be receiving education materials and more information about these services in your [facility] account, by phone or through the mail.  If you do not wish to participate or receive information, please contact our office at [phone].      Sincerely,        Sarah Russ, RN  [facility]   Outpatient RN Complex Care Manager

## 2020-01-14 NOTE — LETTER
January 24, 2020    Eduardo Esposito  [address]  [place] [address]             [facility]  [address]  [place] [address] Dear Ms. Palm,    I work with [facility] Outpatient Care Management Department. We received a referral from your Physician to call you to review your medical history. These services are free of charge and are offered to [facility] patients who have recently been discharged from any of our facilities or who have complex medical conditions that may require the skill of a nurse to assist with management. When we receive a referral, we attempt to contact you to go over some health questions in order to determine how we can best assist you with your health care needs. I attempted to reach you by telephone, but was unsuccessful.    I am a Registered Nurse who specializes in connecting patients with available medical and financial resources as well as addressing any educational needs that may be indicated. We also have a  on our team of providers that is available to assist with any social or mental health needs that may be indicated. Our department works closely with the [facility] to help you manage your health condition(s) safely within your living environment. Our goal is to help you function at the healthiest and highest level possible.     If you would like to enroll in this free program, please give me a call at the contact number provided below. We will go over some questions regarding your health in order to determine how we can best assist you with your health care needs. This is a voluntary program and once enrolled, you may dis-enroll at any time.     I can be reached directly at [phone] from 8:00 AM to 4:30 PM, Monday through Friday. The general Outpatient Care Management Department can be reached at [phone] from 8:00AM to 4:30PM, Monday through Friday. [facility] also has a program where a nurse is available 24/7 to  answer questions or provide medical advice. That number is [phone]. Please feel free to contact us for any questions or concerns.    Kind Regards,        Sarah Russ, RN  Outpatient Care Management  [phone]

## 2020-01-15 ENCOUNTER — LAB VISIT (OUTPATIENT)
Dept: LAB | Facility: HOSPITAL | Age: 70
End: 2020-01-15
Attending: INTERNAL MEDICINE
Payer: MEDICARE

## 2020-01-15 DIAGNOSIS — C50.912 INFILTRATING DUCTAL CARCINOMA OF LEFT BREAST: ICD-10-CM

## 2020-01-15 DIAGNOSIS — Z17.0 MALIGNANT NEOPLASM OF BREAST IN FEMALE, ESTROGEN RECEPTOR POSITIVE, UNSPECIFIED LATERALITY, UNSPECIFIED SITE OF BREAST: ICD-10-CM

## 2020-01-15 DIAGNOSIS — C50.919 MALIGNANT NEOPLASM OF BREAST IN FEMALE, ESTROGEN RECEPTOR POSITIVE, UNSPECIFIED LATERALITY, UNSPECIFIED SITE OF BREAST: ICD-10-CM

## 2020-01-15 LAB
ALBUMIN SERPL BCP-MCNC: 4.1 G/DL (ref 3.5–5.2)
ALP SERPL-CCNC: 92 U/L (ref 55–135)
ALT SERPL W/O P-5'-P-CCNC: 18 U/L (ref 10–44)
ANION GAP SERPL CALC-SCNC: 14 MMOL/L (ref 8–16)
AST SERPL-CCNC: 15 U/L (ref 10–40)
BASOPHILS # BLD AUTO: 0.04 K/UL (ref 0–0.2)
BASOPHILS NFR BLD: 0.4 % (ref 0–1.9)
BILIRUB SERPL-MCNC: 1 MG/DL (ref 0.1–1)
BUN SERPL-MCNC: 15 MG/DL (ref 8–23)
CALCIUM SERPL-MCNC: 9.8 MG/DL (ref 8.7–10.5)
CHLORIDE SERPL-SCNC: 102 MMOL/L (ref 95–110)
CO2 SERPL-SCNC: 25 MMOL/L (ref 23–29)
CREAT SERPL-MCNC: 0.8 MG/DL (ref 0.5–1.4)
DIFFERENTIAL METHOD: ABNORMAL
EOSINOPHIL # BLD AUTO: 0.1 K/UL (ref 0–0.5)
EOSINOPHIL NFR BLD: 1.2 % (ref 0–8)
ERYTHROCYTE [DISTWIDTH] IN BLOOD BY AUTOMATED COUNT: 14.4 % (ref 11.5–14.5)
EST. GFR  (AFRICAN AMERICAN): >60 ML/MIN/1.73 M^2
EST. GFR  (NON AFRICAN AMERICAN): >60 ML/MIN/1.73 M^2
GLUCOSE SERPL-MCNC: 89 MG/DL (ref 70–110)
HCT VFR BLD AUTO: 45.6 % (ref 37–48.5)
HGB BLD-MCNC: 14.3 G/DL (ref 12–16)
IMM GRANULOCYTES # BLD AUTO: 0.05 K/UL (ref 0–0.04)
LYMPHOCYTES # BLD AUTO: 2.3 K/UL (ref 1–4.8)
LYMPHOCYTES NFR BLD: 25.9 % (ref 18–48)
MAGNESIUM SERPL-MCNC: 1.8 MG/DL (ref 1.6–2.6)
MCH RBC QN AUTO: 27.7 PG (ref 27–31)
MCHC RBC AUTO-ENTMCNC: 31.4 G/DL (ref 32–36)
MCV RBC AUTO: 88 FL (ref 82–98)
MONOCYTES # BLD AUTO: 0.9 K/UL (ref 0.3–1)
MONOCYTES NFR BLD: 9.9 % (ref 4–15)
NEUTROPHILS # BLD AUTO: 5.6 K/UL (ref 1.8–7.7)
NEUTROPHILS NFR BLD: 62 % (ref 38–73)
NRBC BLD-RTO: 0 /100 WBC
PLATELET # BLD AUTO: 292 K/UL (ref 150–350)
PLATELET BLD QL SMEAR: ABNORMAL
PMV BLD AUTO: 8.9 FL (ref 9.2–12.9)
POTASSIUM SERPL-SCNC: 3.7 MMOL/L (ref 3.5–5.1)
PROT SERPL-MCNC: 7.1 G/DL (ref 6–8.4)
RBC # BLD AUTO: 5.17 M/UL (ref 4–5.4)
SODIUM SERPL-SCNC: 141 MMOL/L (ref 136–145)
WBC # BLD AUTO: 9.02 K/UL (ref 3.9–12.7)

## 2020-01-15 PROCEDURE — 83735 ASSAY OF MAGNESIUM: CPT | Mod: HCNC

## 2020-01-15 PROCEDURE — 80053 COMPREHEN METABOLIC PANEL: CPT | Mod: HCNC

## 2020-01-15 PROCEDURE — 36415 COLL VENOUS BLD VENIPUNCTURE: CPT | Mod: HCNC

## 2020-01-15 PROCEDURE — 85025 COMPLETE CBC W/AUTO DIFF WBC: CPT | Mod: HCNC

## 2020-01-16 ENCOUNTER — OFFICE VISIT (OUTPATIENT)
Dept: HEMATOLOGY/ONCOLOGY | Facility: CLINIC | Age: 70
End: 2020-01-16
Payer: MEDICARE

## 2020-01-16 VITALS
TEMPERATURE: 99 F | RESPIRATION RATE: 16 BRPM | SYSTOLIC BLOOD PRESSURE: 157 MMHG | HEART RATE: 65 BPM | BODY MASS INDEX: 33.34 KG/M2 | OXYGEN SATURATION: 92 % | HEIGHT: 66 IN | DIASTOLIC BLOOD PRESSURE: 80 MMHG | WEIGHT: 207.44 LBS

## 2020-01-16 DIAGNOSIS — C50.912 INFILTRATING DUCTAL CARCINOMA OF LEFT BREAST: ICD-10-CM

## 2020-01-16 DIAGNOSIS — Z79.811 USE OF ANASTROZOLE (ARIMIDEX): ICD-10-CM

## 2020-01-16 DIAGNOSIS — E11.59 HYPERTENSION ASSOCIATED WITH DIABETES: ICD-10-CM

## 2020-01-16 DIAGNOSIS — I15.2 HYPERTENSION ASSOCIATED WITH DIABETES: ICD-10-CM

## 2020-01-16 DIAGNOSIS — E11.9 CONTROLLED TYPE 2 DIABETES MELLITUS WITHOUT COMPLICATION, WITHOUT LONG-TERM CURRENT USE OF INSULIN: Primary | ICD-10-CM

## 2020-01-16 DIAGNOSIS — I48.20 CHRONIC ATRIAL FIBRILLATION: ICD-10-CM

## 2020-01-16 DIAGNOSIS — Z79.01 LONG TERM (CURRENT) USE OF ANTICOAGULANTS: ICD-10-CM

## 2020-01-16 PROCEDURE — 3044F HG A1C LEVEL LT 7.0%: CPT | Mod: HCNC,CPTII,S$GLB, | Performed by: INTERNAL MEDICINE

## 2020-01-16 PROCEDURE — 1101F PR PT FALLS ASSESS DOC 0-1 FALLS W/OUT INJ PAST YR: ICD-10-PCS | Mod: HCNC,CPTII,S$GLB, | Performed by: INTERNAL MEDICINE

## 2020-01-16 PROCEDURE — 1126F AMNT PAIN NOTED NONE PRSNT: CPT | Mod: HCNC,S$GLB,, | Performed by: INTERNAL MEDICINE

## 2020-01-16 PROCEDURE — 1159F MED LIST DOCD IN RCRD: CPT | Mod: HCNC,S$GLB,, | Performed by: INTERNAL MEDICINE

## 2020-01-16 PROCEDURE — 1159F PR MEDICATION LIST DOCUMENTED IN MEDICAL RECORD: ICD-10-PCS | Mod: HCNC,S$GLB,, | Performed by: INTERNAL MEDICINE

## 2020-01-16 PROCEDURE — 99999 PR PBB SHADOW E&M-EST. PATIENT-LVL III: CPT | Mod: PBBFAC,HCNC,, | Performed by: INTERNAL MEDICINE

## 2020-01-16 PROCEDURE — 99214 PR OFFICE/OUTPT VISIT, EST, LEVL IV, 30-39 MIN: ICD-10-PCS | Mod: HCNC,S$GLB,, | Performed by: INTERNAL MEDICINE

## 2020-01-16 PROCEDURE — 1101F PT FALLS ASSESS-DOCD LE1/YR: CPT | Mod: HCNC,CPTII,S$GLB, | Performed by: INTERNAL MEDICINE

## 2020-01-16 PROCEDURE — 3079F PR MOST RECENT DIASTOLIC BLOOD PRESSURE 80-89 MM HG: ICD-10-PCS | Mod: HCNC,CPTII,S$GLB, | Performed by: INTERNAL MEDICINE

## 2020-01-16 PROCEDURE — 3077F PR MOST RECENT SYSTOLIC BLOOD PRESSURE >= 140 MM HG: ICD-10-PCS | Mod: HCNC,CPTII,S$GLB, | Performed by: INTERNAL MEDICINE

## 2020-01-16 PROCEDURE — 1126F PR PAIN SEVERITY QUANTIFIED, NO PAIN PRESENT: ICD-10-PCS | Mod: HCNC,S$GLB,, | Performed by: INTERNAL MEDICINE

## 2020-01-16 PROCEDURE — 3079F DIAST BP 80-89 MM HG: CPT | Mod: HCNC,CPTII,S$GLB, | Performed by: INTERNAL MEDICINE

## 2020-01-16 PROCEDURE — 99499 RISK ADDL DX/OHS AUDIT: ICD-10-PCS | Mod: HCNC,S$GLB,, | Performed by: INTERNAL MEDICINE

## 2020-01-16 PROCEDURE — 99999 PR PBB SHADOW E&M-EST. PATIENT-LVL III: ICD-10-PCS | Mod: PBBFAC,HCNC,, | Performed by: INTERNAL MEDICINE

## 2020-01-16 PROCEDURE — 3044F PR MOST RECENT HEMOGLOBIN A1C LEVEL <7.0%: ICD-10-PCS | Mod: HCNC,CPTII,S$GLB, | Performed by: INTERNAL MEDICINE

## 2020-01-16 PROCEDURE — 99214 OFFICE O/P EST MOD 30 MIN: CPT | Mod: HCNC,S$GLB,, | Performed by: INTERNAL MEDICINE

## 2020-01-16 PROCEDURE — 3077F SYST BP >= 140 MM HG: CPT | Mod: HCNC,CPTII,S$GLB, | Performed by: INTERNAL MEDICINE

## 2020-01-16 PROCEDURE — 99499 UNLISTED E&M SERVICE: CPT | Mod: HCNC,S$GLB,, | Performed by: INTERNAL MEDICINE

## 2020-01-16 NOTE — PROGRESS NOTES
HPI    A 68 years old  female accompanied by family member is here in Oncology Clinic for evaluation of breast cancer.  Patient recently had ultrasound-guided biopsy left breast found to have invasive ductal carcinoma.  Patient is ERPR positive HER2 negative by FISH.  Patient has been seen by breast surgeon.  Treatment team consider contemplating doing a lumpectomy plus radiation versus mastectomy based on further chain testing.  Patient is here for evaluation of BRCA 1/2 gene studies for above reason.  On ultrasound left breast 7 mm mass at the anterior 6:00 position.  Patient reported her Ants has breast cancer.    Social history is negative for smoking drinking and recreational drug use.    She has history of AFib is taking Eliquis 2.5 mg b.i.d..  She does not have any other medical conditions.      07/30/2019:  No acute event.  Patient is here to follow-up with Oncotype DX study.  As well as to discuss endocrine therapy.  No other acute event.  Review systems below.    09/24/2019:  Patient surgical wound healing well. No complaints.  Ongoing radiation at that time.  Expected to finish radiation by the end of October.    11/7/2019: no report of acute events.  Patient here to follow up     12/12/2019:  No acute events.  Patient here to follow-up.  To start of Arimidex.     01/16/2020:  Patient here to follow-up.  Taking Arimidex without any acute events.  Denies chest pain shortness breath.  Denies abdominal pain nausea vomiting or diarrhea.  No fever no chills.      Review of system:   Deny chest pain shortness of breath.  Denies abdominal pain nausea vomiting or diarrhea.  Denies any fever or chills.  Denies any weight loss.  Denies any change appetite change.      Past Medical History:   Diagnosis Date    Atrial fibrillation     4/11/13-recent dx sceduled to see cardiology Monday- Dr Lynn-dm    Cancer     breast    Diabetes mellitus     Hyperlipemia     Hypertension      Social History      Socioeconomic History    Marital status:      Spouse name: Not on file    Number of children: Not on file    Years of education: Not on file    Highest education level: Not on file   Occupational History    Not on file   Social Needs    Financial resource strain: Not on file    Food insecurity:     Worry: Not on file     Inability: Not on file    Transportation needs:     Medical: Not on file     Non-medical: Not on file   Tobacco Use    Smoking status: Never Smoker    Smokeless tobacco: Never Used   Substance and Sexual Activity    Alcohol use: No     Comment: once or twice a year    Drug use: No    Sexual activity: Not on file   Lifestyle    Physical activity:     Days per week: Not on file     Minutes per session: Not on file    Stress: Not on file   Relationships    Social connections:     Talks on phone: Not on file     Gets together: Not on file     Attends [employer] service: Not on file     Active member of club or organization: Not on file     Attends meetings of clubs or organizations: Not on file     Relationship status: Not on file   Other Topics Concern    Not on file   Social History Narrative    Not on file       Objective    Physical Exam     Vitals:    01/16/20 1010   BP: (!) 157/80   Pulse: 65   Resp: 16   Temp: 98.6 °F (37 °C)         Constitutional: patient is oriented to person, place, and time. patient appears well-developed and well-nourished. No distress.   HENT:  Normocephalic atraumatic pupils equal round reactive to light extraocular muscle intact.  Cardiovascular:  Regular rate  Pulmonary/Chest:  Clear to auscultate   Abdominal:  Soft nontender nondistended  Musculoskeletal: Normal range of motion. Gait is normal  No clubbing, cyanosis     Lymphadenopathy:  No evidence lymphadenopathy  Neurological: patient is alert and oriented to person, place, and time. patient has normal strength and normal reflexes. No sensory deficit. Gait normal.   Skin:  Warm dry    Psychiatric:  No rashes no lesion    CMP  Sodium   Date Value Ref Range Status   01/15/2020 141 136 - 145 mmol/L Final     Potassium   Date Value Ref Range Status   01/15/2020 3.7 3.5 - 5.1 mmol/L Final     Chloride   Date Value Ref Range Status   01/15/2020 102 95 - 110 mmol/L Final     CO2   Date Value Ref Range Status   01/15/2020 25 23 - 29 mmol/L Final     Glucose   Date Value Ref Range Status   01/15/2020 89 70 - 110 mg/dL Final     BUN, Bld   Date Value Ref Range Status   01/15/2020 15 8 - 23 mg/dL Final     Creatinine   Date Value Ref Range Status   01/15/2020 0.8 0.5 - 1.4 mg/dL Final     Calcium   Date Value Ref Range Status   01/15/2020 9.8 8.7 - 10.5 mg/dL Final     Total Protein   Date Value Ref Range Status   01/15/2020 7.1 6.0 - 8.4 g/dL Final     Albumin   Date Value Ref Range Status   01/15/2020 4.1 3.5 - 5.2 g/dL Final     Total Bilirubin   Date Value Ref Range Status   01/15/2020 1.0 0.1 - 1.0 mg/dL Final     Comment:     For infants and newborns, interpretation of results should be based  on gestational age, weight and in agreement with clinical  observations.  Premature Infant recommended reference ranges:  Up to 24 hours.............<8.0 mg/dL  Up to 48 hours............<12.0 mg/dL  3-5 days..................<15.0 mg/dL  6-29 days.................<15.0 mg/dL       Alkaline Phosphatase   Date Value Ref Range Status   01/15/2020 92 55 - 135 U/L Final     AST   Date Value Ref Range Status   01/15/2020 15 10 - 40 U/L Final     ALT   Date Value Ref Range Status   01/15/2020 18 10 - 44 U/L Final     Anion Gap   Date Value Ref Range Status   01/15/2020 14 8 - 16 mmol/L Final     eGFR if    Date Value Ref Range Status   01/15/2020 >60 >60 mL/min/1.73 m^2 Final     eGFR if non    Date Value Ref Range Status   01/15/2020 >60 >60 mL/min/1.73 m^2 Final     Comment:     Calculation used to obtain the estimated glomerular filtration  rate (eGFR) is the CKD-EPI equation.         Lab Results   Component Value Date    WBC 9.02 01/15/2020    HGB 14.3 01/15/2020    HCT 45.6 01/15/2020    MCV 88 01/15/2020     01/15/2020     FINAL PATHOLOGIC DIAGNOSIS biopsy 04/10/2019  Left breast mass, core needle biopsy:  - Invasive ductal carcinoma, [place] grade 2 (T=3, N=3, M=1), 5mm in greatest dimension  - No lymphovascular invasion identified on sections examined  Note: Prognostic/Predictive markers for ER, [place], Her-2 and Ki-67 are ordered, the results of which will be reported  in an addendum. Dr. JORDYN Sierra has reviewed the case and agrees with the above diagnosis    Assessment Plan    [] Breast cancer ERPR positive HER2 negative by FISH.  Mass measuring 7 mm left side breast.  Denies any bone pain, denies any pulmonary symptoms and denies any GI issues.  Alkaline phosphate within normal limits.    Status post surgical approach lumpectomy, tumor is invasive ductal carcinoma size 1 cm margin free of invasive carcinoma.    Left axillary sentinel lymph nodes no evidence of malignancy.    Patient declined chemotherapy,Oncotype DX study shows absolute chemotherapy benefit is less than 1% and distant recurrence risk at 9 years 3%.  Oncotype DX score 9. Given above information we will proceed with endocrine therapy after completion of radiation therapy.  Endocrine therapy will be Arimidex 1 mg daily x5 years.    DEXA scan 4/18 - shows no significant bone density lost    [] History of a AFib on long-term anticoagulation Eliquis 2.5 mg b.i.d. follow Cardiology    [] Patient successfully completed radiation with interruptions due to vacation trips.  Patient also completed hip surgery on November 27th.  Patient has started Arimidex early December 2019.    Thereafter will plan to have patient follow-up in oncology clinic every 3 months for the 1st year    [] Hypertension diabetes followed by primary care physicians.    Dictation software used in this note.  Expect a typo graphic error

## 2020-01-20 DIAGNOSIS — R55 SYNCOPE AND COLLAPSE: Primary | ICD-10-CM

## 2020-01-20 DIAGNOSIS — I48.0 PAROXYSMAL ATRIAL FIBRILLATION: ICD-10-CM

## 2020-01-20 DIAGNOSIS — R00.1 SEVERE SINUS BRADYCARDIA: ICD-10-CM

## 2020-01-21 ENCOUNTER — CLINICAL SUPPORT (OUTPATIENT)
Dept: CARDIOLOGY | Facility: HOSPITAL | Age: 70
End: 2020-01-21
Attending: NURSE PRACTITIONER
Payer: MEDICARE

## 2020-01-21 DIAGNOSIS — R00.1 SEVERE SINUS BRADYCARDIA: ICD-10-CM

## 2020-01-21 DIAGNOSIS — I48.0 PAROXYSMAL ATRIAL FIBRILLATION: ICD-10-CM

## 2020-01-21 DIAGNOSIS — R55 SYNCOPE AND COLLAPSE: ICD-10-CM

## 2020-01-21 PROCEDURE — 93225 XTRNL ECG REC<48 HRS REC: CPT

## 2020-01-22 LAB
OHS CV EVENT MONITOR DAY: 0
OHS CV HOLTER LENGTH DECIMAL HOURS: 24
OHS CV HOLTER LENGTH HOURS: 24
OHS CV HOLTER LENGTH MINUTES: 0

## 2020-01-23 NOTE — ASSESSMENT & PLAN NOTE
Chronic, controlled.  Latest blood pressure and vitals reviewed.   will hold her home BP med for time being.

## 2020-01-23 NOTE — H&P
[facility] Medicine  History & Physical    Patient Name: Eduardo Esposito  MRN: [id number]  Admission Date: 1/8/2020  Attending Physician: Brandon Mayes MD   Primary Care Provider: Walter Nance MD         Patient information was obtained from patient, past medical records and ER records.     Subjective:     Principal Problem:Hypertension associated with diabetes    Chief Complaint:   Chief Complaint   Patient presents with    Dizziness     C/o near syncope while eating lunch; reports emesis x 1 and states she now feels better        HPI: Ms. Esposito presented to ER with complaints of lightheadedness and N/V.  She ate sweet cereal and fruit about 6 hours prior to presenting in triage area.  She did not measure her blood sugar.  No chest pain, SOB, weakness, numbness, facial weakness, slurring of speech.  Had similar episode about twelve years ago.  Medical problems include atrial fibrillation, DM, and hypertension.    Past Medical History:   Diagnosis Date    Atrial fibrillation     4/11/13-recent dx sceduled to see cardiology Monday- Dr Lynn-dm    Cancer     breast    Diabetes mellitus     Hyperlipemia     Hypertension        Past Surgical History:   Procedure Laterality Date    AXILLARY NODE DISSECTION Left 6/13/2019    Procedure: LYMPHADENECTOMY, AXILLARY;  Surgeon: Kendall Howard MD;  Location: [facility];  Service: General;  Laterality: Left;  left lumpectomy with left axillary lymph node dissection    BREAST SURGERY      lumpectomy left    HIP REPLACEMENT ARTHROPLASTY Right 11/27/2019    Procedure: ARTHROPLASTY, HIP REPLACEMENT;  Surgeon: Rahul Hanson II, MD;  Location: [facility] OR;  Service: Orthopedics;  Laterality: Right;    JOINT REPLACEMENT Right     knee    NEEDLE LOCALIZATION Left 6/13/2019    Procedure: NEEDLE LOCALIZATION;  Surgeon: Kendall Howard MD;  Location: [facility] OR;  Service: General;  Laterality: Left;  left lumpectomy with wire needle localization     SENTINEL LYMPH NODE BIOPSY Left 6/13/2019    Procedure: BIOPSY, LYMPH NODE, SENTINEL;  Surgeon: Kendall Howard MD;  Location: [facility];  Service: General;  Laterality: Left;  left sentinel lymph node dissection    TONSILLECTOMY      TOTAL KNEE ARTHROPLASTY         Review of patient's allergies indicates:   Allergen Reactions    Lisinopril      hives       No current facility-administered medications on file prior to encounter.      Current Outpatient Medications on File Prior to Encounter   Medication Sig    anastrozole (ARIMIDEX) 1 mg Tab Take 1 tablet (1 mg total) by mouth once daily.    atorvastatin (LIPITOR) 80 MG tablet Take 0.5 tablets (40 mg total) by mouth once daily. (Patient not taking: Reported on 1/16/2020)    ELIQUIS 5 mg Tab Take 1 tablet (5 mg total) by mouth 2 (two) times daily.    triamterene-hydrochlorothiazide 37.5-25 mg (MAXZIDE-25) 37.5-25 mg per tablet Take 1 tablet by mouth once daily.     Family History     Problem Relation (Age of Onset)    Breast cancer Mother        Tobacco Use    Smoking status: Never Smoker    Smokeless tobacco: Never Used   Substance and Sexual Activity    Alcohol use: No     Comment: once or twice a year    Drug use: No    Sexual activity: Not on file     Review of Systems   Constitutional: Negative for chills and fever.   Respiratory: Negative for cough.    Cardiovascular: Negative for palpitations and leg swelling.   Gastrointestinal: Negative for abdominal pain, nausea and vomiting.   Genitourinary: Negative for difficulty urinating and hematuria.   Neurological: Negative for tremors, seizures and syncope.   All other systems reviewed and are negative.    Objective:     Vital Signs (Most Recent):  Temp: 97 °F (36.1 °C) (01/09/20 0732)  Pulse: (!) 52 (01/09/20 0732)  Resp: 18 (01/09/20 0732)  BP: (!) 144/72 (01/09/20 0732)  SpO2: 98 % (01/09/20 0732) Vital Signs (24h Range):        Weight: 93.9 kg (207 lb)  Body mass index is 33.41  kg/m².    Physical Exam   Constitutional: She is oriented to person, place, and time. She appears well-nourished. No distress.   HENT:   Head: Normocephalic and atraumatic.   Right Ear: External ear normal.   Left Ear: External ear normal.   Mouth/Throat: No oropharyngeal exudate.   Eyes: Right eye exhibits no discharge. Left eye exhibits no discharge. No scleral icterus.   Neck: Normal range of motion. Neck supple. No JVD present. No thyromegaly present.   Cardiovascular: Normal rate. An irregularly irregular rhythm present. Exam reveals no gallop.   No murmur heard.  Pulmonary/Chest: Effort normal and breath sounds normal. No respiratory distress. She exhibits no tenderness.   Abdominal: Soft. Bowel sounds are normal. She exhibits no distension and no mass. There is no tenderness.   Musculoskeletal: She exhibits no edema or deformity.   Lymphadenopathy:     She has no cervical adenopathy.   Neurological: She is alert and oriented to person, place, and time.   Skin: Skin is warm and dry. No rash noted. She is not diaphoretic.           Significant Labs:       1/8/2020 13:21 1/8/2020 13:22   WBC  8.73   RBC  5.02   Hemoglobin  14.0   Hematocrit  45.3   MCV  90   MCH  27.9   MCHC  30.9 (L)   RDW  14.2   Platelets  251   MPV  9.2   Gran%  77.7 (H)   Gran # (ANC)  6.8   Lymph%  14.7 (L)   Lymph #  1.3   Mono%  5.7   Mono #  0.5   Eosinophil%  0.8   Eos #  0.1   Basophil%  0.3   Baso #  0.03   nRBC  0   Differential Method  Automated   Immature Grans (Abs)  0.07 (H)   Sodium 138    Potassium 3.7    Chloride 99    CO2 29    Anion Gap 10    BUN, Bld 17    Creatinine 0.7    eGFR if non  >60    eGFR if African American >60    Glucose 126 (H)    Calcium 9.8    Alkaline Phosphatase 99    PROTEIN TOTAL 7.0    Albumin 4.0    BILIRUBIN TOTAL 1.1 (H)    AST 17    ALT 15    Lipase 26    Hemoglobin A1C External 5.5    Estimated Avg Glucose 111        Significant Imaging: I have reviewed all pertinent imaging  results/findings within the past 24 hours.    Assessment/Plan:     * Hypertension associated with diabetes  Chronic, controlled.  Latest blood pressure and vitals reviewed.   will hold her home BP med for time being.    Vasovagal near-syncope  Likely secondary to vasovagal versus hypoglycemia. Check orthostatic vitals. Telemetry. Fall precautions.   No indication for brain imaging or echocardiogram.    Hyperlipidemia associated with type 2 diabetes mellitus   Patient is chronically on statin.will continue for now. Monitor clinically. Last LDL was   Lab Results   Component Value Date    LDLCALC 70.2 07/10/2019            Type 2 diabetes mellitus with hypoglycemia, without long-term current use of insulin  Last A1c reviewed-   Lab Results   Component Value Date    HGBA1C 5.5 01/08/2020   will monitor FSG and treat hyperglycemia with short acting insulin.      Chronic atrial fibrillation  Atrial Fibrillation controlled currently with currently bradycardia. not on heart rate control at home. [id number] score 4. Anticoagulation indicated currently. Anticoagulation done with eliquis            VTE Risk Mitigation (From admission, onward)         Ordered     IP VTE LOW RISK PATIENT  Once      01/08/20 9588                   Brandon Mayes MD  Department of Hospital Medicine   [facility]

## 2020-01-23 NOTE — SUBJECTIVE & OBJECTIVE
Past Medical History:   Diagnosis Date    Atrial fibrillation     4/11/13-recent dx sceduled to see cardiology Monday- Dr Lynn-dm    Cancer     breast    Diabetes mellitus     Hyperlipemia     Hypertension        Past Surgical History:   Procedure Laterality Date    AXILLARY NODE DISSECTION Left 6/13/2019    Procedure: LYMPHADENECTOMY, AXILLARY;  Surgeon: Kendall Howard MD;  Location: [facility];  Service: General;  Laterality: Left;  left lumpectomy with left axillary lymph node dissection    BREAST SURGERY      lumpectomy left    HIP REPLACEMENT ARTHROPLASTY Right 11/27/2019    Procedure: ARTHROPLASTY, HIP REPLACEMENT;  Surgeon: Rahul Hanson II, MD;  Location: [facility] OR;  Service: Orthopedics;  Laterality: Right;    JOINT REPLACEMENT Right     knee    NEEDLE LOCALIZATION Left 6/13/2019    Procedure: NEEDLE LOCALIZATION;  Surgeon: Kendall Howard MD;  Location: [facility] OR;  Service: General;  Laterality: Left;  left lumpectomy with wire needle localization    SENTINEL LYMPH NODE BIOPSY Left 6/13/2019    Procedure: BIOPSY, LYMPH NODE, SENTINEL;  Surgeon: Kendall Howard MD;  Location: [facility];  Service: General;  Laterality: Left;  left sentinel lymph node dissection    TONSILLECTOMY      TOTAL KNEE ARTHROPLASTY         Review of patient's allergies indicates:   Allergen Reactions    Lisinopril      hives       No current facility-administered medications on file prior to encounter.      Current Outpatient Medications on File Prior to Encounter   Medication Sig    anastrozole (ARIMIDEX) 1 mg Tab Take 1 tablet (1 mg total) by mouth once daily.    atorvastatin (LIPITOR) 80 MG tablet Take 0.5 tablets (40 mg total) by mouth once daily. (Patient not taking: Reported on 1/16/2020)    ELIQUIS 5 mg Tab Take 1 tablet (5 mg total) by mouth 2 (two) times daily.    triamterene-hydrochlorothiazide 37.5-25 mg (MAXZIDE-25) 37.5-25 mg per tablet Take 1 tablet by mouth once daily.     Family History      Problem Relation (Age of Onset)    Breast cancer Mother        Tobacco Use    Smoking status: Never Smoker    Smokeless tobacco: Never Used   Substance and Sexual Activity    Alcohol use: No     Comment: once or twice a year    Drug use: No    Sexual activity: Not on file     Review of Systems   Constitutional: Negative for chills and fever.   Respiratory: Negative for cough.    Cardiovascular: Negative for palpitations and leg swelling.   Gastrointestinal: Negative for abdominal pain, nausea and vomiting.   Genitourinary: Negative for difficulty urinating and hematuria.   Neurological: Negative for tremors, seizures and syncope.   All other systems reviewed and are negative.    Objective:     Vital Signs (Most Recent):  Temp: 97 °F (36.1 °C) (01/09/20 0732)  Pulse: (!) 52 (01/09/20 0732)  Resp: 18 (01/09/20 0732)  BP: (!) 144/72 (01/09/20 0732)  SpO2: 98 % (01/09/20 0732) Vital Signs (24h Range):        Weight: 93.9 kg (207 lb)  Body mass index is 33.41 kg/m².    Physical Exam   Constitutional: She is oriented to person, place, and time. She appears well-nourished. No distress.   HENT:   Head: Normocephalic and atraumatic.   Right Ear: External ear normal.   Left Ear: External ear normal.   Mouth/Throat: No oropharyngeal exudate.   Eyes: Right eye exhibits no discharge. Left eye exhibits no discharge. No scleral icterus.   Neck: Normal range of motion. Neck supple. No JVD present. No thyromegaly present.   Cardiovascular: Normal rate. An irregularly irregular rhythm present. Exam reveals no gallop.   No murmur heard.  Pulmonary/Chest: Effort normal and breath sounds normal. No respiratory distress. She exhibits no tenderness.   Abdominal: Soft. Bowel sounds are normal. She exhibits no distension and no mass. There is no tenderness.   Musculoskeletal: She exhibits no edema or deformity.   Lymphadenopathy:     She has no cervical adenopathy.   Neurological: She is alert and oriented to person, place, and time.    Skin: Skin is warm and dry. No rash noted. She is not diaphoretic.           Significant Labs:       1/8/2020 13:21 1/8/2020 13:22   WBC  8.73   RBC  5.02   Hemoglobin  14.0   Hematocrit  45.3   MCV  90   MCH  27.9   MCHC  30.9 (L)   RDW  14.2   Platelets  251   MPV  9.2   Gran%  77.7 (H)   Gran # (ANC)  6.8   Lymph%  14.7 (L)   Lymph #  1.3   Mono%  5.7   Mono #  0.5   Eosinophil%  0.8   Eos #  0.1   Basophil%  0.3   Baso #  0.03   nRBC  0   Differential Method  Automated   Immature Grans (Abs)  0.07 (H)   Sodium 138    Potassium 3.7    Chloride 99    CO2 29    Anion Gap 10    BUN, Bld 17    Creatinine 0.7    eGFR if non  >60    eGFR if African American >60    Glucose 126 (H)    Calcium 9.8    Alkaline Phosphatase 99    PROTEIN TOTAL 7.0    Albumin 4.0    BILIRUBIN TOTAL 1.1 (H)    AST 17    ALT 15    Lipase 26    Hemoglobin A1C External 5.5    Estimated Avg Glucose 111        Significant Imaging: I have reviewed all pertinent imaging results/findings within the past 24 hours.

## 2020-01-23 NOTE — ASSESSMENT & PLAN NOTE
Atrial Fibrillation controlled currently with currently bradycardia. not on heart rate control at home. [id number] score 4. Anticoagulation indicated currently. Anticoagulation done with eliquis

## 2020-01-23 NOTE — ASSESSMENT & PLAN NOTE
Likely secondary to vasovagal versus hypoglycemia. Check orthostatic vitals. Telemetry. Fall precautions.   No indication for brain imaging or echocardiogram.

## 2020-01-23 NOTE — ASSESSMENT & PLAN NOTE
Last A1c reviewed-   Lab Results   Component Value Date    HGBA1C 5.5 01/08/2020   will monitor FSG and treat hyperglycemia with short acting insulin.

## 2020-01-24 ENCOUNTER — PATIENT MESSAGE (OUTPATIENT)
Dept: ADMINISTRATIVE | Facility: OTHER | Age: 70
End: 2020-01-24

## 2020-01-24 NOTE — PROGRESS NOTES
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 Created   Heart Disease - Deferred (Will revisit after post Holter Monitor Cardiology F/U visit on 2/5/20 )    Short Term Goals  Patient will have appropriate health related knowledge within 2 weeks.   Interventions   · Assess type of communication barriers. - Done 1/24/2020 (Some difficulty with memory; would like written resources).  · Recognize and provide educational material (ANGEL) on Bradycardia and Hypokalemia. - Mailed 1/24/2020  · Assess for receipt of mailed educational resources.  Patient agrees to notify RN-CM if she has not received educational resources by 2/7/2020.   · Patient agrees to OPCM follow up within 12-14 days to assess progress to goal.       Status  · Partially met     Patient will demonstrate improved ability to express understanding of Bradycardia and Hypokalemia within 6 weeks.   Interventions   · Empower patient to discuss treatment plan with Physician/care team.  · Encourage compliance with Physician follow-ups (Post Holtor Monitor visit with Cardiology scheduled for 2/5/2020) .  · Review educational resources on Bradycardia with patient and address any questions/knowledge deficits with patient.   · Review educational  resources on Hypokalemia with patient and address any questions/knowledge deficits with patient.   Patient agrees to review the educational resources on Bradycardia and Hypokalemia within the next 2-3 weeks.   Patient agrees to OPCM follow up within 12-14 days to assess progress to goal.      Status  · Partially met              Patient Instructions     Instructions were provided via the [email or web address] patient resources and are available for the patient to review on the patient portal, if active.     Clinical Reference Documents Added to Patient Instructions       Document    BRADYCARDIA (ENGLISH)    FALLS IN THE HOME, PREVENTING (ENGLISH)    FALLS, PREVENTING, ARE YOU AT RISK OF FALLING? (ENGLISH)    HIGH BLOOD PRESSURE, CONTROLLING (ENGLISH)    HYPERTENSION, ESTABLISHED (ENGLISH)    HYPOKALEMIA (ENGLISH)        Next steps:   - Has patient received/reviewed educational resources mailed on 1/24/2020?  - Has patient been in contact with OPCM- regarding identified needs?  - What was outcome of Post Holtor Monitor Cardiology visit with Dr. Crystal on 2/5/2020? Any medication changes?   - Send medication discrepancies to NP, Harmony Martines after patient has followed up with Dr. Crystal on 2/5/2020.   - Fall Prevention - Patient not identified as a Fall Risk. Sent information as a resource as needed while in temporary living arrangements. Review with patient as needed.   - Hypertension/Hypotension - Continue to review educational resources with patient. Assess for understanding and compliance.  - Hypokalemia - Review information with patient and address any questions/knowledge deficits. Educate as needed.   - Bradycardia - Review information with patient and address any questions/knowledge deficits. Educate as needed.   - Placed call to [facility] [phone] 1/29/20 and  for Isabela Maravilla (Patient Navigator) regarding Oncology Financial application patient completed and needs to follow up. - 1/29/20 Rec'd return call  from Isabela Maravilla; unable to find what contact information was given to patient; Isabela to contact JASON Jarquin and have her call me with any information she can provide. - 1/29/20 Rec'd call from JASON Jarquin, who was also unable to determine what contact information was given to patient. Both Isabela and Eveline were very accommodating and attempted to help find the information. - 1/29/20 OPCM-SW will assist patient with f/u on financial application status.    - Assess for any additional needs.     Follow up in about 12-14 days (around 2/7/2020) for RN Follow Up.    Todays OPCM Self-Management Care Plan was developed with the patients input and was based on identified barriers from todays assessment. Goals were written today with the patient and the patient has agreed to work towards these goals to improve her overall well-being. Patient verbalized understanding of the care plan, goals, and all of today's instructions. Encouraged patient to communicate with her physician and health care team about health conditions and the treatment plan. Provided my contact information today and encouraged patient to call me with any questions as needed. - NIKKY Russ, RN-OPCM

## 2020-01-24 NOTE — PATIENT INSTRUCTIONS
Hypokalemia  Hypokalemia means a low level of potassium in the blood. This most often occurs in patients who take diuretics (water pills). It can also occur due to severe vomiting or diarrhea.  It is also seen in people who take laxatives for long periods of time.  A mild case usually causes no symptoms. It is only found with blood testing. More severe potassium loss causes generalized weakness, muscle or abdominal cramping, heart palpitations (rapid or irregular heartbeats) and low blood pressure.  Home care  · Take any potassium supplements prescribed.  · Eat foods rich in potassium. The highest amount is found in artichoke, baked potatoes, spinach, cantaloupe, honeydew melon, cod, halibut, salmon, and scallops. White, red, or barr beans are also very good sources. A modest amount is found in orange juice, bananas, carrots, and tomato juice.  · If you take certain types of diuretics, you will also need to take potassium supplements. If take a diuretic, discuss potassium supplements with your doctor.  Follow-up care  Follow up with your healthcare provider for a repeat blood test within the next week or as advised by our staff.  When to seek medical advice  Call your healthcare provider if any of the following occur:  · Increased weakness, fatigue, muscle cramps  · Dizziness  Call 911  Call 911 if any of the following occur:  · Irregular heartbeat, extra beats or very fast heart rate  · Loss of consciousness  Date Last Reviewed: 7/26/2015  © 2548-6391 [employer]. [address] [place] [address], [place] [address]. All rights reserved. This information is not intended as a substitute for professional medical care. Always follow your healthcare professional's instructions.        Bradycardia    When your heart rate is slow, less than 60 beats per minute, it is called bradycardia. Bradycardia can be normal, caused by medicines, or a sign of a disease. The slow heart rate may not be constant; it can come and  go. It is a concern when it is very low, or you have symptoms.  Signs and symptoms  The following are signs and symptoms of bradycardia:  · Heart rate less than 60 per minute  · Dizziness or feeling lightheaded  · Weakness  · Trouble breathing  · Fainting  · Sleepiness  · More trouble exercising than usual because of fatigue  · Confusion or trouble concentrating  Causes  There are many causes of bradycardia. Some can be related to your heart, but some may be related to other factors.  Non-heart-related causes:  · Advanced age  · Side effect of certain medicines (such as beta-blockers, calcium channel blockers, digitalis, antiarrhythmic medicines like amiodarone, clonidine, lithium)  · Medical conditions such as hypoglycemia (low blood sugar), hypothyroidism (low thyroid), electrolyte disorder,  hypothermia, sleep apnea  · Athletes, especially long-distance runners, may have a slow heart rate. This can be normal.  · Sleep apnea  · Brain injury such as stroke or bleeding inside the brain  Heart-related causes:  · Coronary artery disease (angina or prior heart attack, also known as acute myocardial infarction, or AMI)  · Heart valve disease  · Heart muscle disease (cardiomyopathy)  · Congestive heart failure  · Sick sinus syndrome, which is when your heart's natural pacemaker is no longer working properly  · Diseases that infiltrate the heart such as sarcoid  · Heart infections  Sometimes the cause for the arrhythmia cannot be found.  Bradycardia that causes symptoms is sometimes reversible, and can be treated with medicines. When more severe bradycardia persists, a pacemaker is generally recommended. When the bradycardia does not cause symptoms, your doctor may decide to evaluate it in his or her office.  Home care  The following will help you care for yourself at home:  · Resume your usual activities when you are feeling back to normal.  · If you develop any of the symptoms below during exertion, then you should not  exert yourself until evaluated further by your doctor.  · Work with your doctor on any needed lifestyle changes, such as changing your diet, stopping smoking if you are a smoker, and a planned exercise program.  Follow-up care  Follow up with your doctor, or as advised.  Call 911  Call 911 if any of the following occur:  · Chest pain  · Trouble breathing  · Slow heart rate with dizziness or lightheadedness  · Fainting or loss of consciousness  · Chest, shoulder, arm, neck, or back pain  · Slow heart rate (under 50 beats per minute) if associated with symptoms  When to seek medical advice  Get prompt medical attention if any of the following occur:  · Occasional weakness, dizziness, or lightheadedness  Date Last Reviewed: 4/25/2016 © 2000-2017 [employer]. [address] [place] [address]. All rights reserved. This information is not intended as a substitute for professional medical care. Always follow your healthcare professional's instructions.        Controlling High Blood Pressure  High blood pressure (hypertension) is often called the silent killer. This is because many people who have it dont know it. High blood pressure is defined as 140/90 mm Hg or higher. Know your blood pressure and remember to check it regularly. Doing so can save your life. Here are some things you can do to help control your blood pressure.    Choose heart-healthy foods  · Select low-salt, low-fat foods. Limit sodium intake to 2,400 mg per day or the amount suggested by your healthcare provider.  · Limit canned, dried, cured, packaged, and fast foods. These can contain a lot of salt.  · Eat 8 to 10 servings of fruits and vegetables every day.  · Choose lean meats, fish, or chicken.  · Eat whole-grain pasta, brown rice, and beans.  · Eat 2 to 3 servings of low-fat or fat-free dairy products.  · Ask your doctor about the DASH eating plan. This plan helps reduce blood pressure.  · When you go to a restaurant, ask  that your meal be prepared with no added salt.  Maintain a healthy weight  · Ask your healthcare provider how many calories to eat a day. Then stick to that number.  · Ask your healthcare provider what weight range is healthiest for you. If you are overweight, a weight loss of only 3% to 5% of your body weight can help lower blood pressure. Generally, a good weight loss goal is to lose 10% of your body weight in a year.  · Limit snacks and sweets.  · Get regular exercise.  Get up and get active  · Choose activities you enjoy. Find ones you can do with friends or family. This includes bicycling, dancing, walking, and jogging.  · Park farther away from building entrances.  · Use stairs instead of the elevator.  · When you can, walk or bike instead of driving.  · [place] leaves, garden, or do household repairs.  · Be active at a moderate to vigorous level of physical activity for at least 40 minutes for a minimum of 3 to 4 days a week.   Manage stress  · Make time to relax and enjoy life. Find time to laugh.  · Communicate your concerns with your loved ones and your healthcare provider.  · Visit with family and friends, and keep up with hobbies.  Limit alcohol and quit smoking  · Men should have no more than 2 drinks per day.  · Women should have no more than 1 drink per day.  · Talk with your healthcare provider about quitting smoking. Smoking significantly increases your risk for heart disease and stroke. Ask your healthcare provider about community smoking cessation programs and other options.  Medicines  If lifestyle changes arent enough, your healthcare provider may prescribe high blood pressure medicine. Take all medicines as prescribed. If you have any questions about your medicines, ask your healthcare provider before stopping or changing them.   Date Last Reviewed: 4/27/2016  © 6860-4061 [employer]. [address] [place] [address], [place] [address]. All rights reserved. This information is not intended as  a substitute for professional medical care. Always follow your healthcare professional's instructions.        Established High Blood Pressure    High blood pressure (hypertension) is a chronic disease. Often, healthcare providers dont know what causes it. But it can be caused by certain health conditions and medicines.  If you have high blood pressure, you may not have any symptoms. If you do have symptoms, they may include headache, dizziness, changes in your vision, chest pain, and shortness of breath. But even without symptoms, high blood pressure thats not treated raises your risk for heart attack and stroke. High blood pressure is a serious health risk and shouldnt be ignored.  A blood pressure reading is made up of two numbers: a higher number over a lower number. The top number is the systolic pressure. The bottom number is the diastolic pressure. A normal blood pressure is a systolic pressure of  less than 120 over a diastolic pressure of less than 80. You will see your blood pressure readings written together. For example, a person with a systolic pressure of 188 and a diastolic pressure of 78 will have 118/78 written in the medical record.  High blood pressure is when either the top number is 140 or higher, or the bottom number is 90 or higher. This must be the result when taking your blood pressure a number of times. The blood pressures between normal and high are called prehypertension.  Home care  If you have high blood pressure, you should do what is listed below to lower your blood pressure. If you are taking medicines for high blood pressure, these methods may reduce or end your need for medicines in the future.  · Begin a weight-loss program if you are overweight.  · Cut back on how much salt you get in your diet. Heres how to do this:  ¨ Dont eat foods that have a lot of salt. These include olives, pickles, smoked meats, and salted potato chips.  ¨ Dont add salt to your food at the  table.  ¨ Use only small amounts of salt when cooking.  · Start an exercise program. Talk with your healthcare provider about the type of exercise program that would be best for you. It doesn't have to be hard. Even brisk walking for 20 minutes 3 times a week is a good form of exercise.  · Dont take medicines that stimulate the heart. This includes many over-the-counter cold and sinus decongestant pills and sprays, as well as diet pills. Check the warnings about hypertension on the label. Before buying any over-the-counter medicines or supplements, always ask the pharmacist about the product's potential interaction with your high blood pressure and your high blood pressure medicines.  · Stimulants such as amphetamine or cocaine could be deadly for someone with high blood pressure. Never take these.  · Limit how much caffeine you get in your diet. Switch to caffeine-free products.  · Stop smoking. If you are a long-time smoker, this can be hard. Talk to your healthcare provider about medicines and nicotine replacement options to help you. Also, enroll in a stop-smoking program to make it more likely that you will quit for good.  · Learn how to handle stress. This is an important part of any program to lower blood pressure. Learn about relaxation methods like meditation, yoga, or biofeedback.  · If your provider prescribed medicines, take them exactly as directed. Missing doses may cause your blood pressure get out of control.  · If you miss a dose or doses, check with your healthcare provider or pharmacist about what to do.  · Consider buying an automatic blood pressure machine. Ask your provider for a recommendation. You can get one of these at most pharmacies.     The American Heart Association recommends the following guidelines for home blood pressure monitoring:  · Don't smoke or drink coffee for 30 minutes before taking your blood pressure.  · Go to the bathroom before the test.  · Relax for 5 minutes before  taking the measurement.  · Sit with your back supported (don't sit on a couch or soft chair); keep your feet on the floor uncrossed. Place your arm on a solid flat surface (like a table) with the upper part of the arm at heart level. Place the middle of the cuff directly above the eye of the elbow. Check the monitor's instruction manual for an illustration.  · Take multiple readings. When you measure, take 2 to 3 readings one minute apart and record all of the results.  · Take your blood pressure at the same time every day, or as your healthcare provider recommends.  · Record the date, time, and blood pressure reading.  · Take the record with you to your next medical appointment. If your blood pressure monitor has a built-in memory, simply take the monitor with you to your next appointment.  · Call your provider if you have several high readings. Don't be frightened by a single high blood pressure reading, but if you get several high readings, check in with your healthcare provider.  · Note: When blood pressure reaches a systolic (top number) of 180 or higher OR diastolic (bottom number) of 110 or higher, seek emergency medical treatment.  Follow-up care  You will need to see your healthcare provider regularly. This is to check your blood pressure and to make changes to your medicines. Make a follow-up appointment as directed. Bring the record of your home blood pressure readings to the appointment.  When to seek medical advice  Call your healthcare provider right away if any of these occur:  · Blood pressure reaches a systolic (upper number) of 180 or higher OR a diastolic (bottom number) of 110 or higher  · Chest pain or shortness of breath  · Severe headache  · Throbbing or rushing sound in the ears  · Nosebleed  · Sudden severe pain in your belly (abdomen)  · Extreme drowsiness, confusion, or fainting  · Dizziness or spinning sensation (vertigo)  · Weakness of an arm or leg or one side of the face  · You have  problems speaking or seeing   Date Last Reviewed: 12/1/2016  © 7380-4220 [employer]. [address] [place] [address], [place] [address]. All rights reserved. This information is not intended as a substitute for professional medical care. Always follow your healthcare professional's instructions.        Preventing Falls: Are You At Risk of Falling?     Ask for help to reduce risk of falling in your home.     As you get older, you're not as steady on your feet as you once were. And you may have health problems you didn't have when you were younger. So, it's not surprising that older people are more likely to trip and fall. Falling can be very serious. It can change your overall health and quality of life. That's why it's important to be aware of your own risk of falling.  The dangers of falling  Falls are one of the main causes of injury in people over age 65. An older person who falls may take longer to get better than a younger person. And, after a fall, an older person is more likely to have problems that don't go away. So, preventing falls can help you avoid serious health problems.  Are you at risk of falling?  Answer these questions to rate your level of risk.  · Are you a woman?  · Have you fallen or stumbled in the last year?  · Are you over age 65?  · Are you ever dizzy or lightheaded with standing?  · Do you have a hard time getting in and out of the bathtub or on and off the toilet?  · Do you lean on objects to help you get around? Or do you use a cane or walker?  · Do you have vision or hearing problems? For example, do you need new glasses or hearing aids?  · Do you have 2 or more long-lasting (chronic) medical conditions?  · Do you take 3 or more medicines?  · Have you felt depressed recently?  · Have you had more trouble with your memory in recent months?  · Are there hazards in your home that might cause you to fall, such as loose rugs or poor lighting?  · Do you have a pet that jumps on you or  "might trip you?  · Have you stopped getting regular exercise?  · Do you have diabetes?   · Do you have a neurologic disease, such as Parkinson or Alzheimer disease?   · Do you drink alcohol?  · Do you wear athletic shoes or slippers, or go barefoot at home?  You can help prevent falls  If you answered "yes" to any of the above questions, you should take steps to reduce your risk of a fall. Monitoring health conditions and keeping walkways in your home free of clutter are just 2 ways. Changing is sometimes easier said than done. But keep in mind that even small changes can make you less likely to fall.  The fear of falling  It's normal to be scared of falling, especially if you've fallen before. But being afraid can actually make you more likely to fall. This is because:  · Fear might cause you to become less active. Being less active can lead to a loss of strength and balance.  · Fear can lead to isolation from others, depression, or the use of more medicines or alcohol. And all these things make falling even more likely.  To break the cycle, learn more about ways to avoid falling. As you take control, you may find yourself feeling less afraid.   Date Last Reviewed: 6/12/2015  © 2263-0688 [employer]. [address] [place] [address], [place] [address]. All rights reserved. This information is not intended as a substitute for professional medical care. Always follow your healthcare professional's instructions.        Preventing Falls in the Home  An adult or child can fall for many reasons. If you are an older adult, you may fall because your reaction time slows down. Your muscles and joints may get stiff, weak, or less flexible because of illness, medicines, or a physical condition. These things can also make a child more likely to fall or be injured in a fall.  Other health problems that make falls more likely include:  · Arthritis  · Dizziness or lightheadedness when you get out of bed (orthostatic " hypotension)  · History of a stroke  · Dizziness  · Anemia  · Certain medicines taken for mental illness  · Problems with balance or gait  · History of falls with or without an injury  · Changes in vision (vision impairment)  · Changes in thinking skills and memory (cognitive impairment)  Injuries from a fall can include broken bones, dislocated joints, and cuts. When these injuries are serious enough, they can make it impossible for you or a child who is injured in a fall to live on his or her own.  Prevention tips  To help prevent falls and fall-related injuries, follow the tips below.   Floors  Make floors safer by doing the following:   · Put nonskid pads under area rugs.  · Remove throw rugs.  · Replace worn floor coverings.  · Tack carpets firmly to each step on carpeted stairs. Put nonskid strips on the edges of uncarpeted stairs.  · Keep floors and stairs free of clutter and cords.  · Arrange furniture so there are clear pathways.  · Clean up any spills right away.  · Wear shoes that fit.  Bathrooms    Make bathrooms safer by doing the following:   · Install grab bars in the tub or shower.  · Apply nonskid strips or put a nonskid rubber mat in the tub or shower.  · Sit on a bath chair to bathe.  · Use bathmats with nonskid backing.  Lighting and the environment  Improve lighting in your home by doing the following:   · Keep a flashlight in each room. Or put a lamp next to the bed within easy reach.  · Put nightlights in the bedrooms, hallways, kitchen, and bathrooms.  · Make sure all stairways have good lighting.  · Take your time when going up and down stairs.  · Put handrails on both sides of stairs and in walkways for more support. To prevent injury to your wrist or arm, dont use handrails to pull yourself up.  · Install grab bars to pull yourself up.  · Move or rearrange items that you use often. This will make them easier to find or reach.  · Look at your home to find any safety hazards. Especially  look at doorways, walkways, and the driveway. Remove or repair any safety problems that you find.  Date Last Reviewed: 8/1/2016 © 2000-2017 [employer]. [address] [place] [address], [place] [address]. All rights reserved. This information is not intended as a substitute for professional medical care. Always follow your healthcare professional's instructions.        Low Blood Pressure (All Causes)  A blood pressure reading is made up of 2 numbers There is a top number over a bottom number. The top number is the systolic pressure. The bottom number is the diastolic pressure. A normal blood pressure is a systolic pressure less than 120 over a diastolic pressure less than 80. Low blood pressure (hypotension) is a blood pressure that is less than what is normal for you. Low blood pressure can cause dizziness, lightheadedness, or fainting.  Some medicines can cause low blood pressure. They include:  · High blood pressure pills  · Water pills (diuretics)  · Some heart medicines  · Some antidepressants  · Pain, anxiety, sedative, and sleeping medicines  Other causes include:  · Dehydration, severe infection, or fever  · Blood loss, such as bleeding from the stomach or intestines.  · Heart failure  · Change in heart rate or rhythm (arrhythmia)  · A drop in blood pressure from a sudden change in body position, from lying down to standing (orthostatic hypotension)  · Alcohol or drug intoxication  · Neurological diseases that impair the autonomic nervous system  Treatment will depend on what is causing your low blood pressure.  Home care  Follow these guidelines when caring for yourself at home:  · Rest until your symptoms get better.  · Keep a record of your symptoms and what you were doing when they occurred. Bring the record with you to your next appointment.  · Be aware of how quickly your blood pressure drops when you become dehydrated, spend a lot of time in the sun, or have low blood sugar. Take measures to  prevent blood pressure drops at these times.  · Follow the treatment plan described by your healthcare provider.  Follow-up care  Follow up with your healthcare provider, or as advised.  When to seek medical advice  Call your healthcare provider right away if any of these occur:  · Dizziness, lightheadedness, or fainting  · Black or red color in your stools or vomit  · Shortness of breath or difficulty breathing  · Chest, shoulder, arm, neck, or upper back pain  · Abdominal pain  · Diarrhea or vomiting that doesnt go away  · You arent able to eat or drink  · Fever of 100.4°F (38°C) or higher, or as directed by your healthcare provider  · Burning when you urinate  · Foul-smelling urine  Date Last Reviewed: 12/1/2016  © 7810-4548 [employer]. [address] [place] [address], [place] [address]. All rights reserved. This information is not intended as a substitute for professional medical care. Always follow your healthcare professional's instructions.

## 2020-02-03 ENCOUNTER — OUTPATIENT CASE MANAGEMENT (OUTPATIENT)
Dept: ADMINISTRATIVE | Facility: OTHER | Age: 70
End: 2020-02-03

## 2020-02-03 NOTE — PROGRESS NOTES
1st Attempt to complete Social Work Assessment for Outpatient Care Management.  No answer and voice mailbox is not set up.  LCSW will reattempt at a later date.

## 2020-02-03 NOTE — PROGRESS NOTES
"  Outpatient Care Management  Plan of Care Follow Up Visit    Patient: Eduardo Esposito  MRN: [id number]  Date of Service: 2020  Completed by: Kristin Jones RN  Referral Date: 2020  Program: Case Management (High Risk)    Reason for Visit   Patient presents with    Update Plan Of Care     Brief Summary: 2020 RN-CM retrieved voice mail message from patient stating that she had received the educational information that was mailed to her last week. Patient requested a return call. Contacted patient as requested. Patient reports that she reviewed the information on hypertension/hypotension. Patient states that she has been checking and logging her blood pressure daily and we reviewed the most recent pressures, which were alarmin/100, 100/71, 247/107, 216/137, 167/118, 154/103, 184/147. Patient denied any S/S of hypertension. Asked patient to check blood pressure while on the phone with me. Reports that the wrist cuff gave her "error" readings on the first 2 attempts, but eventually was able to obtain a reading of 204/126. Patient is using a wrist cuff and seemed to be confused as to how to accurately take a blood pressure reading. We talked through the process and patient was still reporting a high blood pressure. Reminded patient of the importance of notifying MD for blood pressures of 140/90 or higher. Also reviewed the signs and symptoms of Hypertension that need to be reported to healthcare provider or 911 right away: Blood pressure reaches a systolic (upper number) of 180 or higher OR a diastolic (bottom number) of 110 or higher; Chest pain or shortness of breath; Severe headache; Throbbing or rushing sound in the ears; Nosebleed; Sudden severe pain in your belly (abdomen); Extreme drowsiness, confusion, or fainting; Dizziness or spinning sensation (vertigo); Weakness of an arm or leg or one side of the face; You have problems speaking or seeing. Denies any S/S other than the elevated " blood pressure reading itself. Initially patient stated that she was going to drive to the doctor's office and let them check her blood pressure and advised patient against that. Patient gave me permission to speak with her daughter, Kirstin. Instructed patient to lie down and that I was going to contact her Cardiologist, Dr. Crystal. Placed call to office at [phone] X 3 attempts. Left message and then call was answered on 3rd attempt. Apparently patient had also called and they are waiting on a response from Dr. Crystal. I contacted patient's daughter, Kirstin Epstein, to make her aware of the situation and patient's elevated blood pressure readings. Kirstin states that she is not sure if patient is taking her blood pressure accurately, but will go over and check on patient and will bring her to MD office to have them check her blood pressure and the accuracy of her cuff. Called patient again to check on her status. States that Dr. Crystal' nurse had called her back and that she was going to talk with Dr. Crystal and call patient again. Ms. Palm states that she is lying down and feels okay, no S/S, and is just waiting for Dr. Crystal' recommendations. States that she will call me back when she hears something from his office. Ms. Palm returned call and stated that Dr. Crystal increased blood pressure medication (Losartan) from 50mg QD to BID until he sees her in his office this  Wednesday  morning at 1030 with an additional follow up scheduled on 2/14/20. Advised Ms. Palm to bring her blood pressure cuff, blood pressure logs and all of her medications (RX and OTC) to the appointment tomorrow so Cardiologist can review and determine if any medications need to be changed; verbalized understanding and intent to do so. Will follow up with patient on Friday, 2/7/2020 as previously scheduled. At that time, will send any medication discrepancies/changes to [facility], Harmony Martines to update patient's medication list in [facility]. -  NIKKY Russ, RN-[facility]      Patient Summary   Involvement of Care:  Do I have permission to speak with other family members about your care?   Yes    Patient Reported Labs & Vitals:  1.  Any Patient Reported Labs & Vitals?  Yes  2.  Patient Reported Blood Pressure:  179/100, 100/71, 247/107, 216/137, 167/118, 154/103, 184/147  3.  Patient Reported Pulse:     4.  Patient Reported Weight (Kg):     5.  Patient Reported Blood Glucose (mg/dl):       Medical History:  Reviewed medical history with patient and/or caregiver.    Social History:  Reviewed social history with patient and/or caregiver.    Clinical Assessment     Reviewed and provided basic information on available community resources for mental health, transportation, wellness resources, and palliative care programs with patient and/or caregiver.    Complex Care Plan     Care plan was discussed and completed today with input from patient and/or caregiver.    Goals        Outpatient Case Management     Patient will have an action plan in place to manage and prevent complications of high blood pressure prior to discharge from [facility]PC. - Priority: High      Overall Time to Completion  2 months from 01/2/2020     OPCM Identified Patient Needs:  Housing - Referred to [facility]   (Needs to find out about the plan for [employer] or Alternate Housing)  Financial - Referred to [facility]  (Assistance with Cancer Financial Assistance)    [facility] Identified Disease Education Needs:  Hypertension - Care Plan Created   Heart Disease - Deferred (Will revisit after post Holter Monitor Cardiology F/U visit on 2/5/20 )    Short Term Goals  Patient will measure and record the blood pressure 1 times per day for 2 weeks.  Interventions   · Assess for availability of working blood pressure cuff in home setting. - Done 1/24/20 (Yes - only has a wrist cuff)  · Assess patient's ability to perform ADLs. - Done 1/24/2020  · Assess for compliance with daily measuring/logging  of blood pressure. - Done 1/24/20 (Yes)  · Review recent blood pressure readings/ranges with patient. - Done 1/24/20 (120's to 150's over 50's to 80's); 2/3/20 (150's to 247 over 71 to 126)  · Collaborate with Physician as appropriate to meet patient needs. - 2/3/20 Contacted Cardiologist's office (Dr. Crystal [phone]) regarding patient's reported elevated readings  · Mail Blood pressure logs for home use. - Mailed 1/24/20   · Educate patient on the importance of daily measuring and logging of blood pressure. - Done 1/24/20   · Educate patient on the importance of bringing logs to follow up appointments with healthcare provider for review to determine if medications need to be adjusted or changed. - Done 1/24/20, 2/3/20   · Recognize and provide educational material (KRAMES) on Hypertension. - Mailed 1/24/20  · Educate patient on the importance of notifying healthcare provider for blood pressures of 140/90 or higher. - Done 1/24/20, 2/3/20  Patient agrees to continue to measure and record blood pressure 1 time per day for the next 2 weeks and review readings with RN-CM at subsequent follow up calls. - 2/3/20 Measuring/logging daily  Patient agrees to OPCM follow up within 12-14 days to assess progress to goal. - 2/3/20 Meeting goal     Status  · Partially met    Patient will verbalize 2 signs and symptoms of Hypertension and Hypotension within 6 weeks.    Signs and Symptoms of Hypertension   If you have high blood pressure, you may not have any symptoms.   If you do have symptoms, they may include: headache, dizziness, changes in your vision, chest pain, and shortness of breath. **But even without symptoms, high blood pressure thats not treated raises your risk for heart attack and stroke.    Signs and Symptoms of Hypotension   Dizziness   Lightheadedness   Fainting    Interventions   · Assess for patient's ability to verbalize at least 2 signs and symptoms of Hypertension.  · Assess for patient's ability to  verbalize at least 2 signs and symptoms of Hypotension.    · Recognize and provide educational material (KRAMES) on Hypertension/Hypotension. - Mailed 1/24/20  · Review signs and symptoms of Hypertension with patient. - Done 1/24/20, 2/3/20  · Review signs and symptoms of Hypotension with patient. - Done 2/3/20  · Review importance of checking and logging blood pressure if experience any signs/symptoms of Hypertension or Hypotension. - Done 1/24/20, 2/3/20  · Patient agrees to review the educational resources provided on Hypertension/Hypotension within the next 2-3 weeks. - 2/3/20 Reports reviewed information  · Patient agrees to OPCM follow up within 12-14 days to assess progress to goal.       Status  · Partially met    Patient will verbalize 2 signs and symptoms of Hypertension that need to be reported to healthcare provider or 911 right away.   Signs and Symptoms to Report to Healthcare Provider or 911 Right Away   Blood pressure reaches a systolic (upper number) of 180 or higher OR a diastolic (bottom number) of 110 or higher   Chest pain or shortness of breath   Severe headache   Throbbing or rushing sound in the ears   Nosebleed   Sudden severe pain in your belly (abdomen)   Extreme drowsiness, confusion, or fainting   Dizziness or spinning sensation (vertigo)   Weakness of an arm or leg or one side of the face   You have problems speaking or seeing     Interventions   · Assess for ability to verbalize 2 signs and symptoms of Hypertension that need to be reported to healthcare provider or 911 right away.  · Recognize and provide educational material (KRAMES) on Hypertension. - Mailed 1/24/20  · Review with patient signs and symptoms of Hypertension that need to be reported to healthcare provider or 911 right away. - Done 2/3/20  · Educate patient on the importance of checking and logging blood pressure if experience any signs/symptoms of Hypertension. - Done 2/3/20  · Patient agrees to review the educational  resources provided on Hypertension within the next 2-3 weeks. - 2/3/20 Reports reviewed information  · Patient agrees to OPCM follow up within 12-14 days to assess progress to goal. - 2/3/20 Reviewed, but will I continue to review with patient      Status  · Partially met    Patient will verbalize 2 ways of preventing complications due to Hypertension within 8 weeks.   Ways to Prevent Complications   Begin a weight-loss program if you are overweight.   Cut back on how much salt you get in your diet. Heres how to do this:  ? Dont eat foods that have a lot of salt. These include olives, pickles, smoked meats, and salted potato chips.  ? Dont add salt to your food at the table.  ? Use only small amounts of salt when cooking.  Start an exercise program. Talk with your healthcare provider about the type of exercise best for you. Even brisk walking for 20 minutes 3 times a week is a good form of exercise.  Dont take medicines that stimulate the heart. This includes many over-the-counter cold and sinus decongestant pills and sprays, as well as diet pills. Check the warnings about hypertension on the label. Before buying any over-the-counter medicines or supplements, always ask the pharmacist about the product's potential interaction with your high blood pressure and your high blood pressure medicines.  Stimulants such as amphetamine or cocaine could be deadly for someone with high blood pressure. Never take these.  Limit how much caffeine you get in your diet. Switch to caffeine-free products.  Learn how to handle stress. This is an important part of any program to lower blood pressure. Learn about relaxation methods like meditation, yoga, or biofeedback.  If your provider prescribed medicines, take them exactly as directed. Missing doses may cause your blood pressure to get out of control.   If you miss a dose or doses, check with your healthcare provider or pharmacist about what to do.  Purchase an automatic blood  pressure machine. Ask your provider for a recommendation. You can get one of these at most pharmacies.    Interventions   · Assess for patient's ability to verbalize at least 2 ways of preventing complications due to Hypertension.  · Collaborate with Physician as appropriate to meet patient needs.  · Empower patient to discuss treatment plan with Physician/care team. - Done 2/3/20 Patient/RN calling MD office today due to reported elevated blood pressures  · Educate patient on the importance of Dietary Compliance (low sodium). - Done 1/24/20 (Patient tries, but is currently receiving 3 meals per day from [employer] and local [employer] while residing in [place] [address] so difficult to control sodium content. Avoids salty foods as much as she can right now.)  · Educate patient on the importance of daily Exercise as tolerated.  · Educate patient on the importance of Medication Compliance. - Done 1/24/20  · Facilitate medical appointments as needed.  · Recognize and provide educational material (KRAMES) on Hypertension. - Mailed 1/24/20  · Review with patient ways of preventing complications due to Hypertension. - 1/24/20 (Partially reviewed; will continue to review at next call); 2/3/20 (reviewed importance of low sodium diet, exercise, medication compliance, reporting elevated BP readings to MD).  · Patient agrees to review the educational resources provided on Hypertension within the next 2-3 weeks. - 2/3/20 Reports reviewed information  · Patient agrees to OPCM follow up within 12-14 days to assess progress to goal. - 2/3/20 Reviewed, but will I continue to review with patient     Status  Partially met         Patient will have knowledge of resources available in order to obtain the services that are needed prior to discharge from [facility]. - Priority: High      Overall Time to Completion  2 months from 01/24/2020     OPCM Identified Patient Needs:  Housing - Referred to [facility]   (Needs to find out about the plan  for [employer] or Alternate Housing)  Financial - Referred to [facility]  (Assistance with Cancer Financial Assistance)    [facility] Identified Disease Education Needs:  Hypertension - Care Plan Created   Heart Disease - Deferred (Will revisit after post Holter Monitor Cardiology F/U visit on 2/5/20 )      Short Term Goals   Patient/caregiver will fill out any applications that were sent to patient to complete from [facility]  within 1 month.  Interventions   · Refer to Outpatient Case Management Social Worker.     Status  · Partially met      Patient/caregiver will have contact information for identified community resources IE:[facility]  for follow-up within 1 month.  Interventions   · Refer to Outpatient Case Management Social Worker.     Status  · Partially met      Patient/caregiver will notify RN CCM if patient does not hear from [facility]  within 2 weeks.  Interventions   · Refer to Outpatient Case Management Social Worker.     Status  · Partially met         Patient will verbalize understanding of the information that was provided about the patient's identified barrier prior to discharge from [facility]. - Priority: High      Overall Time to Completion  2 months from 01/24/2020     [facility] Identified Patient Needs:  Housing - Referred to [facility]   (Needs to find out about the plan for [employer] or Alternate Housing)  Financial - Referred to [facility]  (Assistance with Cancer Financial Assistance)    [facility] Identified Disease Education Needs:  Hypertension - Care Plan Created   Heart Disease - Deferred (Will revisit after post Holter Monitor Cardiology F/U visit on 2/5/20 )    Short Term Goals  Patient will have appropriate health related knowledge within 2 weeks.   Interventions   · Assess type of communication barriers. - Done 1/24/2020 (Some difficulty with memory; would like written resources).  · Recognize and provide educational material (ANGEL) on  Bradycardia and Hypokalemia. - Mailed 1/24/2020  · Assess for receipt of mailed educational resources.  Patient agrees to notify RN-CM if she has not received educational resources by 2/7/2020.   · Patient agrees to OPCM follow up within 12-14 days to assess progress to goal.       Status  · Partially met     Patient will demonstrate improved ability to express understanding of Bradycardia and Hypokalemia within 6 weeks.   Interventions   · Empower patient to discuss treatment plan with Physician/care team.  · Encourage compliance with Physician follow-ups (Post Holtor Monitor visit with Cardiology scheduled for 2/5/2020) .  · Review educational resources on Bradycardia with patient and address any questions/knowledge deficits with patient.   · Review educational resources on Hypokalemia with patient and address any questions/knowledge deficits with patient.   Patient agrees to review the educational resources on Bradycardia and Hypokalemia within the next 2-3 weeks.   Patient agrees to OPCM follow up within 12-14 days to assess progress to goal.      Status  · Partially met              Patient Instructions     Instructions were previously provided via the [employer] patient resources and are available for the patient to review.     Next Steps:   - Has patient received/reviewed educational resources mailed on 1/24/2020? - 2/3/20 Yes  - Has patient been in contact with [facility]PC- regarding identified needs? - 2/3/20 Not to date  - What was outcome of Post Holtor Monitor Cardiology visit with Dr. Crystal on 2/5/2020? Any medication changes?   - Send medication discrepancies to NP, Harmony Martines after patient has followed up with Dr. Crystal on 2/5/2020.   - Fall Prevention - Patient not identified as a Fall Risk. Sent information as a resource as needed while in temporary living arrangements. Review with patient as needed.   - Hypertension/Hypotension - Continue to review educational resources with patient. Assess for  understanding and compliance. - 2/3/20  - Hypokalemia - Review information with patient and address any questions/knowledge deficits. Educate as needed.   - Bradycardia - Review information with patient and address any questions/knowledge deficits. Educate as needed.   - Placed call to [facility] [phone] 1/29/20 and  for Isabela Maravilla (Patient Navigator) regarding Oncology Financial application patient completed and needs to follow up. - 1/29/20 Rec'd return call from Isabela Maravilla; unable to find what contact information was given to patient; Isabela to contact JASON Jarquin and have her call me with any information she can provide. - 1/29/20 Rec'd call from JASON Jarquin, who was also unable to determine what contact information was given to patient. Both Isabela and Eveline were very accommodating and attempted to help find the information. - 1/29/20 OPCM-SW will assist patient with f/u on financial application status.    Follow up in about 4 days (around 2/7/2020) for RN Follow Up.    Todays OPCM Self-Management Care Plan was reviewed with the patients input and was based on identified barriers from todays assessment. Goals were written today with the patient and the patient has agreed to work towards these goals to improve her overall well-being. Patient verbalized understanding of the care plan, goals, and all of today's instructions. Encouraged patient to communicate with her physician and health care team about health conditions and the treatment plan. Provided my contact information today and encouraged patient to call me with any questions as needed. - NIKKY Russ, RN-OPCM

## 2020-02-06 ENCOUNTER — PATIENT MESSAGE (OUTPATIENT)
Dept: ADMINISTRATIVE | Facility: OTHER | Age: 70
End: 2020-02-06

## 2020-02-07 ENCOUNTER — OUTPATIENT CASE MANAGEMENT (OUTPATIENT)
Dept: ADMINISTRATIVE | Facility: OTHER | Age: 70
End: 2020-02-07

## 2020-02-07 NOTE — LETTER
March 2, 2020    Eduardo Esposito  [address]  [place] [address]             [facility]  [address] [place] [address] Dear Ms. Palm,    I work with [facility]'s Outpatient Care Management Department. I have been unsuccessful at reaching you to follow-up to see how you have been doing. Please call me back at your earliest convenience to discuss your health care needs.      I can be reached at [phone]  from 8:00AM to 4:30,PM, Monday thru Friday. [facility] is a program offered through [facility] where a nurse is available 24/7 to answer questions or provide medical advice, their number is [phone].    Kind Regards,        Sarah Russ, RN  [facility]  [phone]

## 2020-02-07 NOTE — PROGRESS NOTES
"02/07/2020 (1st Attempt) RN-CM attempted to contact patient to follow up from call on 2/3/20. There was no answer and voice mail box is not set up. Will attempt to contact patient for follow up when I return from vacation on 2/17/20. Sent message to \[facility]\""- regarding inability to speak with patient today; requested that she let patient know that I would be out next week and will return to office on 2/17/20. - NIKKY Russ RN-\[facility]\""    02/17/2020 (2nd Attempt) RNRUBÉN attempted to contact patient. Again no answer and voice mail box is not set up. Discussed with \[facility]\""-, Belkys Villagran who spoke with patient's daughter, Kirstin Epstein. Per discussion, Kirstin reported to Belkys that patient has been having problems with her phone. Stated that she had to send the phone in to have repairs done if it can be fixed, or they may have to purchase a new phone. Stated that hopefully she would have a phone within the next couple of weeks. Kirstin also reported that patient would be attending a meeting this evening about the status of the [place] repairs. She is hopeful that patient will be able to move back to her apartment sometime in March. Will attempt to contact patient again in the next 2 weeks to complete follow up. Jad Russ RN-\[facility]\""    02/27/2020 (3rd Attempt) RNRUBÉN made 3rd attempt to contact patient telephonically. Again no answer and recording stated call cannot be completed at this time. Will attempt to contact patient again early next week. If unable to reach patient next week, will send "attempt to follow up letter" and close case. Jad Russ RN-OPCMIESHA    03/02/2020 (4th Attempt)  Attempted to reach patient again this morning; no answer and same recording stating that call cannot be completed at this time. Sent IM to \[facility]\""-, Belkys Villagran to see if she had been able to reach patient for follow up. Belkys stated that she was calling patient again today to follow up and was unsure if patient had received her new " "phone yet. Asked SW to let me know if she was able to reach patient so that I could also follow up. (5th Attempt)  Will go ahead and mail attempt to follow up letter via USPS and through patient portal. Will hold off on closing case until I hear back from CLEMENTE. Jad Russ RN-OPCM    03/03/2020 RN[facility]CM [facility] Belkys stated that she had spoke to patient's daughter/IIC, Kirstin who reported that patient has had some problems getting a phone. Kirstin states that the patient was planning on paying for 30 days service on an old phone that she has in her possession now, but has not been using. Belkys stated that she attempted to reach patient on the 2 phone numbers listed in patient's chart, but got the recording that "call cannot be completed as dialed". Belkys stated that patient attends [employer] every Tuesday morning and may be reached at [phone] and will plan on calling her next Tuesday. Kirstin also reported that the patient attended the meeting about the status of repairs at [facility] 2 weeks ago and was told that they are still not sure when the apartments will be finished as they are waiting on inspections. Kirstin reported that the patient is doing well and until the repairs are complete, she plans to continue to stay at the [place] [address] in [place]. Belkys to follow up with the financial assistance office at [facility] regarding the status of patient's application for financial assistance with her last hospital bill. Will continue to coordinate care with LCSW as appropriate to meet patient's needs. This RN-CM will attempt to contact patient later this week on the numbers listed in patient's chart to see if either phone has been activated. If able to reach patient, will ask patient to call SW for follow up. Jad Greenberg RN-OPCM    03/04/2020 RN-CM received IM from \[facility]\""-Belkys CORNEJO, stating that patient called her late this afternoon. CLEMENTE was able to provide " "RN with contact number for patient [phone]. SW stated that patient was doing fine and all SW goals had been met, so she closed case. Will contact patient tomorrow morning to follow up for OPCM. Jad Russ RN-OPCM    Outpatient Care Management  Plan of Care Follow Up Visit    Patient: Eduardo Esposito  MRN: [id number]  Date of Service: 02/07/2020  Completed by: Kristin Jones RN  Referral Date: 01/09/2020  Program: Case Management (High Risk)    Reason for Visit   Patient presents with    OPCM RN First Follow-Up Attempt     2/7/2020    OPCM RN Second Follow-Up Attempt     2/17/20    OPCM RN Third Follow-Up Attempt     2/27/20    OPCM RN Fourth Follow-Up Attempt     3/2/20 Sent attempt to follow up letter via USPS and through patient portal     Update Plan Of Care     3/5/20    Case Closure     3/5/20 Goals Met       Brief Summary: 03/05/2020 RN-CM contacted patient this morning to follow up for OPCM. Patient states that she just got the "right card" for her phone and it is now working. States that she is doing very well. States that she continues to check/log her blood pressure daily. Reports ranges in the 130's to 140's over 70's to 80's. States that she followed up with Dr. Crystal on 2/5/20 and no blood pressure medications were changed. States that she is not sure if she was checking her blood pressure correctly with the wrist cuff, but now she knows that she is checking correctly and ranges have been good. States that she gets an occasional higher pressure if she's rushing or gets nervous. Re-educated patient on the importance of notifying MD for blood pressures >140/90 with 3 consistent readings or if blood pressure reaches systolic (upper number) of 180 or higher OR a diastolic (bottom number) of 110 or higher; verbalized understanding. Reinforced with patient the importance of checking/logging blood pressure daily and bringing the logs to follow up appointments with PCP and Cardiologist; " verbalized understanding. Reports attending appointment with new PCP, saw Awa Martines NP on 2/27/20 and all medications were reviewed. OV blood pressure was 138/86, and NP told her that was good and she did not change any medications. We reviewed the medication list and patient reports compliance with all ordered medications. States that results of her Holter Monitor were reviewed with her by Dr. Crystal and her rate was WNL (60's). Denies any falls or SOB. Interventions completed with patient as per care plan documentation below. Patient denies any questions, concerns or needs at this time and is in agreement with case closure. As all nursing identified, patient centered goals have been met, this RN-CM will close case and dis-enroll patient today. Jad Russ, RN-OPCM    Patient Summary     Involvement of Care:  Do I have permission to speak with other family members about your care?      Patient Reported Labs & Vitals:  1.  Any Patient Reported Labs & Vitals?  Yes  2.  Patient Reported Blood Pressure:  130's-140's over 70's-80's  3.  Patient Reported Pulse:  60's  4.  Patient Reported Weight (Kg):     5.  Patient Reported Blood Glucose (mg/dl):       Medical History:  Reviewed medical history with patient and/or caregiver    Social History:  Reviewed social history with patient and/or caregiver    Clinical Assessment     Reviewed and provided basic information on available community resources for mental health, transportation, wellness resources, and palliative care programs with patient and/or caregiver.    Complex Care Plan     Care plan was discussed and reviewed today with input from patient.    Goals        Outpatient Case Management     Patient will have an action plan in place to manage and prevent complications of high blood pressure prior to discharge from OPC. - Priority: High      Overall Time to Completion  2 months from 01/2/2020     [facility] [place] Identified Patient Needs:  Housing - Referred to OPCM Social  Worker  (Needs to find out about the plan for [employer] or Alternate Housing)  Financial - Referred to [facility]  (Assistance with Cancer Financial Assistance)    [facility] Identified Disease Education Needs:  Hypertension - Care Plan Created   Heart Disease - Deferred (Will revisit after post Holter Monitor Cardiology F/U visit on 2/5/20 )    Short Term Goals  Patient will measure and record the blood pressure 1 times per day for 2 weeks.  Interventions   · Assess for availability of working blood pressure cuff in home setting. - Done 1/24/20 (Yes - only has a wrist cuff)  · Assess patient's ability to perform ADLs. - Done 1/24/2020  · Assess for compliance with daily measuring/logging of blood pressure. - Done 1/24/20 (Yes)  · Review recent blood pressure readings/ranges with patient. - Done 1/24/20 (120's to 150's over 50's to 80's); 2/3/20 (150's to 247 over 71 to 126)  · Collaborate with Physician as appropriate to meet patient needs. - 2/3/20 Contacted Cardiologist's office (Dr. Crystal [phone]) regarding patient's reported elevated readings  · Mail Blood pressure logs for home use. - Mailed 1/24/20   · Educate patient on the importance of daily measuring and logging of blood pressure. - Done 1/24/20   · Educate patient on the importance of bringing logs to follow up appointments with healthcare provider for review to determine if medications need to be adjusted or changed. - Done 1/24/20, 2/3/20, 3/5/20   · Recognize and provide educational material (ANGEL) on Hypertension. - Mailed 1/24/20  · Educate patient on the importance of notifying healthcare provider for blood pressures of 140/90 or higher with 3 consistent readings. - Done 1/24/20, 2/3/20, 3/5/20  Patient agrees to continue to measure and record blood pressure 1 time per day for the next 2 weeks and review readings with RN-CM at subsequent follow up calls. - 2/3/20, 3/5/20 Measuring/logging daily (ranges in 130's-140's over  70's-80's)  Patient agrees to OPCM follow up within 12-14 days to assess progress to goal. - 2/3/20 Meeting goal     Status  · Met    Patient will verbalize 2 signs and symptoms of Hypertension and Hypotension within 6 weeks.    Signs and Symptoms of Hypertension   If you have high blood pressure, you may not have any symptoms.   If you do have symptoms, they may include: headache*, dizziness, changes in your vision, chest pain, and shortness of breath*. **But even without symptoms, high blood pressure thats not treated raises your risk for heart attack and stroke.    Signs and Symptoms of Hypotension   Dizziness*   Lightheadedness*   Fainting    Interventions   · Assess for patient's ability to verbalize at least 2 signs and symptoms of Hypertension. - Done 3/5/20 (*2)  · Assess for patient's ability to verbalize at least 2 signs and symptoms of Hypotension. - Done 3/5/20 (*2)  · Recognize and provide educational material (KRAMES) on Hypertension/Hypotension. - Mailed 1/24/20  · Review signs and symptoms of Hypertension with patient. - Done 1/24/20, 2/3/20, 3/5/20  · Review signs and symptoms of Hypotension with patient. - Done 2/3/20, 3/5/20  · Review importance of checking and logging blood pressure if experience any signs/symptoms of Hypertension or Hypotension. - Done 1/24/20, 2/3/20  · Patient agrees to review the educational resources provided on Hypertension/Hypotension within the next 2-3 weeks. - 2/3/20 Reports received/reviewed information, Goal Met   · Patient agrees to OPCM follow up within 12-14 days to assess progress to goal. - 2/3/20 Reports received/reviewed information, Goal Met       Status  · Met  Patient will verbalize 2 signs and symptoms of Hypertension that need to be reported to healthcare provider or 911 right away.   Signs and Symptoms to Report to Healthcare Provider or 911 Right Away   Blood pressure reaches a systolic (upper number) of 180 or higher OR a diastolic (bottom number) of  110 or higher*   Chest pain or shortness of breath   Severe headache   Throbbing or rushing sound in the ears*   Nosebleed   Sudden severe pain in your belly (abdomen)   Extreme drowsiness, confusion, or fainting   Dizziness or spinning sensation (vertigo)   Weakness of an arm or leg or one side of the face*   You have problems speaking or seeing*    Interventions   · Assess for ability to verbalize 2 signs and symptoms of Hypertension that need to be reported to healthcare provider or 911 right away. - Done 3/5/20 (*4)  · Recognize and provide educational material (ANGEL) on Hypertension. - Mailed 1/24/20  · Review with patient signs and symptoms of Hypertension that need to be reported to healthcare provider or 911 right away. - Done 2/3/20  · Educate patient on the importance of checking and logging blood pressure if experience any signs/symptoms of Hypertension. - Done 2/3/20, 3/5/20  · Patient agrees to review the educational resources provided on Hypertension within the next 2-3 weeks. - 2/3/20 Reports received/reviewed information, Goal Met  · Patient agrees to OPCM follow up within 12-14 days to assess progress to goal. - 2/3/20 Reviewed, but will I continue to review with patient; 3/5/20 Goal Met      Status  · Met    Patient will verbalize 2 ways of preventing complications due to Hypertension within 8 weeks.   Ways to Prevent Complications   Begin a weight-loss program if you are overweight.   Cut back on how much salt you get in your diet. Heres how to do this:  ? Dont eat foods that have a lot of salt. These include olives, pickles, smoked meats, and salted potato chips.*  ? Dont add salt to your food at the table.*  ? Use only small amounts of salt when cooking.  Start an exercise program. Talk with your healthcare provider about the type of exercise best for you. Even brisk walking for 20 minutes 3 times a week is a good form of exercise.*Swimming Daily  Dont take medicines that stimulate the  heart. This includes many over-the-counter cold and sinus decongestant pills and sprays, as well as diet pills. Check the warnings about hypertension on the label. Before buying any over-the-counter medicines or supplements, always ask the pharmacist about the product's potential interaction with your high blood pressure and your high blood pressure medicines.  Stimulants such as amphetamine or cocaine could be deadly for someone with high blood pressure. Never take these.  Limit how much caffeine you get in your diet. Switch to caffeine-free products.  Learn how to handle stress. This is an important part of any program to lower blood pressure. Learn about relaxation methods like meditation, yoga, or biofeedback.  If your provider prescribed medicines, take them exactly as directed. Missing doses may cause your blood pressure to get out of control.*   If you miss a dose or doses, check with your healthcare provider or pharmacist about what to do.  Purchase an automatic blood pressure machine. Ask your provider for a recommendation. You can get one of these at most pharmacies.*    Interventions   · Assess for patient's ability to verbalize at least 2 ways of preventing complications due to Hypertension. - Done 3/5/20 (*5)   · Collaborate with Physician as appropriate to meet patient needs. - 2/3/20 RN contacted Dr. Crystal' office; BP meds increased for today and patient to follow up with him 2/5/20  · Empower patient to discuss treatment plan with Physician/care team. - Done 2/3/20 Patient/RN calling MD office today due to reported elevated blood pressures  · Educate patient on the importance of Dietary Compliance (low sodium). - Done 1/24/20 (Patient tries, but is currently receiving 3 meals per day from [employer] and local Ch[employer] while residing in [place] [address] so difficult to control sodium content. Avoids salty foods as much as she can right now.)  · Educate patient on the importance of daily Exercise as  tolerated. - 3/5/20 Patient reports swimming at [employer] daily for exercise  · Educate patient on the importance of Medication Compliance. - Done 1/24/20  · Recognize and provide educational material (ANGEL) on Hypertension. - Mailed 1/24/20  · Review with patient ways of preventing complications due to Hypertension. - 1/24/20 (Partially reviewed; will continue to review at next call); 2/3/20 (reviewed importance of low sodium diet, exercise, medication compliance, reporting elevated BP readings to MD); 3/5/20   · Patient agrees to review the educational resources provided on Hypertension within the next 2-3 weeks. - 2/3/20 Reports received/reviewed information, Goal Met  · Patient agrees to OPCM follow up within 12-14 days to assess progress to goal. - 2/3/20 Reviewed, but will I continue to review with patient; 3/5/20 Goal Met     Status  Met         Patient will have knowledge of resources available in order to obtain the services that are needed prior to discharge from OPCM. - Priority: High      Overall Time to Completion  2 months from 01/24/2020     OPCM Identified Patient Needs:  Housing - Referred to [facility]   (Needs to find out about the plan for [place] or Alternate Housing)  Financial - Referred to [facility]  (Assistance with Cancer Financial Assistance)    OPCM Identified Disease Education Needs:  Hypertension - Care Plan Created   Heart Disease - Deferred (Will revisit after post Holter Monitor Cardiology F/U visit on 2/5/20)      Short Term Goals   Patient/caregiver will have contact information for identified community resources IE:OPC  for follow-up within 1 month.  Interventions   · Refer to Outpatient Case Management Social Worker. - SW referral sent 1/24/20; SW assessment completed 2/17/20  · Provide contact information for [facility]E, Belkys Villagran ([phone]).     Status  · Met    Patient/caregiver will notify RN-CM if patient does not hear from  OPCM  within 2 weeks.  Interventions   · Refer to Outpatient Case Management Social Worker.  Patient/Caregiver agrees to notify RN-CM if patient does not hear from SW within 2 weeks. - 2/7/20, 2/17/20, 2/27/20, 3/2/20 (Unable to contact patient as she does not have a working phone at this time), however SW assessment was completed with patient on 2/17/20 when patient called SW from [facility]; SW also has been unable to contact patient since then.   · Patient/Caregiver agrees to OPCM follow up within 2 weeks to assess progress to goal. - See above; SW assessment completed 2/17/20     Status  · Met         Patient will verbalize understanding of the information that was provided about the patient's identified barrier prior to discharge from OPCM. - Priority: High      Overall Time to Completion  2 months from 01/24/2020     OPCM Identified Patient Needs:  Housing - Referred to OPC   (Needs to find out about the plan for [employer] or Alternate Housing)  Financial - Referred to [facility]  (Assistance with Cancer Financial Assistance)    OPCM Identified Disease Education Needs:  Hypertension - Care Plan Created   Heart Disease - Deferred (Will revisit after post Holter Monitor Cardiology F/U visit on 2/5/20 )    Short Term Goals  Patient will have appropriate health related knowledge within 2 weeks.   Interventions   · Assess type of communication barriers. - Done 1/24/2020 (Some difficulty with memory; would like written resources).  · Recognize and provide educational material (ANGEL) on Bradycardia and Hypokalemia. - Mailed 1/24/2020   · Assess for receipt of mailed educational resources. - 2/3/20 Confirmed receipt   Patient agrees to notify RN-CM if she has not received educational resources by 2/7/2020. - Received/Reviewed 2/3/20  · Patient agrees to OPCM follow up within 12-14 days to assess progress to goal. - 2/3/20 Goal Met      Status  · Met     Patient will demonstrate  improved ability to express understanding of Bradycardia and Hypokalemia within 6 weeks.   Interventions   · Empower patient to discuss treatment plan with Physician/care team. - Done 2/3/20   · Encourage compliance with Physician follow-ups (Post Holtor Monitor visit with Cardiology scheduled for 2/5/2020). - Done 2/3/20  · Review educational resources on Bradycardia with patient and address any questions/knowledge deficits with patient. - Done 3/5/20, denies questions; issue resolved  · Review educational resources on Hypokalemia with patient and address any questions/knowledge deficits with patient. - Done 3/5/20, denies questions; issue resolved  Patient agrees to review the educational resources on Bradycardia and Hypokalemia within the next 2-3 weeks. - Done 2/3/20 Reports received/reviewed, 3/5/20 Goal Met  Patient agrees to OPCM follow up within 12-14 days to assess progress to goal. - Done 2/3/20 Reports received/reviewed, 3/5/20 Goal Met     Status  · Met              Patient Instructions   Instructions were provided via the [employer] patient resources and are available for the patient to review.    No follow-ups on file. Case closed; patient dis-enrolled; goals met.     Todays OPCM Self-Management Care Plan was reviewed with the patients input and was based on identified barriers from todays assessment. Goals were reviewed today with the patient and the patient has agreed to continue to work towards these goals to improve her overall well-being. Patient verbalized understanding of the care plan, goals, and all of today's instructions. Encouraged patient to communicate with her physician and health care team about health conditions and the treatment plan. Provided my contact information today and encouraged patient to call me with any questions, needs or concerns that may arise in the future that we can assist with; verbalized understanding. Jad Russ, RN-OPCM
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
## 2020-02-24 DIAGNOSIS — Z96.641 HISTORY OF RIGHT HIP REPLACEMENT: Primary | ICD-10-CM

## 2020-02-26 ENCOUNTER — OFFICE VISIT (OUTPATIENT)
Dept: FAMILY MEDICINE | Facility: CLINIC | Age: 70
End: 2020-02-26
Payer: MEDICARE

## 2020-02-26 VITALS
SYSTOLIC BLOOD PRESSURE: 138 MMHG | DIASTOLIC BLOOD PRESSURE: 86 MMHG | BODY MASS INDEX: 34.49 KG/M2 | WEIGHT: 214.63 LBS | HEART RATE: 66 BPM | TEMPERATURE: 98 F | HEIGHT: 66 IN | OXYGEN SATURATION: 95 %

## 2020-02-26 DIAGNOSIS — E78.5 HYPERLIPIDEMIA, UNSPECIFIED HYPERLIPIDEMIA TYPE: ICD-10-CM

## 2020-02-26 DIAGNOSIS — I10 ESSENTIAL HYPERTENSION: ICD-10-CM

## 2020-02-26 DIAGNOSIS — Z09 HOSPITAL DISCHARGE FOLLOW-UP: Primary | ICD-10-CM

## 2020-02-26 DIAGNOSIS — R94.6 ABNORMAL THYROID FUNCTION TEST: ICD-10-CM

## 2020-02-26 DIAGNOSIS — I48.0 PAROXYSMAL ATRIAL FIBRILLATION: ICD-10-CM

## 2020-02-26 PROBLEM — E11.69 HYPERLIPIDEMIA ASSOCIATED WITH TYPE 2 DIABETES MELLITUS: Status: RESOLVED | Noted: 2019-11-27 | Resolved: 2020-02-26

## 2020-02-26 PROBLEM — E11.649 TYPE 2 DIABETES MELLITUS WITH HYPOGLYCEMIA, WITHOUT LONG-TERM CURRENT USE OF INSULIN: Status: RESOLVED | Noted: 2018-04-12 | Resolved: 2020-02-26

## 2020-02-26 PROBLEM — I15.2 HYPERTENSION ASSOCIATED WITH DIABETES: Status: RESOLVED | Noted: 2018-04-12 | Resolved: 2020-02-26

## 2020-02-26 PROBLEM — I48.20 CHRONIC ATRIAL FIBRILLATION: Status: RESOLVED | Noted: 2018-04-12 | Resolved: 2020-02-26

## 2020-02-26 PROBLEM — E11.59 HYPERTENSION ASSOCIATED WITH DIABETES: Status: RESOLVED | Noted: 2018-04-12 | Resolved: 2020-02-26

## 2020-02-26 PROCEDURE — 99204 PR OFFICE/OUTPT VISIT, NEW, LEVL IV, 45-59 MIN: ICD-10-PCS | Mod: S$GLB,,, | Performed by: NURSE PRACTITIONER

## 2020-02-26 PROCEDURE — 1159F PR MEDICATION LIST DOCUMENTED IN MEDICAL RECORD: ICD-10-PCS | Mod: S$GLB,,, | Performed by: NURSE PRACTITIONER

## 2020-02-26 PROCEDURE — 1111F DSCHRG MED/CURRENT MED MERGE: CPT | Mod: S$GLB,,, | Performed by: NURSE PRACTITIONER

## 2020-02-26 PROCEDURE — 1159F MED LIST DOCD IN RCRD: CPT | Mod: S$GLB,,, | Performed by: NURSE PRACTITIONER

## 2020-02-26 PROCEDURE — 1126F PR PAIN SEVERITY QUANTIFIED, NO PAIN PRESENT: ICD-10-PCS | Mod: S$GLB,,, | Performed by: NURSE PRACTITIONER

## 2020-02-26 PROCEDURE — 3079F DIAST BP 80-89 MM HG: CPT | Mod: S$GLB,,, | Performed by: NURSE PRACTITIONER

## 2020-02-26 PROCEDURE — 1101F PT FALLS ASSESS-DOCD LE1/YR: CPT | Mod: S$GLB,,, | Performed by: NURSE PRACTITIONER

## 2020-02-26 PROCEDURE — 1126F AMNT PAIN NOTED NONE PRSNT: CPT | Mod: S$GLB,,, | Performed by: NURSE PRACTITIONER

## 2020-02-26 PROCEDURE — 3075F SYST BP GE 130 - 139MM HG: CPT | Mod: S$GLB,,, | Performed by: NURSE PRACTITIONER

## 2020-02-26 PROCEDURE — 1101F PR PT FALLS ASSESS DOC 0-1 FALLS W/OUT INJ PAST YR: ICD-10-PCS | Mod: S$GLB,,, | Performed by: NURSE PRACTITIONER

## 2020-02-26 PROCEDURE — 3075F PR MOST RECENT SYSTOLIC BLOOD PRESS GE 130-139MM HG: ICD-10-PCS | Mod: S$GLB,,, | Performed by: NURSE PRACTITIONER

## 2020-02-26 PROCEDURE — 1111F PR DISCHARGE MEDS RECONCILED W/ CURRENT OUTPATIENT MED LIST: ICD-10-PCS | Mod: S$GLB,,, | Performed by: NURSE PRACTITIONER

## 2020-02-26 PROCEDURE — 3079F PR MOST RECENT DIASTOLIC BLOOD PRESSURE 80-89 MM HG: ICD-10-PCS | Mod: S$GLB,,, | Performed by: NURSE PRACTITIONER

## 2020-02-26 PROCEDURE — 99204 OFFICE O/P NEW MOD 45 MIN: CPT | Mod: S$GLB,,, | Performed by: NURSE PRACTITIONER

## 2020-02-26 RX ORDER — HYDROCHLOROTHIAZIDE 12.5 MG/1
1 TABLET ORAL DAILY
COMMUNITY
Start: 2020-01-20 | End: 2020-07-29 | Stop reason: SDUPTHER

## 2020-02-26 RX ORDER — LOSARTAN POTASSIUM 100 MG/1
1 TABLET ORAL NIGHTLY
COMMUNITY
Start: 2020-02-03 | End: 2020-07-29 | Stop reason: SDUPTHER

## 2020-02-26 RX ORDER — ATORVASTATIN CALCIUM 80 MG/1
40 TABLET, FILM COATED ORAL NIGHTLY
Qty: 90 TABLET | Refills: 1 | Status: SHIPPED | OUTPATIENT
Start: 2020-02-26 | End: 2020-07-29 | Stop reason: SDUPTHER

## 2020-02-26 RX ORDER — MULTIVIT WITH MINERALS/HERBS
1 TABLET ORAL DAILY
COMMUNITY
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
## 2020-02-26 NOTE — PROGRESS NOTES
CC:  69-year-old female follows up status post right total hip arthroplasty. Date of surgery was 11/27/2019.  This is her three-month follow-up.  The patient is doing extremely well. She has no pain in the right hip. She is exercising and ambulating without any assistance.    ROS:    Constitution: Denies chills, fever, and sweats.  HENT: Denies headaches or blurry vision.  Cardiovascular: Denies chest pain or irregular heart beat.  Respiratory: Denies cough or shortness of breath.  Gastrointestinal: Denies abdominal pain, nausea, or vomiting.  Genitourinary:  Denies urinary incontinence, bladder and kidney issues  Musculoskeletal:  Denies muscle cramps.  Patient denies any pain in the right hip  Neurological: Denies dizziness or focal weakness.  Psychiatric/Behavioral: Normal mental status.  Hematologic/Lymphatic: Denies bleeding problem or easy bruising/bleeding.  Skin: Denies rash or suspicious lesions.    Physical examination     Gen - No acute distress, well nourished, well groomed   Eyes - Extraoccular motions intact, pupils equally round and reactive to light and accommodation   ENT - normocephalic, atruamtic, oropharynx clear   Neck - Supple, no abnormal masses   Cardiovascular - regular rate and rhythm   Pulmonary - clear to auscultation bilaterally, no wheezes, ronchi, or rales   Abdomen - soft, non-tender, non-distended, positive bowel sounds   Psych - The patient is alert and oriented x3 with normal mood and affect    Examination of the Right Lower Extremity    Skin is intact throughout  Motor in intact EHL,FHL,TA,duke  +2 DP/PT  Sensation LT intact D/P/1st    Examination of the Right Hip    C-Sign negative  Logroll negative  Stenchfield negative    Pain with ROM negative    ROM:    Flexion   110  Extension   10  Abduction   50  Adduction   10  External Rotation 60  Internal Rotation 10    Flexion contracture negative    FADIR negative  FADER negative    Tenderness to palpation over lateral and  posterolateral greater tochanter Negative    X-ray images were examined and personally interpreted by me.  Three views the right hip dated 02/27/2020 available for review.  There is a right total hip arthroplasty that is well fixed and in good alignment with no evidence of loosening.    Dx:  Right total hip arthroplasty, stable    Plan:  Continue activity as tolerated.  We reviewed posterior hip precautions.  Follow-up in 3 months with x-rays.

## 2020-02-27 ENCOUNTER — OFFICE VISIT (OUTPATIENT)
Dept: ORTHOPEDICS | Facility: CLINIC | Age: 70
End: 2020-02-27
Payer: MEDICARE

## 2020-02-27 ENCOUNTER — HOSPITAL ENCOUNTER (OUTPATIENT)
Dept: RADIOLOGY | Facility: HOSPITAL | Age: 70
Discharge: HOME | End: 2020-02-27
Attending: ORTHOPAEDIC SURGERY
Payer: MEDICARE

## 2020-02-27 VITALS
WEIGHT: 214.63 LBS | HEIGHT: 66 IN | DIASTOLIC BLOOD PRESSURE: 81 MMHG | SYSTOLIC BLOOD PRESSURE: 178 MMHG | HEART RATE: 60 BPM | BODY MASS INDEX: 34.49 KG/M2

## 2020-02-27 DIAGNOSIS — Z96.641 HISTORY OF RIGHT HIP REPLACEMENT: ICD-10-CM

## 2020-02-27 DIAGNOSIS — M16.11 UNILATERAL PRIMARY OSTEOARTHRITIS, RIGHT HIP: Primary | ICD-10-CM

## 2020-02-27 PROCEDURE — 1126F AMNT PAIN NOTED NONE PRSNT: CPT | Mod: HCNC,S$GLB,, | Performed by: ORTHOPAEDIC SURGERY

## 2020-02-27 PROCEDURE — 1159F MED LIST DOCD IN RCRD: CPT | Mod: HCNC,S$GLB,, | Performed by: ORTHOPAEDIC SURGERY

## 2020-02-27 PROCEDURE — 99213 PR OFFICE/OUTPT VISIT, EST, LEVL III, 20-29 MIN: ICD-10-PCS | Mod: HCNC,S$GLB,, | Performed by: ORTHOPAEDIC SURGERY

## 2020-02-27 PROCEDURE — 3079F DIAST BP 80-89 MM HG: CPT | Mod: HCNC,CPTII,S$GLB, | Performed by: ORTHOPAEDIC SURGERY

## 2020-02-27 PROCEDURE — 3079F PR MOST RECENT DIASTOLIC BLOOD PRESSURE 80-89 MM HG: ICD-10-PCS | Mod: HCNC,CPTII,S$GLB, | Performed by: ORTHOPAEDIC SURGERY

## 2020-02-27 PROCEDURE — 3077F SYST BP >= 140 MM HG: CPT | Mod: HCNC,CPTII,S$GLB, | Performed by: ORTHOPAEDIC SURGERY

## 2020-02-27 PROCEDURE — 1101F PR PT FALLS ASSESS DOC 0-1 FALLS W/OUT INJ PAST YR: ICD-10-PCS | Mod: HCNC,CPTII,S$GLB, | Performed by: ORTHOPAEDIC SURGERY

## 2020-02-27 PROCEDURE — 73502 XR HIP 2 VIEW RIGHT: ICD-10-PCS | Mod: 26,HCNC,RT, | Performed by: RADIOLOGY

## 2020-02-27 PROCEDURE — 73502 X-RAY EXAM HIP UNI 2-3 VIEWS: CPT | Mod: 26,HCNC,RT, | Performed by: RADIOLOGY

## 2020-02-27 PROCEDURE — 99213 OFFICE O/P EST LOW 20 MIN: CPT | Mod: HCNC,S$GLB,, | Performed by: ORTHOPAEDIC SURGERY

## 2020-02-27 PROCEDURE — 1126F PR PAIN SEVERITY QUANTIFIED, NO PAIN PRESENT: ICD-10-PCS | Mod: HCNC,S$GLB,, | Performed by: ORTHOPAEDIC SURGERY

## 2020-02-27 PROCEDURE — 73502 X-RAY EXAM HIP UNI 2-3 VIEWS: CPT | Mod: TC,HCNC,PN,RT

## 2020-02-27 PROCEDURE — 1159F PR MEDICATION LIST DOCUMENTED IN MEDICAL RECORD: ICD-10-PCS | Mod: HCNC,S$GLB,, | Performed by: ORTHOPAEDIC SURGERY

## 2020-02-27 PROCEDURE — 99999 PR PBB SHADOW E&M-EST. PATIENT-LVL III: CPT | Mod: PBBFAC,HCNC,, | Performed by: ORTHOPAEDIC SURGERY

## 2020-02-27 PROCEDURE — 3077F PR MOST RECENT SYSTOLIC BLOOD PRESSURE >= 140 MM HG: ICD-10-PCS | Mod: HCNC,CPTII,S$GLB, | Performed by: ORTHOPAEDIC SURGERY

## 2020-02-27 PROCEDURE — 99999 PR PBB SHADOW E&M-EST. PATIENT-LVL III: ICD-10-PCS | Mod: PBBFAC,HCNC,, | Performed by: ORTHOPAEDIC SURGERY

## 2020-02-27 PROCEDURE — 1101F PT FALLS ASSESS-DOCD LE1/YR: CPT | Mod: HCNC,CPTII,S$GLB, | Performed by: ORTHOPAEDIC SURGERY

## 2020-03-02 ENCOUNTER — LAB VISIT (OUTPATIENT)
Dept: LAB | Facility: HOSPITAL | Age: 70
End: 2020-03-02
Attending: NURSE PRACTITIONER
Payer: MEDICARE

## 2020-03-02 ENCOUNTER — PATIENT MESSAGE (OUTPATIENT)
Dept: ADMINISTRATIVE | Facility: OTHER | Age: 70
End: 2020-03-02

## 2020-03-02 DIAGNOSIS — I48.0 PAROXYSMAL ATRIAL FIBRILLATION: Primary | ICD-10-CM

## 2020-03-02 LAB
ANION GAP SERPL CALC-SCNC: 9 MMOL/L (ref 8–16)
BUN SERPL-MCNC: 17 MG/DL (ref 8–23)
CALCIUM SERPL-MCNC: 9.4 MG/DL (ref 8.7–10.5)
CHLORIDE SERPL-SCNC: 109 MMOL/L (ref 95–110)
CO2 SERPL-SCNC: 25 MMOL/L (ref 23–29)
CREAT SERPL-MCNC: 0.8 MG/DL (ref 0.5–1.4)
EST. GFR  (AFRICAN AMERICAN): >60 ML/MIN/1.73 M^2
EST. GFR  (NON AFRICAN AMERICAN): >60 ML/MIN/1.73 M^2
GLUCOSE SERPL-MCNC: 105 MG/DL (ref 70–110)
MAGNESIUM SERPL-MCNC: 1.8 MG/DL (ref 1.6–2.6)
POTASSIUM SERPL-SCNC: 4.2 MMOL/L (ref 3.5–5.1)
SODIUM SERPL-SCNC: 143 MMOL/L (ref 136–145)
TSH SERPL DL<=0.005 MIU/L-ACNC: 0.49 UIU/ML (ref 0.4–4)

## 2020-03-02 PROCEDURE — 80048 BASIC METABOLIC PNL TOTAL CA: CPT | Mod: HCNC

## 2020-03-02 PROCEDURE — 84443 ASSAY THYROID STIM HORMONE: CPT | Mod: HCNC

## 2020-03-02 PROCEDURE — 83735 ASSAY OF MAGNESIUM: CPT | Mod: HCNC

## 2020-03-02 PROCEDURE — 36415 COLL VENOUS BLD VENIPUNCTURE: CPT | Mod: HCNC

## 2020-03-03 ENCOUNTER — OUTPATIENT CASE MANAGEMENT (OUTPATIENT)
Dept: ADMINISTRATIVE | Facility: OTHER | Age: 70
End: 2020-03-03
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
## 2020-03-04 NOTE — PROGRESS NOTES
Outpatient Care Management   - Care Plan Follow Up    Patient: Eduardo Esposito  MRN:  [id number]  Date of Service:  3/4/2020  Completed by:  Belkys Villagran LCSW  Referral Date: 01/09/2020  Program: Case Management (High Risk)    Reason for Visit   Patient presents with    OPCM SW First Follow-up Attempt     3/3/2020    Case Closure     3/4/2020     Brief Summary:  Received call from pt who now has working phone [phone].  Pt continues to stay at local motel while repairs are being done at [employer] apartments; completion date unknown.  Pt reports to be in good spirits; stays busy at gym and [employer].  Daughter is supportive.  [employer] provides 1 meal/day and pt goes to [employer] and receives monthly commodities from [employer].  Informed pt, per Nely with [facility] (business office), pt is approved 100% financial assistance for 1 year, less $300 of which pt is responsible. Pt states she received a collection notice from [facility].  Provided pt with contact info for Nely and encouraged her to call re: notice.  Also provided pt with contact number to make payment arrangements for $300.  Goal met; No other needs identified.  Pt agrees to case closure and understands she can call should any needs arise in the future.  Case closed and OPCM RN notified  Complex Care Plan     Care plan was discussed and completed today with input from patient and/or caregiver.    Goals      Patient will have an action plan in place to manage and prevent complications of high blood pressure prior to discharge from OPCM. - Priority: High      Overall Time to Completion  2 months from 01/2/2020     OPCM Identified Patient Needs:  Housing - Referred to OPCM   (Needs to find out about the plan for [address] or Alternate Housing)  Financial - Referred to OPCM  (Assistance with Cancer Financial Assistance)    OPCM Identified Disease Education Needs:  Hypertension - Care Plan  Created   Heart Disease - Deferred (Will revisit after post Holter Monitor Cardiology F/U visit on 2/5/20 )    Short Term Goals  Patient will measure and record the blood pressure 1 times per day for 2 weeks.  Interventions   · Assess for availability of working blood pressure cuff in home setting. - Done 1/24/20 (Yes - only has a wrist cuff)  · Assess patient's ability to perform ADLs. - Done 1/24/2020  · Assess for compliance with daily measuring/logging of blood pressure. - Done 1/24/20 (Yes)  · Review recent blood pressure readings/ranges with patient. - Done 1/24/20 (120's to 150's over 50's to 80's); 2/3/20 (150's to 247 over 71 to 126)  · Collaborate with Physician as appropriate to meet patient needs. - 2/3/20 Contacted Cardiologist's office (Dr. Crystal [phone]) regarding patient's reported elevated readings  · Mail Blood pressure logs for home use. - Mailed 1/24/20   · Educate patient on the importance of daily measuring and logging of blood pressure. - Done 1/24/20   · Educate patient on the importance of bringing logs to follow up appointments with healthcare provider for review to determine if medications need to be adjusted or changed. - Done 1/24/20, 2/3/20   · Recognize and provide educational material (KRAEZRA) on Hypertension. - Mailed 1/24/20  · Educate patient on the importance of notifying healthcare provider for blood pressures of 140/90 or higher. - Done 1/24/20, 2/3/20  Patient agrees to continue to measure and record blood pressure 1 time per day for the next 2 weeks and review readings with RN-CM at subsequent follow up calls. - 2/3/20 Measuring/logging daily  Patient agrees to OPCM follow up within 12-14 days to assess progress to goal. - 2/3/20 Meeting goal     Status  · Partially met    Patient will verbalize 2 signs and symptoms of Hypertension and Hypotension within 6 weeks.    Signs and Symptoms of Hypertension   If you have high blood pressure, you may not have any symptoms.   If  you do have symptoms, they may include: headache, dizziness, changes in your vision, chest pain, and shortness of breath. **But even without symptoms, high blood pressure thats not treated raises your risk for heart attack and stroke.    Signs and Symptoms of Hypotension   Dizziness   Lightheadedness   Fainting    Interventions   · Assess for patient's ability to verbalize at least 2 signs and symptoms of Hypertension.  · Assess for patient's ability to verbalize at least 2 signs and symptoms of Hypotension.    · Recognize and provide educational material (KRAMES) on Hypertension/Hypotension. - Mailed 1/24/20  · Review signs and symptoms of Hypertension with patient. - Done 1/24/20, 2/3/20  · Review signs and symptoms of Hypotension with patient. - Done 2/3/20  · Review importance of checking and logging blood pressure if experience any signs/symptoms of Hypertension or Hypotension. - Done 1/24/20, 2/3/20  · Patient agrees to review the educational resources provided on Hypertension/Hypotension within the next 2-3 weeks. - 2/3/20 Reports reviewed information  · Patient agrees to OPCM follow up within 12-14 days to assess progress to goal.       Status  · Partially met    Patient will verbalize 2 signs and symptoms of Hypertension that need to be reported to healthcare provider or 911 right away.   Signs and Symptoms to Report to Healthcare Provider or 911 Right Away   Blood pressure reaches a systolic (upper number) of 180 or higher OR a diastolic (bottom number) of 110 or higher   Chest pain or shortness of breath   Severe headache   Throbbing or rushing sound in the ears   Nosebleed   Sudden severe pain in your belly (abdomen)   Extreme drowsiness, confusion, or fainting   Dizziness or spinning sensation (vertigo)   Weakness of an arm or leg or one side of the face   You have problems speaking or seeing     Interventions   · Assess for ability to verbalize 2 signs and symptoms of Hypertension that need to be  reported to healthcare provider or 911 right away.  · Recognize and provide educational material (KRAMES) on Hypertension. - Mailed 1/24/20  · Review with patient signs and symptoms of Hypertension that need to be reported to healthcare provider or 911 right away. - Done 2/3/20  · Educate patient on the importance of checking and logging blood pressure if experience any signs/symptoms of Hypertension. - Done 2/3/20  · Patient agrees to review the educational resources provided on Hypertension within the next 2-3 weeks. - 2/3/20 Reports reviewed information  · Patient agrees to OPCM follow up within 12-14 days to assess progress to goal. - 2/3/20 Reviewed, but will I continue to review with patient      Status  · Partially met    Patient will verbalize 2 ways of preventing complications due to Hypertension within 8 weeks.   Ways to Prevent Complications   Begin a weight-loss program if you are overweight.   Cut back on how much salt you get in your diet. Heres how to do this:  ? Dont eat foods that have a lot of salt. These include olives, pickles, smoked meats, and salted potato chips.  ? Dont add salt to your food at the table.  ? Use only small amounts of salt when cooking.  Start an exercise program. Talk with your healthcare provider about the type of exercise best for you. Even brisk walking for 20 minutes 3 times a week is a good form of exercise.  Dont take medicines that stimulate the heart. This includes many over-the-counter cold and sinus decongestant pills and sprays, as well as diet pills. Check the warnings about hypertension on the label. Before buying any over-the-counter medicines or supplements, always ask the pharmacist about the product's potential interaction with your high blood pressure and your high blood pressure medicines.  Stimulants such as amphetamine or cocaine could be deadly for someone with high blood pressure. Never take these.  Limit how much caffeine you get in your diet.  Switch to caffeine-free products.  Learn how to handle stress. This is an important part of any program to lower blood pressure. Learn about relaxation methods like meditation, yoga, or biofeedback.  If your provider prescribed medicines, take them exactly as directed. Missing doses may cause your blood pressure to get out of control.   If you miss a dose or doses, check with your healthcare provider or pharmacist about what to do.  Purchase an automatic blood pressure machine. Ask your provider for a recommendation. You can get one of these at most pharmacies.    Interventions   · Assess for patient's ability to verbalize at least 2 ways of preventing complications due to Hypertension.  · Collaborate with Physician as appropriate to meet patient needs.  · Empower patient to discuss treatment plan with Physician/care team. - Done 2/3/20 Patient/RN calling MD office today due to reported elevated blood pressures  · Educate patient on the importance of Dietary Compliance (low sodium). - Done 1/24/20 (Patient tries, but is currently receiving 3 meals per day from [employer] and local Churches while residing in [place] [address] so difficult to control sodium content. Avoids salty foods as much as she can right now.)  · Educate patient on the importance of daily Exercise as tolerated.  · Educate patient on the importance of Medication Compliance. - Done 1/24/20  · Facilitate medical appointments as needed.  · Recognize and provide educational material (ANGEL) on Hypertension. - Mailed 1/24/20  · Review with patient ways of preventing complications due to Hypertension. - 1/24/20 (Partially reviewed; will continue to review at next call); 2/3/20 (reviewed importance of low sodium diet, exercise, medication compliance, reporting elevated BP readings to MD).  · Patient agrees to review the educational resources provided on Hypertension within the next 2-3 weeks. - 2/3/20 Reports reviewed information  · [facility] within 12-14 days to assess progress to goal. - 2/3/20 Reviewed, but will I continue to review with patient     Status  Partially met         Patient will have knowledge of resources available in order to obtain the services that are needed prior to discharge from [facility]. - Priority: High      Overall Time to Completion  2 months from 01/24/2020     [facility] Identified Patient Needs:  Housing - Referred to [facility]   (Needs to find out about the plan for [place] [address] or Alternate Housing)  Financial - Referred to [facility]  (Assistance with Cancer Financial Assistance)    [facility] Identified Disease Education Needs:  Hypertension - Care Plan Created   Heart Disease - Deferred (Will revisit after post Holter Monitor Cardiology F/U visit on 2/5/20 )      Short Term Goals   Patient/caregiver will fill out any applications that were sent to patient to complete from [facility]  within 1 month.  Interventions   · Refer to Outpatient Case Management Social Worker.     Status  · Partially met      Patient/caregiver will have contact information for identified community resources IE:[facility]  for follow-up within 1 month.  Interventions   · Refer to Outpatient Case Management Social Worker.     Status  · Partially met      Patient/caregiver will notify RN CCM if patient does not hear from OPC  within 2 weeks.  Interventions   · Refer to Outpatient Case Management Social Worker.     Status  · Partially met         Patient will verbalize understanding of the information that was provided about the patient's identified barrier prior to discharge from OPC. - Priority: High      Overall Time to Completion  2 months from 01/24/2020     [facility] Identified Patient Needs:  Housing - Referred to [facility]   (Needs to find out about the plan for [place] [address] or Alternate Housing)  Financial - Referred to [facility]  (Assistance with Cancer Financial  Assistance)    OPCM Identified Disease Education Needs:  Hypertension - Care Plan Created   Heart Disease - Deferred (Will revisit after post Holter Monitor Cardiology F/U visit on 2/5/20 )    Short Term Goals  Patient will have appropriate health related knowledge within 2 weeks.   Interventions   · Assess type of communication barriers. - Done 1/24/2020 (Some difficulty with memory; would like written resources).  · Recognize and provide educational material (KRAMES) on Bradycardia and Hypokalemia. - Mailed 1/24/2020  · Assess for receipt of mailed educational resources.  Patient agrees to notify RN-CM if she has not received educational resources by 2/7/2020.   · Patient agrees to OPCM follow up within 12-14 days to assess progress to goal.       Status  · Partially met     Patient will demonstrate improved ability to express understanding of Bradycardia and Hypokalemia within 6 weeks.   Interventions   · Empower patient to discuss treatment plan with Physician/care team.  · Encourage compliance with Physician follow-ups (Post Holtor Monitor visit with Cardiology scheduled for 2/5/2020) .  · Review educational resources on Bradycardia with patient and address any questions/knowledge deficits with patient.   · Review educational resources on Hypokalemia with patient and address any questions/knowledge deficits with patient.   Patient agrees to review the educational resources on Bradycardia and Hypokalemia within the next 2-3 weeks.   Patient agrees to OPCM follow up within 12-14 days to assess progress to goal.      Status  · Partially met         Patient/caregiver will have knowledge of resources available in order to obtain the services that are needed prior to discharge from OPCM. - Priority: Moderate      Overall Time to Completion  1 month from 02/17/2020     OPCM Identified Patient Needs:  Housing - Referred to [facility]   (Needs to find out about the plan for [place] or Alternate  Housing)-Deferred-Pt will attend meeting 2/17/2020 to get update on status  Financial - Referred to OPCM  (Assistance with Cancer Financial Assistance)-SW Assessment completed    OPCM Identified Disease Education Needs:  Hypertension - Care Plan Created   Heart Disease - Deferred (Will revisit after post Holter Monitor Cardiology F/U visit on 2/5/20 )    Social Work Identified Patient Needs:   Cognitive: Deferred-No needs identified  Support: Deferred-No needs identified  Advanced Care Planning: Resolved-Advance Directives are on file  Safety: Deferred-No needs identified  Mental Health: Deferred-No needs identified     Financial: Care Plan created      Short Term Goals  Patient/caregiver will identify 1 community resources to assist with obtaining food and assist with paying bills within 1 month.  Interventions   · Collaborate with [facility] ([place]) to assist with financial assistance-2/17/2020-approved.   · Collaborate with OPCM RN as appropriate to meet patient needs-2/17/2020.  · Discuss patient care needs and potential barriers-2/17/2020.  · Provide financial assistance resource(s)-2/17/2020 [employer] Supplemental Food Program-gave number to daughter.  · Provide supportive counseling-2/17/2020.    Patient/Caregiver agrees to call [place] [employer] Supplemental Food Program by 3/2/2020-receives commodities and goes to food bank.  Patient/Caregiver agrees to OPCM follow up within 2 weeks to assess progress to goal.      Status  · Met                  Patient Instructions     Instructions were provided via the [employer] patient resources and are available for the patient to view on the patient portal.    No follow-ups on file.    Todays OPCM Self-Management Care Plan was developed with the patients/caregivers input and was based on identified barriers from todays assessment.  Goals were written today with the patient/caregiver and the patient has agreed to work  towards these goals to improve his/her overall well-being. Patient verbalized understanding of the care plan, goals, and all of today's instructions. Encouraged patient/caregiver to communicate with his/her physician and health care team about health conditions and the treatment plan.  Provided my contact information today and encouraged patient/caregiver to call me with any questions as needed.

## 2020-03-09 ENCOUNTER — LAB VISIT (OUTPATIENT)
Dept: LAB | Facility: HOSPITAL | Age: 70
End: 2020-03-09
Attending: NURSE PRACTITIONER
Payer: MEDICARE

## 2020-03-09 DIAGNOSIS — I10 ESSENTIAL HYPERTENSION, MALIGNANT: ICD-10-CM

## 2020-03-09 DIAGNOSIS — I48.0 PAROXYSMAL ATRIAL FIBRILLATION: Primary | ICD-10-CM

## 2020-03-09 LAB
ANION GAP SERPL CALC-SCNC: 10 MMOL/L (ref 8–16)
BUN SERPL-MCNC: 20 MG/DL (ref 8–23)
CALCIUM SERPL-MCNC: 9.5 MG/DL (ref 8.7–10.5)
CHLORIDE SERPL-SCNC: 107 MMOL/L (ref 95–110)
CO2 SERPL-SCNC: 26 MMOL/L (ref 23–29)
CREAT SERPL-MCNC: 0.8 MG/DL (ref 0.5–1.4)
EST. GFR  (AFRICAN AMERICAN): >60 ML/MIN/1.73 M^2
EST. GFR  (NON AFRICAN AMERICAN): >60 ML/MIN/1.73 M^2
GLUCOSE SERPL-MCNC: 101 MG/DL (ref 70–110)
MAGNESIUM SERPL-MCNC: 1.9 MG/DL (ref 1.6–2.6)
POTASSIUM SERPL-SCNC: 4.1 MMOL/L (ref 3.5–5.1)
SODIUM SERPL-SCNC: 143 MMOL/L (ref 136–145)
TSH SERPL DL<=0.005 MIU/L-ACNC: 0.44 UIU/ML (ref 0.4–4)

## 2020-03-09 PROCEDURE — 83735 ASSAY OF MAGNESIUM: CPT | Mod: HCNC

## 2020-03-09 PROCEDURE — 80048 BASIC METABOLIC PNL TOTAL CA: CPT | Mod: HCNC

## 2020-03-09 PROCEDURE — 84443 ASSAY THYROID STIM HORMONE: CPT | Mod: HCNC

## 2020-03-09 PROCEDURE — 36415 COLL VENOUS BLD VENIPUNCTURE: CPT | Mod: HCNC

## 2020-04-14 ENCOUNTER — TELEPHONE (OUTPATIENT)
Dept: HEMATOLOGY/ONCOLOGY | Facility: CLINIC | Age: 70
End: 2020-04-14

## 2020-04-14 NOTE — TELEPHONE ENCOUNTER
----- Message from Jewell Hubbard sent at 4/14/2020  1:07 PM CDT -----  Type:  Patient Returning Call    Who Called:  Oxana  Who Left Message for Patient:  Varun  Does the patient know what this is regarding?:  appt  Best Call Back Number:  [phone]  Additional Information:  Thank you!

## 2020-04-14 NOTE — TELEPHONE ENCOUNTER
Spoke with patient and changed Thursday's visit to a telephone visit.  Patient does not have [employer], nor does she have smart phone.

## 2020-04-15 ENCOUNTER — LAB VISIT (OUTPATIENT)
Dept: LAB | Facility: HOSPITAL | Age: 70
End: 2020-04-15
Attending: INTERNAL MEDICINE
Payer: MEDICARE

## 2020-04-15 DIAGNOSIS — E11.9 CONTROLLED TYPE 2 DIABETES MELLITUS WITHOUT COMPLICATION, WITHOUT LONG-TERM CURRENT USE OF INSULIN: ICD-10-CM

## 2020-04-15 DIAGNOSIS — C50.912 INFILTRATING DUCTAL CARCINOMA OF LEFT BREAST: ICD-10-CM

## 2020-04-15 DIAGNOSIS — E11.59 HYPERTENSION ASSOCIATED WITH DIABETES: ICD-10-CM

## 2020-04-15 DIAGNOSIS — Z79.811 USE OF ANASTROZOLE (ARIMIDEX): ICD-10-CM

## 2020-04-15 DIAGNOSIS — I15.2 HYPERTENSION ASSOCIATED WITH DIABETES: ICD-10-CM

## 2020-04-15 DIAGNOSIS — Z79.01 LONG TERM (CURRENT) USE OF ANTICOAGULANTS: ICD-10-CM

## 2020-04-15 DIAGNOSIS — I48.20 CHRONIC ATRIAL FIBRILLATION: ICD-10-CM

## 2020-04-15 LAB
ALBUMIN SERPL BCP-MCNC: 4.2 G/DL (ref 3.5–5.2)
ALP SERPL-CCNC: 91 U/L (ref 55–135)
ALT SERPL W/O P-5'-P-CCNC: 22 U/L (ref 10–44)
ANION GAP SERPL CALC-SCNC: 9 MMOL/L (ref 8–16)
AST SERPL-CCNC: 17 U/L (ref 10–40)
BASOPHILS # BLD AUTO: 0.02 K/UL (ref 0–0.2)
BASOPHILS NFR BLD: 0.3 % (ref 0–1.9)
BILIRUB SERPL-MCNC: 1.1 MG/DL (ref 0.1–1)
BUN SERPL-MCNC: 20 MG/DL (ref 8–23)
CALCIUM SERPL-MCNC: 10 MG/DL (ref 8.7–10.5)
CHLORIDE SERPL-SCNC: 104 MMOL/L (ref 95–110)
CO2 SERPL-SCNC: 28 MMOL/L (ref 23–29)
CREAT SERPL-MCNC: 0.9 MG/DL (ref 0.5–1.4)
DIFFERENTIAL METHOD: ABNORMAL
EOSINOPHIL # BLD AUTO: 0.1 K/UL (ref 0–0.5)
EOSINOPHIL NFR BLD: 1.3 % (ref 0–8)
ERYTHROCYTE [DISTWIDTH] IN BLOOD BY AUTOMATED COUNT: 14.4 % (ref 11.5–14.5)
EST. GFR  (AFRICAN AMERICAN): >60 ML/MIN/1.73 M^2
EST. GFR  (NON AFRICAN AMERICAN): >60 ML/MIN/1.73 M^2
GLUCOSE SERPL-MCNC: 149 MG/DL (ref 70–110)
HCT VFR BLD AUTO: 50.2 % (ref 37–48.5)
HGB BLD-MCNC: 15.8 G/DL (ref 12–16)
IMM GRANULOCYTES # BLD AUTO: 0.04 K/UL (ref 0–0.04)
IMM GRANULOCYTES NFR BLD AUTO: 0.6 % (ref 0–0.5)
LYMPHOCYTES # BLD AUTO: 2 K/UL (ref 1–4.8)
LYMPHOCYTES NFR BLD: 32 % (ref 18–48)
MCH RBC QN AUTO: 28.3 PG (ref 27–31)
MCHC RBC AUTO-ENTMCNC: 31.5 G/DL (ref 32–36)
MCV RBC AUTO: 90 FL (ref 82–98)
MONOCYTES # BLD AUTO: 0.5 K/UL (ref 0.3–1)
MONOCYTES NFR BLD: 7.4 % (ref 4–15)
NEUTROPHILS # BLD AUTO: 3.7 K/UL (ref 1.8–7.7)
NEUTROPHILS NFR BLD: 58.4 % (ref 38–73)
NRBC BLD-RTO: 0 /100 WBC
PLATELET # BLD AUTO: 236 K/UL (ref 150–350)
PMV BLD AUTO: 9.4 FL (ref 9.2–12.9)
POTASSIUM SERPL-SCNC: 3.6 MMOL/L (ref 3.5–5.1)
PROT SERPL-MCNC: 6.9 G/DL (ref 6–8.4)
RBC # BLD AUTO: 5.59 M/UL (ref 4–5.4)
SODIUM SERPL-SCNC: 141 MMOL/L (ref 136–145)
WBC # BLD AUTO: 6.31 K/UL (ref 3.9–12.7)

## 2020-04-15 PROCEDURE — 36415 COLL VENOUS BLD VENIPUNCTURE: CPT | Mod: HCNC

## 2020-04-15 PROCEDURE — 80053 COMPREHEN METABOLIC PANEL: CPT | Mod: HCNC

## 2020-04-15 PROCEDURE — 85025 COMPLETE CBC W/AUTO DIFF WBC: CPT | Mod: HCNC

## 2020-04-16 ENCOUNTER — OFFICE VISIT (OUTPATIENT)
Dept: HEMATOLOGY/ONCOLOGY | Facility: CLINIC | Age: 70
End: 2020-04-16
Payer: MEDICARE

## 2020-04-16 DIAGNOSIS — I48.20 CHRONIC ATRIAL FIBRILLATION: ICD-10-CM

## 2020-04-16 DIAGNOSIS — I15.2 HYPERTENSION ASSOCIATED WITH DIABETES: ICD-10-CM

## 2020-04-16 DIAGNOSIS — E11.59 HYPERTENSION ASSOCIATED WITH DIABETES: ICD-10-CM

## 2020-04-16 DIAGNOSIS — C50.912 INFILTRATING DUCTAL CARCINOMA OF LEFT BREAST: Primary | ICD-10-CM

## 2020-04-16 PROCEDURE — 99443 PR PHYSICIAN TELEPHONE EVALUATION 21-30 MIN: ICD-10-PCS | Mod: HCNC,95,, | Performed by: INTERNAL MEDICINE

## 2020-04-16 PROCEDURE — 99443 PR PHYSICIAN TELEPHONE EVALUATION 21-30 MIN: CPT | Mod: HCNC,95,, | Performed by: INTERNAL MEDICINE

## 2020-04-16 NOTE — PROGRESS NOTES
The patient location is: home  The chief complaint leading to consultation is:  Breast cancer  Visit type: audio only  Total time spent with patient: 30  Each patient to whom he or she provides medical services by telemedicine is:  (1) informed of the relationship between the physician and patient and the respective role of any other health care provider with respect to management of the patient; and (2) notified that he or she may decline to receive medical services by telemedicine and may withdraw from such care at any time.    Notes:       HPI    A 68 years old  female accompanied by family member is here in Oncology Clinic for evaluation of breast cancer.  Patient recently had ultrasound-guided biopsy left breast found to have invasive ductal carcinoma.  Path ER/NM positive HER2 negative by FISH.  Status post lumpectomy plus lymph node dissection plus XRT.  Surgery completed on June 13, 2019.  Oncotype DX recurrence score 9, node negative.  Arimidex started early December 2019.  No issues with medication.    Past medical history of AFib taking Eliquis 2.5 mg p.o. b.i.d.    Review of system:   Deny chest pain shortness of breath.  Denies abdominal pain nausea vomiting or diarrhea.  Denies any fever or chills.  Denies any weight loss.  Denies any change appetite change.      Past Medical History:   Diagnosis Date    Atrial fibrillation     4/11/13-recent dx sceduled to see cardiology Monday- Dr Lynn-dm    Cancer     breast    Diabetes mellitus     Diabetes mellitus type I     Exposure to STD     Hyperlipemia     Hypertension      Social History     Socioeconomic History    Marital status:      Spouse name: Not on file    Number of children: Not on file    Years of education: Not on file    Highest education level: Not on file   Occupational History    Not on file   Social Needs    Financial resource strain: Somewhat hard    Food insecurity:     Worry: Never true     Inability: Never  true    Transportation needs:     Medical: No     Non-medical: No   Tobacco Use    Smoking status: Never Smoker    Smokeless tobacco: Never Used   Substance and Sexual Activity    Alcohol use: No     Frequency: Never     Drinks per session: Patient refused     Binge frequency: Never     Comment: once or twice a year    Drug use: No    Sexual activity: Not on file   Lifestyle    Physical activity:     Days per week: 5 days     Minutes per session: 60 min    Stress: Not at all   Relationships    Social connections:     Talks on phone: Patient refused     Gets together: Twice a week     Attends Restorationism service: Not on file     Active member of club or organization: Yes     Attends meetings of clubs or organizations: 1 to 4 times per year     Relationship status:    Other Topics Concern    Not on file   Social History Narrative    Not on file       Objective    Physical Exam     Telemedicine    CMP  Sodium   Date Value Ref Range Status   04/15/2020 141 136 - 145 mmol/L Final     Potassium   Date Value Ref Range Status   04/15/2020 3.6 3.5 - 5.1 mmol/L Final     Chloride   Date Value Ref Range Status   04/15/2020 104 95 - 110 mmol/L Final     CO2   Date Value Ref Range Status   04/15/2020 28 23 - 29 mmol/L Final     Glucose   Date Value Ref Range Status   04/15/2020 149 (H) 70 - 110 mg/dL Final     BUN, Bld   Date Value Ref Range Status   04/15/2020 20 8 - 23 mg/dL Final     Creatinine   Date Value Ref Range Status   04/15/2020 0.9 0.5 - 1.4 mg/dL Final     Calcium   Date Value Ref Range Status   04/15/2020 10.0 8.7 - 10.5 mg/dL Final     Total Protein   Date Value Ref Range Status   04/15/2020 6.9 6.0 - 8.4 g/dL Final     Albumin   Date Value Ref Range Status   04/15/2020 4.2 3.5 - 5.2 g/dL Final     Total Bilirubin   Date Value Ref Range Status   04/15/2020 1.1 (H) 0.1 - 1.0 mg/dL Final     Comment:     For infants and newborns, interpretation of results should be based  on gestational age, weight  and in agreement with clinical  observations.  Premature Infant recommended reference ranges:  Up to 24 hours.............<8.0 mg/dL  Up to 48 hours............<12.0 mg/dL  3-5 days..................<15.0 mg/dL  6-29 days.................<15.0 mg/dL       Alkaline Phosphatase   Date Value Ref Range Status   04/15/2020 91 55 - 135 U/L Final     AST   Date Value Ref Range Status   04/15/2020 17 10 - 40 U/L Final     ALT   Date Value Ref Range Status   04/15/2020 22 10 - 44 U/L Final     Anion Gap   Date Value Ref Range Status   04/15/2020 9 8 - 16 mmol/L Final     eGFR if    Date Value Ref Range Status   04/15/2020 >60 >60 mL/min/1.73 m^2 Final     eGFR if non    Date Value Ref Range Status   04/15/2020 >60 >60 mL/min/1.73 m^2 Final     Comment:     Calculation used to obtain the estimated glomerular filtration  rate (eGFR) is the CKD-EPI equation.        Lab Results   Component Value Date    WBC 6.31 04/15/2020    HGB 15.8 04/15/2020    HCT 50.2 (H) 04/15/2020    MCV 90 04/15/2020     04/15/2020     FINAL PATHOLOGIC DIAGNOSIS biopsy 04/10/2019  Left breast mass, core needle biopsy:  - Invasive ductal carcinoma, Jacinto grade 2 (T=3, N=3, M=1), 5mm in greatest dimension  - No lymphovascular invasion identified on sections examined  Note: Prognostic/Predictive markers for ER, DE, Her-2 and Ki-67 are ordered, the results of which will be reported  in an addendum. Dr. JORDYN Sierra has reviewed the case and agrees with the above diagnosis    Assessment Plan    [] Breast cancer ERPR positive HER2 negative by FISH.  Mass measuring 7 mm left side breast.  Denies any bone pain, denies any pulmonary symptoms and denies any GI issues.  Alkaline phosphate within normal limits.    Status post surgical approach lumpectomy, tumor is invasive ductal carcinoma size 1 cm margin free of invasive carcinoma.    Left axillary sentinel lymph nodes no evidence of malignancy.    Patient declined  chemotherapy,Oncotype DX study shows absolute chemotherapy benefit is less than 1% and distant recurrence risk at 9 years 3%.  Oncotype DX score 9. Given above information we will proceed with endocrine therapy after completion of radiation therapy.  Endocrine therapy will be Arimidex 1 mg daily x5 years.    DEXA scan 4/18 - shows no significant bone density lost    > follow-up 3 months with CBC CMP    [] History of a AFib   > long-term anticoagulation Eliquis 2.5 mg b.i.d. follow Cardiology    [] Patient successfully completed radiation with interruptions due to vacation trips.  Patient also completed hip surgery on November 27th.  Patient has started Arimidex early December 2019.    Thereafter will plan to have patient follow-up in oncology clinic every 3 months for the 1st year    [] Hypertension diabetes followed by primary care physicians.    Dictation software used in this note.  Expect a typo graphic error

## 2020-04-22 ENCOUNTER — HOSPITAL ENCOUNTER (OUTPATIENT)
Dept: RADIOLOGY | Facility: HOSPITAL | Age: 70
Discharge: HOME | End: 2020-04-22
Attending: RADIOLOGY
Payer: MEDICARE

## 2020-04-22 DIAGNOSIS — Z17.0 MALIGNANT NEOPLASM OF CENTRAL PORTION OF LEFT BREAST IN FEMALE, ESTROGEN RECEPTOR POSITIVE: ICD-10-CM

## 2020-04-22 DIAGNOSIS — C50.112 MALIGNANT NEOPLASM OF CENTRAL PORTION OF LEFT BREAST IN FEMALE, ESTROGEN RECEPTOR POSITIVE: ICD-10-CM

## 2020-04-22 PROCEDURE — 77062 BREAST TOMOSYNTHESIS BI: CPT | Mod: TC,PO

## 2020-04-27 ENCOUNTER — OFFICE VISIT (OUTPATIENT)
Dept: FAMILY MEDICINE | Facility: CLINIC | Age: 70
End: 2020-04-27
Payer: MEDICARE

## 2020-04-27 VITALS
SYSTOLIC BLOOD PRESSURE: 132 MMHG | TEMPERATURE: 98 F | BODY MASS INDEX: 35.65 KG/M2 | OXYGEN SATURATION: 97 % | HEART RATE: 67 BPM | HEIGHT: 66 IN | DIASTOLIC BLOOD PRESSURE: 78 MMHG | WEIGHT: 221.81 LBS

## 2020-04-27 DIAGNOSIS — I10 ESSENTIAL HYPERTENSION: Primary | ICD-10-CM

## 2020-04-27 DIAGNOSIS — R73.09 ABNORMAL GLUCOSE LEVEL: ICD-10-CM

## 2020-04-27 DIAGNOSIS — E78.5 HYPERLIPIDEMIA, UNSPECIFIED HYPERLIPIDEMIA TYPE: ICD-10-CM

## 2020-04-27 DIAGNOSIS — Z86.39 HISTORY OF DIABETES MELLITUS: ICD-10-CM

## 2020-04-27 PROCEDURE — 1101F PR PT FALLS ASSESS DOC 0-1 FALLS W/OUT INJ PAST YR: ICD-10-PCS | Mod: S$GLB,,, | Performed by: NURSE PRACTITIONER

## 2020-04-27 PROCEDURE — 3075F SYST BP GE 130 - 139MM HG: CPT | Mod: S$GLB,,, | Performed by: NURSE PRACTITIONER

## 2020-04-27 PROCEDURE — 1126F AMNT PAIN NOTED NONE PRSNT: CPT | Mod: S$GLB,,, | Performed by: NURSE PRACTITIONER

## 2020-04-27 PROCEDURE — 1159F MED LIST DOCD IN RCRD: CPT | Mod: S$GLB,,, | Performed by: NURSE PRACTITIONER

## 2020-04-27 PROCEDURE — 3078F DIAST BP <80 MM HG: CPT | Mod: S$GLB,,, | Performed by: NURSE PRACTITIONER

## 2020-04-27 PROCEDURE — 3078F PR MOST RECENT DIASTOLIC BLOOD PRESSURE < 80 MM HG: ICD-10-PCS | Mod: S$GLB,,, | Performed by: NURSE PRACTITIONER

## 2020-04-27 PROCEDURE — 99213 OFFICE O/P EST LOW 20 MIN: CPT | Mod: S$GLB,,, | Performed by: NURSE PRACTITIONER

## 2020-04-27 PROCEDURE — 1159F PR MEDICATION LIST DOCUMENTED IN MEDICAL RECORD: ICD-10-PCS | Mod: S$GLB,,, | Performed by: NURSE PRACTITIONER

## 2020-04-27 PROCEDURE — 1126F PR PAIN SEVERITY QUANTIFIED, NO PAIN PRESENT: ICD-10-PCS | Mod: S$GLB,,, | Performed by: NURSE PRACTITIONER

## 2020-04-27 PROCEDURE — 1101F PT FALLS ASSESS-DOCD LE1/YR: CPT | Mod: S$GLB,,, | Performed by: NURSE PRACTITIONER

## 2020-04-27 PROCEDURE — 99213 PR OFFICE/OUTPT VISIT, EST, LEVL III, 20-29 MIN: ICD-10-PCS | Mod: S$GLB,,, | Performed by: NURSE PRACTITIONER

## 2020-04-27 PROCEDURE — 3075F PR MOST RECENT SYSTOLIC BLOOD PRESS GE 130-139MM HG: ICD-10-PCS | Mod: S$GLB,,, | Performed by: NURSE PRACTITIONER

## 2020-04-27 NOTE — PATIENT INSTRUCTIONS
Established High Blood Pressure    High blood pressure (hypertension) is a chronic disease. Often, healthcare providers dont know what causes it. But it can be caused by certain health conditions and medicines.  If you have high blood pressure, you may not have any symptoms. If you do have symptoms, they may include headache, dizziness, changes in your vision, chest pain, and shortness of breath. But even without symptoms, high blood pressure thats not treated raises your risk for heart attack and stroke. High blood pressure is a serious health risk and shouldnt be ignored.  A blood pressure reading is made up of two numbers: a higher number over a lower number. The top number is the systolic pressure. The bottom number is the diastolic pressure. A normal blood pressure is a systolic pressure of  less than 120 over a diastolic pressure of less than 80. You will see your blood pressure readings written together. For example, a person with a systolic pressure of 188 and a diastolic pressure of 78 will have 118/78 written in the medical record.  High blood pressure is when either the top number is 140 or higher, or the bottom number is 90 or higher. This must be the result when taking your blood pressure a number of times. The blood pressures between normal and high are called prehypertension.  Home care  If you have high blood pressure, you should do what is listed below to lower your blood pressure. If you are taking medicines for high blood pressure, these methods may reduce or end your need for medicines in the future.  · Begin a weight-loss program if you are overweight.  · Cut back on how much salt you get in your diet. Heres how to do this:  ¨ Dont eat foods that have a lot of salt. These include olives, pickles, smoked meats, and salted potato chips.  ¨ Dont add salt to your food at the table.  ¨ Use only small amounts of salt when cooking.  · Start an exercise program. Talk with your healthcare  provider about the type of exercise program that would be best for you. It doesn't have to be hard. Even brisk walking for 20 minutes 3 times a week is a good form of exercise.  · Dont take medicines that stimulate the heart. This includes many over-the-counter cold and sinus decongestant pills and sprays, as well as diet pills. Check the warnings about hypertension on the label. Before buying any over-the-counter medicines or supplements, always ask the pharmacist about the product's potential interaction with your high blood pressure and your high blood pressure medicines.  · Stimulants such as amphetamine or cocaine could be deadly for someone with high blood pressure. Never take these.  · Limit how much caffeine you get in your diet. Switch to caffeine-free products.  · Stop smoking. If you are a long-time smoker, this can be hard. Talk to your healthcare provider about medicines and nicotine replacement options to help you. Also, enroll in a stop-smoking program to make it more likely that you will quit for good.  · Learn how to handle stress. This is an important part of any program to lower blood pressure. Learn about relaxation methods like meditation, yoga, or biofeedback.  · If your provider prescribed medicines, take them exactly as directed. Missing doses may cause your blood pressure get out of control.  · If you miss a dose or doses, check with your healthcare provider or pharmacist about what to do.  · Consider buying an automatic blood pressure machine. Ask your provider for a recommendation. You can get one of these at most pharmacies.     The American Heart Association recommends the following guidelines for home blood pressure monitoring:  · Don't smoke or drink coffee for 30 minutes before taking your blood pressure.  · Go to the bathroom before the test.  · Relax for 5 minutes before taking the measurement.  · Sit with your back supported (don't sit on a couch or soft chair); keep your feet on  the floor uncrossed. Place your arm on a solid flat surface (like a table) with the upper part of the arm at heart level. Place the middle of the cuff directly above the eye of the elbow. Check the monitor's instruction manual for an illustration.  · Take multiple readings. When you measure, take 2 to 3 readings one minute apart and record all of the results.  · Take your blood pressure at the same time every day, or as your healthcare provider recommends.  · Record the date, time, and blood pressure reading.  · Take the record with you to your next medical appointment. If your blood pressure monitor has a built-in memory, simply take the monitor with you to your next appointment.  · Call your provider if you have several high readings. Don't be frightened by a single high blood pressure reading, but if you get several high readings, check in with your healthcare provider.  · Note: When blood pressure reaches a systolic (top number) of 180 or higher OR diastolic (bottom number) of 110 or higher, seek emergency medical treatment.  Follow-up care  You will need to see your healthcare provider regularly. This is to check your blood pressure and to make changes to your medicines. Make a follow-up appointment as directed. Bring the record of your home blood pressure readings to the appointment.  When to seek medical advice  Call your healthcare provider right away if any of these occur:  · Blood pressure reaches a systolic (upper number) of 180 or higher OR a diastolic (bottom number) of 110 or higher  · Chest pain or shortness of breath  · Severe headache  · Throbbing or rushing sound in the ears  · Nosebleed  · Sudden severe pain in your belly (abdomen)  · Extreme drowsiness, confusion, or fainting  · Dizziness or spinning sensation (vertigo)  · Weakness of an arm or leg or one side of the face  · You have problems speaking or seeing   Date Last Reviewed: 12/1/2016  © 0135-5817 [employer]. [address] [place] [address]  [facility], [place] [address]. All rights reserved. This information is not intended as a substitute for professional medical care. Always follow your healthcare professional's instructions.        Prediabetes  You have been diagnosed with prediabetes. This means that the level of sugar (glucose) in your blood is too high. If you have prediabetes, you are at risk for developing type 2 diabetes. Type 2 diabetes is diagnosed when the level of glucose in the blood reaches a certain high level. With prediabetes, it hasnt reached this point yet, but it is higher than normal. It is vital to make lifestyle changes to lower your blood sugar, improve your health, and prevent diabetes. This sheet will tell you more.      Why worry about prediabetes?  Prediabetes is a disease where the bodys cells have trouble using glucose in the blood for energy. As a result, too much glucose stays in the blood and can affect how your heart and blood vessels work. Without changes in diet and lifestyle, the problem can get worse. Once you have type 2 diabetes, it is chronic (ongoing) and needs to be managed for the rest of your life. Diabetes can harm the body and your health by damaging organs, such as your eyes and kidneys. It makes you more likely to have heart disease. And it can damage nerves and blood vessels.  Who is a risk for prediabetes?  The exact cause of prediabetes is not clear. But certain risk factors make a person more likely to have it. These include:  · A family history of type 2 diabetes  · Being overweight  · Being over age 45  · Have hypertension or elevated cholesterol   · Having had gestational diabetes  · Not being physically active  · Being ,  American, , , , or   Diagnosing prediabetes  Prediabetes may have no symptoms or you may have some of the symptoms of diabetes. The diagnosis is made with a blood test. You may have one or more of these  blood tests:   · Fasting glucose test. Blood is taken and tested after you have fasted (not eaten) for at least 8 hours. A normal test result is 99 milligrams per deciliter (mg/dL) or lower. Prediabetes is 100 mg/dL to 125 mg/dL. Diabetes is 126 mg/dL or higher.  · Glucose tolerance test. Your blood sugar is measured before and after you drink a very sugary liquid. A normal test result is 139 milligrams per deciliter (mg/dL) or lower. Prediabetes is 140 mg/dL to 199 mg/dL. Diabetes is 200 mg/dL or higher.  · Hemoglobin A1c (HbA1c). Your HbA1c is normal if it is below 5.7%. Prediabetes is 5.7% to 6.4%. Diabetes is 6.5% or higher.   Treating prediabetes  The best way to treat prediabetes is to lose at least 5% to 7% of your current weight and be more physically active by getting at least 150 minutes a week of physical activity. When sitting for long periods of time, get up for short sessions of light activity every 30 minutes. These changes help the bodys cells use blood sugar better. Even a small amount of weight loss can help. Work with your healthcare provider to make a plan to eat well and be more active. Keep in mind that small changes can add up. Other changes in your lifestyle (or even taking certain medicines, such as metformin) may make you less likely to develop diabetes. Your healthcare provider can talk with you about these.  Follow-up  If it is untreated, prediabetes can turn into diabetes. This is a serious health condition. Take steps to stop this from happening. Follow the treatment plan you have been given. You may have your blood glucose tested again in about 12 to 18 months.  Symptoms of diabetes  Let your healthcare provider know if you have any of the following:  · Always feel very tired  · Feel very thirsty or hungry much of the time  · Have to urinate often  · Lose weight for no reason  · Feel numbness or tingling in your fingers or toes  · Have cuts or bruises that dont seem to heal  · Have  blurry vision   Date Last Reviewed: 5/1/2016  © 7244-1096 [employer]. [address] [place] [address], [place] [address]. All rights reserved. This information is not intended as a substitute for professional medical care. Always follow your healthcare professional's instructions.

## 2020-04-27 NOTE — PROGRESS NOTES
"    SUBJECTIVE:      Patient ID: Eduardo Esposito is a 69 y.o. female.    Chief Complaint: Follow-up (2 month, labs )    Established patient here for follow up on labs and HTN/HLD. She is taking her medications as prescribed daily without SE or complaints. She has been checking her BP at home daily and has very good readings most days. She admits she has not been doing very well on her diet and has gained 7 lbs back in the last 2 months. She is having a hard time "getting on track". She is walking some at home and is trying to reduce her sugars and carbs. She is currently seeing cardiology and oncology as OP for follow ups on other health conditions. She denies any complaints today.     Hypertension   This is a chronic problem. The current episode started more than 1 year ago. The problem is unchanged. The problem is controlled. Pertinent negatives include no blurred vision, chest pain, headaches, orthopnea, palpitations, peripheral edema or shortness of breath. There are no associated agents to hypertension. Risk factors for coronary artery disease include obesity, post-menopausal state, sedentary lifestyle, dyslipidemia and family history. Past treatments include angiotensin blockers and diuretics. The current treatment provides moderate improvement. Compliance problems include exercise and diet.  There is no history of a thyroid problem.   Hyperlipidemia   Pertinent negatives include no chest pain, myalgias or shortness of breath.       Family History   Problem Relation Age of Onset    Breast cancer Mother     Glaucoma Neg Hx     Macular degeneration Neg Hx     Retinal detachment Neg Hx       Social History     Socioeconomic History    Marital status:      Spouse name: Not on file    Number of children: Not on file    Years of education: Not on file    Highest education level: Not on file   Occupational History    Not on file   Social Needs    Financial resource strain: Somewhat hard    Food " insecurity:     Worry: Never true     Inability: Never true    Transportation needs:     Medical: No     Non-medical: No   Tobacco Use    Smoking status: Never Smoker    Smokeless tobacco: Never Used   Substance and Sexual Activity    Alcohol use: No     Frequency: Never     Drinks per session: Patient refused     Binge frequency: Never     Comment: once or twice a year    Drug use: No    Sexual activity: Not on file   Lifestyle    Physical activity:     Days per week: 5 days     Minutes per session: 60 min    Stress: Not at all   Relationships    Social connections:     Talks on phone: Patient refused     Gets together: Twice a week     Attends [employer] service: Not on file     Active member of club or organization: Yes     Attends meetings of clubs or organizations: 1 to 4 times per year     Relationship status:    Other Topics Concern    Not on file   Social History Narrative    Not on file     Current Outpatient Medications   Medication Sig Dispense Refill    anastrozole (ARIMIDEX) 1 mg Tab Take 1 tablet (1 mg total) by mouth once daily. 90 tablet 10    ascorbic acid, vitamin C, (VITAMIN C) 100 MG tablet Take 100 mg by mouth once daily.      atorvastatin (LIPITOR) 80 MG tablet Take 0.5 tablets (40 mg total) by mouth every evening. 90 tablet 1    b complex vitamins tablet Take 1 tablet by mouth once daily.      ELIQUIS 5 mg Tab Take 1 tablet (5 mg total) by mouth 2 (two) times daily. 180 tablet 1    ergocalciferol, vitamin D2, (VITAMIN D ORAL) Take by mouth.      hydroCHLOROthiazide (HYDRODIURIL) 12.5 MG Tab Take 1 tablet by mouth once daily.      losartan (COZAAR) 100 MG tablet Take 1 tablet by mouth every evening.       No current facility-administered medications for this visit.      Review of patient's allergies indicates:   Allergen Reactions    Lisinopril      hives      Past Medical History:   Diagnosis Date    Atrial fibrillation     4/11/13-recent dx sceduled to see  cardiology Monday- Dr Lynn-dm    Cancer     breast    Diabetes mellitus     Diabetes mellitus type I     Exposure to STD     Hyperlipemia     Hypertension      Past Surgical History:   Procedure Laterality Date    AXILLARY NODE DISSECTION Left 6/13/2019    Procedure: LYMPHADENECTOMY, AXILLARY;  Surgeon: Kendall Howard MD;  Location: [facility];  Service: General;  Laterality: Left;  left lumpectomy with left axillary lymph node dissection    BREAST SURGERY      lumpectomy left    HIP REPLACEMENT ARTHROPLASTY Right 11/27/2019    Procedure: ARTHROPLASTY, HIP REPLACEMENT;  Surgeon: Rahul Hanson II, MD;  Location: [facility] OR;  Service: Orthopedics;  Laterality: Right;    JOINT REPLACEMENT Right     knee    NEEDLE LOCALIZATION Left 6/13/2019    Procedure: NEEDLE LOCALIZATION;  Surgeon: Kendall Howard MD;  Location: [facility] OR;  Service: General;  Laterality: Left;  left lumpectomy with wire needle localization    SENTINEL LYMPH NODE BIOPSY Left 6/13/2019    Procedure: BIOPSY, LYMPH NODE, SENTINEL;  Surgeon: Kendall Howard MD;  Location: [facility] OR;  Service: General;  Laterality: Left;  left sentinel lymph node dissection    TONSILLECTOMY      TOTAL KNEE ARTHROPLASTY         Review of Systems   Constitutional: Negative for activity change, chills, diaphoresis, fatigue, fever and unexpected weight change.   HENT: Negative for congestion, hearing loss, rhinorrhea and trouble swallowing.    Eyes: Negative for blurred vision, pain, discharge and visual disturbance.   Respiratory: Negative for cough, chest tightness, shortness of breath and wheezing.    Cardiovascular: Negative for chest pain, palpitations, orthopnea and leg swelling.   Gastrointestinal: Negative for abdominal pain, blood in stool, constipation, diarrhea, nausea and vomiting.   Endocrine: Negative for polydipsia and polyuria.   Genitourinary: Negative for difficulty urinating, dysuria, frequency, hematuria, menstrual problem and vaginal  "bleeding.   Musculoskeletal: Negative for myalgias.   Skin: Negative for pallor and rash.   Neurological: Negative for dizziness, syncope, weakness and headaches.   Hematological: Negative for adenopathy. Bruises/bleeds easily.   Psychiatric/Behavioral: Negative for confusion, dysphoric mood, sleep disturbance and suicidal ideas. The patient is not nervous/anxious.       OBJECTIVE:      Vitals:    04/27/20 1001 04/27/20 1023   BP: (!) 148/92 132/78   BP Location: Right arm    Patient Position: Sitting    BP Method: Large (Manual)    Pulse: 67    Temp: 98.3 °F (36.8 °C)    TempSrc: Oral    SpO2: 97%    Weight: 100.6 kg (221 lb 12.8 oz)    Height: 5' 6" (1.676 m)      Physical Exam   Constitutional: She is oriented to person, place, and time. She appears well-developed and well-nourished. No distress.   Obese    HENT:   Head: Normocephalic and atraumatic.   Mouth/Throat: Oropharynx is clear and moist. No oropharyngeal exudate.   Eyes: Pupils are equal, round, and reactive to light. Conjunctivae are normal.   Neck: Normal range of motion. Neck supple. No thyromegaly present.   Cardiovascular: Normal rate, regular rhythm, normal heart sounds and intact distal pulses.   No murmur heard.  Pulmonary/Chest: Effort normal and breath sounds normal. No respiratory distress. She has no wheezes. She has no rales.   Abdominal: Soft. Bowel sounds are normal. She exhibits no distension and no mass. There is no tenderness. There is no guarding.   Musculoskeletal: Normal range of motion. She exhibits no edema.   Lymphadenopathy:     She has no cervical adenopathy.   Neurological: She is alert and oriented to person, place, and time.   Skin: Skin is warm and dry. Capillary refill takes less than 2 seconds. No rash noted. No pallor.   Psychiatric: She has a normal mood and affect. Her behavior is normal. Judgment and thought content normal.   Nursing note and vitals reviewed.     Assessment:       1. Essential hypertension    2. " Hyperlipidemia, unspecified hyperlipidemia type    3. History of diabetes mellitus    4. Abnormal glucose level        Plan:       Essential hypertension  -     Hemoglobin A1c; Future; Expected date: 04/27/2020  -     Ambulatory referral/consult to Optometry; Future; Expected date: 05/27/2020    *Lifestyle changes: Reduce the amount of salt in your diet; Lose weight; Avoid drinking too much alcohol; Exercise at least 30 minutes per day most days of the week.  Continue current medications and home BP monitoring.     Hyperlipidemia, unspecified hyperlipidemia type  -     Lipid panel; Future; Expected date: 04/27/2020    *Limit red meat, butter, fried foods, cheese, and other foods that have a lot of saturated fat. Consume more: lean meats, fish, fruits, vegetables, whole grains, beans, lentils, and nuts.  Weight loss, and 30-45 min of cardiovascular exercise daily.     History of diabetes mellitus  -     Hemoglobin A1c; Future; Expected date: 04/27/2020  -     Ambulatory referral/consult to Optometry; Future; Expected date: 05/27/2020    Abnormal glucose level  -     Hemoglobin A1c; Future; Expected date: 04/27/2020        Follow up in about 6 months (around 10/27/2020) for diabetes, HTN.      4/27/2020 CHAVO Martínez, FNP

## 2020-04-29 ENCOUNTER — TELEPHONE (OUTPATIENT)
Dept: RADIATION ONCOLOGY | Facility: CLINIC | Age: 70
End: 2020-04-29

## 2020-04-29 DIAGNOSIS — Z17.0 MALIGNANT NEOPLASM OF CENTRAL PORTION OF LEFT BREAST IN FEMALE, ESTROGEN RECEPTOR POSITIVE: Primary | ICD-10-CM

## 2020-04-29 DIAGNOSIS — C50.112 MALIGNANT NEOPLASM OF CENTRAL PORTION OF LEFT BREAST IN FEMALE, ESTROGEN RECEPTOR POSITIVE: Primary | ICD-10-CM

## 2020-05-08 DIAGNOSIS — C50.912 INFILTRATING DUCTAL CARCINOMA OF LEFT BREAST: Primary | ICD-10-CM

## 2020-05-12 DIAGNOSIS — M16.11 UNILATERAL PRIMARY OSTEOARTHRITIS, RIGHT HIP: Primary | ICD-10-CM

## 2020-05-20 ENCOUNTER — OFFICE VISIT (OUTPATIENT)
Dept: RADIATION ONCOLOGY | Facility: CLINIC | Age: 70
End: 2020-05-20
Payer: MEDICARE

## 2020-05-20 VITALS
DIASTOLIC BLOOD PRESSURE: 80 MMHG | WEIGHT: 228.31 LBS | TEMPERATURE: 97 F | BODY MASS INDEX: 36.85 KG/M2 | RESPIRATION RATE: 18 BRPM | SYSTOLIC BLOOD PRESSURE: 175 MMHG | HEART RATE: 63 BPM

## 2020-05-20 DIAGNOSIS — Z17.0 MALIGNANT NEOPLASM OF CENTRAL PORTION OF LEFT BREAST IN FEMALE, ESTROGEN RECEPTOR POSITIVE: Primary | ICD-10-CM

## 2020-05-20 DIAGNOSIS — C50.112 MALIGNANT NEOPLASM OF CENTRAL PORTION OF LEFT BREAST IN FEMALE, ESTROGEN RECEPTOR POSITIVE: Primary | ICD-10-CM

## 2020-05-20 PROCEDURE — 3077F SYST BP >= 140 MM HG: CPT | Mod: S$GLB,,, | Performed by: RADIOLOGY

## 2020-05-20 PROCEDURE — 1126F PR PAIN SEVERITY QUANTIFIED, NO PAIN PRESENT: ICD-10-PCS | Mod: S$GLB,,, | Performed by: RADIOLOGY

## 2020-05-20 PROCEDURE — 1101F PT FALLS ASSESS-DOCD LE1/YR: CPT | Mod: S$GLB,,, | Performed by: RADIOLOGY

## 2020-05-20 PROCEDURE — 99213 PR OFFICE/OUTPT VISIT, EST, LEVL III, 20-29 MIN: ICD-10-PCS | Mod: S$GLB,,, | Performed by: RADIOLOGY

## 2020-05-20 PROCEDURE — 1159F PR MEDICATION LIST DOCUMENTED IN MEDICAL RECORD: ICD-10-PCS | Mod: S$GLB,,, | Performed by: RADIOLOGY

## 2020-05-20 PROCEDURE — 3079F DIAST BP 80-89 MM HG: CPT | Mod: S$GLB,,, | Performed by: RADIOLOGY

## 2020-05-20 PROCEDURE — 1159F MED LIST DOCD IN RCRD: CPT | Mod: S$GLB,,, | Performed by: RADIOLOGY

## 2020-05-20 PROCEDURE — 1101F PR PT FALLS ASSESS DOC 0-1 FALLS W/OUT INJ PAST YR: ICD-10-PCS | Mod: S$GLB,,, | Performed by: RADIOLOGY

## 2020-05-20 PROCEDURE — 3077F PR MOST RECENT SYSTOLIC BLOOD PRESSURE >= 140 MM HG: ICD-10-PCS | Mod: S$GLB,,, | Performed by: RADIOLOGY

## 2020-05-20 PROCEDURE — 1126F AMNT PAIN NOTED NONE PRSNT: CPT | Mod: S$GLB,,, | Performed by: RADIOLOGY

## 2020-05-20 PROCEDURE — 99213 OFFICE O/P EST LOW 20 MIN: CPT | Mod: S$GLB,,, | Performed by: RADIOLOGY

## 2020-05-20 PROCEDURE — 3079F PR MOST RECENT DIASTOLIC BLOOD PRESSURE 80-89 MM HG: ICD-10-PCS | Mod: S$GLB,,, | Performed by: RADIOLOGY

## 2020-05-20 NOTE — PROGRESS NOTES
Eduardo Esposito  [id number]  1950 5/20/2020  Laz Marin Md  [address]  [address]  [place] [address]    DIAGNOSIS:  IA, [id number] IDC L breast,  ER+/HI+/her2(-)  REASON FOR VISIT: Routine scheduled follow-up.    HISTORY OF PRESENT ILLNESS:   69-year-old patient was discovered to have a retro-areolar area of abnormality in the left breast injuring 6 mm on mammogram.  She subsequently underwent a core needle biopsy in April 2019 make in a diagnosis of invasive ductal carcinoma grade 2.    She says only was counseled with respect to treatment options and she elected to undergo breast conservation therapy. She underwent a partial mastectomy on 06/13/2019.    The pathology report indicates the invasive ductal carcinoma was measuring 1 cm. The margin was 3.4 mm. The tumor was grade 2. There was also EIC  and ductal carcinoma in situ measuring 6.9 cm.  The DCIS margin was also negative at 1.4 mm.  There was no lymphovascular space invasion.    The tumor was ER positive, HI positive and HER-2/archana negative.  Her sentinel node was negative.  The tumor has been sent off for Oncotype, score 9.    Patient completed adjuvant radiotherapy, 5040 cGy to the left breast, 6040 cGy to the lumpectomy cavity on November 12, 2019 with expected radiation dermatitis.    INTERVAL HISTORY:   Today patient denies fatigue.  She denies fever, chills, chest pain, cough, shortness of breath or hemoptysis.  She denies pain or discomfort in left breast reports good range of motion without swelling of the left upper extremity.  She denies arthralgias.  She denies hot flashes.  She had successful hip replacement surgery .  She has begun anastrozole and is without complaints reporting good tolerance.  This is managed by Dr. Marin.     Review of Systems   Constitutional: Negative for appetite change, chills, fever and unexpected weight change.   HENT:   Negative for lump/mass, mouth sores, sore throat, trouble swallowing and voice change.     Eyes: Negative for eye problems and icterus.   Respiratory: Negative for cough, hemoptysis and shortness of breath.    Cardiovascular: Negative for chest pain and leg swelling.   Gastrointestinal: Negative for abdominal pain, constipation, diarrhea, nausea and vomiting.   Genitourinary: Negative for dysuria, frequency, hematuria, nocturia and vaginal bleeding.    Musculoskeletal: Negative for back pain, gait problem, neck pain and neck stiffness.   Neurological: Negative for extremity weakness, gait problem, headaches, numbness and seizures.   Hematological: Negative for adenopathy.     Past Medical History:   Diagnosis Date    Atrial fibrillation     4/11/13-recent dx sceduled to see cardiology Monday- Dr Lynn-dm    Cancer     breast    Diabetes mellitus     Diabetes mellitus type I     Exposure to STD     Hyperlipemia     Hypertension      Past Surgical History:   Procedure Laterality Date    AXILLARY NODE DISSECTION Left 6/13/2019    Procedure: LYMPHADENECTOMY, AXILLARY;  Surgeon: Kendall Howard MD;  Location: [facility];  Service: General;  Laterality: Left;  left lumpectomy with left axillary lymph node dissection    BREAST SURGERY      lumpectomy left    HIP REPLACEMENT ARTHROPLASTY Right 11/27/2019    Procedure: ARTHROPLASTY, HIP REPLACEMENT;  Surgeon: Rahul Hanson II, MD;  Location: [facility];  Service: Orthopedics;  Laterality: Right;    JOINT REPLACEMENT Right     knee    NEEDLE LOCALIZATION Left 6/13/2019    Procedure: NEEDLE LOCALIZATION;  Surgeon: Kendall Howard MD;  Location: [facility] OR;  Service: General;  Laterality: Left;  left lumpectomy with wire needle localization    SENTINEL LYMPH NODE BIOPSY Left 6/13/2019    Procedure: BIOPSY, LYMPH NODE, SENTINEL;  Surgeon: Kendall Howard MD;  Location: [facility] OR;  Service: General;  Laterality: Left;  left sentinel lymph node dissection    TONSILLECTOMY      TOTAL KNEE ARTHROPLASTY       Social History     Socioeconomic History     Marital status:      Spouse name: Not on file    Number of children: Not on file    Years of education: Not on file    Highest education level: Not on file   Occupational History    Not on file   Social Needs    Financial resource strain: Somewhat hard    Food insecurity:     Worry: Never true     Inability: Never true    Transportation needs:     Medical: No     Non-medical: No   Tobacco Use    Smoking status: Never Smoker    Smokeless tobacco: Never Used   Substance and Sexual Activity    Alcohol use: No     Frequency: Never     Drinks per session: Patient refused     Binge frequency: Never     Comment: once or twice a year    Drug use: No    Sexual activity: Not on file   Lifestyle    Physical activity:     Days per week: 5 days     Minutes per session: 60 min    Stress: Not at all   Relationships    Social connections:     Talks on phone: Patient refused     Gets together: Twice a week     Attends Denominational service: Not on file     Active member of club or organization: Yes     Attends meetings of clubs or organizations: 1 to 4 times per year     Relationship status:    Other Topics Concern    Not on file   Social History Narrative    Not on file     Family History   Problem Relation Age of Onset    Breast cancer Mother     Glaucoma Neg Hx     Macular degeneration Neg Hx     Retinal detachment Neg Hx      Medication List with Changes/Refills   Current Medications    ANASTROZOLE (ARIMIDEX) 1 MG TAB    Take 1 tablet (1 mg total) by mouth once daily.    ASCORBIC ACID, VITAMIN C, (VITAMIN C) 100 MG TABLET    Take 100 mg by mouth once daily.    ATORVASTATIN (LIPITOR) 80 MG TABLET    Take 0.5 tablets (40 mg total) by mouth every evening.    B COMPLEX VITAMINS TABLET    Take 1 tablet by mouth once daily.    ELIQUIS 5 MG TAB    Take 1 tablet (5 mg total) by mouth 2 (two) times daily.    ERGOCALCIFEROL, VITAMIN D2, (VITAMIN D ORAL)    Take by mouth.    HYDROCHLOROTHIAZIDE  (HYDRODIURIL) 12.5 MG TAB    Take 1 tablet by mouth once daily.    LOSARTAN (COZAAR) 100 MG TABLET    Take 1 tablet by mouth every evening.     Review of patient's allergies indicates:   Allergen Reactions    Lisinopril      hives       QUALITY OF LIFE: 90%- Able to Carry on Normal Activity: Minor Symptoms of Disease    Vitals:    05/20/20 1017   BP: (!) 175/80   Pulse: 63   Resp: 18   Temp: 97 °F (36.1 °C)   TempSrc: Oral   Weight: 103.6 kg (228 lb 4.8 oz)   PainSc: 0-No pain     Body mass index is 36.85 kg/m².    PHYSICAL EXAM:   GENERAL: alert; in no apparent distress.   HEAD: normocephalic, atraumatic.  EYES: pupils are equal, round, reactive to light and accommodation. Sclera anicteric. Conjunctiva not injected.   NOSE/THROAT: no nasal erythema or rhinorrhea. Oropharynx pink, without erythema, ulcerations or thrush.   NECK: no cervical motion rigidity; supple with no masses.  CHEST: Patient is speaking comfortably on room air with normal work of breathing without using accessory muscles of respiration.  MUSCULOSKELETAL: no tenderness to palpation along the spine or scapulae. Normal range of motion.  NEUROLOGIC: cranial nerves II-XII intact bilaterally. Strength 5/5 in bilateral upper and lower extremities. No sensory deficits appreciated.   LYMPHATIC: no left axillary adenopathy appreciated  EXTREMITIES: no clubbing, cyanosis, edema.  BREAST:  Faint hyperpigmentation [place] without signs of desquamation.  No palpable seroma or nodularity    ANCILLARY DATA:   4/22/20 MMG     Interval postsurgical and post radiation changes within the left breast represent a probably benign appearance.  Left breast diagnostic mammography at 6 month interval is recommended.     BI-RADS CATEGORY 3: PROBABLY BENIGN FINDING-SHORT TERM INTERVAL FOLLOW-UP SUGGESTED.    ASSESSMENT: 69 y.o. female with stage IA, [id number] IDC L breast,  ER+/WV+/her2(-) s/p margin negative lumpectomy followed by adjuvant radiotherapy, 6040 cGy the left  breast ending 11/12/2019; on AI.  PLAN:   Eduardo Esposito did very well with her adjuvant radiotherapy in today presents status post hip replacement surgery, without complaints.  She has started antiestrogen therapy and reports good tolerance.  She has clear mammogram noting only post treatment changes and I have placed orders for follow-up in 6 months.  She will return to clinic at that time.    All questions answered and contact information provided. Patient understands free to call us anytime with any questions or concerns regarding radiation therapy.    I have personally seen and evaluated this patient. Greater than 50% of this time was spent discussing coordination of care and/or counseling.    PHYSICIAN: Nick Holliday Jr, MD

## 2020-05-28 ENCOUNTER — OFFICE VISIT (OUTPATIENT)
Dept: ORTHOPEDICS | Facility: CLINIC | Age: 70
End: 2020-05-28
Payer: MEDICARE

## 2020-05-28 ENCOUNTER — HOSPITAL ENCOUNTER (OUTPATIENT)
Dept: RADIOLOGY | Facility: HOSPITAL | Age: 70
Discharge: HOME | End: 2020-05-28
Attending: ORTHOPAEDIC SURGERY
Payer: MEDICARE

## 2020-05-28 VITALS — HEIGHT: 66 IN | BODY MASS INDEX: 36.64 KG/M2 | TEMPERATURE: 98 F | RESPIRATION RATE: 16 BRPM | WEIGHT: 228 LBS

## 2020-05-28 DIAGNOSIS — Z96.641 HISTORY OF RIGHT HIP REPLACEMENT: Primary | ICD-10-CM

## 2020-05-28 DIAGNOSIS — M16.11 UNILATERAL PRIMARY OSTEOARTHRITIS, RIGHT HIP: ICD-10-CM

## 2020-05-28 PROCEDURE — 99213 OFFICE O/P EST LOW 20 MIN: CPT | Mod: HCNC,S$GLB,, | Performed by: ORTHOPAEDIC SURGERY

## 2020-05-28 PROCEDURE — 99999 PR PBB SHADOW E&M-EST. PATIENT-LVL III: ICD-10-PCS | Mod: PBBFAC,HCNC,, | Performed by: ORTHOPAEDIC SURGERY

## 2020-05-28 PROCEDURE — 1159F PR MEDICATION LIST DOCUMENTED IN MEDICAL RECORD: ICD-10-PCS | Mod: HCNC,S$GLB,, | Performed by: ORTHOPAEDIC SURGERY

## 2020-05-28 PROCEDURE — 1101F PT FALLS ASSESS-DOCD LE1/YR: CPT | Mod: HCNC,CPTII,S$GLB, | Performed by: ORTHOPAEDIC SURGERY

## 2020-05-28 PROCEDURE — 99999 PR PBB SHADOW E&M-EST. PATIENT-LVL III: CPT | Mod: PBBFAC,HCNC,, | Performed by: ORTHOPAEDIC SURGERY

## 2020-05-28 PROCEDURE — 1126F PR PAIN SEVERITY QUANTIFIED, NO PAIN PRESENT: ICD-10-PCS | Mod: HCNC,S$GLB,, | Performed by: ORTHOPAEDIC SURGERY

## 2020-05-28 PROCEDURE — 73502 XR HIP 2 VIEW RIGHT: ICD-10-PCS | Mod: 26,HCNC,RT, | Performed by: RADIOLOGY

## 2020-05-28 PROCEDURE — 99213 PR OFFICE/OUTPT VISIT, EST, LEVL III, 20-29 MIN: ICD-10-PCS | Mod: HCNC,S$GLB,, | Performed by: ORTHOPAEDIC SURGERY

## 2020-05-28 PROCEDURE — 73502 X-RAY EXAM HIP UNI 2-3 VIEWS: CPT | Mod: TC,HCNC,PN,RT

## 2020-05-28 PROCEDURE — 1101F PR PT FALLS ASSESS DOC 0-1 FALLS W/OUT INJ PAST YR: ICD-10-PCS | Mod: HCNC,CPTII,S$GLB, | Performed by: ORTHOPAEDIC SURGERY

## 2020-05-28 PROCEDURE — 1159F MED LIST DOCD IN RCRD: CPT | Mod: HCNC,S$GLB,, | Performed by: ORTHOPAEDIC SURGERY

## 2020-05-28 PROCEDURE — 1126F AMNT PAIN NOTED NONE PRSNT: CPT | Mod: HCNC,S$GLB,, | Performed by: ORTHOPAEDIC SURGERY

## 2020-05-28 PROCEDURE — 73502 X-RAY EXAM HIP UNI 2-3 VIEWS: CPT | Mod: 26,HCNC,RT, | Performed by: RADIOLOGY

## 2020-05-28 NOTE — PROGRESS NOTES
CC:  69-year-old female follows up status post right total hip arthroplasty.  Date of surgery was 11/27/2019.  This is her 6 month follow-up.  She has no complaints, no pain.    ROS:    Constitution: Denies chills, fever, and sweats.  HENT: Denies headaches or blurry vision.  Cardiovascular: Denies chest pain or irregular heart beat.  Respiratory: Denies cough or shortness of breath.  Gastrointestinal: Denies abdominal pain, nausea, or vomiting.  Genitourinary:  Denies urinary incontinence, bladder and kidney issues  Musculoskeletal:  Denies muscle cramps.  Neurological: Denies dizziness or focal weakness.  Psychiatric/Behavioral: Normal mental status.  Hematologic/Lymphatic: Denies bleeding problem or easy bruising/bleeding.  Skin: Denies rash or suspicious lesions.    Physical examination     Gen - No acute distress, well nourished, well groomed   Eyes - Extraoccular motions intact, pupils equally round and reactive to light and accommodation   ENT - normocephalic, atruamtic, oropharynx clear   Neck - Supple, no abnormal masses   Cardiovascular - regular rate and rhythm   Pulmonary - clear to auscultation bilaterally, no wheezes, ronchi, or rales   Abdomen - soft, non-tender, non-distended, positive bowel sounds   Psych - The patient is alert and oriented x3 with normal mood and affect    Examination of the Right Lower Extremity    Skin is intact throughout  Motor in intact EHL,FHL,TA,duke  +2 DP/PT  Sensation LT intact D/P/1st    Examination of the Right Hip    C-Sign negative  Logroll negative  Stenchfield negative    Pain with ROM negative    ROM:    Flexion   120  Extension   10  Abduction   50  Adduction   10  External Rotation 60  Internal Rotation 10    Flexion contracture negative    FADIR negative  FADER negative    Tenderness to palpation over lateral and posterolateral greater tochanter negative    X-ray images were examined and personally interpreted by me.  Three views the right hip dated 05/28/2020  show a right total hip arthroplasty that is well fixed and in good alignment.    Dx:  Status post right total hip arthroplasty, stable and doing well    Plan:  Continue activity as tolerated.  Follow-up in 6 months for [place] evaluation.

## 2020-06-22 ENCOUNTER — OFFICE VISIT (OUTPATIENT)
Dept: OPTOMETRY | Facility: CLINIC | Age: 70
End: 2020-06-22
Payer: COMMERCIAL

## 2020-06-22 DIAGNOSIS — E11.9 DIABETES MELLITUS WITHOUT OPHTHALMIC MANIFESTATIONS: Primary | ICD-10-CM

## 2020-06-22 DIAGNOSIS — H52.7 REFRACTIVE ERROR: ICD-10-CM

## 2020-06-22 DIAGNOSIS — H25.13 NUCLEAR SCLEROSIS, BILATERAL: ICD-10-CM

## 2020-06-22 PROCEDURE — 99999 PR PBB SHADOW E&M-EST. PATIENT-LVL II: CPT | Mod: PBBFAC,,, | Performed by: OPTOMETRIST

## 2020-06-22 PROCEDURE — 92014 COMPRE OPH EXAM EST PT 1/>: CPT | Mod: S$GLB,,, | Performed by: OPTOMETRIST

## 2020-06-22 PROCEDURE — 99499 UNLISTED E&M SERVICE: CPT | Mod: S$GLB,,, | Performed by: OPTOMETRIST

## 2020-06-22 PROCEDURE — 99999 PR PBB SHADOW E&M-EST. PATIENT-LVL II: ICD-10-PCS | Mod: PBBFAC,,, | Performed by: OPTOMETRIST

## 2020-06-22 PROCEDURE — 99499 RISK ADDL DX/OHS AUDIT: ICD-10-PCS | Mod: S$GLB,,, | Performed by: OPTOMETRIST

## 2020-06-22 PROCEDURE — 92014 PR EYE EXAM, EST PATIENT,COMPREHESV: ICD-10-PCS | Mod: S$GLB,,, | Performed by: OPTOMETRIST

## 2020-06-22 NOTE — PROGRESS NOTES
HPI     DLS; 4/4/19    Pt here for annual exam for DM . States DM stable. Just changed meds. No   new complaints. Not using any gtts. Denies pain/ FOL/ floaters     Hemoglobin A1C       Date                     Value               Ref Range             Status                01/08/2020               5.5                 4.0 - 5.6 %           Final                  07/10/2019               5.7 (H)             4.0 - 5.6 %           Final                  02/18/2019               5.8 (H)             4.0 - 5.6 %           Final                    Last edited by Priyank Sevilla, OD on 6/22/2020 10:10 AM. (History)            Assessment /Plan     For exam results, see Encounter Report.    Diabetes mellitus without ophthalmic manifestations    Nuclear sclerosis, bilateral    Refractive error      DM type 2 well-controlled with diet/exercise. No ocular retinopathy OU. Discussed possible ocular affects of uncontrolled blood sugar with patient. Recommended continued strong blood sugar control and continued care with PCP. Monitor yearly.     Mild NS OU, not yet significant. Discussed possible ocular affects of cataracts. Acceptable BCVA OU. Discussed treatment options. Surgery not recommended at this time. Monitor yearly.     Patient doing well with current specs, denies refraction. Return as desired for updated spec Rx.      RTC in 1 year for comprehensive eye exam, or sooner prn.

## 2020-07-15 ENCOUNTER — TELEPHONE (OUTPATIENT)
Dept: HEMATOLOGY/ONCOLOGY | Facility: CLINIC | Age: 70
End: 2020-07-15

## 2020-07-15 ENCOUNTER — LAB VISIT (OUTPATIENT)
Dept: LAB | Facility: HOSPITAL | Age: 70
End: 2020-07-15
Attending: INTERNAL MEDICINE
Payer: MEDICARE

## 2020-07-15 DIAGNOSIS — I15.2 HYPERTENSION ASSOCIATED WITH DIABETES: ICD-10-CM

## 2020-07-15 DIAGNOSIS — I48.20 CHRONIC ATRIAL FIBRILLATION: ICD-10-CM

## 2020-07-15 DIAGNOSIS — C50.912 INFILTRATING DUCTAL CARCINOMA OF LEFT BREAST: ICD-10-CM

## 2020-07-15 DIAGNOSIS — E11.59 HYPERTENSION ASSOCIATED WITH DIABETES: ICD-10-CM

## 2020-07-15 LAB
ALBUMIN SERPL BCP-MCNC: 4.3 G/DL (ref 3.5–5.2)
ALP SERPL-CCNC: 89 U/L (ref 55–135)
ALT SERPL W/O P-5'-P-CCNC: 23 U/L (ref 10–44)
ANION GAP SERPL CALC-SCNC: 11 MMOL/L (ref 8–16)
AST SERPL-CCNC: 16 U/L (ref 10–40)
BASOPHILS # BLD AUTO: 0.03 K/UL (ref 0–0.2)
BASOPHILS NFR BLD: 0.5 % (ref 0–1.9)
BILIRUB SERPL-MCNC: 0.7 MG/DL (ref 0.1–1)
BUN SERPL-MCNC: 19 MG/DL (ref 8–23)
CALCIUM SERPL-MCNC: 9.7 MG/DL (ref 8.7–10.5)
CHLORIDE SERPL-SCNC: 105 MMOL/L (ref 95–110)
CO2 SERPL-SCNC: 27 MMOL/L (ref 23–29)
CREAT SERPL-MCNC: 0.8 MG/DL (ref 0.5–1.4)
DIFFERENTIAL METHOD: ABNORMAL
EOSINOPHIL # BLD AUTO: 0.1 K/UL (ref 0–0.5)
EOSINOPHIL NFR BLD: 2 % (ref 0–8)
ERYTHROCYTE [DISTWIDTH] IN BLOOD BY AUTOMATED COUNT: 13.4 % (ref 11.5–14.5)
EST. GFR  (AFRICAN AMERICAN): >60 ML/MIN/1.73 M^2
EST. GFR  (NON AFRICAN AMERICAN): >60 ML/MIN/1.73 M^2
GLUCOSE SERPL-MCNC: 117 MG/DL (ref 70–110)
HCT VFR BLD AUTO: 49.9 % (ref 37–48.5)
HGB BLD-MCNC: 16.2 G/DL (ref 12–16)
IMM GRANULOCYTES # BLD AUTO: 0.03 K/UL (ref 0–0.04)
IMM GRANULOCYTES NFR BLD AUTO: 0.5 % (ref 0–0.5)
LYMPHOCYTES # BLD AUTO: 2.4 K/UL (ref 1–4.8)
LYMPHOCYTES NFR BLD: 35.6 % (ref 18–48)
MCH RBC QN AUTO: 29.8 PG (ref 27–31)
MCHC RBC AUTO-ENTMCNC: 32.5 G/DL (ref 32–36)
MCV RBC AUTO: 92 FL (ref 82–98)
MONOCYTES # BLD AUTO: 0.5 K/UL (ref 0.3–1)
MONOCYTES NFR BLD: 7.7 % (ref 4–15)
NEUTROPHILS # BLD AUTO: 3.6 K/UL (ref 1.8–7.7)
NEUTROPHILS NFR BLD: 53.7 % (ref 38–73)
NRBC BLD-RTO: 0 /100 WBC
PLATELET # BLD AUTO: 222 K/UL (ref 150–350)
PMV BLD AUTO: 9.3 FL (ref 9.2–12.9)
POTASSIUM SERPL-SCNC: 3.9 MMOL/L (ref 3.5–5.1)
PROT SERPL-MCNC: 7.1 G/DL (ref 6–8.4)
RBC # BLD AUTO: 5.43 M/UL (ref 4–5.4)
SODIUM SERPL-SCNC: 143 MMOL/L (ref 136–145)
WBC # BLD AUTO: 6.66 K/UL (ref 3.9–12.7)

## 2020-07-15 PROCEDURE — 80053 COMPREHEN METABOLIC PANEL: CPT | Mod: HCNC

## 2020-07-15 PROCEDURE — 85025 COMPLETE CBC W/AUTO DIFF WBC: CPT | Mod: HCNC

## 2020-07-15 PROCEDURE — 36415 COLL VENOUS BLD VENIPUNCTURE: CPT | Mod: HCNC

## 2020-07-16 ENCOUNTER — OFFICE VISIT (OUTPATIENT)
Dept: HEMATOLOGY/ONCOLOGY | Facility: CLINIC | Age: 70
End: 2020-07-16
Payer: MEDICARE

## 2020-07-16 ENCOUNTER — TELEPHONE (OUTPATIENT)
Dept: HEMATOLOGY/ONCOLOGY | Facility: CLINIC | Age: 70
End: 2020-07-16

## 2020-07-16 VITALS
HEIGHT: 66 IN | HEART RATE: 66 BPM | BODY MASS INDEX: 36.49 KG/M2 | DIASTOLIC BLOOD PRESSURE: 76 MMHG | RESPIRATION RATE: 16 BRPM | OXYGEN SATURATION: 93 % | SYSTOLIC BLOOD PRESSURE: 156 MMHG | WEIGHT: 227.06 LBS | TEMPERATURE: 98 F

## 2020-07-16 DIAGNOSIS — C50.919 MALIGNANT NEOPLASM OF BREAST IN FEMALE, ESTROGEN RECEPTOR POSITIVE, UNSPECIFIED LATERALITY, UNSPECIFIED SITE OF BREAST: ICD-10-CM

## 2020-07-16 DIAGNOSIS — C50.912 INFILTRATING DUCTAL CARCINOMA OF LEFT BREAST: Primary | ICD-10-CM

## 2020-07-16 DIAGNOSIS — Z17.0 MALIGNANT NEOPLASM OF BREAST IN FEMALE, ESTROGEN RECEPTOR POSITIVE, UNSPECIFIED LATERALITY, UNSPECIFIED SITE OF BREAST: ICD-10-CM

## 2020-07-16 PROCEDURE — 99999 PR PBB SHADOW E&M-EST. PATIENT-LVL III: CPT | Mod: PBBFAC,HCNC,, | Performed by: INTERNAL MEDICINE

## 2020-07-16 PROCEDURE — 1159F PR MEDICATION LIST DOCUMENTED IN MEDICAL RECORD: ICD-10-PCS | Mod: HCNC,S$GLB,, | Performed by: INTERNAL MEDICINE

## 2020-07-16 PROCEDURE — 3078F PR MOST RECENT DIASTOLIC BLOOD PRESSURE < 80 MM HG: ICD-10-PCS | Mod: HCNC,CPTII,S$GLB, | Performed by: INTERNAL MEDICINE

## 2020-07-16 PROCEDURE — 1126F AMNT PAIN NOTED NONE PRSNT: CPT | Mod: HCNC,S$GLB,, | Performed by: INTERNAL MEDICINE

## 2020-07-16 PROCEDURE — 3008F BODY MASS INDEX DOCD: CPT | Mod: HCNC,CPTII,S$GLB, | Performed by: INTERNAL MEDICINE

## 2020-07-16 PROCEDURE — 3077F PR MOST RECENT SYSTOLIC BLOOD PRESSURE >= 140 MM HG: ICD-10-PCS | Mod: HCNC,CPTII,S$GLB, | Performed by: INTERNAL MEDICINE

## 2020-07-16 PROCEDURE — 99214 OFFICE O/P EST MOD 30 MIN: CPT | Mod: HCNC,S$GLB,, | Performed by: INTERNAL MEDICINE

## 2020-07-16 PROCEDURE — 3078F DIAST BP <80 MM HG: CPT | Mod: HCNC,CPTII,S$GLB, | Performed by: INTERNAL MEDICINE

## 2020-07-16 PROCEDURE — 1101F PR PT FALLS ASSESS DOC 0-1 FALLS W/OUT INJ PAST YR: ICD-10-PCS | Mod: HCNC,CPTII,S$GLB, | Performed by: INTERNAL MEDICINE

## 2020-07-16 PROCEDURE — 3077F SYST BP >= 140 MM HG: CPT | Mod: HCNC,CPTII,S$GLB, | Performed by: INTERNAL MEDICINE

## 2020-07-16 PROCEDURE — 1101F PT FALLS ASSESS-DOCD LE1/YR: CPT | Mod: HCNC,CPTII,S$GLB, | Performed by: INTERNAL MEDICINE

## 2020-07-16 PROCEDURE — 3008F PR BODY MASS INDEX (BMI) DOCUMENTED: ICD-10-PCS | Mod: HCNC,CPTII,S$GLB, | Performed by: INTERNAL MEDICINE

## 2020-07-16 PROCEDURE — 99999 PR PBB SHADOW E&M-EST. PATIENT-LVL III: ICD-10-PCS | Mod: PBBFAC,HCNC,, | Performed by: INTERNAL MEDICINE

## 2020-07-16 PROCEDURE — 99214 PR OFFICE/OUTPT VISIT, EST, LEVL IV, 30-39 MIN: ICD-10-PCS | Mod: HCNC,S$GLB,, | Performed by: INTERNAL MEDICINE

## 2020-07-16 PROCEDURE — 1159F MED LIST DOCD IN RCRD: CPT | Mod: HCNC,S$GLB,, | Performed by: INTERNAL MEDICINE

## 2020-07-16 PROCEDURE — 1126F PR PAIN SEVERITY QUANTIFIED, NO PAIN PRESENT: ICD-10-PCS | Mod: HCNC,S$GLB,, | Performed by: INTERNAL MEDICINE

## 2020-07-16 RX ORDER — ANASTROZOLE 1 MG/1
1 TABLET ORAL DAILY
Qty: 90 TABLET | Refills: 10 | Status: SHIPPED | OUTPATIENT
Start: 2020-07-16 | End: 2021-07-22 | Stop reason: SDUPTHER

## 2020-07-16 NOTE — TELEPHONE ENCOUNTER
Portal message sent informing pt of f/up appts. Reminder slips placed in outgoing mail.     ----- Message from Laz Marin MD sent at 7/16/2020 10:05 AM CDT -----  RTC 6 weeks with CBC CMP

## 2020-07-16 NOTE — PROGRESS NOTES
HPI    A 70 years old  female accompanied by family member is here in Oncology Clinic for evaluation of breast cancer.  Patient recently had ultrasound-guided biopsy left breast found to have invasive ductal carcinoma.  Path ER/AL positive HER2 negative by FISH.  Status post lumpectomy plus lymph node dissection plus XRT.  Surgery completed on June 13, 2019.  Oncotype DX recurrence score 9, node negative.  Arimidex started early December 2019.  No issues with medication.    Past medical history of AFib taking Eliquis 2.5 mg p.o. b.i.d.    Review of system:   Deny chest pain shortness of breath.  Denies abdominal pain nausea vomiting or diarrhea.  Denies any fever or chills.  Denies any weight loss.  Denies any change appetite change.      Past Medical History:   Diagnosis Date    Atrial fibrillation     4/11/13-recent dx sceduled to see cardiology Monday- Dr Lynn-dm    Cancer     breast    Diabetes mellitus     Diabetes mellitus type I     Exposure to STD     Hyperlipemia     Hypertension      Social History     Socioeconomic History    Marital status:      Spouse name: Not on file    Number of children: Not on file    Years of education: Not on file    Highest education level: Not on file   Occupational History    Not on file   Social Needs    Financial resource strain: Somewhat hard    Food insecurity     Worry: Never true     Inability: Never true    Transportation needs     Medical: No     Non-medical: No   Tobacco Use    Smoking status: Never Smoker    Smokeless tobacco: Never Used   Substance and Sexual Activity    Alcohol use: No     Frequency: Never     Drinks per session: Patient refused     Binge frequency: Never     Comment: once or twice a year    Drug use: No    Sexual activity: Not on file   Lifestyle    Physical activity     Days per week: 5 days     Minutes per session: 60 min    Stress: Not at all   Relationships    Social connections     Talks on phone:  Patient refused     Gets together: Twice a week     Attends Hindu service: Not on file     Active member of club or organization: Yes     Attends meetings of clubs or organizations: 1 to 4 times per year     Relationship status:    Other Topics Concern    Not on file   Social History Narrative    Not on file       Objective    Physical Exam   Vitals:    07/16/20 0936   BP: (!) 156/76   Pulse: 66   Resp: 16   Temp: 98.2 °F (36.8 °C)     Awake alert no acute distress  Normocephalic atraumatic pupils equal round reactive to light extraocular muscle intact  Regular rate and rhythm  Clear to auscultate  Soft nontender nondistended bowel sounds x4  Move all 4 extremity distal pulses intact  Cranial nerves 2-12 grossly intact sensorimotor grossly intact no focal deficit  No rash no lesions    CMP  Sodium   Date Value Ref Range Status   07/15/2020 143 136 - 145 mmol/L Final     Potassium   Date Value Ref Range Status   07/15/2020 3.9 3.5 - 5.1 mmol/L Final     Chloride   Date Value Ref Range Status   07/15/2020 105 95 - 110 mmol/L Final     CO2   Date Value Ref Range Status   07/15/2020 27 23 - 29 mmol/L Final     Glucose   Date Value Ref Range Status   07/15/2020 117 (H) 70 - 110 mg/dL Final     BUN, Bld   Date Value Ref Range Status   07/15/2020 19 8 - 23 mg/dL Final     Creatinine   Date Value Ref Range Status   07/15/2020 0.8 0.5 - 1.4 mg/dL Final     Calcium   Date Value Ref Range Status   07/15/2020 9.7 8.7 - 10.5 mg/dL Final     Total Protein   Date Value Ref Range Status   07/15/2020 7.1 6.0 - 8.4 g/dL Final     Albumin   Date Value Ref Range Status   07/15/2020 4.3 3.5 - 5.2 g/dL Final     Total Bilirubin   Date Value Ref Range Status   07/15/2020 0.7 0.1 - 1.0 mg/dL Final     Comment:     For infants and newborns, interpretation of results should be based  on gestational age, weight and in agreement with clinical  observations.  Premature Infant recommended reference ranges:  Up to 24  hours.............<8.0 mg/dL  Up to 48 hours............<12.0 mg/dL  3-5 days..................<15.0 mg/dL  6-29 days.................<15.0 mg/dL       Alkaline Phosphatase   Date Value Ref Range Status   07/15/2020 89 55 - 135 U/L Final     AST   Date Value Ref Range Status   07/15/2020 16 10 - 40 U/L Final     ALT   Date Value Ref Range Status   07/15/2020 23 10 - 44 U/L Final     Anion Gap   Date Value Ref Range Status   07/15/2020 11 8 - 16 mmol/L Final     eGFR if    Date Value Ref Range Status   07/15/2020 >60 >60 mL/min/1.73 m^2 Final     eGFR if non    Date Value Ref Range Status   07/15/2020 >60 >60 mL/min/1.73 m^2 Final     Comment:     Calculation used to obtain the estimated glomerular filtration  rate (eGFR) is the CKD-EPI equation.        Lab Results   Component Value Date    WBC 6.66 07/15/2020    HGB 16.2 (H) 07/15/2020    HCT 49.9 (H) 07/15/2020    MCV 92 07/15/2020     07/15/2020     FINAL PATHOLOGIC DIAGNOSIS biopsy 04/10/2019  Left breast mass, core needle biopsy:  - Invasive ductal carcinoma, [place] grade 2 (T=3, N=3, M=1), 5mm in greatest dimension  - No lymphovascular invasion identified on sections examined  Note: Prognostic/Predictive markers for ER, NV, Her-2 and Ki-67 are ordered, the results of which will be reported  in an addendum. Dr. JORDYN Sierra has reviewed the case and agrees with the above diagnosis    Assessment Plan    [] Breast cancer ERPR positive HER2 negative by FISH.  Mass measuring 7 mm left side breast.  Denies any bone pain, denies any pulmonary symptoms and denies any GI issues.  Alkaline phosphate within normal limits.    Status post surgical approach lumpectomy, tumor is invasive ductal carcinoma size 1 cm margin free of invasive carcinoma.    Left axillary sentinel lymph nodes no evidence of malignancy.    Patient declined chemotherapy,Oncotype DX study shows absolute chemotherapy benefit is less than 1% and distant recurrence  risk at 9 years 3%.  Oncotype DX score 9. Given above information we will proceed with endocrine therapy after completion of radiation therapy.  Endocrine therapy will be Arimidex 1 mg daily x5 years.  I will refill the medication today.  Medication has been reordered at the [facility]  pharmacy    DEXA scan 4/18 - shows no significant bone density lost    [] Trending up of hemoglobin  > RTC 6 weeks with CBC CMP.    [] History of a AFib   > long-term anticoagulation Eliquis 2.5 mg b.i.d. follow Cardiology    [] Patient successfully completed radiation with interruptions due to vacation trips.  Patient also completed hip surgery on November 27th.  Patient has started Arimidex early December 2019.    [] Hypertension diabetes followed by primary care physicians.    Dictation software used in this note.  Expect a typo graphic error

## 2020-07-29 DIAGNOSIS — Z17.0 MALIGNANT NEOPLASM OF BREAST IN FEMALE, ESTROGEN RECEPTOR POSITIVE, UNSPECIFIED LATERALITY, UNSPECIFIED SITE OF BREAST: ICD-10-CM

## 2020-07-29 DIAGNOSIS — C50.919 MALIGNANT NEOPLASM OF BREAST IN FEMALE, ESTROGEN RECEPTOR POSITIVE, UNSPECIFIED LATERALITY, UNSPECIFIED SITE OF BREAST: ICD-10-CM

## 2020-07-29 DIAGNOSIS — E78.5 HYPERLIPIDEMIA, UNSPECIFIED HYPERLIPIDEMIA TYPE: ICD-10-CM

## 2020-07-29 DIAGNOSIS — I10 ESSENTIAL HYPERTENSION: Primary | ICD-10-CM

## 2020-07-29 DIAGNOSIS — I48.0 PAROXYSMAL ATRIAL FIBRILLATION: ICD-10-CM

## 2020-07-29 RX ORDER — ATORVASTATIN CALCIUM 80 MG/1
40 TABLET, FILM COATED ORAL NIGHTLY
Qty: 30 TABLET | Refills: 0 | Status: SHIPPED | OUTPATIENT
Start: 2020-07-29 | End: 2020-10-27 | Stop reason: SDUPTHER

## 2020-07-29 RX ORDER — HYDROCHLOROTHIAZIDE 12.5 MG/1
12.5 TABLET ORAL DAILY
Qty: 90 TABLET | Refills: 1 | Status: SHIPPED | OUTPATIENT
Start: 2020-07-29 | End: 2020-08-06 | Stop reason: SDUPTHER

## 2020-07-29 RX ORDER — APIXABAN 5 MG/1
5 TABLET, FILM COATED ORAL 2 TIMES DAILY
Qty: 180 TABLET | Refills: 1 | Status: SHIPPED | OUTPATIENT
Start: 2020-07-29

## 2020-07-29 RX ORDER — LOSARTAN POTASSIUM 100 MG/1
100 TABLET ORAL NIGHTLY
Qty: 90 TABLET | Refills: 1 | Status: SHIPPED | OUTPATIENT
Start: 2020-07-29 | End: 2020-10-27 | Stop reason: SDUPTHER

## 2020-08-05 ENCOUNTER — PES CALL (OUTPATIENT)
Dept: ADMINISTRATIVE | Facility: CLINIC | Age: 70
End: 2020-08-05

## 2020-08-06 ENCOUNTER — OFFICE VISIT (OUTPATIENT)
Dept: FAMILY MEDICINE | Facility: CLINIC | Age: 70
End: 2020-08-06
Payer: MEDICARE

## 2020-08-06 VITALS
DIASTOLIC BLOOD PRESSURE: 70 MMHG | HEART RATE: 60 BPM | BODY MASS INDEX: 36.35 KG/M2 | TEMPERATURE: 99 F | HEIGHT: 66 IN | OXYGEN SATURATION: 96 % | SYSTOLIC BLOOD PRESSURE: 126 MMHG | WEIGHT: 226.19 LBS | RESPIRATION RATE: 16 BRPM

## 2020-08-06 DIAGNOSIS — C50.912 INFILTRATING DUCTAL CARCINOMA OF LEFT BREAST: ICD-10-CM

## 2020-08-06 DIAGNOSIS — Z99.89 DEPENDENCE ON OTHER ENABLING MACHINES AND DEVICES: ICD-10-CM

## 2020-08-06 DIAGNOSIS — Z23 NEED FOR SHINGLES VACCINE: ICD-10-CM

## 2020-08-06 DIAGNOSIS — C50.112 MALIGNANT NEOPLASM OF CENTRAL PORTION OF LEFT BREAST IN FEMALE, ESTROGEN RECEPTOR POSITIVE: ICD-10-CM

## 2020-08-06 DIAGNOSIS — Z74.09 OTHER REDUCED MOBILITY: ICD-10-CM

## 2020-08-06 DIAGNOSIS — Z00.00 ENCOUNTER FOR PREVENTIVE HEALTH EXAMINATION: Primary | ICD-10-CM

## 2020-08-06 DIAGNOSIS — Z23 NEED FOR DIPHTHERIA-TETANUS-PERTUSSIS (TDAP) VACCINE: ICD-10-CM

## 2020-08-06 DIAGNOSIS — I48.20 CHRONIC ATRIAL FIBRILLATION: ICD-10-CM

## 2020-08-06 DIAGNOSIS — Z17.0 MALIGNANT NEOPLASM OF CENTRAL PORTION OF LEFT BREAST IN FEMALE, ESTROGEN RECEPTOR POSITIVE: ICD-10-CM

## 2020-08-06 PROBLEM — R55 VASOVAGAL NEAR-SYNCOPE: Status: RESOLVED | Noted: 2020-01-08 | Resolved: 2020-08-06

## 2020-08-06 PROBLEM — M70.61 TROCHANTERIC BURSITIS OF RIGHT HIP: Status: RESOLVED | Noted: 2019-04-05 | Resolved: 2020-08-06

## 2020-08-06 PROBLEM — M16.11 UNILATERAL PRIMARY OSTEOARTHRITIS, RIGHT HIP: Status: RESOLVED | Noted: 2019-10-04 | Resolved: 2020-08-06

## 2020-08-06 PROBLEM — A08.4 VIRAL GASTROENTERITIS: Status: RESOLVED | Noted: 2020-01-10 | Resolved: 2020-08-06

## 2020-08-06 PROCEDURE — G0439 PR MEDICARE ANNUAL WELLNESS SUBSEQUENT VISIT: ICD-10-PCS | Mod: S$GLB,,, | Performed by: NURSE PRACTITIONER

## 2020-08-06 PROCEDURE — G0439 PPPS, SUBSEQ VISIT: HCPCS | Mod: S$GLB,,, | Performed by: NURSE PRACTITIONER

## 2020-08-06 RX ORDER — HYDROCHLOROTHIAZIDE 12.5 MG/1
25 TABLET ORAL DAILY
COMMUNITY
End: 2020-10-27 | Stop reason: SDUPTHER

## 2020-08-06 NOTE — PROGRESS NOTES
"  Eduardo Esposito presented for a  Medicare AWV and comprehensive Health Risk Assessment today. The following components were reviewed and updated:    · Medical history  · Family History  · Social history  · Allergies and Current Medications  · Health Risk Assessment  · Health Maintenance  · Care Team     ** See Completed Assessments for Annual Wellness Visit within the encounter summary.**         The following assessments were completed:  · Living Situation  · CAGE  · Depression Screening  · Timed Get Up and Go  · Whisper Test  · Cognitive Function Screening  · Nutrition Screening  · ADL Screening  · PAQ Screening            Vitals:    08/06/20 1306   BP: 126/70   BP Location: Right arm   Patient Position: Sitting   BP Method: Medium (Manual)   Pulse: 60   Resp: 16   Temp: 98.8 °F (37.1 °C)   TempSrc: Temporal   SpO2: 96%   Weight: 102.6 kg (226 lb 3.1 oz)   Height: 5' 6" (1.676 m)     Body mass index is 36.51 kg/m².  Physical Exam  Vitals signs and nursing note reviewed.   Constitutional:       General: She is not in acute distress.     Appearance: She is well-developed. She is not diaphoretic.   HENT:      Head: Normocephalic and atraumatic.   Eyes:      General: No scleral icterus.        Right eye: No discharge.         Left eye: No discharge.      Conjunctiva/sclera: Conjunctivae normal.   Cardiovascular:      Rate and Rhythm: Normal rate.   Pulmonary:      Effort: No respiratory distress.   Skin:     General: Skin is warm and dry.   Neurological:      Mental Status: She is alert and oriented to person, place, and time.   Psychiatric:         Behavior: Behavior normal.               Diagnoses and health risks identified today and associated recommendations/orders:    Encounter for preventive health examination    Dependence on other enabling machines and devices  Comments:  Continue to use your walker when up. Will continue to monitor.     Other reduced mobility  Comments:  Continue to use your walker when up. " Will continue to monitor.     Need for shingles vaccine  -     varicella-zoster [id number], PF, (SHINGRIX) 50 mcg/0.5 mL injection; Inject 0.5 mLs into the muscle once. for 1 dose  Dispense: 0.5 mL; Refill: 1    Need for diphtheria-tetanus-pertussis (Tdap) vaccine  -     DIPH,PERTUSS,ACEL,,TET VAC,PF,, ADULT, (ADACEL) 2 Lf-(2.5-5-3-5 mcg)-5Lf/0.5 mL injection; Inject 0.5 mLs into the muscle once. for 1 dose  Dispense: 0.5 mL; Refill: 0    Chronic atrial fibrillation  Comments:  Stable. Will continue to monitor.     Infiltrating ductal carcinoma of left breast  Comments:  Continue to follow with oncologist. Continue arimidex as ordered. Will continue to monitor.     Malignant neoplasm of central portion of left breast in female, estrogen receptor positive  Comments:  Continue to follow with oncologist. Continue arimidex as ordered. Will continue to monitor.           Provided Cathyrn with a 5-10 year written screening schedule and personal prevention plan. Recommendations were developed using the USPSTF age appropriate recommendations. Education, counseling, and referrals were provided as needed. After Visit Summary printed and given to patient which includes a list of additional screenings\tests needed.    Follow up with your pcp  Gail Lovett, CHAVO  I offered to discuss end of life issues, including information on how to make advance directives that the patient could use to name someone who would make medical decisions on their behalf if they became too ill to make themselves.    ___Patient declined  _X_Patient is interested, I provided paper work and offered to discuss.

## 2020-08-06 NOTE — PATIENT INSTRUCTIONS
Counseling and Referral of Other Preventative  (Italic type indicates deductible and co-insurance are waived)    Patient Name: Eduardo Esposito  Today's Date: 8/6/2020    Health Maintenance       Date Due Completion Date    TETANUS VACCINE 06/01/1966 ---    Shingles Vaccine (2 of 3) 02/26/2015 1/1/2015    Hemoglobin A1c 07/08/2020 1/8/2020    Lipid Panel 07/10/2020 7/10/2019    Influenza Vaccine (1) 09/01/2020 9/21/2019    Foot Exam 02/26/2021 2/26/2020 (ClinicallyNA)    Override on 2/26/2020: Not Clinically Appropriate    Eye Exam 06/22/2021 6/22/2020    DEXA SCAN 07/05/2021 7/5/2018    Low Dose Statin 08/06/2021 8/6/2020    Mammogram 04/22/2022 4/22/2020    Colorectal Cancer Screening 09/13/2026 9/13/2016        No orders of the defined types were placed in this encounter.    The following information is provided to all patients.  This information is to help you find resources for any of the problems found today that may be affecting your health:                Living healthy guide: www.[facility].[place].gov      Understanding Diabetes: www.diabetes.org      Eating healthy: www.cdc.gov/healthyweight      CDC home safety checklist: www.cdc.gov/steadi/patient.html      Agency on Aging: www.goea.[place].[facility]      Alcoholics anonymous (AA): www.aa.org      Physical Activity: www.doug.nih.gov/mw2brly      Tobacco use: www.quitwithusla.org

## 2020-08-06 NOTE — Clinical Note
I saw your patient for their annual wellness visit. I found the following issues: Unable to put appropriate time on clock, needs tetanus, shingles vaccines, needs labs done.  I ordered: Tdap and shingrix, asked staff to link your A1C and lipid orders to oncology orders (to be drawn at same time) and have referred her back to you for her regular care. I hope this is helpful to you. CHAVO Polanco, FNP

## 2020-08-25 ENCOUNTER — LAB VISIT (OUTPATIENT)
Dept: LAB | Facility: HOSPITAL | Age: 70
End: 2020-08-25
Attending: INTERNAL MEDICINE
Payer: MEDICARE

## 2020-08-25 DIAGNOSIS — E78.5 HYPERLIPIDEMIA, UNSPECIFIED HYPERLIPIDEMIA TYPE: ICD-10-CM

## 2020-08-25 DIAGNOSIS — Z86.39 HISTORY OF DIABETES MELLITUS: ICD-10-CM

## 2020-08-25 DIAGNOSIS — C50.912 INFILTRATING DUCTAL CARCINOMA OF LEFT BREAST: ICD-10-CM

## 2020-08-25 DIAGNOSIS — I10 ESSENTIAL HYPERTENSION: ICD-10-CM

## 2020-08-25 DIAGNOSIS — R73.09 ABNORMAL GLUCOSE LEVEL: ICD-10-CM

## 2020-08-25 LAB
ALBUMIN SERPL BCP-MCNC: 4.2 G/DL (ref 3.5–5.2)
ALP SERPL-CCNC: 80 U/L (ref 55–135)
ALT SERPL W/O P-5'-P-CCNC: 27 U/L (ref 10–44)
ANION GAP SERPL CALC-SCNC: 13 MMOL/L (ref 8–16)
AST SERPL-CCNC: 20 U/L (ref 10–40)
BASOPHILS # BLD AUTO: 0.03 K/UL (ref 0–0.2)
BASOPHILS NFR BLD: 0.4 % (ref 0–1.9)
BILIRUB SERPL-MCNC: 0.9 MG/DL (ref 0.1–1)
BUN SERPL-MCNC: 25 MG/DL (ref 8–23)
CALCIUM SERPL-MCNC: 9.8 MG/DL (ref 8.7–10.5)
CHLORIDE SERPL-SCNC: 104 MMOL/L (ref 95–110)
CHOLEST SERPL-MCNC: 176 MG/DL (ref 120–199)
CHOLEST/HDLC SERPL: 3.4 {RATIO} (ref 2–5)
CO2 SERPL-SCNC: 25 MMOL/L (ref 23–29)
CREAT SERPL-MCNC: 0.9 MG/DL (ref 0.5–1.4)
DIFFERENTIAL METHOD: ABNORMAL
EOSINOPHIL # BLD AUTO: 0.1 K/UL (ref 0–0.5)
EOSINOPHIL NFR BLD: 1.8 % (ref 0–8)
ERYTHROCYTE [DISTWIDTH] IN BLOOD BY AUTOMATED COUNT: 13.1 % (ref 11.5–14.5)
EST. GFR  (AFRICAN AMERICAN): >60 ML/MIN/1.73 M^2
EST. GFR  (NON AFRICAN AMERICAN): >60 ML/MIN/1.73 M^2
ESTIMATED AVG GLUCOSE: 123 MG/DL (ref 68–131)
GLUCOSE SERPL-MCNC: 129 MG/DL (ref 70–110)
HBA1C MFR BLD HPLC: 5.9 % (ref 4–5.6)
HCT VFR BLD AUTO: 49.7 % (ref 37–48.5)
HDLC SERPL-MCNC: 52 MG/DL (ref 40–75)
HDLC SERPL: 29.5 % (ref 20–50)
HGB BLD-MCNC: 16.1 G/DL (ref 12–16)
IMM GRANULOCYTES # BLD AUTO: 0.04 K/UL (ref 0–0.04)
IMM GRANULOCYTES NFR BLD AUTO: 0.5 % (ref 0–0.5)
LDLC SERPL CALC-MCNC: 72 MG/DL (ref 63–159)
LYMPHOCYTES # BLD AUTO: 2.3 K/UL (ref 1–4.8)
LYMPHOCYTES NFR BLD: 29.5 % (ref 18–48)
MAGNESIUM SERPL-MCNC: 1.7 MG/DL (ref 1.6–2.6)
MCH RBC QN AUTO: 30.3 PG (ref 27–31)
MCHC RBC AUTO-ENTMCNC: 32.4 G/DL (ref 32–36)
MCV RBC AUTO: 93 FL (ref 82–98)
MONOCYTES # BLD AUTO: 0.6 K/UL (ref 0.3–1)
MONOCYTES NFR BLD: 7.5 % (ref 4–15)
NEUTROPHILS # BLD AUTO: 4.7 K/UL (ref 1.8–7.7)
NEUTROPHILS NFR BLD: 60.3 % (ref 38–73)
NONHDLC SERPL-MCNC: 124 MG/DL
NRBC BLD-RTO: 0 /100 WBC
PLATELET # BLD AUTO: 239 K/UL (ref 150–350)
PMV BLD AUTO: 9.2 FL (ref 9.2–12.9)
POTASSIUM SERPL-SCNC: 3.7 MMOL/L (ref 3.5–5.1)
PROT SERPL-MCNC: 7 G/DL (ref 6–8.4)
RBC # BLD AUTO: 5.32 M/UL (ref 4–5.4)
SODIUM SERPL-SCNC: 142 MMOL/L (ref 136–145)
TRIGL SERPL-MCNC: 260 MG/DL (ref 30–150)
WBC # BLD AUTO: 7.83 K/UL (ref 3.9–12.7)

## 2020-08-25 PROCEDURE — 80053 COMPREHEN METABOLIC PANEL: CPT | Mod: HCNC

## 2020-08-25 PROCEDURE — 36415 COLL VENOUS BLD VENIPUNCTURE: CPT | Mod: HCNC

## 2020-08-25 PROCEDURE — 83735 ASSAY OF MAGNESIUM: CPT | Mod: HCNC

## 2020-08-25 PROCEDURE — 80061 LIPID PANEL: CPT | Mod: HCNC

## 2020-08-25 PROCEDURE — 83036 HEMOGLOBIN GLYCOSYLATED A1C: CPT | Mod: HCNC

## 2020-08-25 PROCEDURE — 85025 COMPLETE CBC W/AUTO DIFF WBC: CPT | Mod: HCNC

## 2020-08-27 ENCOUNTER — OFFICE VISIT (OUTPATIENT)
Dept: HEMATOLOGY/ONCOLOGY | Facility: CLINIC | Age: 70
End: 2020-08-27
Payer: MEDICARE

## 2020-08-27 VITALS
SYSTOLIC BLOOD PRESSURE: 130 MMHG | RESPIRATION RATE: 18 BRPM | HEART RATE: 58 BPM | DIASTOLIC BLOOD PRESSURE: 61 MMHG | TEMPERATURE: 98 F | WEIGHT: 231.5 LBS | OXYGEN SATURATION: 98 % | BODY MASS INDEX: 37.36 KG/M2

## 2020-08-27 DIAGNOSIS — Z79.01 LONG TERM (CURRENT) USE OF ANTICOAGULANTS: ICD-10-CM

## 2020-08-27 DIAGNOSIS — Z79.01 CURRENT USE OF LONG TERM ANTICOAGULATION: ICD-10-CM

## 2020-08-27 DIAGNOSIS — C50.912 INFILTRATING DUCTAL CARCINOMA OF LEFT BREAST: Primary | ICD-10-CM

## 2020-08-27 DIAGNOSIS — I48.20 CHRONIC ATRIAL FIBRILLATION: ICD-10-CM

## 2020-08-27 DIAGNOSIS — I48.91 ATRIAL FIBRILLATION, UNSPECIFIED TYPE: ICD-10-CM

## 2020-08-27 DIAGNOSIS — I10 ESSENTIAL HYPERTENSION: ICD-10-CM

## 2020-08-27 DIAGNOSIS — Z79.811 USE OF ANASTROZOLE (ARIMIDEX): ICD-10-CM

## 2020-08-27 PROCEDURE — 3075F SYST BP GE 130 - 139MM HG: CPT | Mod: HCNC,CPTII,S$GLB, | Performed by: INTERNAL MEDICINE

## 2020-08-27 PROCEDURE — 1126F AMNT PAIN NOTED NONE PRSNT: CPT | Mod: HCNC,S$GLB,, | Performed by: INTERNAL MEDICINE

## 2020-08-27 PROCEDURE — 1126F PR PAIN SEVERITY QUANTIFIED, NO PAIN PRESENT: ICD-10-PCS | Mod: HCNC,S$GLB,, | Performed by: INTERNAL MEDICINE

## 2020-08-27 PROCEDURE — 3078F PR MOST RECENT DIASTOLIC BLOOD PRESSURE < 80 MM HG: ICD-10-PCS | Mod: HCNC,CPTII,S$GLB, | Performed by: INTERNAL MEDICINE

## 2020-08-27 PROCEDURE — 99214 OFFICE O/P EST MOD 30 MIN: CPT | Mod: HCNC,S$GLB,, | Performed by: INTERNAL MEDICINE

## 2020-08-27 PROCEDURE — 1159F PR MEDICATION LIST DOCUMENTED IN MEDICAL RECORD: ICD-10-PCS | Mod: HCNC,S$GLB,, | Performed by: INTERNAL MEDICINE

## 2020-08-27 PROCEDURE — 3008F PR BODY MASS INDEX (BMI) DOCUMENTED: ICD-10-PCS | Mod: HCNC,CPTII,S$GLB, | Performed by: INTERNAL MEDICINE

## 2020-08-27 PROCEDURE — 99999 PR PBB SHADOW E&M-EST. PATIENT-LVL III: ICD-10-PCS | Mod: PBBFAC,HCNC,, | Performed by: INTERNAL MEDICINE

## 2020-08-27 PROCEDURE — 1101F PR PT FALLS ASSESS DOC 0-1 FALLS W/OUT INJ PAST YR: ICD-10-PCS | Mod: HCNC,CPTII,S$GLB, | Performed by: INTERNAL MEDICINE

## 2020-08-27 PROCEDURE — 3078F DIAST BP <80 MM HG: CPT | Mod: HCNC,CPTII,S$GLB, | Performed by: INTERNAL MEDICINE

## 2020-08-27 PROCEDURE — 99499 UNLISTED E&M SERVICE: CPT | Mod: S$GLB,,, | Performed by: INTERNAL MEDICINE

## 2020-08-27 PROCEDURE — 3075F PR MOST RECENT SYSTOLIC BLOOD PRESS GE 130-139MM HG: ICD-10-PCS | Mod: HCNC,CPTII,S$GLB, | Performed by: INTERNAL MEDICINE

## 2020-08-27 PROCEDURE — 1101F PT FALLS ASSESS-DOCD LE1/YR: CPT | Mod: HCNC,CPTII,S$GLB, | Performed by: INTERNAL MEDICINE

## 2020-08-27 PROCEDURE — 99214 PR OFFICE/OUTPT VISIT, EST, LEVL IV, 30-39 MIN: ICD-10-PCS | Mod: HCNC,S$GLB,, | Performed by: INTERNAL MEDICINE

## 2020-08-27 PROCEDURE — 99499 RISK ADDL DX/OHS AUDIT: ICD-10-PCS | Mod: S$GLB,,, | Performed by: INTERNAL MEDICINE

## 2020-08-27 PROCEDURE — 99999 PR PBB SHADOW E&M-EST. PATIENT-LVL III: CPT | Mod: PBBFAC,HCNC,, | Performed by: INTERNAL MEDICINE

## 2020-08-27 PROCEDURE — 1159F MED LIST DOCD IN RCRD: CPT | Mod: HCNC,S$GLB,, | Performed by: INTERNAL MEDICINE

## 2020-08-27 PROCEDURE — 3008F BODY MASS INDEX DOCD: CPT | Mod: HCNC,CPTII,S$GLB, | Performed by: INTERNAL MEDICINE

## 2020-08-27 NOTE — PROGRESS NOTES
HPI    A 70 years old  female accompanied by family member is here in Oncology Clinic for evaluation of breast cancer.  Patient recently had ultrasound-guided biopsy left breast found to have invasive ductal carcinoma.  Path ER/OH positive HER2 negative by FISH.  Status post lumpectomy plus lymph node dissection plus XRT.  Surgery completed on June 13, 2019.  Oncotype DX recurrence score 9, node negative.  Arimidex started early December 2019.  No issues with medication.    Past medical history of AFib taking Eliquis 2.5 mg p.o. b.i.d.    Review of system:   Deny chest pain shortness of breath.  Denies abdominal pain nausea vomiting or diarrhea.  Denies any fever or chills.  Denies any weight loss.  Denies any change appetite change.      Past Medical History:   Diagnosis Date    Atrial fibrillation     4/11/13-recent dx sceduled to see cardiology Monday- Dr Lynn-dm    Breast cancer     Cancer     breast    Diabetes mellitus     Diabetes mellitus type I     Exposure to STD     Hyperlipemia     Hypertension     Obesity      Social History     Socioeconomic History    Marital status:      Spouse name: Not on file    Number of children: Not on file    Years of education: Not on file    Highest education level: Not on file   Occupational History    Not on file   Social Needs    Financial resource strain: Somewhat hard    Food insecurity     Worry: Never true     Inability: Never true    Transportation needs     Medical: No     Non-medical: No   Tobacco Use    Smoking status: Never Smoker    Smokeless tobacco: Never Used   Substance and Sexual Activity    Alcohol use: No     Frequency: Never     Drinks per session: Patient refused     Binge frequency: Never    Drug use: No    Sexual activity: Not Currently   Lifestyle    Physical activity     Days per week: 5 days     Minutes per session: 60 min    Stress: Not at all   Relationships    Social connections     Talks on phone:  Patient refused     Gets together: Twice a week     Attends Jain service: Not on file     Active member of club or organization: Yes     Attends meetings of clubs or organizations: 1 to 4 times per year     Relationship status:    Other Topics Concern    Not on file   Social History Narrative    Not on file       Objective    Physical Exam     Vitals:    08/27/20 0952   BP: 130/61   Pulse: (!) 58   Resp: 18   Temp: 97.5 °F (36.4 °C)         Awake alert no acute distress  Normocephalic atraumatic pupils equal round reactive to light extraocular muscle intact  Regular rate and rhythm  Clear to auscultate  Soft nontender nondistended bowel sounds x4  Move all 4 extremity distal pulses intact  Cranial nerves 2-12 grossly intact sensorimotor grossly intact no focal deficit  No rash no lesions    CMP  Sodium   Date Value Ref Range Status   08/25/2020 142 136 - 145 mmol/L Final     Potassium   Date Value Ref Range Status   08/25/2020 3.7 3.5 - 5.1 mmol/L Final     Chloride   Date Value Ref Range Status   08/25/2020 104 95 - 110 mmol/L Final     CO2   Date Value Ref Range Status   08/25/2020 25 23 - 29 mmol/L Final     Glucose   Date Value Ref Range Status   08/25/2020 129 (H) 70 - 110 mg/dL Final     BUN, Bld   Date Value Ref Range Status   08/25/2020 25 (H) 8 - 23 mg/dL Final     Creatinine   Date Value Ref Range Status   08/25/2020 0.9 0.5 - 1.4 mg/dL Final     Calcium   Date Value Ref Range Status   08/25/2020 9.8 8.7 - 10.5 mg/dL Final     Total Protein   Date Value Ref Range Status   08/25/2020 7.0 6.0 - 8.4 g/dL Final     Albumin   Date Value Ref Range Status   08/25/2020 4.2 3.5 - 5.2 g/dL Final     Total Bilirubin   Date Value Ref Range Status   08/25/2020 0.9 0.1 - 1.0 mg/dL Final     Comment:     For infants and newborns, interpretation of results should be based  on gestational age, weight and in agreement with clinical  observations.  Premature Infant recommended reference ranges:  Up to 24  hours.............<8.0 mg/dL  Up to 48 hours............<12.0 mg/dL  3-5 days..................<15.0 mg/dL  6-29 days.................<15.0 mg/dL       Alkaline Phosphatase   Date Value Ref Range Status   08/25/2020 80 55 - 135 U/L Final     AST   Date Value Ref Range Status   08/25/2020 20 10 - 40 U/L Final     ALT   Date Value Ref Range Status   08/25/2020 27 10 - 44 U/L Final     Anion Gap   Date Value Ref Range Status   08/25/2020 13 8 - 16 mmol/L Final     eGFR if    Date Value Ref Range Status   08/25/2020 >60 >60 mL/min/1.73 m^2 Final     eGFR if non    Date Value Ref Range Status   08/25/2020 >60 >60 mL/min/1.73 m^2 Final     Comment:     Calculation used to obtain the estimated glomerular filtration  rate (eGFR) is the CKD-EPI equation.        Lab Results   Component Value Date    WBC 7.83 08/25/2020    HGB 16.1 (H) 08/25/2020    HCT 49.7 (H) 08/25/2020    MCV 93 08/25/2020     08/25/2020     FINAL PATHOLOGIC DIAGNOSIS biopsy 04/10/2019  Left breast mass, core needle biopsy:  - Invasive ductal carcinoma, [place] grade 2 (T=3, N=3, M=1), 5mm in greatest dimension  - No lymphovascular invasion identified on sections examined  Note: Prognostic/Predictive markers for ER, CO, Her-2 and Ki-67 are ordered, the results of which will be reported  in an addendum. Dr. JORDYN Sierra has reviewed the case and agrees with the above diagnosis    Assessment Plan    [] Breast cancer ERPR positive HER2 negative by FISH.  Mass measuring 7 mm left side breast.  Denies any bone pain, denies any pulmonary symptoms and denies any GI issues.  Alkaline phosphate within normal limits.    Status post surgical approach lumpectomy, tumor is invasive ductal carcinoma size 1 cm margin free of invasive carcinoma.    Left axillary sentinel lymph nodes no evidence of malignancy.    Patient declined chemotherapy,Oncotype DX study shows absolute chemotherapy benefit is less than 1% and distant recurrence  risk at 9 years 3%.  Oncotype DX score 9.     Started endocrine therapy after completion of radiation therapy (dec 2019).  Endocrine therapy will be Arimidex 1 mg daily x 5 years.      DEXA scan 4/18 - shows no significant bone density lost    > continue arimidex  > RTC Nov 2020 with lab CBC CMP     [] History of a AFib   > long-term anticoagulation Eliquis 2.5 mg b.i.d. follow Cardiology    [] Hypertension diabetes   > followed by primary care physicians.    Dictation software used in this note.  Expect a typo graphic error

## 2020-08-28 NOTE — PROGRESS NOTES
Patient, Eduardo Esposito (MRN #[id number]), presented with a recorded BMI of 37.36 kg/m^2 and a documented comorbidity(s):  - Hypertension  - Atrial Fibrillation  to which the severe obesity is a contributing factor. This is consistent with the definition of severe obesity (BMI 35.0-39.9) with comorbidity (ICD-10 E66.01, Z68.35). The patient's severe obesity was monitored, evaluated, addressed and/or treated. This addendum to the medical record is made on 08/28/2020.

## 2020-09-21 ENCOUNTER — OFFICE VISIT (OUTPATIENT)
Dept: PODIATRY | Facility: CLINIC | Age: 70
End: 2020-09-21
Payer: MEDICARE

## 2020-09-21 ENCOUNTER — TELEPHONE (OUTPATIENT)
Dept: PODIATRY | Facility: CLINIC | Age: 70
End: 2020-09-21

## 2020-09-21 VITALS — BODY MASS INDEX: 37.12 KG/M2 | HEIGHT: 66 IN | WEIGHT: 231 LBS

## 2020-09-21 DIAGNOSIS — B35.1 ONYCHOMYCOSIS DUE TO DERMATOPHYTE: ICD-10-CM

## 2020-09-21 DIAGNOSIS — L60.8 DEFORMITY OF TOENAIL: Primary | ICD-10-CM

## 2020-09-21 PROCEDURE — 1126F PR PAIN SEVERITY QUANTIFIED, NO PAIN PRESENT: ICD-10-PCS | Mod: HCNC,S$GLB,, | Performed by: PODIATRIST

## 2020-09-21 PROCEDURE — 1159F PR MEDICATION LIST DOCUMENTED IN MEDICAL RECORD: ICD-10-PCS | Mod: HCNC,S$GLB,, | Performed by: PODIATRIST

## 2020-09-21 PROCEDURE — 1101F PR PT FALLS ASSESS DOC 0-1 FALLS W/OUT INJ PAST YR: ICD-10-PCS | Mod: HCNC,CPTII,S$GLB, | Performed by: PODIATRIST

## 2020-09-21 PROCEDURE — 3008F PR BODY MASS INDEX (BMI) DOCUMENTED: ICD-10-PCS | Mod: HCNC,CPTII,S$GLB, | Performed by: PODIATRIST

## 2020-09-21 PROCEDURE — 99214 PR OFFICE/OUTPT VISIT, EST, LEVL IV, 30-39 MIN: ICD-10-PCS | Mod: HCNC,S$GLB,, | Performed by: PODIATRIST

## 2020-09-21 PROCEDURE — 99214 OFFICE O/P EST MOD 30 MIN: CPT | Mod: HCNC,S$GLB,, | Performed by: PODIATRIST

## 2020-09-21 PROCEDURE — 1159F MED LIST DOCD IN RCRD: CPT | Mod: HCNC,S$GLB,, | Performed by: PODIATRIST

## 2020-09-21 PROCEDURE — 3008F BODY MASS INDEX DOCD: CPT | Mod: HCNC,CPTII,S$GLB, | Performed by: PODIATRIST

## 2020-09-21 PROCEDURE — 99999 PR PBB SHADOW E&M-EST. PATIENT-LVL IV: CPT | Mod: PBBFAC,HCNC,, | Performed by: PODIATRIST

## 2020-09-21 PROCEDURE — 1126F AMNT PAIN NOTED NONE PRSNT: CPT | Mod: HCNC,S$GLB,, | Performed by: PODIATRIST

## 2020-09-21 PROCEDURE — 1101F PT FALLS ASSESS-DOCD LE1/YR: CPT | Mod: HCNC,CPTII,S$GLB, | Performed by: PODIATRIST

## 2020-09-21 PROCEDURE — 99999 PR PBB SHADOW E&M-EST. PATIENT-LVL IV: ICD-10-PCS | Mod: PBBFAC,HCNC,, | Performed by: PODIATRIST

## 2020-09-21 NOTE — PATIENT INSTRUCTIONS
- Obtain history and physical from primary care physician for medical clearance for surgery within 1 month of surgery date.    - Stop taking any medication that may thin the blood such as anticoagulants and NSAIDs 5 days prior to surgery date.    - Notify clinic if any new or worsening condition arises.

## 2020-09-23 NOTE — TELEPHONE ENCOUNTER
----- Message from Hazel Valderrama sent at 9/23/2020  4:35 PM CDT -----  Contact: self [phone]  Patient is returning a phone call.  Who left a message for the patient: Briseida Yoder LPN  Does patient know what this is regarding:  appt  Comments:

## 2020-09-23 NOTE — TELEPHONE ENCOUNTER
Patient reminded that she has to have a pre-op H&P, and that we need something from her Cardiologist stating that she can hold the Eliquis.  COVID test scheduled.

## 2020-09-25 ENCOUNTER — OFFICE VISIT (OUTPATIENT)
Dept: FAMILY MEDICINE | Facility: CLINIC | Age: 70
End: 2020-09-25
Payer: MEDICARE

## 2020-09-25 VITALS
TEMPERATURE: 98 F | OXYGEN SATURATION: 96 % | SYSTOLIC BLOOD PRESSURE: 102 MMHG | DIASTOLIC BLOOD PRESSURE: 80 MMHG | HEIGHT: 66 IN | BODY MASS INDEX: 37.03 KG/M2 | HEART RATE: 50 BPM | WEIGHT: 230.38 LBS

## 2020-09-25 DIAGNOSIS — R73.01 IMPAIRED FASTING GLUCOSE: ICD-10-CM

## 2020-09-25 DIAGNOSIS — I10 ESSENTIAL HYPERTENSION: ICD-10-CM

## 2020-09-25 DIAGNOSIS — I48.20 CHRONIC ATRIAL FIBRILLATION: ICD-10-CM

## 2020-09-25 DIAGNOSIS — Z01.818 PREOPERATIVE CLEARANCE: Primary | ICD-10-CM

## 2020-09-25 PROCEDURE — 3008F PR BODY MASS INDEX (BMI) DOCUMENTED: ICD-10-PCS | Mod: S$GLB,,, | Performed by: NURSE PRACTITIONER

## 2020-09-25 PROCEDURE — 99213 PR OFFICE/OUTPT VISIT, EST, LEVL III, 20-29 MIN: ICD-10-PCS | Mod: S$GLB,,, | Performed by: NURSE PRACTITIONER

## 2020-09-25 PROCEDURE — 3074F SYST BP LT 130 MM HG: CPT | Mod: S$GLB,,, | Performed by: NURSE PRACTITIONER

## 2020-09-25 PROCEDURE — 1126F AMNT PAIN NOTED NONE PRSNT: CPT | Mod: S$GLB,,, | Performed by: NURSE PRACTITIONER

## 2020-09-25 PROCEDURE — 1126F PR PAIN SEVERITY QUANTIFIED, NO PAIN PRESENT: ICD-10-PCS | Mod: S$GLB,,, | Performed by: NURSE PRACTITIONER

## 2020-09-25 PROCEDURE — 1101F PR PT FALLS ASSESS DOC 0-1 FALLS W/OUT INJ PAST YR: ICD-10-PCS | Mod: S$GLB,,, | Performed by: NURSE PRACTITIONER

## 2020-09-25 PROCEDURE — 3079F DIAST BP 80-89 MM HG: CPT | Mod: S$GLB,,, | Performed by: NURSE PRACTITIONER

## 2020-09-25 PROCEDURE — 1101F PT FALLS ASSESS-DOCD LE1/YR: CPT | Mod: S$GLB,,, | Performed by: NURSE PRACTITIONER

## 2020-09-25 PROCEDURE — 99213 OFFICE O/P EST LOW 20 MIN: CPT | Mod: S$GLB,,, | Performed by: NURSE PRACTITIONER

## 2020-09-25 PROCEDURE — 1159F PR MEDICATION LIST DOCUMENTED IN MEDICAL RECORD: ICD-10-PCS | Mod: S$GLB,,, | Performed by: NURSE PRACTITIONER

## 2020-09-25 PROCEDURE — 3079F PR MOST RECENT DIASTOLIC BLOOD PRESSURE 80-89 MM HG: ICD-10-PCS | Mod: S$GLB,,, | Performed by: NURSE PRACTITIONER

## 2020-09-25 PROCEDURE — 3074F PR MOST RECENT SYSTOLIC BLOOD PRESSURE < 130 MM HG: ICD-10-PCS | Mod: S$GLB,,, | Performed by: NURSE PRACTITIONER

## 2020-09-25 PROCEDURE — 3008F BODY MASS INDEX DOCD: CPT | Mod: S$GLB,,, | Performed by: NURSE PRACTITIONER

## 2020-09-25 PROCEDURE — 1159F MED LIST DOCD IN RCRD: CPT | Mod: S$GLB,,, | Performed by: NURSE PRACTITIONER

## 2020-09-25 NOTE — PROGRESS NOTES
SUBJECTIVE:      Patient ID: Eduardo Esposito is a 70 y.o. female.    Chief Complaint: Pre-op Exam (toe nail surgery right )    Established patient here for pre-op surgery clearance.  She is scheduled to have a right toenail removal next Friday with podiatry d/t toenail fungus.  She does have a history of hypertension and atrial fibrillation.  She is currently taking a blood thinner- Eliquis.  She had recent labs done which show she is in a pre diabetes- A1C 5.9, she is not on any diabetic medications currently.  She is planning on calling her cardiologist today to schedule a preop surgery clearance visit as well.  She denies any complaints of chest pain, shortness of breath, edema, or activity intolerance.  She climbs flights of stairs and completes all of her own ADLs without assistance.  She is able to walk briskly and has been exercising at home without complaints.       Family History   Problem Relation Age of Onset    Breast cancer Mother     Alzheimer's disease Father     Hypertension Father     Pulmonary embolism Brother     Glaucoma Neg Hx     Macular degeneration Neg Hx     Retinal detachment Neg Hx       Social History     Socioeconomic History    Marital status:      Spouse name: Not on file    Number of children: Not on file    Years of education: Not on file    Highest education level: Not on file   Occupational History    Not on file   Social Needs    Financial resource strain: Somewhat hard    Food insecurity     Worry: Never true     Inability: Never true    Transportation needs     Medical: No     Non-medical: No   Tobacco Use    Smoking status: Never Smoker    Smokeless tobacco: Never Used   Substance and Sexual Activity    Alcohol use: No     Frequency: Never     Drinks per session: Patient refused     Binge frequency: Never    Drug use: No    Sexual activity: Not Currently   Lifestyle    Physical activity     Days per week: 5 days     Minutes per session: 60 min     Stress: Not at all   Relationships    Social connections     Talks on phone: Patient refused     Gets together: Twice a week     Attends [employer] service: Not on file     Active member of club or organization: Yes     Attends meetings of clubs or organizations: 1 to 4 times per year     Relationship status:    Other Topics Concern    Not on file   Social History Narrative    Not on file     Current Outpatient Medications   Medication Sig Dispense Refill    anastrozole (ARIMIDEX) 1 mg Tab Take 1 tablet (1 mg total) by mouth once daily. 90 tablet 10    ascorbic acid, vitamin C, (VITAMIN C) 100 MG tablet Take 100 mg by mouth once daily.      atorvastatin (LIPITOR) 80 MG tablet Take 0.5 tablets (40 mg total) by mouth every evening. 30 tablet 0    b complex vitamins tablet Take 1 tablet by mouth once daily.      ELIQUIS 5 mg Tab Take 1 tablet (5 mg total) by mouth 2 (two) times daily. 180 tablet 1    ergocalciferol, vitamin D2, (VITAMIN D ORAL) Take by mouth.      hydroCHLOROthiazide (HYDRODIURIL) 12.5 MG Tab Take 25 mg by mouth once daily.      losartan (COZAAR) 100 MG tablet Take 1 tablet (100 mg total) by mouth every evening. 90 tablet 1     No current facility-administered medications for this visit.      Review of patient's allergies indicates:   Allergen Reactions    Lisinopril      hives      Past Medical History:   Diagnosis Date    Atrial fibrillation     4/11/13-recent dx sceduled to see cardiology Monday- Dr Lynn-dm    Breast cancer     Cancer     breast    Diabetes mellitus     Diabetes mellitus type I     Exposure to STD     Hyperlipemia     Hypertension     Obesity      Past Surgical History:   Procedure Laterality Date    AXILLARY NODE DISSECTION Left 6/13/2019    Procedure: LYMPHADENECTOMY, AXILLARY;  Surgeon: Kendall Howard MD;  [facility];  Service: General;  Laterality: Left;  left lumpectomy with left axillary lymph node dissection    BREAST SURGERY       lumpectomy left    HIP REPLACEMENT ARTHROPLASTY Right 11/27/2019    Procedure: ARTHROPLASTY, HIP REPLACEMENT;  Surgeon: Rahul Hanson II, MD;  Location: [facility] OR;  Service: Orthopedics;  Laterality: Right;    JOINT REPLACEMENT Right     knee    NEEDLE LOCALIZATION Left 6/13/2019    Procedure: NEEDLE LOCALIZATION;  Surgeon: Kendall Howard MD;  Location: [facility] OR;  Service: General;  Laterality: Left;  left lumpectomy with wire needle localization    SENTINEL LYMPH NODE BIOPSY Left 6/13/2019    Procedure: BIOPSY, LYMPH NODE, SENTINEL;  Surgeon: Kendall Howard MD;  Location: [facility] OR;  Service: General;  Laterality: Left;  left sentinel lymph node dissection    TONSILLECTOMY      TOTAL KNEE ARTHROPLASTY         Review of Systems   Constitutional: Negative for activity change, chills, diaphoresis, fatigue, fever and unexpected weight change.   HENT: Negative for congestion, rhinorrhea, sore throat and trouble swallowing.    Eyes: Negative for pain, discharge and visual disturbance.   Respiratory: Negative for cough, chest tightness, shortness of breath and wheezing.    Cardiovascular: Negative for chest pain, palpitations and leg swelling.   Gastrointestinal: Negative for abdominal pain, diarrhea, nausea and vomiting.   Endocrine: Negative for polydipsia and polyuria.   Genitourinary: Negative for difficulty urinating, dysuria, frequency, hematuria, menstrual problem and vaginal bleeding.   Musculoskeletal: Negative for myalgias.   Skin: Negative for pallor, rash and wound.   Neurological: Negative for dizziness, syncope, speech difficulty, weakness, light-headedness, numbness and headaches.   Hematological: Negative for adenopathy. Bruises/bleeds easily.   Psychiatric/Behavioral: Negative for confusion, dysphoric mood, sleep disturbance and suicidal ideas. The patient is not nervous/anxious.       OBJECTIVE:      Vitals:    09/25/20 1253   BP: 102/80   BP Location: Right arm   Patient Position:  "Sitting   BP Method: Large (Manual)   Pulse: (!) 50   Temp: 97.7 °F (36.5 °C)   TempSrc: Temporal   SpO2: 96%   Weight: 104.5 kg (230 lb 6.4 oz)   Height: 5' 6" (1.676 m)     Physical Exam  Vitals signs and nursing note reviewed.   Constitutional:       General: She is not in acute distress.     Appearance: Normal appearance. She is well-developed. She is obese. She is not diaphoretic.      Comments: Obese    HENT:      Head: Normocephalic and atraumatic.      Mouth/Throat:      Pharynx: No oropharyngeal exudate.   Eyes:      Conjunctiva/sclera: Conjunctivae normal.      Pupils: Pupils are equal, round, and reactive to light.   Neck:      Musculoskeletal: Normal range of motion and neck supple.      Thyroid: No thyromegaly.   Cardiovascular:      Rate and Rhythm: Regular rhythm. Bradycardia present.      Pulses: Normal pulses.      Heart sounds: Normal heart sounds. No murmur.   Pulmonary:      Effort: Pulmonary effort is normal. No respiratory distress.      Breath sounds: Normal breath sounds. No wheezing, rhonchi or rales.   Abdominal:      General: Bowel sounds are normal.      Palpations: Abdomen is soft.      Tenderness: There is no abdominal tenderness.   Musculoskeletal: Normal range of motion.      Right lower leg: No edema.      Left lower leg: No edema.   Lymphadenopathy:      Cervical: No cervical adenopathy.   Skin:     General: Skin is warm and dry.      Capillary Refill: Capillary refill takes less than 2 seconds.      Coloration: Skin is not pale.      Findings: No rash.   Neurological:      Mental Status: She is alert and oriented to person, place, and time.   Psychiatric:         Mood and Affect: Mood normal.         Behavior: Behavior normal.        Assessment:       1. Preoperative clearance    2. Chronic atrial fibrillation    3. Essential hypertension    4. Impaired fasting glucose        Plan:       Preoperative clearance   *pt able to complete >4 METS without symptoms    * patient needs to " follow-up with cardiology for clearance and instructions for Eliquis      Chronic atrial fibrillation    Essential hypertension    Impaired fasting glucose        No follow-ups on file.      9/25/2020 Harmony Martines, CHAVO, FNP

## 2020-09-28 PROBLEM — B35.1 ONYCHOMYCOSIS DUE TO DERMATOPHYTE: Status: ACTIVE | Noted: 2020-09-28

## 2020-09-28 NOTE — PROGRESS NOTES
Subjective:      Patient ID: Eduardo Esposito is a 70 y.o. female.    Chief Complaint: Nail Problem (L foot great toe)      HPI:  Eduardo Esposito is a 70 y.o. female who presents to clinic with a chief complaint of deformed great toenails.  Patient reports having a history of toenail fungus in both great toenails.  She states that she had the left great toenail removed previously, which partially re-grew.  She denies any pain but states that both toenails frequently catch on her socks and is worried that they will rip off.  She informs of having difficulty trimming her toenails herself due to the thickness in both great toenails.  She states that she has tried multiple treatments over the years to get rid of the toenail fungus to no avail.  She relates wanting to have both great toenails removed.  Patient denies any other pedal complaints at this time.    PCP:   BOBY Martínez  Date last seen: 9/25/2020    Review of Systems   Constitutional: Negative for appetite change, fever, chills, fatigue and unexpected weight change.   Respiratory: Negative for cough, wheezing, and shortness of breath.   Cardiovascular: Negative for chest pain, claudication, cyanosis, and leg swelling.  Endocrine:  Negative for intolerance to cold, intolerance to heat, polydipsia, polyphagia, and polyuria.    Gastrointestinal: Negative for nausea, vomiting, diarrhea, and constipation.   Musculoskeletal: Negative for back pain, arthritis, joint pain, joint swelling, myalgias, and stiffness.   Skin: Negative for rash, itching, poor wound healing, suspicious lesion, and unusual hair distribution.  Positive for nail bed changes, discoloration.  Neurological: Negative for loss of balance, sensory change, paresthesias, and numbness.   Hematological: Negative for adenopathy.  Positive for bleeding, bruising easily.   Psychiatric/Behavioral: The patient is not nervous/anxious.  Negative for altered mental status.    Hemoglobin A1C   Date Value  Ref Range Status   08/25/2020 5.9 (H) 4.0 - 5.6 % Final     Comment:     ADA Screening Guidelines:  5.7-6.4%  Consistent with prediabetes  >or=6.5%  Consistent with diabetes  High levels of fetal hemoglobin interfere with the HbA1C  assay. Heterozygous hemoglobin variants (HbS, HgC, etc)do  not significantly interfere with this assay.   However, presence of multiple variants may affect accuracy.     01/08/2020 5.5 4.0 - 5.6 % Final     Comment:     ADA Screening Guidelines:  5.7-6.4%  Consistent with prediabetes  >or=6.5%  Consistent with diabetes  High levels of fetal hemoglobin interfere with the HbA1C  assay. Heterozygous hemoglobin variants (HbS, HgC, etc)do  not significantly interfere with this assay.   However, presence of multiple variants may affect accuracy.     07/10/2019 5.7 (H) 4.0 - 5.6 % Final     Comment:     ADA Screening Guidelines:  5.7-6.4%  Consistent with prediabetes  >or=6.5%  Consistent with diabetes  High levels of fetal hemoglobin interfere with the HbA1C  assay. Heterozygous hemoglobin variants (HbS, HgC, etc)do  not significantly interfere with this assay.   However, presence of multiple variants may affect accuracy.         Past Medical History:   Diagnosis Date    Atrial fibrillation     4/11/13-recent dx sceduled to see cardiology Monday- Dr Lynn-dm    Breast cancer     Cancer     breast    Diabetes mellitus     Diabetes mellitus type I     Exposure to STD     Hyperlipemia     Hypertension     Obesity      Past Surgical History:   Procedure Laterality Date    AXILLARY NODE DISSECTION Left 6/13/2019    Procedure: LYMPHADENECTOMY, AXILLARY;  Surgeon: Kendall Howard MD;  Location: [facility];  Service: General;  Laterality: Left;  left lumpectomy with left axillary lymph node dissection    BREAST SURGERY      lumpectomy left    HIP REPLACEMENT ARTHROPLASTY Right 11/27/2019    Procedure: ARTHROPLASTY, HIP REPLACEMENT;  Surgeon: Rahul Hanson II, MD;  Location: [facility];   Service: Orthopedics;  Laterality: Right;    JOINT REPLACEMENT Right     knee    NEEDLE LOCALIZATION Left 6/13/2019    Procedure: NEEDLE LOCALIZATION;  Surgeon: Kendall Howard MD;  Location: [facility] OR;  Service: General;  Laterality: Left;  left lumpectomy with wire needle localization    SENTINEL LYMPH NODE BIOPSY Left 6/13/2019    Procedure: BIOPSY, LYMPH NODE, SENTINEL;  Surgeon: Kendall Howard MD;  Location: [facility] OR;  Service: General;  Laterality: Left;  left sentinel lymph node dissection    TONSILLECTOMY      TOTAL KNEE ARTHROPLASTY       Family History   Problem Relation Age of Onset    Breast cancer Mother     Alzheimer's disease Father     Hypertension Father     Pulmonary embolism Brother     Glaucoma Neg Hx     Macular degeneration Neg Hx     Retinal detachment Neg Hx      Social History     Socioeconomic History    Marital status:      Spouse name: Not on file    Number of children: Not on file    Years of education: Not on file    Highest education level: Not on file   Occupational History    Not on file   Social Needs    Financial resource strain: Somewhat hard    Food insecurity     Worry: Never true     Inability: Never true    Transportation needs     Medical: No     Non-medical: No   Tobacco Use    Smoking status: Never Smoker    Smokeless tobacco: Never Used   Substance and Sexual Activity    Alcohol use: No     Frequency: Never     Drinks per session: Patient refused     Binge frequency: Never    Drug use: No    Sexual activity: Not Currently   Lifestyle    Physical activity     Days per week: 5 days     Minutes per session: 60 min    Stress: Not at all   Relationships    Social connections     Talks on phone: Patient refused     Gets together: Twice a week     Attends Restoration service: Not on file     Active member of club or organization: Yes     Attends meetings of clubs or organizations: 1 to 4 times per year     Relationship status:   "  Other Topics Concern    Not on file   Social History Narrative    Not on file           Objective:        Ht 5' 6" (1.676 m)   Wt 104.8 kg (231 lb)   LMP  (LMP Unknown)   BMI 37.28 kg/m²     Physical Exam   Constitutional: Patient is oriented to person, place, and time. Patient appears well-developed and well-nourished. No acute distress.     Psychiatric: Patient has a normal mood and affect. Patient's speech is normal and behavior is normal. Judgment is normal. Cognition and memory are normal.     Bilateral pedal exam was performed today.  Vascular: Pedal pulses palpable 1/4 DP & PT.  CFT is = 3 seconds to the hallux.  Skin temperature is warm to cool proximal tibia to distal toes without localized increase in calor noted.  No erythema, edema, or ecchymosis noted to the foot or ankle.  Hair growth decreased distally to the LE.     Musculoskeletal: Ankle joint ROM is decreased. Subtalar joint ROM is decreased.  Midtarsal joint ROM is decreased.  1st ray ROM is decreased.  1st  MTPJ ROM is decreased.  Ankle joint dorsiflexion is restricted with the knee extended and flexed per Silfverskiold exam.    Muscle strength is 5/5 for all LE muscle groups tested.    Neurological:  Epicritic sensation is grossly Intact to the foot.   Achilles DTR and Chaddock STR are Intact to bilateral lower extremities.   Negative Babinski sign bilateral lower extremities.  Negative clonus sign bilateral lower extremities.  No tenderness to palpation noted to the foot or ankle.    Dermatological: Right hallux toenail is thickened, dystrophic, brittle, discolored, and mycotic with subungal debris present.  Left hallux toenail is a residual nail spicule secondary to previous incomplete permanent total nail avulsion procedure.  Toenails 2-5 bilateral are WNL in length and thickness.  Webspaces 1-4 bilateral are clean, dry, and intact.  Skin turgor is supple.  No dry, flaky skin noted to the LE.  No open wounds or suspicious pigmented " lesions appreciable to the foot or ankle.    Nursing note and vitals reviewed.        Assessment:       Encounter Diagnoses   Name Primary?    Deformity of toenail Yes    Onychomycosis due to dermatophyte          Plan:       Eduardo was seen today for nail problem.    Diagnoses and all orders for this visit:    Deformity of toenail  -     Case Request Operating Room: EXCISION, NAIL MATRIX    Left hallux Zadik procedure, right hallux permanent total nail avulsion  -     Cancel: COVID-19 Routine Screening  -     COVID-19 Routine Screening; Future    Onychomycosis due to dermatophyte      I counseled the patient on her conditions, their implications and medical management.    - Discussed with patient conservative and surgical treatment options along with pertinent risks, benefits, and complications.  She opted for Zadik procedure of the left hallux toenail and permanent total nail avulsion of the right hallux toenail.  Due to more delicate procedure on the left hallux, recommended procedures be performed in the OR setting to which patient was amenable and opted for [facility] - scheduled for 10/2/20.    Patient was given the following recommendations and instructions:  Patient Instructions   - Obtain history and physical from primary care physician for medical clearance for surgery within 1 month of surgery date.    - Stop taking any medication that may thin the blood such as anticoagulants and NSAIDs 5 days prior to surgery date.    - Notify clinic if any new or worsening condition arises.        Jill Marin DPM        Dictation was performed using M*Modal Fluency.  Transcription errors may be present.

## 2020-09-28 NOTE — H&P (VIEW-ONLY)
Subjective:      Patient ID: Eduardo Esposito is a 70 y.o. female.    Chief Complaint: Nail Problem (L foot great toe)      HPI:  Eduardo Esposito is a 70 y.o. female who presents to clinic with a chief complaint of deformed great toenails.  Patient reports having a history of toenail fungus in both great toenails.  She states that she had the left great toenail removed previously, which partially re-grew.  She denies any pain but states that both toenails frequently catch on her socks and is worried that they will rip off.  She informs of having difficulty trimming her toenails herself due to the thickness in both great toenails.  She states that she has tried multiple treatments over the years to get rid of the toenail fungus to no avail.  She relates wanting to have both great toenails removed.  Patient denies any other pedal complaints at this time.    PCP:   BOBY Martínez  Date last seen: 9/25/2020    Review of Systems   Constitutional: Negative for appetite change, fever, chills, fatigue and unexpected weight change.   Respiratory: Negative for cough, wheezing, and shortness of breath.   Cardiovascular: Negative for chest pain, claudication, cyanosis, and leg swelling.  Endocrine:  Negative for intolerance to cold, intolerance to heat, polydipsia, polyphagia, and polyuria.    Gastrointestinal: Negative for nausea, vomiting, diarrhea, and constipation.   Musculoskeletal: Negative for back pain, arthritis, joint pain, joint swelling, myalgias, and stiffness.   Skin: Negative for rash, itching, poor wound healing, suspicious lesion, and unusual hair distribution.  Positive for nail bed changes, discoloration.  Neurological: Negative for loss of balance, sensory change, paresthesias, and numbness.   Hematological: Negative for adenopathy.  Positive for bleeding, bruising easily.   Psychiatric/Behavioral: The patient is not nervous/anxious.  Negative for altered mental status.    Hemoglobin A1C   Date Value  Ref Range Status   08/25/2020 5.9 (H) 4.0 - 5.6 % Final     Comment:     ADA Screening Guidelines:  5.7-6.4%  Consistent with prediabetes  >or=6.5%  Consistent with diabetes  High levels of fetal hemoglobin interfere with the HbA1C  assay. Heterozygous hemoglobin variants (HbS, HgC, etc)do  not significantly interfere with this assay.   However, presence of multiple variants may affect accuracy.     01/08/2020 5.5 4.0 - 5.6 % Final     Comment:     ADA Screening Guidelines:  5.7-6.4%  Consistent with prediabetes  >or=6.5%  Consistent with diabetes  High levels of fetal hemoglobin interfere with the HbA1C  assay. Heterozygous hemoglobin variants (HbS, HgC, etc)do  not significantly interfere with this assay.   However, presence of multiple variants may affect accuracy.     07/10/2019 5.7 (H) 4.0 - 5.6 % Final     Comment:     ADA Screening Guidelines:  5.7-6.4%  Consistent with prediabetes  >or=6.5%  Consistent with diabetes  High levels of fetal hemoglobin interfere with the HbA1C  assay. Heterozygous hemoglobin variants (HbS, HgC, etc)do  not significantly interfere with this assay.   However, presence of multiple variants may affect accuracy.         Past Medical History:   Diagnosis Date    Atrial fibrillation     4/11/13-recent dx sceduled to see cardiology Monday- Dr Lynn-dm    Breast cancer     Cancer     breast    Diabetes mellitus     Diabetes mellitus type I     Exposure to STD     Hyperlipemia     Hypertension     Obesity      Past Surgical History:   Procedure Laterality Date    AXILLARY NODE DISSECTION Left 6/13/2019    Procedure: LYMPHADENECTOMY, AXILLARY;  Surgeon: Kendall Howard MD;  Location: [facility];  Service: General;  Laterality: Left;  left lumpectomy with left axillary lymph node dissection    BREAST SURGERY      lumpectomy left    HIP REPLACEMENT ARTHROPLASTY Right 11/27/2019    Procedure: ARTHROPLASTY, HIP REPLACEMENT;  Surgeon: Rahul Hanson II, MD;  Location: [facility];   Service: Orthopedics;  Laterality: Right;    JOINT REPLACEMENT Right     knee    NEEDLE LOCALIZATION Left 6/13/2019    Procedure: NEEDLE LOCALIZATION;  Surgeon: Kendall Howard MD;  Location: [facility] OR;  Service: General;  Laterality: Left;  left lumpectomy with wire needle localization    SENTINEL LYMPH NODE BIOPSY Left 6/13/2019    Procedure: BIOPSY, LYMPH NODE, SENTINEL;  Surgeon: Kendall Howard MD;  Location: [facility] OR;  Service: General;  Laterality: Left;  left sentinel lymph node dissection    TONSILLECTOMY      TOTAL KNEE ARTHROPLASTY       Family History   Problem Relation Age of Onset    Breast cancer Mother     Alzheimer's disease Father     Hypertension Father     Pulmonary embolism Brother     Glaucoma Neg Hx     Macular degeneration Neg Hx     Retinal detachment Neg Hx      Social History     Socioeconomic History    Marital status:      Spouse name: Not on file    Number of children: Not on file    Years of education: Not on file    Highest education level: Not on file   Occupational History    Not on file   Social Needs    Financial resource strain: Somewhat hard    Food insecurity     Worry: Never true     Inability: Never true    Transportation needs     Medical: No     Non-medical: No   Tobacco Use    Smoking status: Never Smoker    Smokeless tobacco: Never Used   Substance and Sexual Activity    Alcohol use: No     Frequency: Never     Drinks per session: Patient refused     Binge frequency: Never    Drug use: No    Sexual activity: Not Currently   Lifestyle    Physical activity     Days per week: 5 days     Minutes per session: 60 min    Stress: Not at all   Relationships    Social connections     Talks on phone: Patient refused     Gets together: Twice a week     Attends Jain service: Not on file     Active member of club or organization: Yes     Attends meetings of clubs or organizations: 1 to 4 times per year     Relationship status:   "  Other Topics Concern    Not on file   Social History Narrative    Not on file           Objective:        Ht 5' 6" (1.676 m)   Wt 104.8 kg (231 lb)   LMP  (LMP Unknown)   BMI 37.28 kg/m²     Physical Exam   Constitutional: Patient is oriented to person, place, and time. Patient appears well-developed and well-nourished. No acute distress.     Psychiatric: Patient has a normal mood and affect. Patient's speech is normal and behavior is normal. Judgment is normal. Cognition and memory are normal.     Bilateral pedal exam was performed today.  Vascular: Pedal pulses palpable 1/4 DP & PT.  CFT is = 3 seconds to the hallux.  Skin temperature is warm to cool proximal tibia to distal toes without localized increase in calor noted.  No erythema, edema, or ecchymosis noted to the foot or ankle.  Hair growth decreased distally to the LE.     Musculoskeletal: Ankle joint ROM is decreased. Subtalar joint ROM is decreased.  Midtarsal joint ROM is decreased.  1st ray ROM is decreased.  1st  MTPJ ROM is decreased.  Ankle joint dorsiflexion is restricted with the knee extended and flexed per Silfverskiold exam.    Muscle strength is 5/5 for all LE muscle groups tested.    Neurological:  Epicritic sensation is grossly Intact to the foot.   Achilles DTR and Chaddock STR are Intact to bilateral lower extremities.   Negative Babinski sign bilateral lower extremities.  Negative clonus sign bilateral lower extremities.  No tenderness to palpation noted to the foot or ankle.    Dermatological: Right hallux toenail is thickened, dystrophic, brittle, discolored, and mycotic with subungal debris present.  Left hallux toenail is a residual nail spicule secondary to previous incomplete permanent total nail avulsion procedure.  Toenails 2-5 bilateral are WNL in length and thickness.  Webspaces 1-4 bilateral are clean, dry, and intact.  Skin turgor is supple.  No dry, flaky skin noted to the LE.  No open wounds or suspicious pigmented " lesions appreciable to the foot or ankle.    Nursing note and vitals reviewed.        Assessment:       Encounter Diagnoses   Name Primary?    Deformity of toenail Yes    Onychomycosis due to dermatophyte          Plan:       Eduardo was seen today for nail problem.    Diagnoses and all orders for this visit:    Deformity of toenail  -     Case Request Operating Room: EXCISION, NAIL MATRIX    Left hallux Zadik procedure, right hallux permanent total nail avulsion  -     Cancel: COVID-19 Routine Screening  -     COVID-19 Routine Screening; Future    Onychomycosis due to dermatophyte      I counseled the patient on her conditions, their implications and medical management.    - Discussed with patient conservative and surgical treatment options along with pertinent risks, benefits, and complications.  She opted for Zadik procedure of the left hallux toenail and permanent total nail avulsion of the right hallux toenail.  Due to more delicate procedure on the left hallux, recommended procedures be performed in the OR setting to which patient was amenable and opted for [facility] - scheduled for 10/2/20.    Patient was given the following recommendations and instructions:  Patient Instructions   - Obtain history and physical from primary care physician for medical clearance for surgery within 1 month of surgery date.    - Stop taking any medication that may thin the blood such as anticoagulants and NSAIDs 5 days prior to surgery date.    - Notify clinic if any new or worsening condition arises.        Jill Marin DPM        Dictation was performed using M*Modal Fluency.  Transcription errors may be present.

## 2020-09-29 ENCOUNTER — TELEPHONE (OUTPATIENT)
Dept: PODIATRY | Facility: CLINIC | Age: 70
End: 2020-09-29

## 2020-09-29 ENCOUNTER — LAB VISIT (OUTPATIENT)
Dept: PRIMARY CARE CLINIC | Facility: CLINIC | Age: 70
End: 2020-09-29
Payer: MEDICARE

## 2020-09-29 DIAGNOSIS — L60.8 DEFORMITY OF TOENAIL: ICD-10-CM

## 2020-09-29 PROCEDURE — U0003 INFECTIOUS AGENT DETECTION BY NUCLEIC ACID (DNA OR RNA); SEVERE ACUTE RESPIRATORY SYNDROME CORONAVIRUS 2 (SARS-COV-2) (CORONAVIRUS DISEASE [COVID-19]), AMPLIFIED PROBE TECHNIQUE, MAKING USE OF HIGH THROUGHPUT TECHNOLOGIES AS DESCRIBED BY CMS-2020-01-R: HCPCS | Mod: HCNC

## 2020-09-29 NOTE — TELEPHONE ENCOUNTER
----- Message from Viky Gar sent at 9/29/2020 11:16 AM CDT -----  Regarding: Surgery clearance received  Contact: patient  Patient calling to see if her surgery clearance was received that was sent over on Saturday afternoon.  Please call to advise     [phone]

## 2020-09-30 ENCOUNTER — TELEPHONE (OUTPATIENT)
Dept: PODIATRY | Facility: CLINIC | Age: 70
End: 2020-09-30

## 2020-09-30 LAB — SARS-COV-2 RNA RESP QL NAA+PROBE: NOT DETECTED

## 2020-09-30 NOTE — TELEPHONE ENCOUNTER
----- Message from Jewell Hubbard sent at 9/30/2020 11:19 AM CDT -----  Type:  Patient Returning Call    Who Called:  Oxana  Who Left Message for Patient:  Briseida  Does the patient know what this is regarding?:    Best Call Back Number:  [phone]  Additional Information:  She said she wanted to let you know that she did not take the eloquis today.  Thank you!

## 2020-09-30 NOTE — TELEPHONE ENCOUNTER
Patient stated that they had to change the time of her COVID test because she came early yesterday.

## 2020-09-30 NOTE — TELEPHONE ENCOUNTER
"----- Message from Jill Marin DPM sent at 9/30/2020 10:31 AM CDT -----  Regarding: FW: Cancellation of Order # [id number]  Please clarify why test was cancelled.  ----- Message -----  From: [employer]  Sent: 9/30/2020   9:41 AM CDT  To: Jill Marin DPM  Subject: Cancellation of Order # [id number]                Order number [id number] for the procedure SARS-COV-2 (COVID-19)   QUALITATIVE PCR [[id number]] has been canceled by [facility] [[id number]]. This procedure was ordered by Jill Marin DPM [[id number]] on Sep 29, 2020 for the patient Eduardo Esposito "Arpita" [[id number]]. The reason for cancellation was "Other".    "

## 2020-10-01 ENCOUNTER — ANESTHESIA EVENT (OUTPATIENT)
Dept: SURGERY | Facility: AMBULARY SURGERY CENTER | Age: 70
End: 2020-10-01
Payer: MEDICARE

## 2020-10-02 ENCOUNTER — HOSPITAL ENCOUNTER (OUTPATIENT)
Facility: AMBULARY SURGERY CENTER | Age: 70
Discharge: HOME | End: 2020-10-02
Attending: PODIATRIST | Admitting: PODIATRIST
Payer: MEDICARE

## 2020-10-02 ENCOUNTER — ANESTHESIA (OUTPATIENT)
Dept: SURGERY | Facility: AMBULARY SURGERY CENTER | Age: 70
End: 2020-10-02
Payer: MEDICARE

## 2020-10-02 DIAGNOSIS — L60.8 NAIL DEFORMITY: ICD-10-CM

## 2020-10-02 DIAGNOSIS — L60.8 DEFORMITY OF TOENAIL: Primary | ICD-10-CM

## 2020-10-02 DIAGNOSIS — B35.1 ONYCHOMYCOSIS DUE TO DERMATOPHYTE: ICD-10-CM

## 2020-10-02 PROCEDURE — 11730 AVULSION NAIL PLATE SIMPLE 1: CPT | Mod: RT | Performed by: PODIATRIST

## 2020-10-02 PROCEDURE — 11750 EXCISION NAIL&NAIL MATRIX: CPT | Mod: T5,HCNC,, | Performed by: PODIATRIST

## 2020-10-02 PROCEDURE — 11750 PR REMOVAL OF NAIL BED: ICD-10-PCS | Mod: T5,HCNC,, | Performed by: PODIATRIST

## 2020-10-02 PROCEDURE — D9220A PRA ANESTHESIA: Mod: ANES,,, | Performed by: ANESTHESIOLOGY

## 2020-10-02 PROCEDURE — 11750 EXCISION NAIL&NAIL MATRIX: CPT | Mod: RT | Performed by: PODIATRIST

## 2020-10-02 PROCEDURE — D9220A PRA ANESTHESIA: ICD-10-PCS | Mod: CRNA,,, | Performed by: NURSE ANESTHETIST, CERTIFIED REGISTERED

## 2020-10-02 PROCEDURE — D9220A PRA ANESTHESIA: ICD-10-PCS | Mod: ANES,,, | Performed by: ANESTHESIOLOGY

## 2020-10-02 PROCEDURE — D9220A PRA ANESTHESIA: Mod: CRNA,,, | Performed by: NURSE ANESTHETIST, CERTIFIED REGISTERED

## 2020-10-02 RX ORDER — ONDANSETRON 2 MG/ML
INJECTION INTRAMUSCULAR; INTRAVENOUS
Status: DISCONTINUED | OUTPATIENT
Start: 2020-10-02 | End: 2020-10-02

## 2020-10-02 RX ORDER — MUPIROCIN 20 MG/G
OINTMENT TOPICAL
Status: DISCONTINUED | OUTPATIENT
Start: 2020-10-02 | End: 2020-10-02 | Stop reason: HOSPADM

## 2020-10-02 RX ORDER — LIDOCAINE HYDROCHLORIDE 20 MG/ML
INJECTION, SOLUTION INFILTRATION; PERINEURAL
Status: DISCONTINUED | OUTPATIENT
Start: 2020-10-02 | End: 2020-10-02 | Stop reason: HOSPADM

## 2020-10-02 RX ORDER — PROPOFOL 10 MG/ML
VIAL (ML) INTRAVENOUS
Status: DISCONTINUED | OUTPATIENT
Start: 2020-10-02 | End: 2020-10-02

## 2020-10-02 RX ORDER — LIDOCAINE HYDROCHLORIDE 10 MG/ML
1 INJECTION, SOLUTION EPIDURAL; INFILTRATION; INTRACAUDAL; PERINEURAL ONCE
Status: DISCONTINUED | OUTPATIENT
Start: 2020-10-02 | End: 2020-10-02 | Stop reason: HOSPADM

## 2020-10-02 RX ORDER — ONDANSETRON 8 MG/1
8 TABLET, ORALLY DISINTEGRATING ORAL EVERY 8 HOURS PRN
Status: DISCONTINUED | OUTPATIENT
Start: 2020-10-02 | End: 2020-10-02 | Stop reason: HOSPADM

## 2020-10-02 RX ORDER — OXYCODONE HYDROCHLORIDE 5 MG/1
5 TABLET ORAL ONCE AS NEEDED
Status: DISCONTINUED | OUTPATIENT
Start: 2020-10-02 | End: 2020-10-02 | Stop reason: HOSPADM

## 2020-10-02 RX ORDER — BUPIVACAINE HYDROCHLORIDE 5 MG/ML
INJECTION, SOLUTION EPIDURAL; INTRACAUDAL
Status: DISCONTINUED | OUTPATIENT
Start: 2020-10-02 | End: 2020-10-02 | Stop reason: HOSPADM

## 2020-10-02 RX ORDER — SODIUM CHLORIDE 0.9 % (FLUSH) 0.9 %
3 SYRINGE (ML) INJECTION EVERY 8 HOURS
Status: DISCONTINUED | OUTPATIENT
Start: 2020-10-02 | End: 2020-10-02 | Stop reason: HOSPADM

## 2020-10-02 RX ORDER — FENTANYL CITRATE 50 UG/ML
25 INJECTION, SOLUTION INTRAMUSCULAR; INTRAVENOUS EVERY 5 MIN PRN
Status: DISCONTINUED | OUTPATIENT
Start: 2020-10-02 | End: 2020-10-02 | Stop reason: HOSPADM

## 2020-10-02 RX ORDER — KETAMINE HYDROCHLORIDE 100 MG/ML
INJECTION, SOLUTION INTRAMUSCULAR; INTRAVENOUS
Status: DISCONTINUED | OUTPATIENT
Start: 2020-10-02 | End: 2020-10-02

## 2020-10-02 RX ORDER — LIDOCAINE HYDROCHLORIDE 20 MG/ML
INJECTION INTRAVENOUS
Status: DISCONTINUED | OUTPATIENT
Start: 2020-10-02 | End: 2020-10-02

## 2020-10-02 RX ORDER — SODIUM CHLORIDE, SODIUM LACTATE, POTASSIUM CHLORIDE, CALCIUM CHLORIDE 600; 310; 30; 20 MG/100ML; MG/100ML; MG/100ML; MG/100ML
500 INJECTION, SOLUTION INTRAVENOUS ONCE
Status: COMPLETED | OUTPATIENT
Start: 2020-10-02 | End: 2020-10-02

## 2020-10-02 RX ORDER — HYDROCODONE BITARTRATE AND ACETAMINOPHEN 5; 325 MG/1; MG/1
1 TABLET ORAL EVERY 4 HOURS PRN
Status: DISCONTINUED | OUTPATIENT
Start: 2020-10-02 | End: 2020-10-02 | Stop reason: HOSPADM

## 2020-10-02 RX ADMIN — Medication 20 MG: at 11:10

## 2020-10-02 RX ADMIN — KETAMINE HYDROCHLORIDE 10 MG: 100 INJECTION, SOLUTION INTRAMUSCULAR; INTRAVENOUS at 10:10

## 2020-10-02 RX ADMIN — Medication 20 MG: at 10:10

## 2020-10-02 RX ADMIN — ONDANSETRON 4 MG: 2 INJECTION INTRAMUSCULAR; INTRAVENOUS at 10:10

## 2020-10-02 RX ADMIN — SODIUM CHLORIDE, SODIUM LACTATE, POTASSIUM CHLORIDE, CALCIUM CHLORIDE 500 ML: 600; 310; 30; 20 INJECTION, SOLUTION INTRAVENOUS at 10:10

## 2020-10-02 RX ADMIN — KETAMINE HYDROCHLORIDE 10 MG: 100 INJECTION, SOLUTION INTRAMUSCULAR; INTRAVENOUS at 11:10

## 2020-10-02 RX ADMIN — LIDOCAINE HYDROCHLORIDE 20 MG: 20 INJECTION INTRAVENOUS at 10:10

## 2020-10-02 NOTE — TRANSFER OF CARE
"Anesthesia Transfer of Care Note    Patient: Eduardo Esposito    Procedure(s) Performed: Procedure(s) (LRB):  EXCISION, NAIL MATRIX  Left hallux Zadik procedure, right hallux permanent total nail avulsion (Bilateral)    Patient location: PACU    Anesthesia Type: MAC    Transport from OR: Transported from OR on 2-3 L/min O2 by NC with adequate spontaneous ventilation    Post pain: adequate analgesia    Post assessment: no apparent anesthetic complications and tolerated procedure well    Post vital signs: stable    Level of consciousness: awake, alert and oriented    Nausea/Vomiting: no nausea/vomiting    Complications: none    Transfer of care protocol was followed      Last vitals:   Visit Vitals  BP (!) 166/81 (BP Location: Right arm, Patient Position: Sitting)   Pulse (!) 56   Temp 36.4 °C (97.5 °F) (Skin)   Resp 19   Ht 5' 6" (1.676 m)   Wt 104.5 kg (230 lb 6.1 oz)   LMP  (LMP Unknown)   SpO2 97%   Breastfeeding No   BMI 37.18 kg/m²     "

## 2020-10-02 NOTE — ANESTHESIA POSTPROCEDURE EVALUATION
Anesthesia Post Evaluation    Patient: Eduardo Esposito    Procedure(s) Performed: Procedure(s) (LRB):  EXCISION, NAIL MATRIX  Left hallux Zadik procedure, right hallux permanent total nail avulsion (Bilateral)    Final Anesthesia Type: general    Patient location during evaluation: PACU  Patient participation: Yes- Able to Participate  Level of consciousness: awake and alert and oriented  Post-procedure vital signs: reviewed and stable  Pain management: adequate  Airway patency: patent    PONV status at discharge: No PONV  Anesthetic complications: no      Cardiovascular status: blood pressure returned to baseline  Respiratory status: unassisted, spontaneous ventilation and room air  Hydration status: euvolemic  Follow-up not needed.          Vitals Value Taken Time   /80 10/02/20 1209   Temp 36.2 °C (97.2 °F) 10/02/20 1141   Pulse 49 10/02/20 1213   Resp 16 10/02/20 1232   SpO2 90 % 10/02/20 1213   Vitals shown include unvalidated device data.      No case tracking events are documented in the log.      Pain/Mi Score: Modified Mi Score: 19 (10/2/2020 11:43 AM)

## 2020-10-02 NOTE — BRIEF OP NOTE
[facility]  Brief Operative Note    Surgery Date: 10/2/2020     Surgeon(s) and Role:     * Jill Marin DPM - Primary    Assisting Surgeon: None    Pre-op Diagnosis:  Deformity of toenail [L60.8], Onychomycosis    Post-op Diagnosis:  Post-Op Diagnosis Codes:     * Deformity of toenail [L60.8], Onychomycosis    Procedure(s) (LRB):  EXCISION, NAIL MATRIX  Left hallux Zadik procedure, right hallux permanent total nail avulsion (Bilateral)    Anesthesia: Local MAC    Description of the findings of the procedure(s): Left hallux medial nail spicule was growing transversely towards central hallux; right hallux toenail was laterally detached secondary to mycosis.    Estimated Blood Loss: < 1 cc; * No values recorded between 10/2/2020 11:02 AM and 10/2/2020 11:46 AM *         Specimens:   Specimen (12h ago, onward)    None            Discharge Note    OUTCOME: Patient tolerated treatment/procedure well without complication and is now ready for discharge.    DISPOSITION: Home or Self Care    FINAL DIAGNOSIS:  Nail deformity    FOLLOWUP: In clinic    DISCHARGE INSTRUCTIONS:    Discharge Procedure Orders   Diet general     Keep surgical extremity elevated     Call MD for:  temperature >100.4     Call MD for:  persistent nausea and vomiting     Call MD for:  severe uncontrolled pain     Call MD for:  difficulty breathing, headache or visual disturbances     Call MD for:  redness, tenderness, or signs of infection (pain, swelling, redness, odor or green/yellow discharge around incision site)     Call MD for:  hives     Call MD for:  persistent dizziness or light-headedness     Remove dressing in 24 hours     Wound care routine (specify)   Order Comments: Wound care routine:  - Perform wound care daily after shower and whenever dressing becomes soiled or wet via removing dressing, cleansing with betadine, patting area dry, applying antibiotic cream, and covering with bandaid.     Activity as tolerated          TIKA RuedaM

## 2020-10-02 NOTE — INTERVAL H&P NOTE
The patient has been examined and the H&P has been reviewed:    I concur with the findings and no changes have occurred since H&P was written.    Surgery risks, benefits and alternative options discussed and understood by patient/family.          Active Hospital Problems    Diagnosis  POA    Nail deformity [L60.8]  Yes      Resolved Hospital Problems   No resolved problems to display.

## 2020-10-02 NOTE — DISCHARGE INSTRUCTIONS
"INGROWN TOENAIL [Excised]  An Ingrown Toenail occurs when the nail grows sideways into the skin alongside the nail. This can cause pain, especially when wearing tight shoes. It can also lead to an infection with redness and swelling.  The side of the nail will need to be removed in order to stop the pain and release any infection present. If there is a lot of redness and swelling, then an antibiotic may also be used. The redness and pain should begin to go away within 48 hours. It will take about 2 weeks for the exposed nail bed to become dry and all of the swelling to go down.  If only the side of the nail was removed it will begin to grow back in a few months. To prevent recurrence, that side of nail bed may be treated with a strong chemical to prevent the nail from regrowing ("ablation").  HOME CARE:  1) Twice a day for the first three days, clean and soak the toe as follows:  · Soak your foot in a tub of warm water for five minutes. Or, hold your toe under a faucet of warm running water for five minutes.  · Clean any remaining crust away with soap and water using a cotton-tipped applicator (Q-tip).  · Apply Bacitracin, Polysporin or other antibiotic ointment to the infected area.  · Cover with a bandage or Band-Aid until the exposed nail bed is dry and there is no more drainage.  2) Change the dressing daily or whenever it becomes wet or dirty.  3) If you were prescribed antibiotics, take them as directed until they are all gone.  4) Wear comfortable shoes with a lot of toe room, or open-toe sandals, while your toe is healing.  5) You may use acetaminophen (Tylenol) or ibuprofen (Motrin, Advil) to control pain, unless another medicine was prescribed. [ NOTE : If you have chronic liver or kidney disease or ever had a stomach ulcer or GI bleeding, talk with your doctor before using these medicines.]  PREVENTION:  1) Wear shoes that fit well. Avoid shoes that pinch the toes together.  2) When you trim your " toenails, do not cut them too short. Cut straight across at the top and do not round the edges.  3) Do not use a sharp object to clean under your nail since this might cause an infection.  4) At first signs of a recurrence, insert a small piece of cotton under that side of the nail to help it grow out straight.  FOLLOW UP with your doctor or this facility as advised by our staff. If the ingrown toenail recurs, follow up with a podiatrist for nail bed ablation.  GET PROMPT MEDICAL ATTENTION if any of the following occur:  · Increasing redness, pain or swelling of the toe  · Red streaks in the skin leading away from the wound  · Continued pus or fluid drainage for more than 24 hours  · Fever of 100.4º F (38º C) or higher, or as directed by your healthcare provider  © 0931-4734 [employer], [address] [place] [address], [place] [address]. All rights reserved. This information is not intended as a substitute for professional medical care. Always follow your healthcare professional's instructions.

## 2020-10-02 NOTE — OP NOTE
Surgeon: Jill Marin DPM  Assist: none  Pre op Dx: deformity of toenail  Post op Dx: same  Procedure: right hallux permanent total nail avulsion (matrixectomy) and left hallux toenail Zadik matrixectomy  Anesthesia: MAC w/ local   Hemostasis: B/L hallux tournikots  EBL: 1 cc  Material: 3-0 nylon  Injections: 12 cc of 1:1 mix of 2% lidocaine plain and 0.5% marcaine plain  Implants: none  Pathology: none  Complications: none  Condition: VSS, NAD, A&Ox3    Operative Report:  Patient was seen in the pre-operative setting resting comfortably on stretcher.  We reviewed planned procedure with pertinent risks and benefits along with alternative conservative and surgical treatment options with no 100% guarantees made or implied.  Patient verbalized all understanding of the discussed above and consented to right hallux permanent total nail avulsion (matrixectomy) and left hallux toenail Zadik matrixectomy.  Written consent was obtained.  Patient's operative side was marked with an X and verified with the patient as the correct limb for planned operation.    Patient was brought back into the operating room and placed on the operating table.  Following initiation of MAC anesthesia, nursing prepped and draped B/L foot in the usual aseptic fashion.  When surgeon ready, room was silenced and timeout was performed and agreed upon.    Attention was directed to B/L hallux where local anesthesia was administered to each hallux in an H block fashion consisting of 6 cc of 1:1 mix of 2% lidocaine plain and 0.5% marcaine plain.  Anesthesia was tested and noted to be sufficient.  Digital tournikots were applied.  Performed permanent total nail avulsion of the right hallux toenail with phenol, flushed surgical wound with isopropyl alcohol, removed tournikot with prompt hyperemic response appreciated, applied antibiotic cream, and covered with non-adherent 4 x 4 gauze, and coban.  Performed Zadik procedure to the left hallux toenail via  excising the left hallux nail spicule in a wedge fashion via #15 scalpel down to the level of bone, applied phenol to the surgical site, flushed surgical wound with copious amount of isopropyl alcohol, approximated skin edges via 3-0 nylon in horizontal mattress technique, removed tournikot with prompt hyperemic response appreciated, and dressed hallux with antibiotic ointment, Adaptic, 4 x 4 gauze, and coban.  Patient tolerated procedures well.    Once patient ready per anesthesiology, patient was transported to PACU with VSS and NVS intact.  Patient was given instructions to be limited weight-bearing to both feet with reduced ADLs and to keep dressings clean, dry, and intact for 24 hours before beginning home wound care.  Patient was given written post-operative instructions and discharged to home per anesthesia protocol.        Jill Marin DPM

## 2020-10-02 NOTE — ANESTHESIA PREPROCEDURE EVALUATION
10/02/2020  Eduardo Esposito is a 70 y.o., female.    Anesthesia Evaluation    I have reviewed the Patient Summary Reports.   I have reviewed the NPO Status.   I have reviewed the Medications.     Review of Systems  Anesthesia Hx:  No problems with previous Anesthesia    Cardiovascular:   Exercise tolerance: good Hypertension Denies CAD.    Denies CABG/stent.     Pulmonary:  Pulmonary Normal    Hepatic/GI:  Hepatic/GI Normal    Neurological:  Neurology Normal    Endocrine:   Diabetes, type 2        Physical Exam  General:  Obesity    Airway/Jaw/Neck:  Airway Findings: Mouth Opening: Normal Tongue: Normal  General Airway Assessment: Adult  Mallampati: II  TM Distance: Normal, at least 6 cm       Chest/Lungs:  Chest/Lungs Clear    Heart/Vascular:  Heart Findings: Normal            Anesthesia Plan  Type of Anesthesia, risks & benefits discussed:  Anesthesia Type:  general  Patient's Preference:   Intra-op Monitoring Plan: standard ASA monitors  Intra-op Monitoring Plan Comments:   Post Op Pain Control Plan: multimodal analgesia and IV/PO Opioids PRN  Post Op Pain Control Plan Comments:   Induction:   IV  Beta Blocker:  Patient is not currently on a Beta-Blocker (No further documentation required).       Informed Consent: Patient understands risks and agrees with Anesthesia plan.  Questions answered. Anesthesia consent signed with patient.  ASA Score: 3     Day of Surgery Review of History & Physical:    H&P update referred to the surgeon.     Anesthesia Plan Notes: I have personally evaluated the patient and discussed risk/benefits/alternatives of general anesthesia.        Ready For Surgery From Anesthesia Perspective.

## 2020-10-06 VITALS
HEART RATE: 48 BPM | RESPIRATION RATE: 19 BRPM | OXYGEN SATURATION: 98 % | DIASTOLIC BLOOD PRESSURE: 80 MMHG | TEMPERATURE: 97 F | WEIGHT: 230.38 LBS | HEIGHT: 66 IN | SYSTOLIC BLOOD PRESSURE: 143 MMHG | BODY MASS INDEX: 37.03 KG/M2

## 2020-10-13 ENCOUNTER — LAB VISIT (OUTPATIENT)
Dept: LAB | Facility: HOSPITAL | Age: 70
End: 2020-10-13
Attending: NURSE PRACTITIONER
Payer: MEDICARE

## 2020-10-13 DIAGNOSIS — E11.9 DIABETES MELLITUS WITHOUT COMPLICATION: Primary | ICD-10-CM

## 2020-10-13 LAB
CHOLEST SERPL-MCNC: 148 MG/DL (ref 120–199)
CHOLEST/HDLC SERPL: 3.5 {RATIO} (ref 2–5)
HDLC SERPL-MCNC: 42 MG/DL (ref 40–75)
HDLC SERPL: 28.4 % (ref 20–50)
LDLC SERPL CALC-MCNC: 71.4 MG/DL (ref 63–159)
NONHDLC SERPL-MCNC: 106 MG/DL
TRIGL SERPL-MCNC: 173 MG/DL (ref 30–150)
TSH SERPL DL<=0.005 MIU/L-ACNC: 0.6 UIU/ML (ref 0.4–4)

## 2020-10-13 PROCEDURE — 84443 ASSAY THYROID STIM HORMONE: CPT | Mod: HCNC

## 2020-10-13 PROCEDURE — 36415 COLL VENOUS BLD VENIPUNCTURE: CPT | Mod: HCNC

## 2020-10-13 PROCEDURE — 80061 LIPID PANEL: CPT | Mod: HCNC

## 2020-10-19 ENCOUNTER — OFFICE VISIT (OUTPATIENT)
Dept: PODIATRY | Facility: CLINIC | Age: 70
End: 2020-10-19
Payer: MEDICARE

## 2020-10-19 VITALS — WEIGHT: 230.38 LBS | BODY MASS INDEX: 37.03 KG/M2 | HEIGHT: 66 IN

## 2020-10-19 DIAGNOSIS — Z98.890 STATUS POST NAIL SURGERY: Primary | ICD-10-CM

## 2020-10-19 PROCEDURE — 99024 PR POST-OP FOLLOW-UP VISIT: ICD-10-PCS | Mod: HCNC,S$GLB,, | Performed by: PODIATRIST

## 2020-10-19 PROCEDURE — 99024 POSTOP FOLLOW-UP VISIT: CPT | Mod: HCNC,S$GLB,, | Performed by: PODIATRIST

## 2020-10-19 PROCEDURE — 99999 PR PBB SHADOW E&M-EST. PATIENT-LVL IV: CPT | Mod: PBBFAC,HCNC,, | Performed by: PODIATRIST

## 2020-10-19 PROCEDURE — 99999 PR PBB SHADOW E&M-EST. PATIENT-LVL IV: ICD-10-PCS | Mod: PBBFAC,HCNC,, | Performed by: PODIATRIST

## 2020-10-19 NOTE — PATIENT INSTRUCTIONS
- Perform wound care daily after shower and whenever dressing becomes soiled or wet via removing dressing, cleansing with betadine, patting area dry, and covering with bandaid.    - Notify clinic if redness, swelling, or pain worsens or fails to improve.

## 2020-10-20 ENCOUNTER — TELEPHONE (OUTPATIENT)
Dept: HEMATOLOGY/ONCOLOGY | Facility: CLINIC | Age: 70
End: 2020-10-20

## 2020-10-20 NOTE — TELEPHONE ENCOUNTER
----- Message from Austin Sanchez sent at 10/20/2020  1:21 PM CDT -----  Type: Needs Medical Advice  Who Called:  Patient    Best Call Back Number: [phone]  Additional Information: Patient states that she would like a callback regarding rescheduling her 11/19 appointment.

## 2020-10-25 PROBLEM — Z98.890 STATUS POST NAIL SURGERY: Status: ACTIVE | Noted: 2020-10-25

## 2020-10-26 NOTE — PROGRESS NOTES
Subjective:      Patient ID: Eduardo Esposito is a 70 y.o. female.    Chief Complaint: Post-op Evaluation (PCP-Breezy,NP-09/25/2020)      HPI:  Eduardo Esposito is a 70 y.o. female who presents to clinic with a chief complaint of wounds on both great toes and retained sutures.  Patient is 2 weeks S/P right hallux permanent total nail avulsion and left hallux Zadik procedure.  She denies experiencing any pain.  She relates trying to dry out the wounds but denies seeing any pus-like drainage.  Patient denies any other pedal complaints at this time.    PCP:   Harmony Martines, BOBY  Date last seen: 9/25/2020    Review of Systems   Constitutional: Negative for appetite change, fever, chills, fatigue and unexpected weight change.   Respiratory: Negative for cough, wheezing, and shortness of breath.   Cardiovascular: Negative for chest pain, claudication, cyanosis, and leg swelling.  Gastrointestinal: Negative for nausea, vomiting, diarrhea, and constipation.   Musculoskeletal: Negative for back pain, arthritis, joint pain, joint swelling, myalgias, and stiffness.   Skin: Negative for rash, itching, poor wound healing, suspicious lesion, and unusual hair distribution.  Positive for nail bed changes, discoloration.  Neurological: Negative for loss of balance, sensory change, paresthesias, and numbness.   Hematological: Negative for adenopathy.  Positive for bleeding, bruising easily.   Psychiatric/Behavioral: The patient is not nervous/anxious.  Negative for altered mental status.    Hemoglobin A1C   Date Value Ref Range Status   08/25/2020 5.9 (H) 4.0 - 5.6 % Final     Comment:     ADA Screening Guidelines:  5.7-6.4%  Consistent with prediabetes  >or=6.5%  Consistent with diabetes  High levels of fetal hemoglobin interfere with the HbA1C  assay. Heterozygous hemoglobin variants (HbS, HgC, etc)do  not significantly interfere with this assay.   However, presence of multiple variants may affect accuracy.     01/08/2020 5.5  4.0 - 5.6 % Final     Comment:     ADA Screening Guidelines:  5.7-6.4%  Consistent with prediabetes  >or=6.5%  Consistent with diabetes  High levels of fetal hemoglobin interfere with the HbA1C  assay. Heterozygous hemoglobin variants (HbS, HgC, etc)do  not significantly interfere with this assay.   However, presence of multiple variants may affect accuracy.     07/10/2019 5.7 (H) 4.0 - 5.6 % Final     Comment:     ADA Screening Guidelines:  5.7-6.4%  Consistent with prediabetes  >or=6.5%  Consistent with diabetes  High levels of fetal hemoglobin interfere with the HbA1C  assay. Heterozygous hemoglobin variants (HbS, HgC, etc)do  not significantly interfere with this assay.   However, presence of multiple variants may affect accuracy.         Past Medical History:   Diagnosis Date    Atrial fibrillation     4/11/13-recent dx sceduled to see cardiology Monday- Dr Lynn-dm    Breast cancer     Cancer     breast    Diabetes mellitus     Diabetes mellitus type I     Exposure to STD     Hyperlipemia     Hypertension     Limb alert care status     NO BP/ IV IN LEFT ARM    Obesity      Past Surgical History:   Procedure Laterality Date    AXILLARY NODE DISSECTION Left 6/13/2019    Procedure: LYMPHADENECTOMY, AXILLARY;  Surgeon: Kendall Howard MD;  Location: [facility] OR;  Service: General;  Laterality: Left;  left lumpectomy with left axillary lymph node dissection    BREAST SURGERY      lumpectomy left    EXCISION OF NAIL MATRIX Bilateral 10/2/2020    Procedure: EXCISION, NAIL MATRIX  Left hallux Zadik procedure, right hallux permanent total nail avulsion;  Surgeon: Jill Marin DPM;  Location[facility] OR;  Service: Podiatry;  Laterality: Bilateral;    HIP REPLACEMENT ARTHROPLASTY Right 11/27/2019    Procedure: ARTHROPLASTY, HIP REPLACEMENT;  Surgeon: Rahul Hanson II, MD;  Location: [facility] OR;  Service: Orthopedics;  Laterality: Right;    JOINT REPLACEMENT Right     knee    NEEDLE LOCALIZATION Left  6/13/2019    Procedure: NEEDLE LOCALIZATION;  Surgeon: Kendall Howard MD;  Location: [facility] OR;  Service: General;  Laterality: Left;  left lumpectomy with wire needle localization    SENTINEL LYMPH NODE BIOPSY Left 6/13/2019    Procedure: BIOPSY, LYMPH NODE, SENTINEL;  Surgeon: Kendall Howard MD;  Location: [facility] OR;  Service: General;  Laterality: Left;  left sentinel lymph node dissection    TONSILLECTOMY      TOTAL KNEE ARTHROPLASTY       Family History   Problem Relation Age of Onset    Breast cancer Mother     Alzheimer's disease Father     Hypertension Father     Pulmonary embolism Brother     Glaucoma Neg Hx     Macular degeneration Neg Hx     Retinal detachment Neg Hx      Social History     Socioeconomic History    Marital status:      Spouse name: Not on file    Number of children: Not on file    Years of education: Not on file    Highest education level: Not on file   Occupational History    Not on file   Social Needs    Financial resource strain: Somewhat hard    Food insecurity     Worry: Never true     Inability: Never true    Transportation needs     Medical: No     Non-medical: No   Tobacco Use    Smoking status: Never Smoker    Smokeless tobacco: Never Used   Substance and Sexual Activity    Alcohol use: No     Frequency: Never     Drinks per session: Patient refused     Binge frequency: Never    Drug use: No    Sexual activity: Not Currently   Lifestyle    Physical activity     Days per week: 5 days     Minutes per session: 60 min    Stress: Not at all   Relationships    Social connections     Talks on phone: Patient refused     Gets together: Twice a week     Attends [employer] service: Not on file     Active member of club or organization: Yes     Attends meetings of clubs or organizations: 1 to 4 times per year     Relationship status:    Other Topics Concern    Not on file   Social History Narrative    Not on file           Objective:        Ht  "5' 6" (1.676 m)   Wt 104.5 kg (230 lb 6.1 oz)   LMP  (LMP Unknown)   BMI 37.18 kg/m²     Physical Exam   Constitutional: Patient is oriented to person, place, and time. Patient appears well-developed and well-nourished. No acute distress.     Psychiatric: Patient has a normal mood and affect. Patient's speech is normal and behavior is normal. Judgment is normal. Cognition and memory are normal.     Bilateral pedal exam was performed today.  Vascular: Pedal pulses palpable 1/4 DP & PT.  CFT is = 3 seconds to the hallux.  Skin temperature is warm to cool proximal tibia to distal toes without localized increase in calor noted.  No ecchymosis noted to the foot or ankle.  Hair growth decreased distally to the LE.  Mild periwound erythema and edema noted to B/L hallux.     Musculoskeletal: Ankle and pedal joint ROM are decreased.  Ankle joint dorsiflexion is restricted with the knee extended and flexed per Silfverskiold exam.  Muscle strength is 5/5 for all LE muscle groups tested.    Neurological:  Epicritic sensation is grossly Intact to the foot.   Chaddock STR is Intact to the LE.  No tenderness to palpation noted to the foot or ankle.    Dermatological: Right hallux toenail is absent with post-surgical changes noted with sanguinous crust and normal healing without suze signs of infection present.  Left hallux skin edges are well approximated and coapted with sutures intact without signs of infection or drainage present.  Toenails 2-5 bilateral are WNL in length and thickness.  Webspaces 1-4 bilateral are clean, dry, and intact.  Skin turgor is supple.  No dry, flaky skin, open wounds, or suspicious pigmented lesions appreciable to the foot or ankle.    Nursing note and vitals reviewed.        Assessment:       Encounter Diagnosis   Name Primary?    Status post nail surgery Yes         Plan:       Eduardo was seen today for post-op evaluation.    Diagnoses and all orders for this visit:    Status post nail " surgery      I counseled the patient on her conditions, their implications and medical management.    - With patient's permission, cleansed B/L hallux with betadine, performed suture removal from left hallux, and applied Mepilex bandages to B/L hallux.    - Instructed patient on wound care and advised patient to follow up in 2 weeks for wound care if wounds have not completely healed or if any redness or pain occurs.  Patient verbalized all understanding.    Patient was given the following recommendations and instructions:  Patient Instructions   - Perform wound care daily after shower and whenever dressing becomes soiled or wet via removing dressing, cleansing with betadine, patting area dry, and covering with bandaid.    - Notify clinic if redness, swelling, or pain worsens or fails to improve.        Jill Marin DPM        Dictation was performed using M*Modal Fluency.  Transcription errors may be present.

## 2020-10-27 ENCOUNTER — OFFICE VISIT (OUTPATIENT)
Dept: FAMILY MEDICINE | Facility: CLINIC | Age: 70
End: 2020-10-27
Payer: MEDICARE

## 2020-10-27 VITALS
HEIGHT: 66 IN | OXYGEN SATURATION: 98 % | BODY MASS INDEX: 38.01 KG/M2 | TEMPERATURE: 97 F | SYSTOLIC BLOOD PRESSURE: 132 MMHG | WEIGHT: 236.5 LBS | DIASTOLIC BLOOD PRESSURE: 74 MMHG | HEART RATE: 60 BPM

## 2020-10-27 DIAGNOSIS — E78.5 HYPERLIPIDEMIA, UNSPECIFIED HYPERLIPIDEMIA TYPE: ICD-10-CM

## 2020-10-27 DIAGNOSIS — E66.01 CLASS 2 SEVERE OBESITY DUE TO EXCESS CALORIES WITH SERIOUS COMORBIDITY AND BODY MASS INDEX (BMI) OF 38.0 TO 38.9 IN ADULT: ICD-10-CM

## 2020-10-27 DIAGNOSIS — I10 ESSENTIAL HYPERTENSION: Primary | ICD-10-CM

## 2020-10-27 DIAGNOSIS — R73.01 IMPAIRED FASTING GLUCOSE: ICD-10-CM

## 2020-10-27 PROCEDURE — 3078F DIAST BP <80 MM HG: CPT | Mod: S$GLB,,, | Performed by: NURSE PRACTITIONER

## 2020-10-27 PROCEDURE — 1170F FXNL STATUS ASSESSED: CPT | Mod: S$GLB,,, | Performed by: NURSE PRACTITIONER

## 2020-10-27 PROCEDURE — 1159F MED LIST DOCD IN RCRD: CPT | Mod: S$GLB,,, | Performed by: NURSE PRACTITIONER

## 2020-10-27 PROCEDURE — 99213 OFFICE O/P EST LOW 20 MIN: CPT | Mod: S$GLB,,, | Performed by: NURSE PRACTITIONER

## 2020-10-27 PROCEDURE — 1126F PR PAIN SEVERITY QUANTIFIED, NO PAIN PRESENT: ICD-10-PCS | Mod: S$GLB,,, | Performed by: NURSE PRACTITIONER

## 2020-10-27 PROCEDURE — 3075F PR MOST RECENT SYSTOLIC BLOOD PRESS GE 130-139MM HG: ICD-10-PCS | Mod: S$GLB,,, | Performed by: NURSE PRACTITIONER

## 2020-10-27 PROCEDURE — 3075F SYST BP GE 130 - 139MM HG: CPT | Mod: S$GLB,,, | Performed by: NURSE PRACTITIONER

## 2020-10-27 PROCEDURE — 1126F AMNT PAIN NOTED NONE PRSNT: CPT | Mod: S$GLB,,, | Performed by: NURSE PRACTITIONER

## 2020-10-27 PROCEDURE — 1101F PT FALLS ASSESS-DOCD LE1/YR: CPT | Mod: S$GLB,,, | Performed by: NURSE PRACTITIONER

## 2020-10-27 PROCEDURE — 3078F PR MOST RECENT DIASTOLIC BLOOD PRESSURE < 80 MM HG: ICD-10-PCS | Mod: S$GLB,,, | Performed by: NURSE PRACTITIONER

## 2020-10-27 PROCEDURE — 1101F PR PT FALLS ASSESS DOC 0-1 FALLS W/OUT INJ PAST YR: ICD-10-PCS | Mod: S$GLB,,, | Performed by: NURSE PRACTITIONER

## 2020-10-27 PROCEDURE — 3008F PR BODY MASS INDEX (BMI) DOCUMENTED: ICD-10-PCS | Mod: S$GLB,,, | Performed by: NURSE PRACTITIONER

## 2020-10-27 PROCEDURE — 1159F PR MEDICATION LIST DOCUMENTED IN MEDICAL RECORD: ICD-10-PCS | Mod: S$GLB,,, | Performed by: NURSE PRACTITIONER

## 2020-10-27 PROCEDURE — 3008F BODY MASS INDEX DOCD: CPT | Mod: S$GLB,,, | Performed by: NURSE PRACTITIONER

## 2020-10-27 PROCEDURE — 1170F PR FUNCTIONAL STATUS ASSESSED: ICD-10-PCS | Mod: S$GLB,,, | Performed by: NURSE PRACTITIONER

## 2020-10-27 PROCEDURE — 99213 PR OFFICE/OUTPT VISIT, EST, LEVL III, 20-29 MIN: ICD-10-PCS | Mod: S$GLB,,, | Performed by: NURSE PRACTITIONER

## 2020-10-27 RX ORDER — HYDROCHLOROTHIAZIDE 25 MG/1
25 TABLET ORAL DAILY
Qty: 90 TABLET | Refills: 1 | Status: SHIPPED | OUTPATIENT
Start: 2020-10-27 | End: 2021-04-28 | Stop reason: SDUPTHER

## 2020-10-27 RX ORDER — ATORVASTATIN CALCIUM 80 MG/1
40 TABLET, FILM COATED ORAL NIGHTLY
Qty: 30 TABLET | Refills: 0 | Status: SHIPPED | OUTPATIENT
Start: 2020-10-27 | End: 2020-10-27 | Stop reason: SDUPTHER

## 2020-10-27 RX ORDER — ATORVASTATIN CALCIUM 80 MG/1
40 TABLET, FILM COATED ORAL NIGHTLY
Qty: 90 TABLET | Refills: 1 | Status: SHIPPED | OUTPATIENT
Start: 2020-10-27 | End: 2021-04-22

## 2020-10-27 RX ORDER — LOSARTAN POTASSIUM 100 MG/1
100 TABLET ORAL NIGHTLY
Qty: 90 TABLET | Refills: 1 | Status: SHIPPED | OUTPATIENT
Start: 2020-10-27 | End: 2021-04-28 | Stop reason: SDUPTHER

## 2020-10-27 NOTE — PROGRESS NOTES
SUBJECTIVE:      Patient ID: Eduardo Esposito is a 70 y.o. female.    Chief Complaint: Follow-up (DM & HTN)    Established patient here for follow-up on lab work, hypertension, hyperlipidemia and impaired fasting glucose.  Her cholesterol was very good except for triglycerides recently elevated.  Last A1c in August was 5.9.  She is taking her medication as prescribed daily without side effects or complaints.  She is not currently on any diabetes medications and is monitoring her blood sugars daily.  She is watching her diet and recently started exercising some at home.  She has gained about 6 lb since her last visit.  She had her cataract surgery since the last visit and states this procedure went well.  She brought in a log today of blood sugars and blood pressures from home to review.        Hypertension  This is a chronic problem. The current episode started more than 1 year ago. The problem is unchanged. The problem is controlled. Pertinent negatives include no anxiety, blurred vision, chest pain, headaches, orthopnea, palpitations, peripheral edema or shortness of breath. There are no associated agents to hypertension. Risk factors for coronary artery disease include obesity, post-menopausal state, sedentary lifestyle, dyslipidemia and family history. Past treatments include angiotensin blockers, diuretics and lifestyle changes. The current treatment provides moderate improvement. Compliance problems include exercise and diet.  There is no history of angina, kidney disease, CAD/MI, CVA or heart failure. There is no history of chronic renal disease or a thyroid problem.   Hyperlipidemia  This is a chronic problem. The current episode started more than 1 year ago. The problem is controlled (trigs 170s). Recent lipid tests were reviewed and are normal (LDL below goal ). Exacerbating diseases include diabetes (hx of - now has IFG ) and obesity. She has no history of chronic renal disease, hypothyroidism or liver  disease. Factors aggravating her hyperlipidemia include thiazides and fatty foods. Pertinent negatives include no chest pain, leg pain, myalgias or shortness of breath. Current antihyperlipidemic treatment includes statins, diet change and exercise. The current treatment provides moderate improvement of lipids. Compliance problems include adherence to diet and adherence to exercise.  Risk factors for coronary artery disease include a sedentary lifestyle, post-menopausal, obesity, hypertension, dyslipidemia, diabetes mellitus and family history.       Family History   Problem Relation Age of Onset    Breast cancer Mother     Alzheimer's disease Father     Hypertension Father     Pulmonary embolism Brother     Glaucoma Neg Hx     Macular degeneration Neg Hx     Retinal detachment Neg Hx       Social History     Socioeconomic History    Marital status:      Spouse name: Not on file    Number of children: Not on file    Years of education: Not on file    Highest education level: Not on file   Occupational History    Not on file   Social Needs    Financial resource strain: Somewhat hard    Food insecurity     Worry: Never true     Inability: Never true    Transportation needs     Medical: No     Non-medical: No   Tobacco Use    Smoking status: Never Smoker    Smokeless tobacco: Never Used   Substance and Sexual Activity    Alcohol use: No     Frequency: Never     Drinks per session: Patient refused     Binge frequency: Never    Drug use: No    Sexual activity: Not Currently   Lifestyle    Physical activity     Days per week: 5 days     Minutes per session: 60 min    Stress: Not at all   Relationships    Social connections     Talks on phone: Patient refused     Gets together: Twice a week     Attends [employer] service: Not on file     Active member of club or organization: Yes     Attends meetings of clubs or organizations: 1 to 4 times per year     Relationship status:    Other  Topics Concern    Not on file   Social History Narrative    Not on file     Current Outpatient Medications   Medication Sig Dispense Refill    anastrozole (ARIMIDEX) 1 mg Tab Take 1 tablet (1 mg total) by mouth once daily. 90 tablet 10    ascorbic acid, vitamin C, (VITAMIN C) 100 MG tablet Take 100 mg by mouth once daily.      atorvastatin (LIPITOR) 80 MG tablet Take 0.5 tablets (40 mg total) by mouth every evening. 30 tablet 0    b complex vitamins tablet Take 1 tablet by mouth once daily.      ELIQUIS 5 mg Tab Take 1 tablet (5 mg total) by mouth 2 (two) times daily. 180 tablet 1    ergocalciferol, vitamin D2, (VITAMIN D ORAL) Take by mouth.      hydroCHLOROthiazide (HYDRODIURIL) 25 MG tablet Take 1 tablet (25 mg total) by mouth once daily. 90 tablet 1    losartan (COZAAR) 100 MG tablet Take 1 tablet (100 mg total) by mouth every evening. 90 tablet 1     No current facility-administered medications for this visit.      Review of patient's allergies indicates:   Allergen Reactions    Lisinopril      hives      Past Medical History:   Diagnosis Date    Atrial fibrillation     4/11/13-recent dx sceduled to see cardiology Monday- Dr Lynn-dm    Breast cancer     Cancer     breast    Diabetes mellitus     Diabetes mellitus type I     Exposure to STD     Hyperlipemia     Hypertension     Limb alert care status     NO BP/ IV IN LEFT ARM    Obesity      Past Surgical History:   Procedure Laterality Date    AXILLARY NODE DISSECTION Left 6/13/2019    Procedure: LYMPHADENECTOMY, AXILLARY;  Surgeon: Kendall Howard MD;  Location: [facility] OR;  Service: General;  Laterality: Left;  left lumpectomy with left axillary lymph node dissection    BREAST SURGERY      lumpectomy left    EXCISION OF NAIL MATRIX Bilateral 10/2/2020    Procedure: EXCISION, NAIL MATRIX  Left hallux Zadik procedure, right hallux permanent total nail avulsion;  Surgeon: Jill Marin DPM;  Location: [facility] OR;  Service: Podiatry;   Laterality: Bilateral;    HIP REPLACEMENT ARTHROPLASTY Right 11/27/2019    Procedure: ARTHROPLASTY, HIP REPLACEMENT;  Surgeon: Rahul Hanson II, MD;  Location: [facility] OR;  Service: Orthopedics;  Laterality: Right;    JOINT REPLACEMENT Right     knee    NEEDLE LOCALIZATION Left 6/13/2019    Procedure: NEEDLE LOCALIZATION;  Surgeon: Kendall Howard MD;  Location: [facility] OR;  Service: General;  Laterality: Left;  left lumpectomy with wire needle localization    SENTINEL LYMPH NODE BIOPSY Left 6/13/2019    Procedure: BIOPSY, LYMPH NODE, SENTINEL;  Surgeon: Kendall Howard MD;  Location: [facility] OR;  Service: General;  Laterality: Left;  left sentinel lymph node dissection    TONSILLECTOMY      TOTAL KNEE ARTHROPLASTY         Review of Systems   Constitutional: Negative for activity change, appetite change, chills, diaphoresis, fatigue, fever and unexpected weight change.   HENT: Negative for congestion, ear discharge, ear pain, hearing loss, rhinorrhea and trouble swallowing.    Eyes: Negative for blurred vision, pain, discharge and visual disturbance.   Respiratory: Negative for cough, chest tightness, shortness of breath and wheezing.    Cardiovascular: Negative for chest pain, palpitations, orthopnea and leg swelling.   Gastrointestinal: Negative for abdominal pain, blood in stool, constipation, diarrhea, nausea and vomiting.   Endocrine: Negative for polydipsia and polyuria.   Genitourinary: Negative for difficulty urinating, dysuria, frequency, hematuria, menstrual problem and vaginal bleeding.   Musculoskeletal: Negative for myalgias.   Skin: Negative for pallor and rash.   Neurological: Negative for dizziness, syncope, weakness and headaches.   Hematological: Negative for adenopathy. Bruises/bleeds easily.   Psychiatric/Behavioral: Negative for confusion, dysphoric mood, sleep disturbance and suicidal ideas. The patient is not nervous/anxious.       OBJECTIVE:      Vitals:    10/27/20 1032 10/27/20 1100  "  BP: (!) 141/72 132/74   BP Location: Left arm    Patient Position: Sitting    BP Method: Medium (Automatic)    Pulse: 60    Temp: 97 °F (36.1 °C)    SpO2: 98%    Weight: 107.3 kg (236 lb 8 oz)    Height: 5' 6" (1.676 m)      Physical Exam  Vitals signs and nursing note reviewed.   Constitutional:       General: She is not in acute distress.     Appearance: Normal appearance. She is well-developed. She is obese.      Comments: Obese    HENT:      Head: Normocephalic and atraumatic.      Mouth/Throat:      Mouth: Mucous membranes are moist.      Pharynx: Oropharynx is clear. No oropharyngeal exudate.   Eyes:      General: No scleral icterus.     Conjunctiva/sclera: Conjunctivae normal.      Pupils: Pupils are equal, round, and reactive to light.   Neck:      Musculoskeletal: Normal range of motion and neck supple.      Thyroid: No thyromegaly.   Cardiovascular:      Rate and Rhythm: Normal rate and regular rhythm.      Heart sounds: Normal heart sounds. No murmur.   Pulmonary:      Effort: Pulmonary effort is normal. No respiratory distress.      Breath sounds: Normal breath sounds. No wheezing or rales.   Abdominal:      General: Bowel sounds are normal. There is no distension.      Palpations: Abdomen is soft. There is no mass.      Tenderness: There is no abdominal tenderness. There is no guarding.   Musculoskeletal: Normal range of motion.      Right lower leg: No edema.      Left lower leg: No edema.   Lymphadenopathy:      Cervical: No cervical adenopathy.   Skin:     General: Skin is warm and dry.      Capillary Refill: Capillary refill takes less than 2 seconds.      Coloration: Skin is not pale.      Findings: No rash.   Neurological:      Mental Status: She is alert and oriented to person, place, and time.   Psychiatric:         Mood and Affect: Mood normal.         Behavior: Behavior normal.         Thought Content: Thought content normal.         Judgment: Judgment normal.        Assessment:       1. " Essential hypertension    2. Impaired fasting glucose    3. Hyperlipidemia, unspecified hyperlipidemia type    4. Class 2 severe obesity due to excess calories with serious comorbidity and body mass index (BMI) of 38.0 to 38.9 in adult        Plan:       Essential hypertension  -     losartan (COZAAR) 100 MG tablet; Take 1 tablet (100 mg total) by mouth every evening.  Dispense: 90 tablet; Refill: 1  -     hydroCHLOROthiazide (HYDRODIURIL) 25 MG tablet; Take 1 tablet (25 mg total) by mouth once daily.  Dispense: 90 tablet; Refill: 1  -     Comprehensive Metabolic Panel; Future; Expected date: 04/06/2021  -     Urinalysis; Future; Expected date: 04/06/2021    *controlled on meds- continue as prescribed     Impaired fasting glucose  -     Comprehensive Metabolic Panel; Future; Expected date: 04/06/2021  -     Hemoglobin A1C; Future; Expected date: 04/06/2021    *discussed diet, wt loss and reduction in carbs  *continue glucose monitoring     Hyperlipidemia, unspecified hyperlipidemia type  -     atorvastatin (LIPITOR) 80 MG tablet; Take 0.5 tablets (40 mg total) by mouth every evening.  Dispense: 90 tablet; Refill: 1    *discussed decrease carbs and sugars- Limit red meat, butter, fried foods, cheese, and other foods that have a lot of saturated fat. Consume more: lean meats, fish, fruits, vegetables, whole grains, beans, lentils, and nuts.  Weight loss, and 30-45 min of cardiovascular exercise daily.     Class 2 severe obesity due to excess calories with serious comorbidity and body mass index (BMI) of 38.0 to 38.9 in adult        Follow up in about 6 months (around 4/27/2021) for HTN, IFG.      10/27/2020 CHAVO Martínez, FNP

## 2020-10-27 NOTE — PATIENT INSTRUCTIONS
"  Controlling Your Cholesterol  Cholesterol is a waxy substance. It travels in your blood through the blood vessels. When you have high cholesterol, it builds up in the walls of the blood vessels. This makes the vessels narrower. Blood flow decreases. You are then at greater risk for having a heart attack or a stroke.  Good and bad cholesterol  Lipids are fats. Blood is mostly water. Fat and water don't mix. So our bodies need lipoproteins (lipids inside a protein shell) to carry the lipids. The protein shell carries its lipids through the bloodstream. There are two main kinds of lipoproteins:  · LDL (low-density lipoprotein) is known as "bad cholesterol." It mainly carries cholesterol. It delivers this cholesterol to body cells. Excess LDL cholesterol will build up in artery walls. This increases your risk for heart disease and stroke.  · HDL (high-density lipoprotein) is known as "good cholesterol." This protein shell collects excess cholesterol that LDLs have left behind on blood vessel walls. That's why high levels of HDL cholesterol can decrease your risk of heart disease and stroke.  Controlling cholesterol levels  Total cholesterol includes LDL and HDL cholesterol, as well as other fats in the bloodstream. If your total cholesterol is high, follow the steps below to help lower your total cholesterol level:  · Eat less unhealthy fat:  ¨ Cut back on saturated fats and trans (also called hydrogenated) fats by selecting lean cuts of meat, low-fat dairy, and using oils instead of solid fats. Limit baked goods, processed meats, and fried foods. A diet thats high in these fats increases your bad cholesterol. It's not enough to just cut back on foods containing cholesterol.  ¨ Eat about 2 servings of fish per week. Most fish contain omega-3 fatty acids. These help lower blood cholesterol.  ¨ Eat more whole grains and soluble fiber (such as oat bran). These lower overall cholesterol.  · Be active:  ¨ Choose an " activity you enjoy. Walking, swimming, and riding a bike are some good ways to be active.  ¨ Start at a level where you feel comfortable. Increase your time and pace a little each week.  ¨ Work up to 40 minutes of moderate to high intensity physical activity at least 3 to 4 days per week.  ¨ Remember, some activity is better than none.  ¨ If you haven't been exercising regularly, start slowly. Check with your doctor to make sure the exercise plan is right for you.  · Quit smoking. Quitting smoking can improve your lipid levels. It also lowers your risk for heart disease and stroke.  · Weight management. If you are overweight or obese, your health care provider will work with you to lose weight and lower your BMI (body mass index) to a normal or near-normal level. Making diet changes and increasing physical activity can help.  · Take medication as directed. Many people need medication to get their LDL levels to a safe level. Medication to lower cholesterol levels is effective and safe. (But taking medication is not a substitute for exercise or watching your diet!) Your doctor can tell you whether you might benefit from a cholesterol-lowering medication.  Date Last Reviewed: 5/11/2015  © 2802-0530 [employer]. [address] [place] [address], [place] [address]. All rights reserved. This information is not intended as a substitute for professional medical care. Always follow your healthcare professional's instructions.        Established High Blood Pressure    High blood pressure (hypertension) is a chronic disease. Often, healthcare providers dont know what causes it. But it can be caused by certain health conditions and medicines.  If you have high blood pressure, you may not have any symptoms. If you do have symptoms, they may include headache, dizziness, changes in your vision, chest pain, and shortness of breath. But even without symptoms, high blood pressure thats not treated raises your risk for heart  attack and stroke. High blood pressure is a serious health risk and shouldnt be ignored.  A blood pressure reading is made up of two numbers: a higher number over a lower number. The top number is the systolic pressure. The bottom number is the diastolic pressure. A normal blood pressure is a systolic pressure of  less than 120 over a diastolic pressure of less than 80. You will see your blood pressure readings written together. For example, a person with a systolic pressure of 188 and a diastolic pressure of 78 will have 118/78 written in the medical record.  High blood pressure is when either the top number is 140 or higher, or the bottom number is 90 or higher. This must be the result when taking your blood pressure a number of times. The blood pressures between normal and high are called prehypertension.  Home care  If you have high blood pressure, you should do what is listed below to lower your blood pressure. If you are taking medicines for high blood pressure, these methods may reduce or end your need for medicines in the future.  · Begin a weight-loss program if you are overweight.  · Cut back on how much salt you get in your diet. Heres how to do this:  ¨ Dont eat foods that have a lot of salt. These include olives, pickles, smoked meats, and salted potato chips.  ¨ Dont add salt to your food at the table.  ¨ Use only small amounts of salt when cooking.  · Start an exercise program. Talk with your healthcare provider about the type of exercise program that would be best for you. It doesn't have to be hard. Even brisk walking for 20 minutes 3 times a week is a good form of exercise.  · Dont take medicines that stimulate the heart. This includes many over-the-counter cold and sinus decongestant pills and sprays, as well as diet pills. Check the warnings about hypertension on the label. Before buying any over-the-counter medicines or supplements, always ask the pharmacist about the product's potential  interaction with your high blood pressure and your high blood pressure medicines.  · Stimulants such as amphetamine or cocaine could be deadly for someone with high blood pressure. Never take these.  · Limit how much caffeine you get in your diet. Switch to caffeine-free products.  · Stop smoking. If you are a long-time smoker, this can be hard. Talk to your healthcare provider about medicines and nicotine replacement options to help you. Also, enroll in a stop-smoking program to make it more likely that you will quit for good.  · Learn how to handle stress. This is an important part of any program to lower blood pressure. Learn about relaxation methods like meditation, yoga, or biofeedback.  · If your provider prescribed medicines, take them exactly as directed. Missing doses may cause your blood pressure get out of control.  · If you miss a dose or doses, check with your healthcare provider or pharmacist about what to do.  · Consider buying an automatic blood pressure machine. Ask your provider for a recommendation. You can get one of these at most pharmacies.     The American Heart Association recommends the following guidelines for home blood pressure monitoring:  · Don't smoke or drink coffee for 30 minutes before taking your blood pressure.  · Go to the bathroom before the test.  · Relax for 5 minutes before taking the measurement.  · Sit with your back supported (don't sit on a couch or soft chair); keep your feet on the floor uncrossed. Place your arm on a solid flat surface (like a table) with the upper part of the arm at heart level. Place the middle of the cuff directly above the eye of the elbow. Check the monitor's instruction manual for an illustration.  · Take multiple readings. When you measure, take 2 to 3 readings one minute apart and record all of the results.  · Take your blood pressure at the same time every day, or as your healthcare provider recommends.  · Record the date, time, and blood  pressure reading.  · Take the record with you to your next medical appointment. If your blood pressure monitor has a built-in memory, simply take the monitor with you to your next appointment.  · Call your provider if you have several high readings. Don't be frightened by a single high blood pressure reading, but if you get several high readings, check in with your healthcare provider.  · Note: When blood pressure reaches a systolic (top number) of 180 or higher OR diastolic (bottom number) of 110 or higher, seek emergency medical treatment.  Follow-up care  You will need to see your healthcare provider regularly. This is to check your blood pressure and to make changes to your medicines. Make a follow-up appointment as directed. Bring the record of your home blood pressure readings to the appointment.  When to seek medical advice  Call your healthcare provider right away if any of these occur:  · Blood pressure reaches a systolic (upper number) of 180 or higher OR a diastolic (bottom number) of 110 or higher  · Chest pain or shortness of breath  · Severe headache  · Throbbing or rushing sound in the ears  · Nosebleed  · Sudden severe pain in your belly (abdomen)  · Extreme drowsiness, confusion, or fainting  · Dizziness or spinning sensation (vertigo)  · Weakness of an arm or leg or one side of the face  · You have problems speaking or seeing   Date Last Reviewed: 12/1/2016  © 7567-7540 [employer]. [address] [place] [address], [place] [address]. All rights reserved. This information is not intended as a substitute for professional medical care. Always follow your healthcare professional's instructions.

## 2020-11-09 ENCOUNTER — TELEPHONE (OUTPATIENT)
Dept: PODIATRY | Facility: CLINIC | Age: 70
End: 2020-11-09

## 2020-11-09 NOTE — TELEPHONE ENCOUNTER
Spoke with patient and advised I don't see where we called her. She stated that was fine and that everything was okay.

## 2020-11-09 NOTE — TELEPHONE ENCOUNTER
----- Message from Treva Mckeon sent at 11/9/2020 11:03 AM CST -----  Regarding: rtc  Contact: BOB WHITNEY [[id number]]  Patient is returning nurse's phone call.  Please call patient back at [phone].

## 2020-11-11 ENCOUNTER — TELEPHONE (OUTPATIENT)
Dept: HEMATOLOGY/ONCOLOGY | Facility: CLINIC | Age: 70
End: 2020-11-11

## 2020-11-11 ENCOUNTER — HOSPITAL ENCOUNTER (OUTPATIENT)
Dept: RADIOLOGY | Facility: HOSPITAL | Age: 70
Discharge: HOME | End: 2020-11-11
Attending: RADIOLOGY
Payer: MEDICARE

## 2020-11-11 DIAGNOSIS — Z17.0 MALIGNANT NEOPLASM OF CENTRAL PORTION OF LEFT BREAST IN FEMALE, ESTROGEN RECEPTOR POSITIVE: ICD-10-CM

## 2020-11-11 DIAGNOSIS — C50.112 MALIGNANT NEOPLASM OF CENTRAL PORTION OF LEFT BREAST IN FEMALE, ESTROGEN RECEPTOR POSITIVE: ICD-10-CM

## 2020-11-11 PROCEDURE — 77065 DX MAMMO INCL CAD UNI: CPT | Mod: TC,PO,LT

## 2020-11-11 PROCEDURE — 77061 BREAST TOMOSYNTHESIS UNI: CPT | Mod: TC,PO,LT

## 2020-11-11 NOTE — TELEPHONE ENCOUNTER
LM returning pt call.     ----- Message from Rachel Lucio MA sent at 11/11/2020  7:32 AM CST -----  Regarding: appt need rescheduling  Contact: patient  Type: request to reschedule appt   Who Called:  patient       Best Call Back Number: [phone] (home) [phone] (work)    Additional Information:patient need to speak to nurse to reschedule appt on 11/19

## 2020-11-11 NOTE — TELEPHONE ENCOUNTER
R/s pt appts. Pt confirmed new appt date/times.     ----- Message from Lynette Castillo MA sent at 11/11/2020 11:39 AM CST -----  Call Back # [phone]  Pt is requesting a call back R/S her appt

## 2020-11-12 ENCOUNTER — TELEPHONE (OUTPATIENT)
Dept: RADIATION ONCOLOGY | Facility: CLINIC | Age: 70
End: 2020-11-12

## 2020-11-12 DIAGNOSIS — Z17.0 MALIGNANT NEOPLASM OF CENTRAL PORTION OF LEFT BREAST IN FEMALE, ESTROGEN RECEPTOR POSITIVE: Primary | ICD-10-CM

## 2020-11-12 DIAGNOSIS — C50.112 MALIGNANT NEOPLASM OF CENTRAL PORTION OF LEFT BREAST IN FEMALE, ESTROGEN RECEPTOR POSITIVE: Primary | ICD-10-CM

## 2020-11-20 DIAGNOSIS — Z96.641 HISTORY OF RIGHT HIP REPLACEMENT: Primary | ICD-10-CM

## 2020-11-24 NOTE — PROGRESS NOTES
CC:  70-year-old female follows up with her right hip.  She is 1 year out from a right total hip arthroplasty.  Date of surgery was 11/27/2019.  Overall she is doing quite well and has no complaints today.    ROS:    Constitution: Denies chills, fever, and sweats.  HENT: Denies headaches or blurry vision.  Cardiovascular: Denies chest pain or irregular heart beat.  Respiratory: Denies cough or shortness of breath.  Gastrointestinal: Denies abdominal pain, nausea, or vomiting.  Genitourinary:  Denies urinary incontinence, bladder and kidney issues  Musculoskeletal:  Denies muscle cramps.  Neurological: Denies dizziness or focal weakness.  Psychiatric/Behavioral: Normal mental status.  Hematologic/Lymphatic: Denies bleeding problem or easy bruising/bleeding.  Skin: Denies rash or suspicious lesions.    Physical examination     Gen - No acute distress, well nourished, well groomed   Eyes - Extraoccular motions intact, pupils equally round and reactive to light and accommodation   ENT - normocephalic, atruamtic, oropharynx clear   Neck - Supple, no abnormal masses   Cardiovascular - regular rate and rhythm   Pulmonary - clear to auscultation bilaterally, no wheezes, ronchi, or rales   Abdomen - soft, non-tender, non-distended, positive bowel sounds   Psych - The patient is alert and oriented x3 with normal mood and affect    Examination of the Right Lower Extremity    Skin is intact throughout  Motor in intact EHL,FHL,TA,duke  +2 DP/PT  Sensation LT intact D/P/1st    Examination of the Right Hip    C-Sign negative  Logroll negative  Stenchfield negative    Pain with ROM negative    ROM:    Flexion   120  Extension   30  Abduction   45  Adduction   20  External Rotation 45  Internal Rotation 35    Flexion contracture negative    FADIR negative  FADER negative    Tenderness to palpation over lateral and posterolateral greater tochanter negative    X-ray images were examined and personally interpreted by me.  Three views  of the right hip dated 11/30/2020 show a right total hip arthroplasty that is well fixed and in good alignment.    Dx:  Status post right total hip arthroplasty, stable    Plan:  Activity as tolerated.  Follow-up in 1 year with an x-ray.

## 2020-11-27 ENCOUNTER — LAB VISIT (OUTPATIENT)
Dept: LAB | Facility: HOSPITAL | Age: 70
End: 2020-11-27
Attending: INTERNAL MEDICINE
Payer: MEDICARE

## 2020-11-27 DIAGNOSIS — C50.912 INFILTRATING DUCTAL CARCINOMA OF LEFT BREAST: ICD-10-CM

## 2020-11-27 LAB
ALBUMIN SERPL BCP-MCNC: 4.1 G/DL (ref 3.5–5.2)
ALP SERPL-CCNC: 83 U/L (ref 55–135)
ALT SERPL W/O P-5'-P-CCNC: 24 U/L (ref 10–44)
ANION GAP SERPL CALC-SCNC: 12 MMOL/L (ref 8–16)
AST SERPL-CCNC: 20 U/L (ref 10–40)
BASOPHILS # BLD AUTO: 0.03 K/UL (ref 0–0.2)
BASOPHILS NFR BLD: 0.5 % (ref 0–1.9)
BILIRUB SERPL-MCNC: 0.9 MG/DL (ref 0.1–1)
BUN SERPL-MCNC: 24 MG/DL (ref 8–23)
CALCIUM SERPL-MCNC: 9.4 MG/DL (ref 8.7–10.5)
CHLORIDE SERPL-SCNC: 103 MMOL/L (ref 95–110)
CO2 SERPL-SCNC: 26 MMOL/L (ref 23–29)
CREAT SERPL-MCNC: 0.9 MG/DL (ref 0.5–1.4)
DIFFERENTIAL METHOD: ABNORMAL
EOSINOPHIL # BLD AUTO: 0.1 K/UL (ref 0–0.5)
EOSINOPHIL NFR BLD: 1.4 % (ref 0–8)
ERYTHROCYTE [DISTWIDTH] IN BLOOD BY AUTOMATED COUNT: 13.3 % (ref 11.5–14.5)
EST. GFR  (AFRICAN AMERICAN): >60 ML/MIN/1.73 M^2
EST. GFR  (NON AFRICAN AMERICAN): >60 ML/MIN/1.73 M^2
GLUCOSE SERPL-MCNC: 143 MG/DL (ref 70–110)
HCT VFR BLD AUTO: 47 % (ref 37–48.5)
HGB BLD-MCNC: 15.4 G/DL (ref 12–16)
IMM GRANULOCYTES # BLD AUTO: 0.04 K/UL (ref 0–0.04)
IMM GRANULOCYTES NFR BLD AUTO: 0.6 % (ref 0–0.5)
LYMPHOCYTES # BLD AUTO: 1.7 K/UL (ref 1–4.8)
LYMPHOCYTES NFR BLD: 25.3 % (ref 18–48)
MCH RBC QN AUTO: 29.6 PG (ref 27–31)
MCHC RBC AUTO-ENTMCNC: 32.8 G/DL (ref 32–36)
MCV RBC AUTO: 90 FL (ref 82–98)
MONOCYTES # BLD AUTO: 0.5 K/UL (ref 0.3–1)
MONOCYTES NFR BLD: 7.5 % (ref 4–15)
NEUTROPHILS # BLD AUTO: 4.2 K/UL (ref 1.8–7.7)
NEUTROPHILS NFR BLD: 64.7 % (ref 38–73)
NRBC BLD-RTO: 0 /100 WBC
PLATELET # BLD AUTO: 227 K/UL (ref 150–350)
PMV BLD AUTO: 9.3 FL (ref 9.2–12.9)
POTASSIUM SERPL-SCNC: 4.3 MMOL/L (ref 3.5–5.1)
PROT SERPL-MCNC: 7 G/DL (ref 6–8.4)
RBC # BLD AUTO: 5.2 M/UL (ref 4–5.4)
SODIUM SERPL-SCNC: 141 MMOL/L (ref 136–145)
WBC # BLD AUTO: 6.51 K/UL (ref 3.9–12.7)

## 2020-11-27 PROCEDURE — 80053 COMPREHEN METABOLIC PANEL: CPT

## 2020-11-27 PROCEDURE — 85025 COMPLETE CBC W/AUTO DIFF WBC: CPT

## 2020-11-27 PROCEDURE — 36415 COLL VENOUS BLD VENIPUNCTURE: CPT

## 2020-11-30 ENCOUNTER — OFFICE VISIT (OUTPATIENT)
Dept: ORTHOPEDICS | Facility: CLINIC | Age: 70
End: 2020-11-30
Payer: MEDICARE

## 2020-11-30 ENCOUNTER — TELEPHONE (OUTPATIENT)
Dept: HEMATOLOGY/ONCOLOGY | Facility: CLINIC | Age: 70
End: 2020-11-30

## 2020-11-30 ENCOUNTER — HOSPITAL ENCOUNTER (OUTPATIENT)
Dept: RADIOLOGY | Facility: HOSPITAL | Age: 70
Discharge: HOME | End: 2020-11-30
Attending: ORTHOPAEDIC SURGERY
Payer: MEDICARE

## 2020-11-30 ENCOUNTER — PATIENT MESSAGE (OUTPATIENT)
Dept: HEMATOLOGY/ONCOLOGY | Facility: CLINIC | Age: 70
End: 2020-11-30

## 2020-11-30 VITALS — WEIGHT: 236 LBS | BODY MASS INDEX: 37.93 KG/M2 | RESPIRATION RATE: 16 BRPM | HEIGHT: 66 IN

## 2020-11-30 DIAGNOSIS — Z96.641 HISTORY OF RIGHT HIP REPLACEMENT: Primary | ICD-10-CM

## 2020-11-30 DIAGNOSIS — Z96.641 HISTORY OF RIGHT HIP REPLACEMENT: ICD-10-CM

## 2020-11-30 PROCEDURE — 99999 PR PBB SHADOW E&M-EST. PATIENT-LVL III: CPT | Mod: PBBFAC,,, | Performed by: ORTHOPAEDIC SURGERY

## 2020-11-30 PROCEDURE — 1157F ADVNC CARE PLAN IN RCRD: CPT | Mod: S$GLB,,, | Performed by: ORTHOPAEDIC SURGERY

## 2020-11-30 PROCEDURE — 1101F PR PT FALLS ASSESS DOC 0-1 FALLS W/OUT INJ PAST YR: ICD-10-PCS | Mod: CPTII,S$GLB,, | Performed by: ORTHOPAEDIC SURGERY

## 2020-11-30 PROCEDURE — 73502 X-RAY EXAM HIP UNI 2-3 VIEWS: CPT | Mod: 26,RT,, | Performed by: RADIOLOGY

## 2020-11-30 PROCEDURE — 99999 PR PBB SHADOW E&M-EST. PATIENT-LVL III: ICD-10-PCS | Mod: PBBFAC,,, | Performed by: ORTHOPAEDIC SURGERY

## 2020-11-30 PROCEDURE — 3008F BODY MASS INDEX DOCD: CPT | Mod: CPTII,S$GLB,, | Performed by: ORTHOPAEDIC SURGERY

## 2020-11-30 PROCEDURE — 3008F PR BODY MASS INDEX (BMI) DOCUMENTED: ICD-10-PCS | Mod: CPTII,S$GLB,, | Performed by: ORTHOPAEDIC SURGERY

## 2020-11-30 PROCEDURE — 1159F MED LIST DOCD IN RCRD: CPT | Mod: S$GLB,,, | Performed by: ORTHOPAEDIC SURGERY

## 2020-11-30 PROCEDURE — 1101F PT FALLS ASSESS-DOCD LE1/YR: CPT | Mod: CPTII,S$GLB,, | Performed by: ORTHOPAEDIC SURGERY

## 2020-11-30 PROCEDURE — 1159F PR MEDICATION LIST DOCUMENTED IN MEDICAL RECORD: ICD-10-PCS | Mod: S$GLB,,, | Performed by: ORTHOPAEDIC SURGERY

## 2020-11-30 PROCEDURE — 73502 XR HIP 2 VIEW RIGHT: ICD-10-PCS | Mod: 26,RT,, | Performed by: RADIOLOGY

## 2020-11-30 PROCEDURE — 1126F AMNT PAIN NOTED NONE PRSNT: CPT | Mod: S$GLB,,, | Performed by: ORTHOPAEDIC SURGERY

## 2020-11-30 PROCEDURE — 99213 OFFICE O/P EST LOW 20 MIN: CPT | Mod: S$GLB,,, | Performed by: ORTHOPAEDIC SURGERY

## 2020-11-30 PROCEDURE — 99213 PR OFFICE/OUTPT VISIT, EST, LEVL III, 20-29 MIN: ICD-10-PCS | Mod: S$GLB,,, | Performed by: ORTHOPAEDIC SURGERY

## 2020-11-30 PROCEDURE — 1126F PR PAIN SEVERITY QUANTIFIED, NO PAIN PRESENT: ICD-10-PCS | Mod: S$GLB,,, | Performed by: ORTHOPAEDIC SURGERY

## 2020-11-30 PROCEDURE — 3288F PR FALLS RISK ASSESSMENT DOCUMENTED: ICD-10-PCS | Mod: CPTII,S$GLB,, | Performed by: ORTHOPAEDIC SURGERY

## 2020-11-30 PROCEDURE — 1157F PR ADVANCE CARE PLAN OR EQUIV PRESENT IN MEDICAL RECORD: ICD-10-PCS | Mod: S$GLB,,, | Performed by: ORTHOPAEDIC SURGERY

## 2020-11-30 PROCEDURE — 3288F FALL RISK ASSESSMENT DOCD: CPT | Mod: CPTII,S$GLB,, | Performed by: ORTHOPAEDIC SURGERY

## 2020-11-30 PROCEDURE — 3072F LOW RISK FOR RETINOPATHY: CPT | Mod: S$GLB,,, | Performed by: ORTHOPAEDIC SURGERY

## 2020-11-30 PROCEDURE — 73502 X-RAY EXAM HIP UNI 2-3 VIEWS: CPT | Mod: TC,PN,RT

## 2020-11-30 PROCEDURE — 3072F PR LOW RISK FOR RETINOPATHY: ICD-10-PCS | Mod: S$GLB,,, | Performed by: ORTHOPAEDIC SURGERY

## 2020-11-30 NOTE — TELEPHONE ENCOUNTER
LM informing pt of need to change appt to see JOY Rapp as Dr Marin is out of the office this week sick. Encouraged pt to call back to confirm new appt.

## 2020-12-01 ENCOUNTER — OFFICE VISIT (OUTPATIENT)
Dept: HEMATOLOGY/ONCOLOGY | Facility: CLINIC | Age: 70
End: 2020-12-01
Payer: MEDICARE

## 2020-12-01 ENCOUNTER — TELEPHONE (OUTPATIENT)
Dept: HEMATOLOGY/ONCOLOGY | Facility: CLINIC | Age: 70
End: 2020-12-01

## 2020-12-01 VITALS
OXYGEN SATURATION: 97 % | DIASTOLIC BLOOD PRESSURE: 70 MMHG | BODY MASS INDEX: 38.27 KG/M2 | TEMPERATURE: 98 F | HEART RATE: 61 BPM | RESPIRATION RATE: 18 BRPM | HEIGHT: 66 IN | SYSTOLIC BLOOD PRESSURE: 141 MMHG | WEIGHT: 238.13 LBS

## 2020-12-01 DIAGNOSIS — C50.912 INFILTRATING DUCTAL CARCINOMA OF LEFT BREAST: Primary | ICD-10-CM

## 2020-12-01 PROCEDURE — 99213 OFFICE O/P EST LOW 20 MIN: CPT | Mod: S$GLB,,, | Performed by: PHYSICIAN ASSISTANT

## 2020-12-01 PROCEDURE — 99999 PR PBB SHADOW E&M-EST. PATIENT-LVL IV: CPT | Mod: PBBFAC,,, | Performed by: PHYSICIAN ASSISTANT

## 2020-12-01 PROCEDURE — 3078F PR MOST RECENT DIASTOLIC BLOOD PRESSURE < 80 MM HG: ICD-10-PCS | Mod: CPTII,S$GLB,, | Performed by: PHYSICIAN ASSISTANT

## 2020-12-01 PROCEDURE — 99213 PR OFFICE/OUTPT VISIT, EST, LEVL III, 20-29 MIN: ICD-10-PCS | Mod: S$GLB,,, | Performed by: PHYSICIAN ASSISTANT

## 2020-12-01 PROCEDURE — 3077F SYST BP >= 140 MM HG: CPT | Mod: CPTII,S$GLB,, | Performed by: PHYSICIAN ASSISTANT

## 2020-12-01 PROCEDURE — 3078F DIAST BP <80 MM HG: CPT | Mod: CPTII,S$GLB,, | Performed by: PHYSICIAN ASSISTANT

## 2020-12-01 PROCEDURE — 3077F PR MOST RECENT SYSTOLIC BLOOD PRESSURE >= 140 MM HG: ICD-10-PCS | Mod: CPTII,S$GLB,, | Performed by: PHYSICIAN ASSISTANT

## 2020-12-01 PROCEDURE — 1159F MED LIST DOCD IN RCRD: CPT | Mod: S$GLB,,, | Performed by: PHYSICIAN ASSISTANT

## 2020-12-01 PROCEDURE — 99999 PR PBB SHADOW E&M-EST. PATIENT-LVL IV: ICD-10-PCS | Mod: PBBFAC,,, | Performed by: PHYSICIAN ASSISTANT

## 2020-12-01 PROCEDURE — 1159F PR MEDICATION LIST DOCUMENTED IN MEDICAL RECORD: ICD-10-PCS | Mod: S$GLB,,, | Performed by: PHYSICIAN ASSISTANT

## 2020-12-01 NOTE — Clinical Note
Pt will be due for bilateral mammography in April 2020. Please have her come after mammogram with CBC, CMP, and Ca27.29 for surveillance

## 2020-12-01 NOTE — TELEPHONE ENCOUNTER
Patient arrived for office visit and agrees to see JOY Monte.  No questions/concerns noted.    ----- Message from Kami Haney sent at 12/1/2020  1:24 PM CST -----  Type:  Patient Returning Call    Who Called:  Patient   Who Left Message for Patient:  Belia   Does the patient know what this is regarding?:  no  Best Call Back Number:     Additional Information:  Please advise-thank you

## 2020-12-01 NOTE — PROGRESS NOTES
[facility]     Subjective:      Patient: Eduardo Esposito Patient PCP: BOBY Martínez         :  1950     Sex:  female      MRN:  [id number]          Date of Visit: 2020      Chief Complaint: Breast CA surveillance    Patient ID: Eduardo Esposito is a 70 y.o. female with history of invasive ductal carcinoma of the left breast ER+/KS+/HER2- who presents to clinic for breast CA surveillance.Pt had abnormality in the left breast injuring 6 mm on mammogram.  She subsequently underwent a core needle biopsy in 2019 make in a diagnosis of invasive ductal carcinoma grade 2. She underwent a partial mastectomy on 2019. The pathology report indicated the invasive ductal carcinoma was measuring 1 cm. The margin was 3.4 mm. The tumor was grade 2. There was also EIC  and ductal carcinoma in situ measuring 6.9 cm.  The DCIS margin was also negative at 1.4 mm. There was no lymphovascular space invasion. Patient completed adjuvant radiotherapy, 5040 cGy to the left breast, 6040 cGy to the lumpectomy cavity on 2019. Pt started endocrine therapy after completion of radiation therapy (dec 2019). Arimidex 1 mg daily x 5 years started 2019. Most recent mammogram Diagnostic left 2020 showed stable post lumpectomy changes of the left breast. Pt has no complaints in office today.     Review of Systems   Constitutional: Negative for activity change, appetite change, chills, fatigue, fever and unexpected weight change.   HENT: Negative for mouth sores and trouble swallowing.    Eyes: Negative for photophobia and visual disturbance.   Respiratory: Negative for cough, chest tightness, shortness of breath, wheezing and stridor.    Cardiovascular: Negative for chest pain and leg swelling.   Gastrointestinal: Negative for abdominal pain, constipation, diarrhea, nausea and vomiting.   Musculoskeletal: Negative for arthralgias, back pain and myalgias.   Skin: Negative for color  change, pallor, rash and wound.   Neurological: Negative for syncope, speech difficulty and weakness.   Hematological: Negative for adenopathy. Does not bruise/bleed easily.   Psychiatric/Behavioral: Negative for agitation, behavioral problems, confusion, decreased concentration and dysphoric mood.        Past Medical History:   Diagnosis Date    Atrial fibrillation     4/11/13-recent dx sceduled to see cardiology Monday- Dr Lynn-dm    Breast cancer     Cancer     breast    Diabetes mellitus     Diabetes mellitus type I     Exposure to STD     Hyperlipemia     Hypertension     Limb alert care status     NO BP/ IV IN LEFT ARM    Obesity      Family History   Problem Relation Age of Onset    Breast cancer Mother     Alzheimer's disease Father     Hypertension Father     Pulmonary embolism Brother     Glaucoma Neg Hx     Macular degeneration Neg Hx     Retinal detachment Neg Hx      Past Surgical History:   Procedure Laterality Date    AXILLARY NODE DISSECTION Left 6/13/2019    Procedure: LYMPHADENECTOMY, AXILLARY;  Surgeon: Kendall Howard MD;  Location: [facility];  Service: General;  Laterality: Left;  left lumpectomy with left axillary lymph node dissection    BREAST BIOPSY Left     BREAST SURGERY      lumpectomy left    EXCISION OF NAIL MATRIX Bilateral 10/2/2020    Procedure: EXCISION, NAIL MATRIX  Left hallux Zadik procedure, right hallux permanent total nail avulsion;  Surgeon: Jill Marin DPM;  Location: [facility] OR;  Service: Podiatry;  Laterality: Bilateral;    HIP REPLACEMENT ARTHROPLASTY Right 11/27/2019    Procedure: ARTHROPLASTY, HIP REPLACEMENT;  Surgeon: Rahul Hanson II, MD;  Location: [facility] OR;  Service: Orthopedics;  Laterality: Right;    JOINT REPLACEMENT Right     knee    NEEDLE LOCALIZATION Left 6/13/2019    Procedure: NEEDLE LOCALIZATION;  Surgeon: Kendall Howard MD;  Location: [facility] OR;  Service: General;  Laterality: Left;  left lumpectomy with wire needle  localization    SENTINEL LYMPH NODE BIOPSY Left 6/13/2019    Procedure: BIOPSY, LYMPH NODE, SENTINEL;  Surgeon: Kendall Howard MD;  Location: [facility] OR;  Service: General;  Laterality: Left;  left sentinel lymph node dissection    TONSILLECTOMY      TOTAL KNEE ARTHROPLASTY       Social History     Socioeconomic History    Marital status:      Spouse name: Not on file    Number of children: Not on file    Years of education: Not on file    Highest education level: Not on file   Occupational History    Not on file   Social Needs    Financial resource strain: Somewhat hard    Food insecurity     Worry: Never true     Inability: Never true    Transportation needs     Medical: No     Non-medical: No   Tobacco Use    Smoking status: Never Smoker    Smokeless tobacco: Never Used   Substance and Sexual Activity    Alcohol use: No     Frequency: Never     Drinks per session: Patient refused     Binge frequency: Never    Drug use: No    Sexual activity: Not Currently   Lifestyle    Physical activity     Days per week: 5 days     Minutes per session: 60 min    Stress: Not at all   Relationships    Social connections     Talks on phone: Patient refused     Gets together: Twice a week     Attends Holiness service: Not on file     Active member of club or organization: Yes     Attends meetings of clubs or organizations: 1 to 4 times per year     Relationship status:    Other Topics Concern    Not on file   Social History Narrative    Not on file       Current Outpatient Medications:     anastrozole (ARIMIDEX) 1 mg Tab, Take 1 tablet (1 mg total) by mouth once daily., Disp: 90 tablet, Rfl: 10    ascorbic acid, vitamin C, (VITAMIN C) 100 MG tablet, Take 100 mg by mouth once daily., Disp: , Rfl:     atorvastatin (LIPITOR) 80 MG tablet, Take 0.5 tablets (40 mg total) by mouth every evening., Disp: 90 tablet, Rfl: 1    b complex vitamins tablet, Take 1 tablet by mouth once daily., Disp: ,  "Rfl:     ELIQUIS 5 mg Tab, Take 1 tablet (5 mg total) by mouth 2 (two) times daily., Disp: 180 tablet, Rfl: 1    ergocalciferol, vitamin D2, (VITAMIN D ORAL), Take by mouth., Disp: , Rfl:     hydroCHLOROthiazide (HYDRODIURIL) 25 MG tablet, Take 1 tablet (25 mg total) by mouth once daily., Disp: 90 tablet, Rfl: 1    losartan (COZAAR) 100 MG tablet, Take 1 tablet (100 mg total) by mouth every evening., Disp: 90 tablet, Rfl: 1    Review of patient's allergies indicates:   Allergen Reactions    Lisinopril      hives     All medications, allergies, and past history have been reviewed.  Objective:      Vitals:  Vitals - 1 value per visit 10/19/2020 10/27/2020 11/30/2020   SYSTOLIC - 132 -   DIASTOLIC - 74 -   PULSE - 60 -   TEMPERATURE - 97 -   RESPIRATIONS - - 16   SPO2 - 98 -   Weight (lb) 230.38 236.5 236   Weight (kg) 104.5 107.276 107.049   HEIGHT 5' 6" 5' 6" 5' 6"   BODY MASS INDEX 37.18 38.17 38.09   VISIT REPORT - - -   Pain Score  0 0 0   Some recent data might be hidden       There is no height or weight on file to calculate BSA.  Weight Readings:  Wt Readings from Last 5 Encounters:   11/30/20 107 kg (236 lb)   10/27/20 107.3 kg (236 lb 8 oz)   10/19/20 104.5 kg (230 lb 6.1 oz)   09/28/20 104.5 kg (230 lb 6.1 oz)   09/25/20 104.5 kg (230 lb 6.4 oz)       Physical Exam  Constitutional:       General: She is not in acute distress.     Appearance: Normal appearance. She is not ill-appearing.   HENT:      Head: Normocephalic and atraumatic.      Right Ear: External ear normal.      Left Ear: External ear normal.      Nose: Nose normal. No congestion or rhinorrhea.   Eyes:      General:         Right eye: No discharge.         Left eye: No discharge.      Extraocular Movements: Extraocular movements intact.      Conjunctiva/sclera: Conjunctivae normal.      Pupils: Pupils are equal, round, and reactive to light.   Neck:      Musculoskeletal: Normal range of motion and neck supple. No neck rigidity. "   Cardiovascular:      Rate and Rhythm: Normal rate and regular rhythm.      Heart sounds: Normal heart sounds. No murmur.   Pulmonary:      Effort: Pulmonary effort is normal. No respiratory distress.      Breath sounds: Normal breath sounds. No stridor. No wheezing, rhonchi or rales.   Abdominal:      General: Bowel sounds are normal. There is no distension.      Palpations: Abdomen is soft.      Tenderness: There is no abdominal tenderness. There is no guarding.   Musculoskeletal: Normal range of motion.         General: No deformity.      Right lower leg: No edema.      Left lower leg: No edema.   Lymphadenopathy:      Cervical: No cervical adenopathy.   Skin:     General: Skin is warm and dry.      Coloration: Skin is not pale.      Findings: No erythema or rash.   Neurological:      General: No focal deficit present.      Mental Status: She is alert and oriented to person, place, and time. Mental status is at baseline.      Cranial Nerves: No cranial nerve deficit.   Psychiatric:         Mood and Affect: Mood normal.         Behavior: Behavior normal.         Thought Content: Thought content normal.         Judgment: Judgment normal.       Labs:    CBC  WBC   Date Value Ref Range Status   11/27/2020 6.51 3.90 - 12.70 K/uL Final   08/25/2020 7.83 3.90 - 12.70 K/uL Final   07/15/2020 6.66 3.90 - 12.70 K/uL Final     RBC   Date Value Ref Range Status   11/27/2020 5.20 4.00 - 5.40 M/uL Final   08/25/2020 5.32 4.00 - 5.40 M/uL Final   07/15/2020 5.43 (H) 4.00 - 5.40 M/uL Final     Hemoglobin   Date Value Ref Range Status   11/27/2020 15.4 12.0 - 16.0 g/dL Final   08/25/2020 16.1 (H) 12.0 - 16.0 g/dL Final   07/15/2020 16.2 (H) 12.0 - 16.0 g/dL Final     Hematocrit   Date Value Ref Range Status   11/27/2020 47.0 37.0 - 48.5 % Final   08/25/2020 49.7 (H) 37.0 - 48.5 % Final   07/15/2020 49.9 (H) 37.0 - 48.5 % Final     MCV   Date Value Ref Range Status   11/27/2020 90 82 - 98 fL Final   08/25/2020 93 82 - 98 fL Final    07/15/2020 92 82 - 98 fL Final     Platelets   Date Value Ref Range Status   11/27/2020 227 150 - 350 K/uL Final   08/25/2020 239 150 - 350 K/uL Final   07/15/2020 222 150 - 350 K/uL Final     MCH   Date Value Ref Range Status   11/27/2020 29.6 27.0 - 31.0 pg Final   08/25/2020 30.3 27.0 - 31.0 pg Final   07/15/2020 29.8 27.0 - 31.0 pg Final     MCHC   Date Value Ref Range Status   11/27/2020 32.8 32.0 - 36.0 g/dL Final   08/25/2020 32.4 32.0 - 36.0 g/dL Final   07/15/2020 32.5 32.0 - 36.0 g/dL Final     RDW   Date Value Ref Range Status   11/27/2020 13.3 11.5 - 14.5 % Final   08/25/2020 13.1 11.5 - 14.5 % Final   07/15/2020 13.4 11.5 - 14.5 % Final       CMP  Sodium   Date Value Ref Range Status   11/27/2020 141 136 - 145 mmol/L Final   08/25/2020 142 136 - 145 mmol/L Final   07/15/2020 143 136 - 145 mmol/L Final     Potassium   Date Value Ref Range Status   11/27/2020 4.3 3.5 - 5.1 mmol/L Final   08/25/2020 3.7 3.5 - 5.1 mmol/L Final   07/15/2020 3.9 3.5 - 5.1 mmol/L Final     Chloride   Date Value Ref Range Status   11/27/2020 103 95 - 110 mmol/L Final   08/25/2020 104 95 - 110 mmol/L Final   07/15/2020 105 95 - 110 mmol/L Final     CO2   Date Value Ref Range Status   11/27/2020 26 23 - 29 mmol/L Final   08/25/2020 25 23 - 29 mmol/L Final   07/15/2020 27 23 - 29 mmol/L Final     BUN   Date Value Ref Range Status   11/27/2020 24 (H) 8 - 23 mg/dL Final   08/25/2020 25 (H) 8 - 23 mg/dL Final   07/15/2020 19 8 - 23 mg/dL Final     Creatinine   Date Value Ref Range Status   11/27/2020 0.9 0.5 - 1.4 mg/dL Final   08/25/2020 0.9 0.5 - 1.4 mg/dL Final   07/15/2020 0.8 0.5 - 1.4 mg/dL Final     Glucose   Date Value Ref Range Status   11/27/2020 143 (H) 70 - 110 mg/dL Final   08/25/2020 129 (H) 70 - 110 mg/dL Final   07/15/2020 117 (H) 70 - 110 mg/dL Final     Calcium   Date Value Ref Range Status   11/27/2020 9.4 8.7 - 10.5 mg/dL Final   08/25/2020 9.8 8.7 - 10.5 mg/dL Final   07/15/2020 9.7 8.7 - 10.5 mg/dL Final      Magnesium   Date Value Ref Range Status   08/25/2020 1.7 1.6 - 2.6 mg/dL Final   03/09/2020 1.9 1.6 - 2.6 mg/dL Final   03/02/2020 1.8 1.6 - 2.6 mg/dL Final     Alkaline Phosphatase   Date Value Ref Range Status   11/27/2020 83 55 - 135 U/L Final   08/25/2020 80 55 - 135 U/L Final   07/15/2020 89 55 - 135 U/L Final     Total Protein   Date Value Ref Range Status   11/27/2020 7.0 6.0 - 8.4 g/dL Final   08/25/2020 7.0 6.0 - 8.4 g/dL Final   07/15/2020 7.1 6.0 - 8.4 g/dL Final     Albumin   Date Value Ref Range Status   11/27/2020 4.1 3.5 - 5.2 g/dL Final   08/25/2020 4.2 3.5 - 5.2 g/dL Final   07/15/2020 4.3 3.5 - 5.2 g/dL Final     Total Bilirubin   Date Value Ref Range Status   11/27/2020 0.9 0.1 - 1.0 mg/dL Final     Comment:     For infants and newborns, interpretation of results should be based  on gestational age, weight and in agreement with clinical  observations.  Premature Infant recommended reference ranges:  Up to 24 hours.............<8.0 mg/dL  Up to 48 hours............<12.0 mg/dL  3-5 days..................<15.0 mg/dL  6-29 days.................<15.0 mg/dL     08/25/2020 0.9 0.1 - 1.0 mg/dL Final     Comment:     For infants and newborns, interpretation of results should be based  on gestational age, weight and in agreement with clinical  observations.  Premature Infant recommended reference ranges:  Up to 24 hours.............<8.0 mg/dL  Up to 48 hours............<12.0 mg/dL  3-5 days..................<15.0 mg/dL  6-29 days.................<15.0 mg/dL     07/15/2020 0.7 0.1 - 1.0 mg/dL Final     Comment:     For infants and newborns, interpretation of results should be based  on gestational age, weight and in agreement with clinical  observations.  Premature Infant recommended reference ranges:  Up to 24 hours.............<8.0 mg/dL  Up to 48 hours............<12.0 mg/dL  3-5 days..................<15.0 mg/dL  6-29 days.................<15.0 mg/dL       AST   Date Value Ref Range Status    11/27/2020 20 10 - 40 U/L Final   08/25/2020 20 10 - 40 U/L Final   07/15/2020 16 10 - 40 U/L Final     ALT   Date Value Ref Range Status   11/27/2020 24 10 - 44 U/L Final   08/25/2020 27 10 - 44 U/L Final   07/15/2020 23 10 - 44 U/L Final     Imaging:    DXA 07/05/2018  FINDINGS:  The L1 to L4 vertebral bone mineral density is equal to 1.281 g/cm squared with a T score of 2.1.     The left femoral neck bone mineral density is equal to 0.753 g/cm squared with a T score of -0.9.     The total hip bone mineral density is equal to 0.974 g/cm squared with a T score of 1.8.     There is a 8.1% risk of a major osteoporotic fracture and a 0.8% risk of hip fracture in the next 10 years (FRAX).     IMPRESSION:      No evidence of significant bone density loss    Mammogram Diagnostic Left 11/11/2020  FINDINGS:  There are scattered fibroglandular densities.  Lumpectomy scar in the retroareolar aspect of the left breast is stable compared to prior studies, with associated skin retraction and skin thickening.  No new mass, architectural distortion, or microcalcifications identified.     Impression:     Stable post lumpectomy changes of the left breast.  Patient will be due for bilateral mammography in April 2020.     BI-RADS CATEGORY 3: PROBABLY BENIGN FINDING-SHORT TERM INTERVAL FOLLOWUP SUGGESTED.  All lab results and imaging results have been reviewed.    Assessment and Plan:        ICD-10-CM ICD-9-CM   1. Infiltrating ductal carcinoma of left breast  C50.912 174.9            -Pt will be due for bilateral mammography in April 2020. Please have her come after mammogram with CBC, CMP, and CA27.29 with Dr. Laz Marin for breast CA surveillance.  -DXA 07/05/2018 showed no evidence of significant bone density loss.  -Pt is to continue taking Arimidex 1mg tab once daily.    Sincerely,  Marcus Rapp PA-C    Note is available for collaborating MD; Dr. Laz Marin for review.  Electronically signed by: Marcus Rapp  SERGE  Future Appointments   Date Time Provider Department Center   12/1/2020  2:20 PM [facility]   12/2/2020  2:00 PM [facility] RAD ONC, PHYSICIAN SCHED [facility] RAD ONC [facility] Rahul   4/14/2021  1:30 PM [facility] OIC MAMMO2 [facility] IM PANDA [facility] Imaging   4/14/2021  2:00 PM [facility] OIC US2 [facility] IM ULT [facility] Imaging   4/28/2021 10:30 AM BOBY Griffiths [facility] G ROSALIO GALARZA [facility] MOB 2

## 2020-12-02 ENCOUNTER — PATIENT MESSAGE (OUTPATIENT)
Dept: HEMATOLOGY/ONCOLOGY | Facility: CLINIC | Age: 70
End: 2020-12-02

## 2020-12-14 ENCOUNTER — TELEPHONE (OUTPATIENT)
Dept: FAMILY MEDICINE | Facility: CLINIC | Age: 70
End: 2020-12-14

## 2020-12-14 NOTE — TELEPHONE ENCOUNTER
Refill her lancets and Test strips   Please send them to [facility] on  for patient to call with name of test strips so we can order the correct strips

## 2020-12-15 ENCOUNTER — TELEPHONE (OUTPATIENT)
Dept: FAMILY MEDICINE | Facility: CLINIC | Age: 70
End: 2020-12-15

## 2020-12-15 DIAGNOSIS — R73.01 IMPAIRED FASTING GLUCOSE: Primary | ICD-10-CM

## 2020-12-16 RX ORDER — LANCETS
EACH MISCELLANEOUS
Qty: 50 EACH | Refills: 5 | Status: SHIPPED | OUTPATIENT
Start: 2020-12-16

## 2021-02-25 ENCOUNTER — PES CALL (OUTPATIENT)
Dept: ADMINISTRATIVE | Facility: CLINIC | Age: 71
End: 2021-02-25

## 2021-03-02 ENCOUNTER — OFFICE VISIT (OUTPATIENT)
Dept: FAMILY MEDICINE | Facility: CLINIC | Age: 71
End: 2021-03-02
Payer: MEDICARE

## 2021-03-02 VITALS
BODY MASS INDEX: 39.97 KG/M2 | DIASTOLIC BLOOD PRESSURE: 78 MMHG | SYSTOLIC BLOOD PRESSURE: 146 MMHG | HEART RATE: 68 BPM | TEMPERATURE: 97 F | OXYGEN SATURATION: 97 % | HEIGHT: 66 IN | WEIGHT: 248.69 LBS

## 2021-03-02 DIAGNOSIS — Z74.09 OTHER REDUCED MOBILITY: ICD-10-CM

## 2021-03-02 DIAGNOSIS — E66.01 OBESITY, CLASS III, BMI 40-49.9 (MORBID OBESITY): ICD-10-CM

## 2021-03-02 DIAGNOSIS — Z00.00 ENCOUNTER FOR PREVENTIVE HEALTH EXAMINATION: Primary | ICD-10-CM

## 2021-03-02 DIAGNOSIS — C50.112 MALIGNANT NEOPLASM OF CENTRAL PORTION OF LEFT BREAST IN FEMALE, ESTROGEN RECEPTOR POSITIVE: ICD-10-CM

## 2021-03-02 DIAGNOSIS — Z17.0 MALIGNANT NEOPLASM OF CENTRAL PORTION OF LEFT BREAST IN FEMALE, ESTROGEN RECEPTOR POSITIVE: ICD-10-CM

## 2021-03-02 DIAGNOSIS — E11.36 TYPE 2 DIABETES MELLITUS WITH DIABETIC CATARACT, UNSPECIFIED WHETHER LONG TERM INSULIN USE: ICD-10-CM

## 2021-03-02 DIAGNOSIS — R94.120 ABNORMAL HEARING SCREEN: ICD-10-CM

## 2021-03-02 DIAGNOSIS — I48.20 CHRONIC ATRIAL FIBRILLATION: ICD-10-CM

## 2021-03-02 DIAGNOSIS — C50.912 INFILTRATING DUCTAL CARCINOMA OF LEFT BREAST: ICD-10-CM

## 2021-03-02 DIAGNOSIS — E78.5 HYPERLIPIDEMIA, UNSPECIFIED HYPERLIPIDEMIA TYPE: ICD-10-CM

## 2021-03-02 DIAGNOSIS — I10 ESSENTIAL HYPERTENSION: ICD-10-CM

## 2021-03-02 PROCEDURE — 3072F LOW RISK FOR RETINOPATHY: CPT | Mod: S$GLB,,, | Performed by: NURSE PRACTITIONER

## 2021-03-02 PROCEDURE — 1157F PR ADVANCE CARE PLAN OR EQUIV PRESENT IN MEDICAL RECORD: ICD-10-PCS | Mod: S$GLB,,, | Performed by: NURSE PRACTITIONER

## 2021-03-02 PROCEDURE — G0439 PR MEDICARE ANNUAL WELLNESS SUBSEQUENT VISIT: ICD-10-PCS | Mod: S$GLB,,, | Performed by: NURSE PRACTITIONER

## 2021-03-02 PROCEDURE — 1101F PR PT FALLS ASSESS DOC 0-1 FALLS W/OUT INJ PAST YR: ICD-10-PCS | Mod: CPTII,S$GLB,, | Performed by: NURSE PRACTITIONER

## 2021-03-02 PROCEDURE — 3044F PR MOST RECENT HEMOGLOBIN A1C LEVEL <7.0%: ICD-10-PCS | Mod: CPTII,S$GLB,, | Performed by: NURSE PRACTITIONER

## 2021-03-02 PROCEDURE — 3077F SYST BP >= 140 MM HG: CPT | Mod: CPTII,S$GLB,, | Performed by: NURSE PRACTITIONER

## 2021-03-02 PROCEDURE — 1157F ADVNC CARE PLAN IN RCRD: CPT | Mod: S$GLB,,, | Performed by: NURSE PRACTITIONER

## 2021-03-02 PROCEDURE — 3078F PR MOST RECENT DIASTOLIC BLOOD PRESSURE < 80 MM HG: ICD-10-PCS | Mod: CPTII,S$GLB,, | Performed by: NURSE PRACTITIONER

## 2021-03-02 PROCEDURE — 3044F HG A1C LEVEL LT 7.0%: CPT | Mod: CPTII,S$GLB,, | Performed by: NURSE PRACTITIONER

## 2021-03-02 PROCEDURE — 3288F PR FALLS RISK ASSESSMENT DOCUMENTED: ICD-10-PCS | Mod: CPTII,S$GLB,, | Performed by: NURSE PRACTITIONER

## 2021-03-02 PROCEDURE — 1126F AMNT PAIN NOTED NONE PRSNT: CPT | Mod: S$GLB,,, | Performed by: NURSE PRACTITIONER

## 2021-03-02 PROCEDURE — G0439 PPPS, SUBSEQ VISIT: HCPCS | Mod: S$GLB,,, | Performed by: NURSE PRACTITIONER

## 2021-03-02 PROCEDURE — 3008F BODY MASS INDEX DOCD: CPT | Mod: CPTII,S$GLB,, | Performed by: NURSE PRACTITIONER

## 2021-03-02 PROCEDURE — 1126F PR PAIN SEVERITY QUANTIFIED, NO PAIN PRESENT: ICD-10-PCS | Mod: S$GLB,,, | Performed by: NURSE PRACTITIONER

## 2021-03-02 PROCEDURE — 1101F PT FALLS ASSESS-DOCD LE1/YR: CPT | Mod: CPTII,S$GLB,, | Performed by: NURSE PRACTITIONER

## 2021-03-02 PROCEDURE — 3072F PR LOW RISK FOR RETINOPATHY: ICD-10-PCS | Mod: S$GLB,,, | Performed by: NURSE PRACTITIONER

## 2021-03-02 PROCEDURE — 3078F DIAST BP <80 MM HG: CPT | Mod: CPTII,S$GLB,, | Performed by: NURSE PRACTITIONER

## 2021-03-02 PROCEDURE — 3008F PR BODY MASS INDEX (BMI) DOCUMENTED: ICD-10-PCS | Mod: CPTII,S$GLB,, | Performed by: NURSE PRACTITIONER

## 2021-03-02 PROCEDURE — 3077F PR MOST RECENT SYSTOLIC BLOOD PRESSURE >= 140 MM HG: ICD-10-PCS | Mod: CPTII,S$GLB,, | Performed by: NURSE PRACTITIONER

## 2021-03-02 PROCEDURE — 3288F FALL RISK ASSESSMENT DOCD: CPT | Mod: CPTII,S$GLB,, | Performed by: NURSE PRACTITIONER

## 2021-04-14 ENCOUNTER — HOSPITAL ENCOUNTER (OUTPATIENT)
Dept: RADIOLOGY | Facility: HOSPITAL | Age: 71
Discharge: HOME | End: 2021-04-14
Attending: RADIOLOGY
Payer: MEDICARE

## 2021-04-14 ENCOUNTER — LAB VISIT (OUTPATIENT)
Dept: LAB | Facility: HOSPITAL | Age: 71
End: 2021-04-14
Attending: PHYSICIAN ASSISTANT
Payer: MEDICARE

## 2021-04-14 DIAGNOSIS — Z17.0 MALIGNANT NEOPLASM OF CENTRAL PORTION OF LEFT BREAST IN FEMALE, ESTROGEN RECEPTOR POSITIVE: ICD-10-CM

## 2021-04-14 DIAGNOSIS — C50.112 MALIGNANT NEOPLASM OF CENTRAL PORTION OF LEFT BREAST IN FEMALE, ESTROGEN RECEPTOR POSITIVE: ICD-10-CM

## 2021-04-14 DIAGNOSIS — C50.912 INFILTRATING DUCTAL CARCINOMA OF LEFT BREAST: ICD-10-CM

## 2021-04-14 LAB
ALBUMIN SERPL BCP-MCNC: 4.4 G/DL (ref 3.5–5.2)
ALP SERPL-CCNC: 83 U/L (ref 55–135)
ALT SERPL W/O P-5'-P-CCNC: 25 U/L (ref 10–44)
ANION GAP SERPL CALC-SCNC: 16 MMOL/L (ref 8–16)
AST SERPL-CCNC: 22 U/L (ref 10–40)
BASOPHILS # BLD AUTO: 0.03 K/UL (ref 0–0.2)
BASOPHILS NFR BLD: 0.4 % (ref 0–1.9)
BILIRUB SERPL-MCNC: 1 MG/DL (ref 0.1–1)
BUN SERPL-MCNC: 22 MG/DL (ref 8–23)
CALCIUM SERPL-MCNC: 10.2 MG/DL (ref 8.7–10.5)
CHLORIDE SERPL-SCNC: 102 MMOL/L (ref 95–110)
CO2 SERPL-SCNC: 23 MMOL/L (ref 23–29)
CREAT SERPL-MCNC: 0.8 MG/DL (ref 0.5–1.4)
DIFFERENTIAL METHOD: ABNORMAL
EOSINOPHIL # BLD AUTO: 0.2 K/UL (ref 0–0.5)
EOSINOPHIL NFR BLD: 2.2 % (ref 0–8)
ERYTHROCYTE [DISTWIDTH] IN BLOOD BY AUTOMATED COUNT: 13.2 % (ref 11.5–14.5)
EST. GFR  (AFRICAN AMERICAN): >60 ML/MIN/1.73 M^2
EST. GFR  (NON AFRICAN AMERICAN): >60 ML/MIN/1.73 M^2
GLUCOSE SERPL-MCNC: 140 MG/DL (ref 70–110)
HCT VFR BLD AUTO: 50.8 % (ref 37–48.5)
HGB BLD-MCNC: 16.6 G/DL (ref 12–16)
IMM GRANULOCYTES # BLD AUTO: 0.04 K/UL (ref 0–0.04)
IMM GRANULOCYTES NFR BLD AUTO: 0.6 % (ref 0–0.5)
LYMPHOCYTES # BLD AUTO: 1.9 K/UL (ref 1–4.8)
LYMPHOCYTES NFR BLD: 25.4 % (ref 18–48)
MCH RBC QN AUTO: 29.9 PG (ref 27–31)
MCHC RBC AUTO-ENTMCNC: 32.7 G/DL (ref 32–36)
MCV RBC AUTO: 91 FL (ref 82–98)
MONOCYTES # BLD AUTO: 0.5 K/UL (ref 0.3–1)
MONOCYTES NFR BLD: 7 % (ref 4–15)
NEUTROPHILS # BLD AUTO: 4.7 K/UL (ref 1.8–7.7)
NEUTROPHILS NFR BLD: 64.4 % (ref 38–73)
NRBC BLD-RTO: 0 /100 WBC
PLATELET # BLD AUTO: 257 K/UL (ref 150–450)
PMV BLD AUTO: 9.7 FL (ref 9.2–12.9)
POTASSIUM SERPL-SCNC: 4 MMOL/L (ref 3.5–5.1)
PROT SERPL-MCNC: 7.6 G/DL (ref 6–8.4)
RBC # BLD AUTO: 5.56 M/UL (ref 4–5.4)
SODIUM SERPL-SCNC: 141 MMOL/L (ref 136–145)
WBC # BLD AUTO: 7.27 K/UL (ref 3.9–12.7)

## 2021-04-14 PROCEDURE — 86300 IMMUNOASSAY TUMOR CA 15-3: CPT | Performed by: PHYSICIAN ASSISTANT

## 2021-04-14 PROCEDURE — 85025 COMPLETE CBC W/AUTO DIFF WBC: CPT | Performed by: PHYSICIAN ASSISTANT

## 2021-04-14 PROCEDURE — 77062 BREAST TOMOSYNTHESIS BI: CPT | Mod: TC,PO

## 2021-04-14 PROCEDURE — 80053 COMPREHEN METABOLIC PANEL: CPT | Performed by: PHYSICIAN ASSISTANT

## 2021-04-16 ENCOUNTER — TELEPHONE (OUTPATIENT)
Dept: RADIATION ONCOLOGY | Facility: CLINIC | Age: 71
End: 2021-04-16

## 2021-04-16 DIAGNOSIS — Z17.0 MALIGNANT NEOPLASM OF CENTRAL PORTION OF LEFT BREAST IN FEMALE, ESTROGEN RECEPTOR POSITIVE: ICD-10-CM

## 2021-04-16 DIAGNOSIS — C50.112 MALIGNANT NEOPLASM OF CENTRAL PORTION OF LEFT BREAST IN FEMALE, ESTROGEN RECEPTOR POSITIVE: ICD-10-CM

## 2021-04-16 DIAGNOSIS — C50.912 INFILTRATING DUCTAL CARCINOMA OF LEFT BREAST: Primary | ICD-10-CM

## 2021-04-19 LAB — CANCER AG27-29 SERPL-ACNC: 17.1 U/ML

## 2021-04-20 ENCOUNTER — OFFICE VISIT (OUTPATIENT)
Dept: HEMATOLOGY/ONCOLOGY | Facility: CLINIC | Age: 71
End: 2021-04-20
Payer: MEDICARE

## 2021-04-20 VITALS
HEIGHT: 66 IN | HEART RATE: 65 BPM | SYSTOLIC BLOOD PRESSURE: 126 MMHG | DIASTOLIC BLOOD PRESSURE: 72 MMHG | RESPIRATION RATE: 17 BRPM | WEIGHT: 242.06 LBS | TEMPERATURE: 97 F | OXYGEN SATURATION: 96 % | BODY MASS INDEX: 38.9 KG/M2

## 2021-04-20 DIAGNOSIS — C50.912 INFILTRATING DUCTAL CARCINOMA OF LEFT BREAST: ICD-10-CM

## 2021-04-20 DIAGNOSIS — I48.20 CHRONIC ATRIAL FIBRILLATION: Primary | ICD-10-CM

## 2021-04-20 DIAGNOSIS — Z79.811 USE OF ANASTROZOLE (ARIMIDEX): ICD-10-CM

## 2021-04-20 DIAGNOSIS — Z79.01 LONG TERM (CURRENT) USE OF ANTICOAGULANTS: ICD-10-CM

## 2021-04-20 PROCEDURE — 1159F PR MEDICATION LIST DOCUMENTED IN MEDICAL RECORD: ICD-10-PCS | Mod: S$GLB,,, | Performed by: INTERNAL MEDICINE

## 2021-04-20 PROCEDURE — 3288F FALL RISK ASSESSMENT DOCD: CPT | Mod: CPTII,S$GLB,, | Performed by: INTERNAL MEDICINE

## 2021-04-20 PROCEDURE — 3008F BODY MASS INDEX DOCD: CPT | Mod: CPTII,S$GLB,, | Performed by: INTERNAL MEDICINE

## 2021-04-20 PROCEDURE — 99999 PR PBB SHADOW E&M-EST. PATIENT-LVL III: CPT | Mod: PBBFAC,,, | Performed by: INTERNAL MEDICINE

## 2021-04-20 PROCEDURE — 1126F AMNT PAIN NOTED NONE PRSNT: CPT | Mod: S$GLB,,, | Performed by: INTERNAL MEDICINE

## 2021-04-20 PROCEDURE — 99213 OFFICE O/P EST LOW 20 MIN: CPT | Mod: S$GLB,,, | Performed by: INTERNAL MEDICINE

## 2021-04-20 PROCEDURE — 1101F PT FALLS ASSESS-DOCD LE1/YR: CPT | Mod: CPTII,S$GLB,, | Performed by: INTERNAL MEDICINE

## 2021-04-20 PROCEDURE — 3008F PR BODY MASS INDEX (BMI) DOCUMENTED: ICD-10-PCS | Mod: CPTII,S$GLB,, | Performed by: INTERNAL MEDICINE

## 2021-04-20 PROCEDURE — 1126F PR PAIN SEVERITY QUANTIFIED, NO PAIN PRESENT: ICD-10-PCS | Mod: S$GLB,,, | Performed by: INTERNAL MEDICINE

## 2021-04-20 PROCEDURE — 99213 PR OFFICE/OUTPT VISIT, EST, LEVL III, 20-29 MIN: ICD-10-PCS | Mod: S$GLB,,, | Performed by: INTERNAL MEDICINE

## 2021-04-20 PROCEDURE — 3288F PR FALLS RISK ASSESSMENT DOCUMENTED: ICD-10-PCS | Mod: CPTII,S$GLB,, | Performed by: INTERNAL MEDICINE

## 2021-04-20 PROCEDURE — 99999 PR PBB SHADOW E&M-EST. PATIENT-LVL III: ICD-10-PCS | Mod: PBBFAC,,, | Performed by: INTERNAL MEDICINE

## 2021-04-20 PROCEDURE — 1157F PR ADVANCE CARE PLAN OR EQUIV PRESENT IN MEDICAL RECORD: ICD-10-PCS | Mod: S$GLB,,, | Performed by: INTERNAL MEDICINE

## 2021-04-20 PROCEDURE — 1157F ADVNC CARE PLAN IN RCRD: CPT | Mod: S$GLB,,, | Performed by: INTERNAL MEDICINE

## 2021-04-20 PROCEDURE — 1159F MED LIST DOCD IN RCRD: CPT | Mod: S$GLB,,, | Performed by: INTERNAL MEDICINE

## 2021-04-20 PROCEDURE — 1101F PR PT FALLS ASSESS DOC 0-1 FALLS W/OUT INJ PAST YR: ICD-10-PCS | Mod: CPTII,S$GLB,, | Performed by: INTERNAL MEDICINE

## 2021-04-20 PROCEDURE — 3072F LOW RISK FOR RETINOPATHY: CPT | Mod: S$GLB,,, | Performed by: INTERNAL MEDICINE

## 2021-04-20 PROCEDURE — 3072F PR LOW RISK FOR RETINOPATHY: ICD-10-PCS | Mod: S$GLB,,, | Performed by: INTERNAL MEDICINE

## 2021-04-26 ENCOUNTER — LAB VISIT (OUTPATIENT)
Dept: LAB | Facility: HOSPITAL | Age: 71
End: 2021-04-26
Attending: NURSE PRACTITIONER
Payer: MEDICARE

## 2021-04-26 DIAGNOSIS — I10 ESSENTIAL HYPERTENSION: ICD-10-CM

## 2021-04-26 DIAGNOSIS — R73.01 IMPAIRED FASTING GLUCOSE: ICD-10-CM

## 2021-04-26 LAB
ALBUMIN SERPL BCP-MCNC: 4.4 G/DL (ref 3.5–5.2)
ALP SERPL-CCNC: 69 U/L (ref 55–135)
ALT SERPL W/O P-5'-P-CCNC: 21 U/L (ref 10–44)
ANION GAP SERPL CALC-SCNC: 11 MMOL/L (ref 8–16)
AST SERPL-CCNC: 17 U/L (ref 10–40)
BILIRUB SERPL-MCNC: 1.1 MG/DL (ref 0.1–1)
BUN SERPL-MCNC: 27 MG/DL (ref 8–23)
CALCIUM SERPL-MCNC: 9.6 MG/DL (ref 8.7–10.5)
CHLORIDE SERPL-SCNC: 105 MMOL/L (ref 95–110)
CO2 SERPL-SCNC: 26 MMOL/L (ref 23–29)
CREAT SERPL-MCNC: 0.8 MG/DL (ref 0.5–1.4)
EST. GFR  (AFRICAN AMERICAN): >60 ML/MIN/1.73 M^2
EST. GFR  (NON AFRICAN AMERICAN): >60 ML/MIN/1.73 M^2
ESTIMATED AVG GLUCOSE: 137 MG/DL (ref 68–131)
GLUCOSE SERPL-MCNC: 117 MG/DL (ref 70–110)
HBA1C MFR BLD: 6.4 % (ref 4.5–6.2)
POTASSIUM SERPL-SCNC: 4 MMOL/L (ref 3.5–5.1)
PROT SERPL-MCNC: 6.8 G/DL (ref 6–8.4)
SODIUM SERPL-SCNC: 142 MMOL/L (ref 136–145)

## 2021-04-26 PROCEDURE — 83036 HEMOGLOBIN GLYCOSYLATED A1C: CPT | Performed by: NURSE PRACTITIONER

## 2021-04-26 PROCEDURE — 36415 COLL VENOUS BLD VENIPUNCTURE: CPT | Performed by: NURSE PRACTITIONER

## 2021-04-26 PROCEDURE — 80053 COMPREHEN METABOLIC PANEL: CPT | Performed by: NURSE PRACTITIONER

## 2021-04-28 ENCOUNTER — OFFICE VISIT (OUTPATIENT)
Dept: FAMILY MEDICINE | Facility: CLINIC | Age: 71
End: 2021-04-28
Payer: MEDICARE

## 2021-04-28 VITALS
BODY MASS INDEX: 38.57 KG/M2 | TEMPERATURE: 99 F | WEIGHT: 240 LBS | OXYGEN SATURATION: 97 % | SYSTOLIC BLOOD PRESSURE: 130 MMHG | HEART RATE: 66 BPM | DIASTOLIC BLOOD PRESSURE: 70 MMHG | HEIGHT: 66 IN

## 2021-04-28 DIAGNOSIS — I10 ESSENTIAL HYPERTENSION: Primary | ICD-10-CM

## 2021-04-28 DIAGNOSIS — I48.20 CHRONIC ATRIAL FIBRILLATION: ICD-10-CM

## 2021-04-28 DIAGNOSIS — E66.01 CLASS 2 SEVERE OBESITY DUE TO EXCESS CALORIES WITH SERIOUS COMORBIDITY AND BODY MASS INDEX (BMI) OF 38.0 TO 38.9 IN ADULT: ICD-10-CM

## 2021-04-28 DIAGNOSIS — R73.01 IMPAIRED FASTING GLUCOSE: ICD-10-CM

## 2021-04-28 DIAGNOSIS — E78.5 HYPERLIPIDEMIA, UNSPECIFIED HYPERLIPIDEMIA TYPE: ICD-10-CM

## 2021-04-28 PROCEDURE — 3008F PR BODY MASS INDEX (BMI) DOCUMENTED: ICD-10-PCS | Mod: S$GLB,,, | Performed by: NURSE PRACTITIONER

## 2021-04-28 PROCEDURE — 3044F PR MOST RECENT HEMOGLOBIN A1C LEVEL <7.0%: ICD-10-PCS | Mod: S$GLB,,, | Performed by: NURSE PRACTITIONER

## 2021-04-28 PROCEDURE — 3044F HG A1C LEVEL LT 7.0%: CPT | Mod: S$GLB,,, | Performed by: NURSE PRACTITIONER

## 2021-04-28 PROCEDURE — 3072F PR LOW RISK FOR RETINOPATHY: ICD-10-PCS | Mod: S$GLB,,, | Performed by: NURSE PRACTITIONER

## 2021-04-28 PROCEDURE — 1157F ADVNC CARE PLAN IN RCRD: CPT | Mod: S$GLB,,, | Performed by: NURSE PRACTITIONER

## 2021-04-28 PROCEDURE — 1101F PR PT FALLS ASSESS DOC 0-1 FALLS W/OUT INJ PAST YR: ICD-10-PCS | Mod: S$GLB,,, | Performed by: NURSE PRACTITIONER

## 2021-04-28 PROCEDURE — 3075F PR MOST RECENT SYSTOLIC BLOOD PRESS GE 130-139MM HG: ICD-10-PCS | Mod: S$GLB,,, | Performed by: NURSE PRACTITIONER

## 2021-04-28 PROCEDURE — 3078F PR MOST RECENT DIASTOLIC BLOOD PRESSURE < 80 MM HG: ICD-10-PCS | Mod: S$GLB,,, | Performed by: NURSE PRACTITIONER

## 2021-04-28 PROCEDURE — 99213 OFFICE O/P EST LOW 20 MIN: CPT | Mod: S$GLB,,, | Performed by: NURSE PRACTITIONER

## 2021-04-28 PROCEDURE — 3288F FALL RISK ASSESSMENT DOCD: CPT | Mod: S$GLB,,, | Performed by: NURSE PRACTITIONER

## 2021-04-28 PROCEDURE — 1159F PR MEDICATION LIST DOCUMENTED IN MEDICAL RECORD: ICD-10-PCS | Mod: S$GLB,,, | Performed by: NURSE PRACTITIONER

## 2021-04-28 PROCEDURE — 3078F DIAST BP <80 MM HG: CPT | Mod: S$GLB,,, | Performed by: NURSE PRACTITIONER

## 2021-04-28 PROCEDURE — 99213 PR OFFICE/OUTPT VISIT, EST, LEVL III, 20-29 MIN: ICD-10-PCS | Mod: S$GLB,,, | Performed by: NURSE PRACTITIONER

## 2021-04-28 PROCEDURE — 1101F PT FALLS ASSESS-DOCD LE1/YR: CPT | Mod: S$GLB,,, | Performed by: NURSE PRACTITIONER

## 2021-04-28 PROCEDURE — 1159F MED LIST DOCD IN RCRD: CPT | Mod: S$GLB,,, | Performed by: NURSE PRACTITIONER

## 2021-04-28 PROCEDURE — 3288F PR FALLS RISK ASSESSMENT DOCUMENTED: ICD-10-PCS | Mod: S$GLB,,, | Performed by: NURSE PRACTITIONER

## 2021-04-28 PROCEDURE — 3075F SYST BP GE 130 - 139MM HG: CPT | Mod: S$GLB,,, | Performed by: NURSE PRACTITIONER

## 2021-04-28 PROCEDURE — 1157F PR ADVANCE CARE PLAN OR EQUIV PRESENT IN MEDICAL RECORD: ICD-10-PCS | Mod: S$GLB,,, | Performed by: NURSE PRACTITIONER

## 2021-04-28 PROCEDURE — 3008F BODY MASS INDEX DOCD: CPT | Mod: S$GLB,,, | Performed by: NURSE PRACTITIONER

## 2021-04-28 PROCEDURE — 3072F LOW RISK FOR RETINOPATHY: CPT | Mod: S$GLB,,, | Performed by: NURSE PRACTITIONER

## 2021-04-28 RX ORDER — HYDROCHLOROTHIAZIDE 25 MG/1
25 TABLET ORAL DAILY
Qty: 90 TABLET | Refills: 1 | Status: SHIPPED | OUTPATIENT
Start: 2021-04-28 | End: 2021-07-19 | Stop reason: SDUPTHER

## 2021-04-28 RX ORDER — AMLODIPINE BESYLATE 5 MG/1
5 TABLET ORAL DAILY
COMMUNITY
Start: 2021-03-22

## 2021-04-28 RX ORDER — LOSARTAN POTASSIUM 100 MG/1
100 TABLET ORAL NIGHTLY
Qty: 90 TABLET | Refills: 1 | Status: SHIPPED | OUTPATIENT
Start: 2021-04-28 | End: 2021-08-19

## 2021-04-29 ENCOUNTER — TELEPHONE (OUTPATIENT)
Dept: FAMILY MEDICINE | Facility: CLINIC | Age: 71
End: 2021-04-29

## 2021-04-29 DIAGNOSIS — H91.93 BILATERAL HEARING LOSS, UNSPECIFIED HEARING LOSS TYPE: Primary | ICD-10-CM

## 2021-04-30 ENCOUNTER — TELEPHONE (OUTPATIENT)
Dept: FAMILY MEDICINE | Facility: CLINIC | Age: 71
End: 2021-04-30

## 2021-05-10 ENCOUNTER — PATIENT MESSAGE (OUTPATIENT)
Dept: RESEARCH | Facility: HOSPITAL | Age: 71
End: 2021-05-10

## 2021-07-16 ENCOUNTER — LAB VISIT (OUTPATIENT)
Dept: LAB | Facility: HOSPITAL | Age: 71
End: 2021-07-16
Attending: INTERNAL MEDICINE
Payer: MEDICARE

## 2021-07-16 DIAGNOSIS — C50.912 INFILTRATING DUCTAL CARCINOMA OF LEFT BREAST: ICD-10-CM

## 2021-07-16 DIAGNOSIS — I48.20 CHRONIC ATRIAL FIBRILLATION: ICD-10-CM

## 2021-07-16 DIAGNOSIS — Z79.811 USE OF ANASTROZOLE (ARIMIDEX): ICD-10-CM

## 2021-07-16 DIAGNOSIS — Z79.01 LONG TERM (CURRENT) USE OF ANTICOAGULANTS: ICD-10-CM

## 2021-07-16 LAB
ALBUMIN SERPL BCP-MCNC: 4 G/DL (ref 3.5–5.2)
ALP SERPL-CCNC: 78 U/L (ref 55–135)
ALT SERPL W/O P-5'-P-CCNC: 25 U/L (ref 10–44)
ANION GAP SERPL CALC-SCNC: 12 MMOL/L (ref 8–16)
AST SERPL-CCNC: 17 U/L (ref 10–40)
BASOPHILS # BLD AUTO: 0.04 K/UL (ref 0–0.2)
BASOPHILS NFR BLD: 0.5 % (ref 0–1.9)
BILIRUB SERPL-MCNC: 1.3 MG/DL (ref 0.1–1)
BUN SERPL-MCNC: 27 MG/DL (ref 8–23)
CALCIUM SERPL-MCNC: 9.8 MG/DL (ref 8.7–10.5)
CHLORIDE SERPL-SCNC: 105 MMOL/L (ref 95–110)
CO2 SERPL-SCNC: 23 MMOL/L (ref 23–29)
CREAT SERPL-MCNC: 0.9 MG/DL (ref 0.5–1.4)
DIFFERENTIAL METHOD: ABNORMAL
EOSINOPHIL # BLD AUTO: 0.1 K/UL (ref 0–0.5)
EOSINOPHIL NFR BLD: 1.3 % (ref 0–8)
ERYTHROCYTE [DISTWIDTH] IN BLOOD BY AUTOMATED COUNT: 13.2 % (ref 11.5–14.5)
EST. GFR  (AFRICAN AMERICAN): >60 ML/MIN/1.73 M^2
EST. GFR  (NON AFRICAN AMERICAN): >60 ML/MIN/1.73 M^2
GLUCOSE SERPL-MCNC: 221 MG/DL (ref 70–110)
HCT VFR BLD AUTO: 47.7 % (ref 37–48.5)
HGB BLD-MCNC: 16 G/DL (ref 12–16)
IMM GRANULOCYTES # BLD AUTO: 0.04 K/UL (ref 0–0.04)
IMM GRANULOCYTES NFR BLD AUTO: 0.5 % (ref 0–0.5)
LYMPHOCYTES # BLD AUTO: 2.2 K/UL (ref 1–4.8)
LYMPHOCYTES NFR BLD: 25.7 % (ref 18–48)
MCH RBC QN AUTO: 30 PG (ref 27–31)
MCHC RBC AUTO-ENTMCNC: 33.5 G/DL (ref 32–36)
MCV RBC AUTO: 90 FL (ref 82–98)
MONOCYTES # BLD AUTO: 0.6 K/UL (ref 0.3–1)
MONOCYTES NFR BLD: 7.6 % (ref 4–15)
NEUTROPHILS # BLD AUTO: 5.4 K/UL (ref 1.8–7.7)
NEUTROPHILS NFR BLD: 64.4 % (ref 38–73)
NRBC BLD-RTO: 0 /100 WBC
PLATELET # BLD AUTO: 256 K/UL (ref 150–450)
PMV BLD AUTO: 9 FL (ref 9.2–12.9)
POTASSIUM SERPL-SCNC: 3.9 MMOL/L (ref 3.5–5.1)
PROT SERPL-MCNC: 6.6 G/DL (ref 6–8.4)
RBC # BLD AUTO: 5.33 M/UL (ref 4–5.4)
SODIUM SERPL-SCNC: 140 MMOL/L (ref 136–145)
WBC # BLD AUTO: 8.39 K/UL (ref 3.9–12.7)

## 2021-07-16 PROCEDURE — 85025 COMPLETE CBC W/AUTO DIFF WBC: CPT | Performed by: INTERNAL MEDICINE

## 2021-07-16 PROCEDURE — 36415 COLL VENOUS BLD VENIPUNCTURE: CPT | Performed by: INTERNAL MEDICINE

## 2021-07-16 PROCEDURE — 80053 COMPREHEN METABOLIC PANEL: CPT | Performed by: INTERNAL MEDICINE

## 2021-07-19 DIAGNOSIS — I10 ESSENTIAL HYPERTENSION: ICD-10-CM

## 2021-07-19 RX ORDER — HYDROCHLOROTHIAZIDE 25 MG/1
25 TABLET ORAL DAILY
Qty: 90 TABLET | Refills: 1 | Status: SHIPPED | OUTPATIENT
Start: 2021-07-19 | End: 2022-01-21

## 2021-07-22 ENCOUNTER — OFFICE VISIT (OUTPATIENT)
Dept: HEMATOLOGY/ONCOLOGY | Facility: CLINIC | Age: 71
End: 2021-07-22
Payer: MEDICARE

## 2021-07-22 VITALS
BODY MASS INDEX: 39.62 KG/M2 | HEART RATE: 69 BPM | SYSTOLIC BLOOD PRESSURE: 117 MMHG | TEMPERATURE: 97 F | DIASTOLIC BLOOD PRESSURE: 56 MMHG | HEIGHT: 66 IN | WEIGHT: 246.5 LBS | OXYGEN SATURATION: 97 % | RESPIRATION RATE: 18 BRPM

## 2021-07-22 DIAGNOSIS — Z17.0 MALIGNANT NEOPLASM OF BREAST IN FEMALE, ESTROGEN RECEPTOR POSITIVE, UNSPECIFIED LATERALITY, UNSPECIFIED SITE OF BREAST: ICD-10-CM

## 2021-07-22 DIAGNOSIS — C50.919 MALIGNANT NEOPLASM OF BREAST IN FEMALE, ESTROGEN RECEPTOR POSITIVE, UNSPECIFIED LATERALITY, UNSPECIFIED SITE OF BREAST: ICD-10-CM

## 2021-07-22 DIAGNOSIS — Z79.811 USE OF ANASTROZOLE (ARIMIDEX): Primary | ICD-10-CM

## 2021-07-22 PROCEDURE — 99213 PR OFFICE/OUTPT VISIT, EST, LEVL III, 20-29 MIN: ICD-10-PCS | Mod: S$GLB,,, | Performed by: INTERNAL MEDICINE

## 2021-07-22 PROCEDURE — 3072F PR LOW RISK FOR RETINOPATHY: ICD-10-PCS | Mod: CPTII,S$GLB,, | Performed by: INTERNAL MEDICINE

## 2021-07-22 PROCEDURE — 3008F BODY MASS INDEX DOCD: CPT | Mod: CPTII,S$GLB,, | Performed by: INTERNAL MEDICINE

## 2021-07-22 PROCEDURE — 3078F DIAST BP <80 MM HG: CPT | Mod: CPTII,S$GLB,, | Performed by: INTERNAL MEDICINE

## 2021-07-22 PROCEDURE — 1101F PT FALLS ASSESS-DOCD LE1/YR: CPT | Mod: CPTII,S$GLB,, | Performed by: INTERNAL MEDICINE

## 2021-07-22 PROCEDURE — 3074F SYST BP LT 130 MM HG: CPT | Mod: CPTII,S$GLB,, | Performed by: INTERNAL MEDICINE

## 2021-07-22 PROCEDURE — 99999 PR PBB SHADOW E&M-EST. PATIENT-LVL III: CPT | Mod: PBBFAC,,, | Performed by: INTERNAL MEDICINE

## 2021-07-22 PROCEDURE — 3044F PR MOST RECENT HEMOGLOBIN A1C LEVEL <7.0%: ICD-10-PCS | Mod: CPTII,S$GLB,, | Performed by: INTERNAL MEDICINE

## 2021-07-22 PROCEDURE — 1101F PR PT FALLS ASSESS DOC 0-1 FALLS W/OUT INJ PAST YR: ICD-10-PCS | Mod: CPTII,S$GLB,, | Performed by: INTERNAL MEDICINE

## 2021-07-22 PROCEDURE — 1126F AMNT PAIN NOTED NONE PRSNT: CPT | Mod: CPTII,S$GLB,, | Performed by: INTERNAL MEDICINE

## 2021-07-22 PROCEDURE — 3078F PR MOST RECENT DIASTOLIC BLOOD PRESSURE < 80 MM HG: ICD-10-PCS | Mod: CPTII,S$GLB,, | Performed by: INTERNAL MEDICINE

## 2021-07-22 PROCEDURE — 3008F PR BODY MASS INDEX (BMI) DOCUMENTED: ICD-10-PCS | Mod: CPTII,S$GLB,, | Performed by: INTERNAL MEDICINE

## 2021-07-22 PROCEDURE — 3044F HG A1C LEVEL LT 7.0%: CPT | Mod: CPTII,S$GLB,, | Performed by: INTERNAL MEDICINE

## 2021-07-22 PROCEDURE — 1159F PR MEDICATION LIST DOCUMENTED IN MEDICAL RECORD: ICD-10-PCS | Mod: CPTII,S$GLB,, | Performed by: INTERNAL MEDICINE

## 2021-07-22 PROCEDURE — 1159F MED LIST DOCD IN RCRD: CPT | Mod: CPTII,S$GLB,, | Performed by: INTERNAL MEDICINE

## 2021-07-22 PROCEDURE — 3072F LOW RISK FOR RETINOPATHY: CPT | Mod: CPTII,S$GLB,, | Performed by: INTERNAL MEDICINE

## 2021-07-22 PROCEDURE — 1157F ADVNC CARE PLAN IN RCRD: CPT | Mod: CPTII,S$GLB,, | Performed by: INTERNAL MEDICINE

## 2021-07-22 PROCEDURE — 3074F PR MOST RECENT SYSTOLIC BLOOD PRESSURE < 130 MM HG: ICD-10-PCS | Mod: CPTII,S$GLB,, | Performed by: INTERNAL MEDICINE

## 2021-07-22 PROCEDURE — 1157F PR ADVANCE CARE PLAN OR EQUIV PRESENT IN MEDICAL RECORD: ICD-10-PCS | Mod: CPTII,S$GLB,, | Performed by: INTERNAL MEDICINE

## 2021-07-22 PROCEDURE — 99999 PR PBB SHADOW E&M-EST. PATIENT-LVL III: ICD-10-PCS | Mod: PBBFAC,,, | Performed by: INTERNAL MEDICINE

## 2021-07-22 PROCEDURE — 1126F PR PAIN SEVERITY QUANTIFIED, NO PAIN PRESENT: ICD-10-PCS | Mod: CPTII,S$GLB,, | Performed by: INTERNAL MEDICINE

## 2021-07-22 PROCEDURE — 3288F FALL RISK ASSESSMENT DOCD: CPT | Mod: CPTII,S$GLB,, | Performed by: INTERNAL MEDICINE

## 2021-07-22 PROCEDURE — 99213 OFFICE O/P EST LOW 20 MIN: CPT | Mod: S$GLB,,, | Performed by: INTERNAL MEDICINE

## 2021-07-22 PROCEDURE — 3288F PR FALLS RISK ASSESSMENT DOCUMENTED: ICD-10-PCS | Mod: CPTII,S$GLB,, | Performed by: INTERNAL MEDICINE

## 2021-07-22 RX ORDER — ANASTROZOLE 1 MG/1
1 TABLET ORAL DAILY
Qty: 90 TABLET | Refills: 10 | Status: SHIPPED | OUTPATIENT
Start: 2021-07-22 | End: 2021-09-02

## 2021-10-29 ENCOUNTER — TELEPHONE (OUTPATIENT)
Dept: HEMATOLOGY/ONCOLOGY | Facility: CLINIC | Age: 71
End: 2021-10-29
Payer: MEDICARE

## 2021-10-29 DIAGNOSIS — E78.5 HYPERLIPIDEMIA, UNSPECIFIED HYPERLIPIDEMIA TYPE: ICD-10-CM

## 2021-10-29 DIAGNOSIS — I10 ESSENTIAL HYPERTENSION: ICD-10-CM

## 2021-11-01 ENCOUNTER — LAB VISIT (OUTPATIENT)
Dept: LAB | Facility: HOSPITAL | Age: 71
End: 2021-11-01
Attending: NURSE PRACTITIONER
Payer: MEDICARE

## 2021-11-01 ENCOUNTER — TELEPHONE (OUTPATIENT)
Dept: HEMATOLOGY/ONCOLOGY | Facility: CLINIC | Age: 71
End: 2021-11-01
Payer: MEDICARE

## 2021-11-01 DIAGNOSIS — E78.5 HYPERLIPIDEMIA, UNSPECIFIED HYPERLIPIDEMIA TYPE: ICD-10-CM

## 2021-11-01 DIAGNOSIS — I10 ESSENTIAL HYPERTENSION: ICD-10-CM

## 2021-11-01 DIAGNOSIS — R73.01 IMPAIRED FASTING GLUCOSE: ICD-10-CM

## 2021-11-01 LAB
ALBUMIN SERPL BCP-MCNC: 4.2 G/DL (ref 3.5–5.2)
ALP SERPL-CCNC: 69 U/L (ref 55–135)
ALT SERPL W/O P-5'-P-CCNC: 24 U/L (ref 10–44)
ANION GAP SERPL CALC-SCNC: 10 MMOL/L (ref 8–16)
AST SERPL-CCNC: 18 U/L (ref 10–40)
BACTERIA #/AREA URNS HPF: NEGATIVE /HPF
BASOPHILS # BLD AUTO: 0.04 K/UL (ref 0–0.2)
BASOPHILS NFR BLD: 0.6 % (ref 0–1.9)
BILIRUB SERPL-MCNC: 0.8 MG/DL (ref 0.1–1)
BILIRUB UR QL STRIP: NEGATIVE
BUN SERPL-MCNC: 23 MG/DL (ref 8–23)
CALCIUM SERPL-MCNC: 9.7 MG/DL (ref 8.7–10.5)
CHLORIDE SERPL-SCNC: 106 MMOL/L (ref 95–110)
CHOLEST SERPL-MCNC: 229 MG/DL (ref 120–199)
CHOLEST/HDLC SERPL: 4.8 {RATIO} (ref 2–5)
CLARITY UR: CLEAR
CO2 SERPL-SCNC: 28 MMOL/L (ref 23–29)
COLOR UR: YELLOW
CREAT SERPL-MCNC: 0.9 MG/DL (ref 0.5–1.4)
DIFFERENTIAL METHOD: ABNORMAL
EOSINOPHIL # BLD AUTO: 0.1 K/UL (ref 0–0.5)
EOSINOPHIL NFR BLD: 2 % (ref 0–8)
ERYTHROCYTE [DISTWIDTH] IN BLOOD BY AUTOMATED COUNT: 13.5 % (ref 11.5–14.5)
EST. GFR  (AFRICAN AMERICAN): >60 ML/MIN/1.73 M^2
EST. GFR  (NON AFRICAN AMERICAN): >60 ML/MIN/1.73 M^2
ESTIMATED AVG GLUCOSE: 126 MG/DL (ref 68–131)
GLUCOSE SERPL-MCNC: 142 MG/DL (ref 70–110)
GLUCOSE UR QL STRIP: NEGATIVE
HBA1C MFR BLD: 6 % (ref 4.5–6.2)
HCT VFR BLD AUTO: 51.5 % (ref 37–48.5)
HDLC SERPL-MCNC: 48 MG/DL (ref 40–75)
HDLC SERPL: 21 % (ref 20–50)
HGB BLD-MCNC: 16.4 G/DL (ref 12–16)
HGB UR QL STRIP: NEGATIVE
HYALINE CASTS #/AREA URNS LPF: 15 /LPF
IMM GRANULOCYTES # BLD AUTO: 0.05 K/UL (ref 0–0.04)
IMM GRANULOCYTES NFR BLD AUTO: 0.8 % (ref 0–0.5)
KETONES UR QL STRIP: NEGATIVE
LDLC SERPL CALC-MCNC: 130.6 MG/DL (ref 63–159)
LEUKOCYTE ESTERASE UR QL STRIP: ABNORMAL
LYMPHOCYTES # BLD AUTO: 1.9 K/UL (ref 1–4.8)
LYMPHOCYTES NFR BLD: 30 % (ref 18–48)
MCH RBC QN AUTO: 29.2 PG (ref 27–31)
MCHC RBC AUTO-ENTMCNC: 31.8 G/DL (ref 32–36)
MCV RBC AUTO: 92 FL (ref 82–98)
MICROSCOPIC COMMENT: ABNORMAL
MONOCYTES # BLD AUTO: 0.6 K/UL (ref 0.3–1)
MONOCYTES NFR BLD: 9.7 % (ref 4–15)
NEUTROPHILS # BLD AUTO: 3.6 K/UL (ref 1.8–7.7)
NEUTROPHILS NFR BLD: 56.9 % (ref 38–73)
NITRITE UR QL STRIP: NEGATIVE
NONHDLC SERPL-MCNC: 181 MG/DL
NRBC BLD-RTO: 0 /100 WBC
PH UR STRIP: 6 [PH] (ref 5–8)
PLATELET # BLD AUTO: 261 K/UL (ref 150–450)
PMV BLD AUTO: 9.5 FL (ref 9.2–12.9)
POTASSIUM SERPL-SCNC: 4 MMOL/L (ref 3.5–5.1)
PROT SERPL-MCNC: 7 G/DL (ref 6–8.4)
PROT UR QL STRIP: ABNORMAL
RBC # BLD AUTO: 5.61 M/UL (ref 4–5.4)
RBC #/AREA URNS HPF: 4 /HPF (ref 0–4)
SODIUM SERPL-SCNC: 144 MMOL/L (ref 136–145)
SP GR UR STRIP: 1.02 (ref 1–1.03)
SQUAMOUS #/AREA URNS HPF: 9 /HPF
TRIGL SERPL-MCNC: 252 MG/DL (ref 30–150)
TSH SERPL DL<=0.005 MIU/L-ACNC: 0.61 UIU/ML (ref 0.34–5.6)
URN SPEC COLLECT METH UR: ABNORMAL
UROBILINOGEN UR STRIP-ACNC: NEGATIVE EU/DL
WBC # BLD AUTO: 6.36 K/UL (ref 3.9–12.7)
WBC #/AREA URNS HPF: 11 /HPF (ref 0–5)

## 2021-11-01 PROCEDURE — 80061 LIPID PANEL: CPT | Performed by: NURSE PRACTITIONER

## 2021-11-01 PROCEDURE — 80053 COMPREHEN METABOLIC PANEL: CPT | Performed by: NURSE PRACTITIONER

## 2021-11-01 PROCEDURE — 84443 ASSAY THYROID STIM HORMONE: CPT | Performed by: NURSE PRACTITIONER

## 2021-11-01 PROCEDURE — 36415 COLL VENOUS BLD VENIPUNCTURE: CPT | Performed by: NURSE PRACTITIONER

## 2021-11-01 PROCEDURE — 83036 HEMOGLOBIN GLYCOSYLATED A1C: CPT | Performed by: NURSE PRACTITIONER

## 2021-11-01 PROCEDURE — 81001 URINALYSIS AUTO W/SCOPE: CPT | Performed by: NURSE PRACTITIONER

## 2021-11-01 PROCEDURE — 85025 COMPLETE CBC W/AUTO DIFF WBC: CPT | Performed by: NURSE PRACTITIONER

## 2021-11-01 RX ORDER — LOSARTAN POTASSIUM 100 MG/1
TABLET ORAL
Qty: 90 TABLET | Refills: 0 | Status: SHIPPED | OUTPATIENT
Start: 2021-11-01 | End: 2022-02-11

## 2021-11-01 RX ORDER — ATORVASTATIN CALCIUM 80 MG/1
TABLET, FILM COATED ORAL
Qty: 45 TABLET | Refills: 0 | Status: SHIPPED | OUTPATIENT
Start: 2021-11-01 | End: 2022-01-21

## 2021-11-03 ENCOUNTER — OFFICE VISIT (OUTPATIENT)
Dept: FAMILY MEDICINE | Facility: CLINIC | Age: 71
End: 2021-11-03
Payer: MEDICARE

## 2021-11-03 VITALS
BODY MASS INDEX: 37.64 KG/M2 | WEIGHT: 234.19 LBS | SYSTOLIC BLOOD PRESSURE: 129 MMHG | HEART RATE: 69 BPM | TEMPERATURE: 98 F | DIASTOLIC BLOOD PRESSURE: 71 MMHG | OXYGEN SATURATION: 97 % | HEIGHT: 66 IN | RESPIRATION RATE: 20 BRPM

## 2021-11-03 DIAGNOSIS — E66.01 CLASS 2 SEVERE OBESITY DUE TO EXCESS CALORIES WITH SERIOUS COMORBIDITY AND BODY MASS INDEX (BMI) OF 37.0 TO 37.9 IN ADULT: ICD-10-CM

## 2021-11-03 DIAGNOSIS — E78.1 HYPERTRIGLYCERIDEMIA: ICD-10-CM

## 2021-11-03 DIAGNOSIS — R82.90 ABNORMAL URINE FINDING: ICD-10-CM

## 2021-11-03 DIAGNOSIS — E78.5 HYPERLIPIDEMIA, UNSPECIFIED HYPERLIPIDEMIA TYPE: ICD-10-CM

## 2021-11-03 DIAGNOSIS — E11.9 CONTROLLED TYPE 2 DIABETES MELLITUS WITHOUT COMPLICATION, WITHOUT LONG-TERM CURRENT USE OF INSULIN: ICD-10-CM

## 2021-11-03 DIAGNOSIS — I10 ESSENTIAL HYPERTENSION: Primary | ICD-10-CM

## 2021-11-03 DIAGNOSIS — Z78.0 ASYMPTOMATIC POSTMENOPAUSAL STATE: ICD-10-CM

## 2021-11-03 PROCEDURE — 1101F PR PT FALLS ASSESS DOC 0-1 FALLS W/OUT INJ PAST YR: ICD-10-PCS | Mod: S$GLB,,, | Performed by: NURSE PRACTITIONER

## 2021-11-03 PROCEDURE — 3072F PR LOW RISK FOR RETINOPATHY: ICD-10-PCS | Mod: S$GLB,,, | Performed by: NURSE PRACTITIONER

## 2021-11-03 PROCEDURE — 4010F ACE/ARB THERAPY RXD/TAKEN: CPT | Mod: S$GLB,,, | Performed by: NURSE PRACTITIONER

## 2021-11-03 PROCEDURE — 1159F MED LIST DOCD IN RCRD: CPT | Mod: S$GLB,,, | Performed by: NURSE PRACTITIONER

## 2021-11-03 PROCEDURE — 1160F PR REVIEW ALL MEDS BY PRESCRIBER/CLIN PHARMACIST DOCUMENTED: ICD-10-PCS | Mod: S$GLB,,, | Performed by: NURSE PRACTITIONER

## 2021-11-03 PROCEDURE — 1101F PT FALLS ASSESS-DOCD LE1/YR: CPT | Mod: S$GLB,,, | Performed by: NURSE PRACTITIONER

## 2021-11-03 PROCEDURE — 3044F HG A1C LEVEL LT 7.0%: CPT | Mod: S$GLB,,, | Performed by: NURSE PRACTITIONER

## 2021-11-03 PROCEDURE — 1125F PR PAIN SEVERITY QUANTIFIED, PAIN PRESENT: ICD-10-PCS | Mod: S$GLB,,, | Performed by: NURSE PRACTITIONER

## 2021-11-03 PROCEDURE — 3288F FALL RISK ASSESSMENT DOCD: CPT | Mod: S$GLB,,, | Performed by: NURSE PRACTITIONER

## 2021-11-03 PROCEDURE — 4010F PR ACE/ARB THEARPY RXD/TAKEN: ICD-10-PCS | Mod: S$GLB,,, | Performed by: NURSE PRACTITIONER

## 2021-11-03 PROCEDURE — 1157F ADVNC CARE PLAN IN RCRD: CPT | Mod: S$GLB,,, | Performed by: NURSE PRACTITIONER

## 2021-11-03 PROCEDURE — 3044F PR MOST RECENT HEMOGLOBIN A1C LEVEL <7.0%: ICD-10-PCS | Mod: S$GLB,,, | Performed by: NURSE PRACTITIONER

## 2021-11-03 PROCEDURE — 3074F PR MOST RECENT SYSTOLIC BLOOD PRESSURE < 130 MM HG: ICD-10-PCS | Mod: S$GLB,,, | Performed by: NURSE PRACTITIONER

## 2021-11-03 PROCEDURE — 1125F AMNT PAIN NOTED PAIN PRSNT: CPT | Mod: S$GLB,,, | Performed by: NURSE PRACTITIONER

## 2021-11-03 PROCEDURE — 3288F PR FALLS RISK ASSESSMENT DOCUMENTED: ICD-10-PCS | Mod: S$GLB,,, | Performed by: NURSE PRACTITIONER

## 2021-11-03 PROCEDURE — 1157F PR ADVANCE CARE PLAN OR EQUIV PRESENT IN MEDICAL RECORD: ICD-10-PCS | Mod: S$GLB,,, | Performed by: NURSE PRACTITIONER

## 2021-11-03 PROCEDURE — 3072F LOW RISK FOR RETINOPATHY: CPT | Mod: S$GLB,,, | Performed by: NURSE PRACTITIONER

## 2021-11-03 PROCEDURE — 3078F DIAST BP <80 MM HG: CPT | Mod: S$GLB,,, | Performed by: NURSE PRACTITIONER

## 2021-11-03 PROCEDURE — 3008F BODY MASS INDEX DOCD: CPT | Mod: S$GLB,,, | Performed by: NURSE PRACTITIONER

## 2021-11-03 PROCEDURE — 3008F PR BODY MASS INDEX (BMI) DOCUMENTED: ICD-10-PCS | Mod: S$GLB,,, | Performed by: NURSE PRACTITIONER

## 2021-11-03 PROCEDURE — 1160F RVW MEDS BY RX/DR IN RCRD: CPT | Mod: S$GLB,,, | Performed by: NURSE PRACTITIONER

## 2021-11-03 PROCEDURE — 1159F PR MEDICATION LIST DOCUMENTED IN MEDICAL RECORD: ICD-10-PCS | Mod: S$GLB,,, | Performed by: NURSE PRACTITIONER

## 2021-11-03 PROCEDURE — 3078F PR MOST RECENT DIASTOLIC BLOOD PRESSURE < 80 MM HG: ICD-10-PCS | Mod: S$GLB,,, | Performed by: NURSE PRACTITIONER

## 2021-11-03 PROCEDURE — 99214 PR OFFICE/OUTPT VISIT, EST, LEVL IV, 30-39 MIN: ICD-10-PCS | Mod: S$GLB,,, | Performed by: NURSE PRACTITIONER

## 2021-11-03 PROCEDURE — 99214 OFFICE O/P EST MOD 30 MIN: CPT | Mod: S$GLB,,, | Performed by: NURSE PRACTITIONER

## 2021-11-03 PROCEDURE — 3074F SYST BP LT 130 MM HG: CPT | Mod: S$GLB,,, | Performed by: NURSE PRACTITIONER

## 2021-11-03 RX ORDER — ICOSAPENT ETHYL 1000 MG/1
2 CAPSULE ORAL 2 TIMES DAILY WITH MEALS
Qty: 120 CAPSULE | Refills: 5 | Status: SHIPPED | OUTPATIENT
Start: 2021-11-03 | End: 2021-11-03

## 2021-11-03 RX ORDER — IBUPROFEN 200 MG
200 TABLET ORAL EVERY 6 HOURS PRN
COMMUNITY

## 2021-11-04 ENCOUNTER — TELEPHONE (OUTPATIENT)
Dept: HEMATOLOGY/ONCOLOGY | Facility: CLINIC | Age: 71
End: 2021-11-04
Payer: MEDICARE

## 2021-11-09 ENCOUNTER — TELEPHONE (OUTPATIENT)
Dept: HEMATOLOGY/ONCOLOGY | Facility: CLINIC | Age: 71
End: 2021-11-09
Payer: MEDICARE

## 2021-11-10 ENCOUNTER — PATIENT MESSAGE (OUTPATIENT)
Dept: HEMATOLOGY/ONCOLOGY | Facility: CLINIC | Age: 71
End: 2021-11-10
Payer: MEDICARE

## 2021-11-10 ENCOUNTER — TELEPHONE (OUTPATIENT)
Dept: HEMATOLOGY/ONCOLOGY | Facility: CLINIC | Age: 71
End: 2021-11-10
Payer: MEDICARE

## 2021-11-19 ENCOUNTER — HOSPITAL ENCOUNTER (OUTPATIENT)
Dept: RADIOLOGY | Facility: HOSPITAL | Age: 71
Discharge: HOME | End: 2021-11-19
Attending: NURSE PRACTITIONER
Payer: MEDICARE

## 2021-11-19 DIAGNOSIS — Z78.0 ASYMPTOMATIC POSTMENOPAUSAL STATE: ICD-10-CM

## 2021-11-19 PROCEDURE — 77080 DXA BONE DENSITY AXIAL: CPT | Mod: TC,PO

## 2021-11-22 ENCOUNTER — TELEPHONE (OUTPATIENT)
Dept: FAMILY MEDICINE | Facility: CLINIC | Age: 71
End: 2021-11-22
Payer: MEDICARE

## 2021-12-13 ENCOUNTER — TELEPHONE (OUTPATIENT)
Dept: HEMATOLOGY/ONCOLOGY | Facility: CLINIC | Age: 71
End: 2021-12-13
Payer: MEDICARE

## 2021-12-14 ENCOUNTER — HOSPITAL ENCOUNTER (OUTPATIENT)
Dept: RADIOLOGY | Facility: HOSPITAL | Age: 71
Discharge: HOME | End: 2021-12-14
Attending: RADIOLOGY
Payer: MEDICARE

## 2021-12-14 VITALS — BODY MASS INDEX: 37.63 KG/M2 | HEIGHT: 66 IN | WEIGHT: 234.13 LBS

## 2021-12-14 DIAGNOSIS — Z17.0 MALIGNANT NEOPLASM OF CENTRAL PORTION OF LEFT BREAST IN FEMALE, ESTROGEN RECEPTOR POSITIVE: ICD-10-CM

## 2021-12-14 DIAGNOSIS — C50.112 MALIGNANT NEOPLASM OF CENTRAL PORTION OF LEFT BREAST IN FEMALE, ESTROGEN RECEPTOR POSITIVE: ICD-10-CM

## 2021-12-14 PROCEDURE — 77061 BREAST TOMOSYNTHESIS UNI: CPT | Mod: TC,PO,LT

## 2021-12-15 ENCOUNTER — LAB VISIT (OUTPATIENT)
Dept: LAB | Facility: HOSPITAL | Age: 71
End: 2021-12-15
Attending: INTERNAL MEDICINE
Payer: MEDICARE

## 2021-12-15 DIAGNOSIS — Z79.811 USE OF ANASTROZOLE (ARIMIDEX): ICD-10-CM

## 2021-12-15 LAB
ALBUMIN SERPL BCP-MCNC: 4 G/DL (ref 3.5–5.2)
ALP SERPL-CCNC: 68 U/L (ref 55–135)
ALT SERPL W/O P-5'-P-CCNC: 20 U/L (ref 10–44)
ANION GAP SERPL CALC-SCNC: 11 MMOL/L (ref 8–16)
AST SERPL-CCNC: 16 U/L (ref 10–40)
BASOPHILS # BLD AUTO: 0.03 K/UL (ref 0–0.2)
BASOPHILS NFR BLD: 0.4 % (ref 0–1.9)
BILIRUB SERPL-MCNC: 0.7 MG/DL (ref 0.1–1)
BUN SERPL-MCNC: 22 MG/DL (ref 8–23)
CALCIUM SERPL-MCNC: 10 MG/DL (ref 8.7–10.5)
CHLORIDE SERPL-SCNC: 103 MMOL/L (ref 95–110)
CO2 SERPL-SCNC: 27 MMOL/L (ref 23–29)
CREAT SERPL-MCNC: 0.9 MG/DL (ref 0.5–1.4)
DIFFERENTIAL METHOD: NORMAL
EOSINOPHIL # BLD AUTO: 0.1 K/UL (ref 0–0.5)
EOSINOPHIL NFR BLD: 1.7 % (ref 0–8)
ERYTHROCYTE [DISTWIDTH] IN BLOOD BY AUTOMATED COUNT: 13.5 % (ref 11.5–14.5)
EST. GFR  (AFRICAN AMERICAN): >60 ML/MIN/1.73 M^2
EST. GFR  (NON AFRICAN AMERICAN): >60 ML/MIN/1.73 M^2
GLUCOSE SERPL-MCNC: 130 MG/DL (ref 70–110)
HCT VFR BLD AUTO: 47.7 % (ref 37–48.5)
HGB BLD-MCNC: 15.3 G/DL (ref 12–16)
IMM GRANULOCYTES # BLD AUTO: 0.04 K/UL (ref 0–0.04)
IMM GRANULOCYTES NFR BLD AUTO: 0.5 % (ref 0–0.5)
LYMPHOCYTES # BLD AUTO: 1.9 K/UL (ref 1–4.8)
LYMPHOCYTES NFR BLD: 25.9 % (ref 18–48)
MCH RBC QN AUTO: 29.2 PG (ref 27–31)
MCHC RBC AUTO-ENTMCNC: 32.1 G/DL (ref 32–36)
MCV RBC AUTO: 91 FL (ref 82–98)
MONOCYTES # BLD AUTO: 0.7 K/UL (ref 0.3–1)
MONOCYTES NFR BLD: 8.8 % (ref 4–15)
NEUTROPHILS # BLD AUTO: 4.7 K/UL (ref 1.8–7.7)
NEUTROPHILS NFR BLD: 62.7 % (ref 38–73)
NRBC BLD-RTO: 0 /100 WBC
PLATELET # BLD AUTO: 234 K/UL (ref 150–450)
PMV BLD AUTO: 9.4 FL (ref 9.2–12.9)
POTASSIUM SERPL-SCNC: 4 MMOL/L (ref 3.5–5.1)
PROT SERPL-MCNC: 6.7 G/DL (ref 6–8.4)
RBC # BLD AUTO: 5.24 M/UL (ref 4–5.4)
SODIUM SERPL-SCNC: 141 MMOL/L (ref 136–145)
WBC # BLD AUTO: 7.5 K/UL (ref 3.9–12.7)

## 2021-12-15 PROCEDURE — 85025 COMPLETE CBC W/AUTO DIFF WBC: CPT | Mod: HCNC | Performed by: INTERNAL MEDICINE

## 2021-12-15 PROCEDURE — 80053 COMPREHEN METABOLIC PANEL: CPT | Mod: HCNC | Performed by: INTERNAL MEDICINE

## 2021-12-15 PROCEDURE — 36415 COLL VENOUS BLD VENIPUNCTURE: CPT | Mod: HCNC | Performed by: INTERNAL MEDICINE

## 2021-12-16 ENCOUNTER — TELEPHONE (OUTPATIENT)
Dept: HEMATOLOGY/ONCOLOGY | Facility: CLINIC | Age: 71
End: 2021-12-16

## 2021-12-16 ENCOUNTER — OFFICE VISIT (OUTPATIENT)
Dept: HEMATOLOGY/ONCOLOGY | Facility: CLINIC | Age: 71
End: 2021-12-16
Payer: MEDICARE

## 2021-12-16 VITALS
OXYGEN SATURATION: 96 % | WEIGHT: 236.31 LBS | BODY MASS INDEX: 37.98 KG/M2 | SYSTOLIC BLOOD PRESSURE: 150 MMHG | RESPIRATION RATE: 16 BRPM | HEIGHT: 66 IN | DIASTOLIC BLOOD PRESSURE: 76 MMHG | TEMPERATURE: 96 F | HEART RATE: 66 BPM

## 2021-12-16 DIAGNOSIS — Z79.01 LONG TERM (CURRENT) USE OF ANTICOAGULANTS: ICD-10-CM

## 2021-12-16 DIAGNOSIS — C50.919 MALIGNANT NEOPLASM OF BREAST IN FEMALE, ESTROGEN RECEPTOR POSITIVE, UNSPECIFIED LATERALITY, UNSPECIFIED SITE OF BREAST: ICD-10-CM

## 2021-12-16 DIAGNOSIS — N64.89 OTHER SPECIFIED DISORDERS OF BREAST: ICD-10-CM

## 2021-12-16 DIAGNOSIS — Z79.811 USE OF ANASTROZOLE (ARIMIDEX): Primary | ICD-10-CM

## 2021-12-16 DIAGNOSIS — Z17.0 MALIGNANT NEOPLASM OF BREAST IN FEMALE, ESTROGEN RECEPTOR POSITIVE, UNSPECIFIED LATERALITY, UNSPECIFIED SITE OF BREAST: ICD-10-CM

## 2021-12-16 DIAGNOSIS — I48.20 CHRONIC ATRIAL FIBRILLATION: ICD-10-CM

## 2021-12-16 PROCEDURE — 3072F LOW RISK FOR RETINOPATHY: CPT | Mod: HCNC,CPTII,S$GLB, | Performed by: INTERNAL MEDICINE

## 2021-12-16 PROCEDURE — 1157F PR ADVANCE CARE PLAN OR EQUIV PRESENT IN MEDICAL RECORD: ICD-10-PCS | Mod: HCNC,CPTII,S$GLB, | Performed by: INTERNAL MEDICINE

## 2021-12-16 PROCEDURE — 3072F PR LOW RISK FOR RETINOPATHY: ICD-10-PCS | Mod: HCNC,CPTII,S$GLB, | Performed by: INTERNAL MEDICINE

## 2021-12-16 PROCEDURE — 99499 UNLISTED E&M SERVICE: CPT | Mod: S$GLB,,, | Performed by: INTERNAL MEDICINE

## 2021-12-16 PROCEDURE — 4010F ACE/ARB THERAPY RXD/TAKEN: CPT | Mod: HCNC,CPTII,S$GLB, | Performed by: INTERNAL MEDICINE

## 2021-12-16 PROCEDURE — 3061F NEG MICROALBUMINURIA REV: CPT | Mod: HCNC,CPTII,S$GLB, | Performed by: INTERNAL MEDICINE

## 2021-12-16 PROCEDURE — 99999 PR PBB SHADOW E&M-EST. PATIENT-LVL IV: CPT | Mod: PBBFAC,HCNC,, | Performed by: INTERNAL MEDICINE

## 2021-12-16 PROCEDURE — 99999 PR PBB SHADOW E&M-EST. PATIENT-LVL IV: ICD-10-PCS | Mod: PBBFAC,HCNC,, | Performed by: INTERNAL MEDICINE

## 2021-12-16 PROCEDURE — 3066F NEPHROPATHY DOC TX: CPT | Mod: HCNC,CPTII,S$GLB, | Performed by: INTERNAL MEDICINE

## 2021-12-16 PROCEDURE — 99214 PR OFFICE/OUTPT VISIT, EST, LEVL IV, 30-39 MIN: ICD-10-PCS | Mod: HCNC,S$GLB,, | Performed by: INTERNAL MEDICINE

## 2021-12-16 PROCEDURE — 3061F PR NEG MICROALBUMINURIA RESULT DOCUMENTED/REVIEW: ICD-10-PCS | Mod: HCNC,CPTII,S$GLB, | Performed by: INTERNAL MEDICINE

## 2021-12-16 PROCEDURE — 1157F ADVNC CARE PLAN IN RCRD: CPT | Mod: HCNC,CPTII,S$GLB, | Performed by: INTERNAL MEDICINE

## 2021-12-16 PROCEDURE — 4010F PR ACE/ARB THEARPY RXD/TAKEN: ICD-10-PCS | Mod: HCNC,CPTII,S$GLB, | Performed by: INTERNAL MEDICINE

## 2021-12-16 PROCEDURE — 99214 OFFICE O/P EST MOD 30 MIN: CPT | Mod: HCNC,S$GLB,, | Performed by: INTERNAL MEDICINE

## 2021-12-16 PROCEDURE — 99499 RISK ADDL DX/OHS AUDIT: ICD-10-PCS | Mod: S$GLB,,, | Performed by: INTERNAL MEDICINE

## 2021-12-16 PROCEDURE — 3066F PR DOCUMENTATION OF TREATMENT FOR NEPHROPATHY: ICD-10-PCS | Mod: HCNC,CPTII,S$GLB, | Performed by: INTERNAL MEDICINE

## 2021-12-17 ENCOUNTER — TELEPHONE (OUTPATIENT)
Dept: HEMATOLOGY/ONCOLOGY | Facility: CLINIC | Age: 71
End: 2021-12-17
Payer: MEDICARE

## 2021-12-20 ENCOUNTER — TELEPHONE (OUTPATIENT)
Dept: HEMATOLOGY/ONCOLOGY | Facility: CLINIC | Age: 71
End: 2021-12-20
Payer: MEDICARE

## 2021-12-21 ENCOUNTER — TELEPHONE (OUTPATIENT)
Dept: HEMATOLOGY/ONCOLOGY | Facility: CLINIC | Age: 71
End: 2021-12-21
Payer: MEDICARE

## 2021-12-23 ENCOUNTER — TELEPHONE (OUTPATIENT)
Dept: HEMATOLOGY/ONCOLOGY | Facility: CLINIC | Age: 71
End: 2021-12-23
Payer: MEDICARE

## 2022-01-05 ENCOUNTER — OFFICE VISIT (OUTPATIENT)
Dept: OPTOMETRY | Facility: CLINIC | Age: 72
End: 2022-01-05
Payer: COMMERCIAL

## 2022-01-05 DIAGNOSIS — H26.9 CORTICAL CATARACT: ICD-10-CM

## 2022-01-05 DIAGNOSIS — E11.9 DIABETES MELLITUS TYPE 2 WITHOUT RETINOPATHY: ICD-10-CM

## 2022-01-05 DIAGNOSIS — H25.13 NUCLEAR SCLEROSIS, BILATERAL: ICD-10-CM

## 2022-01-05 DIAGNOSIS — Z01.00 EXAMINATION OF EYES AND VISION: Primary | ICD-10-CM

## 2022-01-05 DIAGNOSIS — E11.36 DIABETIC CATARACT: ICD-10-CM

## 2022-01-05 DIAGNOSIS — H52.7 REFRACTIVE ERROR: ICD-10-CM

## 2022-01-05 PROCEDURE — 92014 COMPRE OPH EXAM EST PT 1/>: CPT | Mod: S$GLB,,, | Performed by: OPTOMETRIST

## 2022-01-05 PROCEDURE — 92014 PR EYE EXAM, EST PATIENT,COMPREHESV: ICD-10-PCS | Mod: S$GLB,,, | Performed by: OPTOMETRIST

## 2022-01-05 PROCEDURE — 99499 RISK ADDL DX/OHS AUDIT: ICD-10-PCS | Mod: S$GLB,,, | Performed by: OPTOMETRIST

## 2022-01-05 PROCEDURE — 92015 PR REFRACTION: ICD-10-PCS | Mod: S$GLB,,, | Performed by: OPTOMETRIST

## 2022-01-05 PROCEDURE — 99499 UNLISTED E&M SERVICE: CPT | Mod: S$GLB,,, | Performed by: OPTOMETRIST

## 2022-01-05 PROCEDURE — 99999 PR PBB SHADOW E&M-EST. PATIENT-LVL III: CPT | Mod: PBBFAC,,, | Performed by: OPTOMETRIST

## 2022-01-05 PROCEDURE — 99999 PR PBB SHADOW E&M-EST. PATIENT-LVL III: ICD-10-PCS | Mod: PBBFAC,,, | Performed by: OPTOMETRIST

## 2022-01-05 PROCEDURE — 92015 DETERMINE REFRACTIVE STATE: CPT | Mod: S$GLB,,, | Performed by: OPTOMETRIST

## 2022-01-05 RX ORDER — ICOSAPENT ETHYL 1000 MG/1
CAPSULE ORAL
COMMUNITY
Start: 2021-11-03

## 2022-01-05 NOTE — PROGRESS NOTES
HPI     Diabetic Eye Exam     Comments: LDE: 06/2020 Dr. Sevilla               Comments     70 YO female presents today for an annual diabetic eye exam. Patient   states that she is doing well, notes that she is no longer diabetic so   unsure if she needs to continue routine care here. Also needs a new   glasses prescription as her glasses are scratched.     Hemoglobin A1C       Date                     Value               Ref Range             Status                11/01/2021               6.0                 4.5 - 6.2 %           Final                 04/26/2021               6.4 (H)             4.5 - 6.2 %           Final                 08/25/2020               5.9 (H)             4.0 - 5.6 %           Final                          Last edited by Priyank Sevilla, OD on 1/5/2022 10:39 AM. (History)            Assessment /Plan     For exam results, see Encounter Report.    Examination of eyes and vision    Diabetes mellitus type 2 without retinopathy    Diabetic cataract    Nuclear sclerosis, bilateral    Cortical cataract    Refractive error      1. Examination of eyes and vision    2. Diabetes mellitus type 2 without retinopathy  Controlled with diet/exercise. No retinopathy, no CSME OU. Discussed possible ocular affects of uncontrolled blood sugar with patient. Recommended continued strong blood sugar control and continued care with PCP. Monitor yearly.     3. Diabetic cataract  4. Nuclear sclerosis, bilateral  5. Cortical cataract  Mild to moderate, not yet significant. Discussed possible ocular affects of cataracts. Acceptable BCVA OU. Discussed treatment options. Surgery not recommended at this time. Monitor yearly.     6. Refractive error  Dispensed updated spectacle Rx. Discussed various spectacle lens options. Discussed adaptation period to new specs.  Demonstrated new spec Rx vs current specs in phoropter with patient satisfaction.      RTC in 1 year for comprehensive eye exam, or sooner prn.

## 2022-04-28 NOTE — PROGRESS NOTES
2nd attempt to complete Social Work Assessment for OPCM. No answer on home/mobile phone and voice mailbox is not set up.  Call to work number x 2; call could not be completed as dialed. LCSW will mail a letter and send message to patient portal with contact information requesting a return call.  OPCM RN notified.     Detail Level: Detailed

## 2022-05-03 ENCOUNTER — OFFICE VISIT (OUTPATIENT)
Dept: FAMILY MEDICINE | Facility: CLINIC | Age: 72
End: 2022-05-03
Payer: MEDICARE

## 2022-05-03 VITALS
BODY MASS INDEX: 37.41 KG/M2 | DIASTOLIC BLOOD PRESSURE: 80 MMHG | SYSTOLIC BLOOD PRESSURE: 130 MMHG | TEMPERATURE: 99 F | HEIGHT: 66 IN | HEART RATE: 67 BPM | RESPIRATION RATE: 20 BRPM | OXYGEN SATURATION: 98 % | WEIGHT: 232.81 LBS

## 2022-05-03 DIAGNOSIS — I48.20 CHRONIC ATRIAL FIBRILLATION: ICD-10-CM

## 2022-05-03 DIAGNOSIS — E11.9 CONTROLLED TYPE 2 DIABETES MELLITUS WITHOUT COMPLICATION, WITHOUT LONG-TERM CURRENT USE OF INSULIN: Primary | ICD-10-CM

## 2022-05-03 DIAGNOSIS — E66.01 CLASS 2 SEVERE OBESITY DUE TO EXCESS CALORIES WITH SERIOUS COMORBIDITY AND BODY MASS INDEX (BMI) OF 37.0 TO 37.9 IN ADULT: ICD-10-CM

## 2022-05-03 DIAGNOSIS — E78.5 HYPERLIPIDEMIA, UNSPECIFIED HYPERLIPIDEMIA TYPE: ICD-10-CM

## 2022-05-03 DIAGNOSIS — I10 ESSENTIAL HYPERTENSION: ICD-10-CM

## 2022-05-03 LAB — HBA1C MFR BLD: 6.5 %

## 2022-05-03 PROCEDURE — 3079F DIAST BP 80-89 MM HG: CPT | Mod: CPTII,S$GLB,, | Performed by: NURSE PRACTITIONER

## 2022-05-03 PROCEDURE — 3075F SYST BP GE 130 - 139MM HG: CPT | Mod: CPTII,S$GLB,, | Performed by: NURSE PRACTITIONER

## 2022-05-03 PROCEDURE — 1159F PR MEDICATION LIST DOCUMENTED IN MEDICAL RECORD: ICD-10-PCS | Mod: CPTII,S$GLB,, | Performed by: NURSE PRACTITIONER

## 2022-05-03 PROCEDURE — 3288F PR FALLS RISK ASSESSMENT DOCUMENTED: ICD-10-PCS | Mod: CPTII,S$GLB,, | Performed by: NURSE PRACTITIONER

## 2022-05-03 PROCEDURE — 3008F PR BODY MASS INDEX (BMI) DOCUMENTED: ICD-10-PCS | Mod: CPTII,S$GLB,, | Performed by: NURSE PRACTITIONER

## 2022-05-03 PROCEDURE — 1160F RVW MEDS BY RX/DR IN RCRD: CPT | Mod: CPTII,S$GLB,, | Performed by: NURSE PRACTITIONER

## 2022-05-03 PROCEDURE — 1126F PR PAIN SEVERITY QUANTIFIED, NO PAIN PRESENT: ICD-10-PCS | Mod: CPTII,S$GLB,, | Performed by: NURSE PRACTITIONER

## 2022-05-03 PROCEDURE — 3075F PR MOST RECENT SYSTOLIC BLOOD PRESS GE 130-139MM HG: ICD-10-PCS | Mod: CPTII,S$GLB,, | Performed by: NURSE PRACTITIONER

## 2022-05-03 PROCEDURE — 1157F PR ADVANCE CARE PLAN OR EQUIV PRESENT IN MEDICAL RECORD: ICD-10-PCS | Mod: CPTII,S$GLB,, | Performed by: NURSE PRACTITIONER

## 2022-05-03 PROCEDURE — 99213 PR OFFICE/OUTPT VISIT, EST, LEVL III, 20-29 MIN: ICD-10-PCS | Mod: S$GLB,,, | Performed by: NURSE PRACTITIONER

## 2022-05-03 PROCEDURE — 1126F AMNT PAIN NOTED NONE PRSNT: CPT | Mod: CPTII,S$GLB,, | Performed by: NURSE PRACTITIONER

## 2022-05-03 PROCEDURE — 3044F PR MOST RECENT HEMOGLOBIN A1C LEVEL <7.0%: ICD-10-PCS | Mod: CPTII,S$GLB,, | Performed by: NURSE PRACTITIONER

## 2022-05-03 PROCEDURE — 1159F MED LIST DOCD IN RCRD: CPT | Mod: CPTII,S$GLB,, | Performed by: NURSE PRACTITIONER

## 2022-05-03 PROCEDURE — 3044F HG A1C LEVEL LT 7.0%: CPT | Mod: CPTII,S$GLB,, | Performed by: NURSE PRACTITIONER

## 2022-05-03 PROCEDURE — 1157F ADVNC CARE PLAN IN RCRD: CPT | Mod: CPTII,S$GLB,, | Performed by: NURSE PRACTITIONER

## 2022-05-03 PROCEDURE — 3008F BODY MASS INDEX DOCD: CPT | Mod: CPTII,S$GLB,, | Performed by: NURSE PRACTITIONER

## 2022-05-03 PROCEDURE — 1101F PT FALLS ASSESS-DOCD LE1/YR: CPT | Mod: CPTII,S$GLB,, | Performed by: NURSE PRACTITIONER

## 2022-05-03 PROCEDURE — 3288F FALL RISK ASSESSMENT DOCD: CPT | Mod: CPTII,S$GLB,, | Performed by: NURSE PRACTITIONER

## 2022-05-03 PROCEDURE — 99213 OFFICE O/P EST LOW 20 MIN: CPT | Mod: S$GLB,,, | Performed by: NURSE PRACTITIONER

## 2022-05-03 PROCEDURE — 83036 POCT HEMOGLOBIN A1C: ICD-10-PCS | Mod: QW,S$GLB,, | Performed by: NURSE PRACTITIONER

## 2022-05-03 PROCEDURE — 1101F PR PT FALLS ASSESS DOC 0-1 FALLS W/OUT INJ PAST YR: ICD-10-PCS | Mod: CPTII,S$GLB,, | Performed by: NURSE PRACTITIONER

## 2022-05-03 PROCEDURE — 3079F PR MOST RECENT DIASTOLIC BLOOD PRESSURE 80-89 MM HG: ICD-10-PCS | Mod: CPTII,S$GLB,, | Performed by: NURSE PRACTITIONER

## 2022-05-03 PROCEDURE — 83036 HEMOGLOBIN GLYCOSYLATED A1C: CPT | Mod: QW,S$GLB,, | Performed by: NURSE PRACTITIONER

## 2022-05-03 PROCEDURE — 1160F PR REVIEW ALL MEDS BY PRESCRIBER/CLIN PHARMACIST DOCUMENTED: ICD-10-PCS | Mod: CPTII,S$GLB,, | Performed by: NURSE PRACTITIONER

## 2022-05-03 PROCEDURE — 4010F ACE/ARB THERAPY RXD/TAKEN: CPT | Mod: CPTII,S$GLB,, | Performed by: NURSE PRACTITIONER

## 2022-05-03 PROCEDURE — 4010F PR ACE/ARB THEARPY RXD/TAKEN: ICD-10-PCS | Mod: CPTII,S$GLB,, | Performed by: NURSE PRACTITIONER

## 2022-05-03 NOTE — PROGRESS NOTES
SUBJECTIVE:      Patient ID: Eduardo Esposito is a 71 y.o. female.    Chief Complaint: Hypertension and Diabetes    Arpita is here today for follow-up on hypertension and DM.  She is taking her medications as prescribed daily without side effects or complaints.  She saw her cardiologist Dr. Crystal since last visit- need notes to review.  Her blood pressure is well controlled today on her current regimen.  Recent A1c worsened to 6.5 depsite diet and wt loss. She may be eating too many carbs- recommended cut down.  She is exercising 3-5 times weekly at [employer]. She is continuing to follow-up with her oncologist for breast cancer maintenance/management.  She is up-to-date on her vaccinations and other health screenings. Due for foot exam today.    Hypertension  This is a chronic problem. The current episode started more than 1 year ago. The problem is unchanged. The problem is controlled. Pertinent negatives include no anxiety, blurred vision, chest pain, headaches, malaise/fatigue, orthopnea, palpitations, peripheral edema, shortness of breath or sweats. There are no associated agents to hypertension. Risk factors for coronary artery disease include obesity, post-menopausal state, sedentary lifestyle, dyslipidemia and family history. Past treatments include angiotensin blockers, diuretics, lifestyle changes and calcium channel blockers. The current treatment provides moderate improvement. Compliance problems include exercise.  There is no history of angina, kidney disease, CAD/MI, CVA or heart failure. There is no history of a thyroid problem.   Diabetes  She presents for her follow-up diabetic visit. She has type 2 diabetes mellitus. Her disease course has been worsening. Pertinent negatives for hypoglycemia include no confusion, dizziness, headaches, nervousness/anxiousness, pallor or sweats. Associated symptoms include weight loss. Pertinent negatives for diabetes include no blurred vision, no chest pain, no  fatigue, no foot paresthesias, no foot ulcerations, no polydipsia, no polyuria, no visual change and no weakness. Symptoms are worsening. Pertinent negatives for diabetic complications include no CVA. She participates in exercise every other day. An ACE inhibitor/angiotensin II receptor blocker is being taken. She does not see a podiatrist.Eye exam is current.       Family History   Problem Relation Age of Onset    Breast cancer Mother     Alzheimer's disease Father     Hypertension Father     Pulmonary embolism Brother     Glaucoma Neg Hx     Macular degeneration Neg Hx     Retinal detachment Neg Hx       Social History     Socioeconomic History    Marital status:    Tobacco Use    Smoking status: Never Smoker    Smokeless tobacco: Never Used   Substance and Sexual Activity    Alcohol use: No    Drug use: No    Sexual activity: Not Currently     Current Outpatient Medications   Medication Sig Dispense Refill    amLODIPine (NORVASC) 5 MG tablet Take 5 mg by mouth once daily.      anastrozole (ARIMIDEX) 1 mg Tab TAKE ONE TABLET BY MOUTH ONCE A DAY 90 tablet 10    ascorbic acid, vitamin C, (VITAMIN C) 100 MG tablet Take 100 mg by mouth once daily.      atorvastatin (LIPITOR) 80 MG tablet TAKE 1/2 TABLET BY MOUTH EVERY EVENING 45 tablet 0    b complex vitamins tablet Take 1 tablet by mouth once daily.      ELIQUIS 5 mg Tab Take 1 tablet (5 mg total) by mouth 2 (two) times daily. 180 tablet 1    ergocalciferol, vitamin D2, (VITAMIN D ORAL) Take by mouth.      hydroCHLOROthiazide (HYDRODIURIL) 25 MG tablet TAKE 1 TABLET BY MOUTH ONCE DAILY 90 tablet 1    losartan (COZAAR) 100 MG tablet TAKE ONE TABLET BY MOUTH EVERY EVENING 90 tablet 1    ibuprofen (ADVIL,MOTRIN) 200 MG tablet Take 200 mg by mouth every 6 (six) hours as needed for Pain.      lancets Misc To check BG two times daily, to use with insurance preferred meter 50 each 5    TRUE METRIX GLUCOSE TEST STRIP Strp TO  CHECK BLOOD  GLUCOSE  2  TIMES  DAILY 200 strip 1    TRUEPLUS LANCETS 33 gauge Misc CHECK BLOOD GLUCOSE TWICE DAILY 200 each 1    VASCEPA 1 gram Cap TAKE TWO CAPSULES BY MOUTH TWO TIMES A DAY WITH MEALS       No current facility-administered medications for this visit.     Review of patient's allergies indicates:   Allergen Reactions    Lisinopril      hives      Past Medical History:   Diagnosis Date    Atrial fibrillation     4/11/13-recent dx sceduled to see cardiology Monday- Dr Lynn-dm    Breast cancer     Cancer     breast    Diabetes mellitus     Diabetes mellitus type I     Exposure to STD     Hyperlipemia     Hypertension     Limb alert care status     NO BP/ IV IN LEFT ARM    Obesity      Past Surgical History:   Procedure Laterality Date    AXILLARY NODE DISSECTION Left 6/13/2019    Procedure: LYMPHADENECTOMY, AXILLARY;  Surgeon: Kendall Howard MD;  Location: [facility] OR;  Service: General;  Laterality: Left;  left lumpectomy with left axillary lymph node dissection    BREAST BIOPSY Left     BREAST SURGERY      lumpectomy left    EXCISION OF NAIL MATRIX Bilateral 10/2/2020    Procedure: EXCISION, NAIL MATRIX  Left hallux Zadik procedure, right hallux permanent total nail avulsion;  Surgeon: Jill Marin DPM;  Location: [facility] OR;  Service: Podiatry;  Laterality: Bilateral;    HIP REPLACEMENT ARTHROPLASTY Right 11/27/2019    Procedure: ARTHROPLASTY, HIP REPLACEMENT;  Surgeon: Rahul Hanson II, MD;  Location: [facility] OR;  Service: Orthopedics;  Laterality: Right;    JOINT REPLACEMENT Right     knee    NEEDLE LOCALIZATION Left 6/13/2019    Procedure: NEEDLE LOCALIZATION;  Surgeon: Kendall Howard MD;  Location: [facility] OR;  Service: General;  Laterality: Left;  left lumpectomy with wire needle localization    SENTINEL LYMPH NODE BIOPSY Left 6/13/2019    Procedure: BIOPSY, LYMPH NODE, SENTINEL;  Surgeon: Kendall Howard MD;  Location: [facility] OR;  Service: General;  Laterality: Left;  left  "sentinel lymph node dissection    TONSILLECTOMY      TOTAL KNEE ARTHROPLASTY         Review of Systems   Constitutional: Positive for weight loss. Negative for appetite change, chills, fatigue, fever, malaise/fatigue and unexpected weight change.   HENT: Negative for congestion and trouble swallowing.    Eyes: Negative for blurred vision, pain, discharge and visual disturbance.   Respiratory: Negative for cough, chest tightness and shortness of breath.    Cardiovascular: Negative for chest pain, palpitations, orthopnea and leg swelling.   Gastrointestinal: Negative for abdominal pain, diarrhea, nausea and vomiting.   Endocrine: Negative for polydipsia and polyuria.   Genitourinary: Negative for dysuria, frequency, hematuria, pelvic pain, vaginal bleeding, vaginal discharge and vaginal pain.   Musculoskeletal: Negative for myalgias.   Skin: Negative for pallor and rash.   Neurological: Negative for dizziness, weakness, numbness and headaches.   Hematological: Negative for adenopathy. Bruises/bleeds easily (on Eliquis ).   Psychiatric/Behavioral: Negative for confusion, dysphoric mood, sleep disturbance and suicidal ideas. The patient is not nervous/anxious.       OBJECTIVE:      Vitals:    05/03/22 1058   BP: 130/80   BP Location: Right arm   Patient Position: Sitting   BP Method: Large (Manual)   Pulse: 67   Resp: 20   Temp: 98.6 °F (37 °C)   TempSrc: Oral   SpO2: 98%   Weight: 105.6 kg (232 lb 12.8 oz)   Height: 5' 6" (1.676 m)     Physical Exam  Vitals and nursing note reviewed.   Constitutional:       General: She is not in acute distress.     Appearance: Normal appearance. She is well-developed. She is not ill-appearing.      Comments: Obese    HENT:      Head: Normocephalic.      Mouth/Throat:      Mouth: Mucous membranes are moist.   Eyes:      General: No scleral icterus.     Conjunctiva/sclera: Conjunctivae normal.      Pupils: Pupils are equal, round, and reactive to light.   Neck:      Thyroid: No " thyromegaly.   Cardiovascular:      Rate and Rhythm: Normal rate and regular rhythm.      Pulses:           Dorsalis pedis pulses are 1+ on the right side and 1+ on the left side.        Posterior tibial pulses are 1+ on the right side and 1+ on the left side.      Heart sounds: Normal heart sounds.   Pulmonary:      Effort: Pulmonary effort is normal. No respiratory distress.      Breath sounds: Normal breath sounds. No wheezing or rales.   Abdominal:      Palpations: Abdomen is soft.   Musculoskeletal:         General: Normal range of motion.      Cervical back: Normal range of motion and neck supple.      Right lower leg: No edema.      Left lower leg: No edema.      Right foot: Normal range of motion. No deformity.      Left foot: Normal range of motion. No deformity.   Feet:      Right foot:      Protective Sensation: 10 sites tested. 10 sites sensed.      Skin integrity: Skin integrity normal. No ulcer, blister, skin breakdown, erythema, warmth, callus or dry skin.      Toenail Condition: Right toenails are normal.      Left foot:      Protective Sensation: 10 sites tested. 10 sites sensed.      Skin integrity: Skin integrity normal. No ulcer, blister, skin breakdown, erythema, warmth, callus or dry skin.      Toenail Condition: Left toenails are normal.   Lymphadenopathy:      Cervical: No cervical adenopathy.   Skin:     General: Skin is warm and dry.      Capillary Refill: Capillary refill takes less than 2 seconds.      Coloration: Skin is not pale.   Neurological:      Mental Status: She is alert and oriented to person, place, and time.   Psychiatric:         Mood and Affect: Mood normal.         Behavior: Behavior normal.         Thought Content: Thought content normal.         Judgment: Judgment normal.        Assessment:       1. Controlled type 2 diabetes mellitus without complication, without long-term current use of insulin    2. Essential hypertension    3. Chronic atrial fibrillation    4.  Hyperlipidemia, unspecified hyperlipidemia type    5. Class 2 severe obesity due to excess calories with serious comorbidity and body mass index (BMI) of 37.0 to 37.9 in adult        Plan:       Controlled type 2 diabetes mellitus without complication, without long-term current use of insulin  -     Hemoglobin A1C, POCT  -     Urinalysis; Future; Expected date: 11/04/2022  -     Microalbumin/Creatinine Ratio, Urine; Future; Expected date: 11/04/2022  -     Hemoglobin A1C; Future; Expected date: 11/04/2022  -     Comprehensive Metabolic Panel; Future; Expected date: 11/04/2022  -monitor in 6 mo     Essential hypertension  -     Lipid Panel; Future; Expected date: 11/04/2022  -     TSH; Future; Expected date: 11/04/2022  -     Urinalysis; Future; Expected date: 11/04/2022  -     Comprehensive Metabolic Panel; Future; Expected date: 11/04/2022  -improved with wt loss     Chronic atrial fibrillation   -get cardiology notes     Hyperlipidemia, unspecified hyperlipidemia type  -     Lipid Panel; Future; Expected date: 11/04/2022  -     Comprehensive Metabolic Panel; Future; Expected date: 11/04/2022    Class 2 severe obesity due to excess calories with serious comorbidity and body mass index (BMI) of 37.0 to 37.9 in adult        Follow up in about 6 months (around 11/3/2022) for diabetes, HTN.      5/3/2022 CHAVO Martínez, FNP    This note was created using [employer] voice recognition software that occasionally misinterprets phrases or words.

## 2022-06-14 ENCOUNTER — HOSPITAL ENCOUNTER (OUTPATIENT)
Dept: RADIOLOGY | Facility: HOSPITAL | Age: 72
Discharge: HOME | End: 2022-06-14
Attending: INTERNAL MEDICINE
Payer: MEDICARE

## 2022-06-14 VITALS — BODY MASS INDEX: 37.41 KG/M2 | HEIGHT: 66 IN | WEIGHT: 232.81 LBS

## 2022-06-14 DIAGNOSIS — I48.20 CHRONIC ATRIAL FIBRILLATION: ICD-10-CM

## 2022-06-14 DIAGNOSIS — N64.89 OTHER SPECIFIED DISORDERS OF BREAST: ICD-10-CM

## 2022-06-14 DIAGNOSIS — Z17.0 MALIGNANT NEOPLASM OF BREAST IN FEMALE, ESTROGEN RECEPTOR POSITIVE, UNSPECIFIED LATERALITY, UNSPECIFIED SITE OF BREAST: ICD-10-CM

## 2022-06-14 DIAGNOSIS — Z79.01 LONG TERM (CURRENT) USE OF ANTICOAGULANTS: ICD-10-CM

## 2022-06-14 DIAGNOSIS — C50.919 MALIGNANT NEOPLASM OF BREAST IN FEMALE, ESTROGEN RECEPTOR POSITIVE, UNSPECIFIED LATERALITY, UNSPECIFIED SITE OF BREAST: ICD-10-CM

## 2022-06-14 DIAGNOSIS — Z79.811 USE OF ANASTROZOLE (ARIMIDEX): ICD-10-CM

## 2022-06-14 PROCEDURE — 77066 DX MAMMO INCL CAD BI: CPT | Mod: TC,PO

## 2022-06-22 ENCOUNTER — LAB VISIT (OUTPATIENT)
Dept: LAB | Facility: HOSPITAL | Age: 72
End: 2022-06-22
Attending: INTERNAL MEDICINE
Payer: MEDICARE

## 2022-06-22 DIAGNOSIS — C50.919 MALIGNANT NEOPLASM OF BREAST IN FEMALE, ESTROGEN RECEPTOR POSITIVE, UNSPECIFIED LATERALITY, UNSPECIFIED SITE OF BREAST: ICD-10-CM

## 2022-06-22 DIAGNOSIS — Z79.811 USE OF ANASTROZOLE (ARIMIDEX): ICD-10-CM

## 2022-06-22 DIAGNOSIS — I48.20 CHRONIC ATRIAL FIBRILLATION: ICD-10-CM

## 2022-06-22 DIAGNOSIS — Z79.01 LONG TERM (CURRENT) USE OF ANTICOAGULANTS: ICD-10-CM

## 2022-06-22 DIAGNOSIS — Z17.0 MALIGNANT NEOPLASM OF BREAST IN FEMALE, ESTROGEN RECEPTOR POSITIVE, UNSPECIFIED LATERALITY, UNSPECIFIED SITE OF BREAST: ICD-10-CM

## 2022-06-22 LAB
ALBUMIN SERPL BCP-MCNC: 4.3 G/DL (ref 3.5–5.2)
ALP SERPL-CCNC: 66 U/L (ref 55–135)
ALT SERPL W/O P-5'-P-CCNC: 30 U/L (ref 10–44)
ANION GAP SERPL CALC-SCNC: 10 MMOL/L (ref 8–16)
AST SERPL-CCNC: 22 U/L (ref 10–40)
BASOPHILS # BLD AUTO: 0.04 K/UL (ref 0–0.2)
BASOPHILS NFR BLD: 0.5 % (ref 0–1.9)
BILIRUB SERPL-MCNC: 1.2 MG/DL (ref 0.1–1)
BUN SERPL-MCNC: 24 MG/DL (ref 8–23)
CALCIUM SERPL-MCNC: 10 MG/DL (ref 8.7–10.5)
CHLORIDE SERPL-SCNC: 101 MMOL/L (ref 95–110)
CO2 SERPL-SCNC: 29 MMOL/L (ref 23–29)
CREAT SERPL-MCNC: 0.7 MG/DL (ref 0.5–1.4)
DIFFERENTIAL METHOD: ABNORMAL
EOSINOPHIL # BLD AUTO: 0.2 K/UL (ref 0–0.5)
EOSINOPHIL NFR BLD: 2.1 % (ref 0–8)
ERYTHROCYTE [DISTWIDTH] IN BLOOD BY AUTOMATED COUNT: 13.5 % (ref 11.5–14.5)
EST. GFR  (AFRICAN AMERICAN): >60 ML/MIN/1.73 M^2
EST. GFR  (NON AFRICAN AMERICAN): >60 ML/MIN/1.73 M^2
GLUCOSE SERPL-MCNC: 111 MG/DL (ref 70–110)
HCT VFR BLD AUTO: 48.3 % (ref 37–48.5)
HGB BLD-MCNC: 16.5 G/DL (ref 12–16)
IMM GRANULOCYTES # BLD AUTO: 0.08 K/UL (ref 0–0.04)
IMM GRANULOCYTES NFR BLD AUTO: 1 % (ref 0–0.5)
LYMPHOCYTES # BLD AUTO: 2.5 K/UL (ref 1–4.8)
LYMPHOCYTES NFR BLD: 31.6 % (ref 18–48)
MCH RBC QN AUTO: 30.5 PG (ref 27–31)
MCHC RBC AUTO-ENTMCNC: 34.2 G/DL (ref 32–36)
MCV RBC AUTO: 89 FL (ref 82–98)
MONOCYTES # BLD AUTO: 0.6 K/UL (ref 0.3–1)
MONOCYTES NFR BLD: 8 % (ref 4–15)
NEUTROPHILS # BLD AUTO: 4.5 K/UL (ref 1.8–7.7)
NEUTROPHILS NFR BLD: 56.8 % (ref 38–73)
NRBC BLD-RTO: 0 /100 WBC
PLATELET # BLD AUTO: 269 K/UL (ref 150–450)
PMV BLD AUTO: 9.5 FL (ref 9.2–12.9)
POTASSIUM SERPL-SCNC: 3.8 MMOL/L (ref 3.5–5.1)
PROT SERPL-MCNC: 6.9 G/DL (ref 6–8.4)
RBC # BLD AUTO: 5.41 M/UL (ref 4–5.4)
SODIUM SERPL-SCNC: 140 MMOL/L (ref 136–145)
WBC # BLD AUTO: 7.97 K/UL (ref 3.9–12.7)

## 2022-06-22 PROCEDURE — 80053 COMPREHEN METABOLIC PANEL: CPT | Performed by: INTERNAL MEDICINE

## 2022-06-22 PROCEDURE — 36415 COLL VENOUS BLD VENIPUNCTURE: CPT | Performed by: INTERNAL MEDICINE

## 2022-06-22 PROCEDURE — 85025 COMPLETE CBC W/AUTO DIFF WBC: CPT | Performed by: INTERNAL MEDICINE

## 2022-06-24 ENCOUNTER — OFFICE VISIT (OUTPATIENT)
Dept: HEMATOLOGY/ONCOLOGY | Facility: CLINIC | Age: 72
End: 2022-06-24
Payer: MEDICARE

## 2022-06-24 VITALS
SYSTOLIC BLOOD PRESSURE: 143 MMHG | HEART RATE: 62 BPM | WEIGHT: 229.5 LBS | BODY MASS INDEX: 37.04 KG/M2 | OXYGEN SATURATION: 95 % | DIASTOLIC BLOOD PRESSURE: 75 MMHG | RESPIRATION RATE: 16 BRPM | TEMPERATURE: 97 F

## 2022-06-24 DIAGNOSIS — Z79.811 USE OF ANASTROZOLE (ARIMIDEX): Primary | ICD-10-CM

## 2022-06-24 DIAGNOSIS — I48.20 CHRONIC ATRIAL FIBRILLATION: ICD-10-CM

## 2022-06-24 DIAGNOSIS — Z79.01 LONG TERM (CURRENT) USE OF ANTICOAGULANTS: ICD-10-CM

## 2022-06-24 PROCEDURE — 1157F PR ADVANCE CARE PLAN OR EQUIV PRESENT IN MEDICAL RECORD: ICD-10-PCS | Mod: CPTII,S$GLB,, | Performed by: INTERNAL MEDICINE

## 2022-06-24 PROCEDURE — 1101F PR PT FALLS ASSESS DOC 0-1 FALLS W/OUT INJ PAST YR: ICD-10-PCS | Mod: CPTII,S$GLB,, | Performed by: INTERNAL MEDICINE

## 2022-06-24 PROCEDURE — 99214 OFFICE O/P EST MOD 30 MIN: CPT | Mod: S$GLB,,, | Performed by: INTERNAL MEDICINE

## 2022-06-24 PROCEDURE — 3288F PR FALLS RISK ASSESSMENT DOCUMENTED: ICD-10-PCS | Mod: CPTII,S$GLB,, | Performed by: INTERNAL MEDICINE

## 2022-06-24 PROCEDURE — 1159F MED LIST DOCD IN RCRD: CPT | Mod: CPTII,S$GLB,, | Performed by: INTERNAL MEDICINE

## 2022-06-24 PROCEDURE — 3078F PR MOST RECENT DIASTOLIC BLOOD PRESSURE < 80 MM HG: ICD-10-PCS | Mod: CPTII,S$GLB,, | Performed by: INTERNAL MEDICINE

## 2022-06-24 PROCEDURE — 99999 PR PBB SHADOW E&M-EST. PATIENT-LVL III: ICD-10-PCS | Mod: PBBFAC,,, | Performed by: INTERNAL MEDICINE

## 2022-06-24 PROCEDURE — 1159F PR MEDICATION LIST DOCUMENTED IN MEDICAL RECORD: ICD-10-PCS | Mod: CPTII,S$GLB,, | Performed by: INTERNAL MEDICINE

## 2022-06-24 PROCEDURE — 1126F PR PAIN SEVERITY QUANTIFIED, NO PAIN PRESENT: ICD-10-PCS | Mod: CPTII,S$GLB,, | Performed by: INTERNAL MEDICINE

## 2022-06-24 PROCEDURE — 3008F PR BODY MASS INDEX (BMI) DOCUMENTED: ICD-10-PCS | Mod: CPTII,S$GLB,, | Performed by: INTERNAL MEDICINE

## 2022-06-24 PROCEDURE — 3077F SYST BP >= 140 MM HG: CPT | Mod: CPTII,S$GLB,, | Performed by: INTERNAL MEDICINE

## 2022-06-24 PROCEDURE — 1101F PT FALLS ASSESS-DOCD LE1/YR: CPT | Mod: CPTII,S$GLB,, | Performed by: INTERNAL MEDICINE

## 2022-06-24 PROCEDURE — 3077F PR MOST RECENT SYSTOLIC BLOOD PRESSURE >= 140 MM HG: ICD-10-PCS | Mod: CPTII,S$GLB,, | Performed by: INTERNAL MEDICINE

## 2022-06-24 PROCEDURE — 4010F ACE/ARB THERAPY RXD/TAKEN: CPT | Mod: CPTII,S$GLB,, | Performed by: INTERNAL MEDICINE

## 2022-06-24 PROCEDURE — 1126F AMNT PAIN NOTED NONE PRSNT: CPT | Mod: CPTII,S$GLB,, | Performed by: INTERNAL MEDICINE

## 2022-06-24 PROCEDURE — 3078F DIAST BP <80 MM HG: CPT | Mod: CPTII,S$GLB,, | Performed by: INTERNAL MEDICINE

## 2022-06-24 PROCEDURE — 4010F PR ACE/ARB THEARPY RXD/TAKEN: ICD-10-PCS | Mod: CPTII,S$GLB,, | Performed by: INTERNAL MEDICINE

## 2022-06-24 PROCEDURE — 99214 PR OFFICE/OUTPT VISIT, EST, LEVL IV, 30-39 MIN: ICD-10-PCS | Mod: S$GLB,,, | Performed by: INTERNAL MEDICINE

## 2022-06-24 PROCEDURE — 3008F BODY MASS INDEX DOCD: CPT | Mod: CPTII,S$GLB,, | Performed by: INTERNAL MEDICINE

## 2022-06-24 PROCEDURE — 3044F HG A1C LEVEL LT 7.0%: CPT | Mod: CPTII,S$GLB,, | Performed by: INTERNAL MEDICINE

## 2022-06-24 PROCEDURE — 3044F PR MOST RECENT HEMOGLOBIN A1C LEVEL <7.0%: ICD-10-PCS | Mod: CPTII,S$GLB,, | Performed by: INTERNAL MEDICINE

## 2022-06-24 PROCEDURE — 99999 PR PBB SHADOW E&M-EST. PATIENT-LVL III: CPT | Mod: PBBFAC,,, | Performed by: INTERNAL MEDICINE

## 2022-06-24 PROCEDURE — 3288F FALL RISK ASSESSMENT DOCD: CPT | Mod: CPTII,S$GLB,, | Performed by: INTERNAL MEDICINE

## 2022-06-24 PROCEDURE — 1157F ADVNC CARE PLAN IN RCRD: CPT | Mod: CPTII,S$GLB,, | Performed by: INTERNAL MEDICINE

## 2022-06-24 NOTE — PROGRESS NOTES
HPI    A 72 years old  female accompanied by family member is here in Oncology Clinic for evaluation of breast cancer.  Patient recently had ultrasound-guided biopsy left breast found to have invasive ductal carcinoma.  Path ER/NM positive HER2 negative by FISH.  Status post lumpectomy plus lymph node dissection plus XRT.  Surgery completed on June 13, 2019.  Oncotype DX recurrence score 9, node negative.  Arimidex started early December 2019.  No issues with medication.    Past medical history of AFib taking Eliquis 2.5 mg p.o. b.i.d.    Review of system:   Deny chest pain shortness of breath.  Denies abdominal pain nausea vomiting or diarrhea.  Denies any fever or chills.  Denies any weight loss.  Denies any change appetite change.      Past Medical History:   Diagnosis Date    Atrial fibrillation     4/11/13-recent dx sceduled to see cardiology Monday- Dr Lynn-dm    Breast cancer     Cancer     breast    Diabetes mellitus     Diabetes mellitus type I     Exposure to STD     Hyperlipemia     Hypertension     Limb alert care status     NO BP/ IV IN LEFT ARM    Obesity      Social History     Socioeconomic History    Marital status:    Tobacco Use    Smoking status: Never Smoker    Smokeless tobacco: Never Used   Substance and Sexual Activity    Alcohol use: No    Drug use: No    Sexual activity: Not Currently       Objective    Physical Exam   Vitals:    06/24/22 1351   BP: (!) 143/75   Pulse: 62   Resp: 16   Temp: 96.8 °F (36 °C)       Awake alert no acute distress  Normocephalic atraumatic pupils equal round reactive to light extraocular muscle intact  Regular rate and rhythm  Clear to auscultate  Soft nontender nondistended bowel sounds x4  Move all 4 extremity distal pulses intact  Cranial nerves 2-12 grossly intact sensorimotor grossly intact no focal deficit  No rash no lesions    CMP  Sodium   Date Value Ref Range Status   06/22/2022 140 136 - 145 mmol/L Final      Potassium   Date Value Ref Range Status   06/22/2022 3.8 3.5 - 5.1 mmol/L Final     Chloride   Date Value Ref Range Status   06/22/2022 101 95 - 110 mmol/L Final     CO2   Date Value Ref Range Status   06/22/2022 29 23 - 29 mmol/L Final     Glucose   Date Value Ref Range Status   06/22/2022 111 (H) 70 - 110 mg/dL Final     BUN   Date Value Ref Range Status   06/22/2022 24 (H) 8 - 23 mg/dL Final     Creatinine   Date Value Ref Range Status   06/22/2022 0.7 0.5 - 1.4 mg/dL Final     Calcium   Date Value Ref Range Status   06/22/2022 10.0 8.7 - 10.5 mg/dL Final     Total Protein   Date Value Ref Range Status   06/22/2022 6.9 6.0 - 8.4 g/dL Final     Albumin   Date Value Ref Range Status   06/22/2022 4.3 3.5 - 5.2 g/dL Final     Total Bilirubin   Date Value Ref Range Status   06/22/2022 1.2 (H) 0.1 - 1.0 mg/dL Final     Comment:     For infants and newborns, interpretation of results should be based  on gestational age, weight and in agreement with clinical  observations.    Premature Infant recommended reference ranges:  Up to 24 hours.............<8.0 mg/dL  Up to 48 hours............<12.0 mg/dL  3-5 days..................<15.0 mg/dL  6-29 days.................<15.0 mg/dL       Alkaline Phosphatase   Date Value Ref Range Status   06/22/2022 66 55 - 135 U/L Final     AST   Date Value Ref Range Status   06/22/2022 22 10 - 40 U/L Final     ALT   Date Value Ref Range Status   06/22/2022 30 10 - 44 U/L Final     Anion Gap   Date Value Ref Range Status   06/22/2022 10 8 - 16 mmol/L Final     eGFR if    Date Value Ref Range Status   06/22/2022 >60.0 >60 mL/min/1.73 m^2 Final     eGFR if non    Date Value Ref Range Status   06/22/2022 >60.0 >60 mL/min/1.73 m^2 Final     Comment:     Calculation used to obtain the estimated glomerular filtration  rate (eGFR) is the CKD-EPI equation.        Lab Results   Component Value Date    WBC 7.97 06/22/2022    HGB 16.5 (H) 06/22/2022    HCT 48.3  06/22/2022    MCV 89 06/22/2022     06/22/2022     FINAL PATHOLOGIC DIAGNOSIS biopsy 04/10/2019  Left breast mass, core needle biopsy:  - Invasive ductal carcinoma, [place] grade 2 (T=3, N=3, M=1), 5mm in greatest dimension  - No lymphovascular invasion identified on sections examined  Note: Prognostic/Predictive markers for ER, [place], Her-2 and Ki-67 are ordered, the results of which will be reported  in an addendum. Dr. JORDYN Sierra has reviewed the case and agrees with the above diagnosis    Assessment Plan    [] Breast cancer ERPR positive HER2 negative by FISH.  Mass measuring 7 mm left side breast.  Denies any bone pain, denies any pulmonary symptoms and denies any GI issues.  Alkaline phosphate within normal limits.    Status post surgical approach lumpectomy, tumor is invasive ductal carcinoma size 1 cm margin free of invasive carcinoma.    Left axillary sentinel lymph nodes no evidence of malignancy.    Patient declined chemotherapy,Oncotype DX study shows absolute chemotherapy benefit is less than 1% and distant recurrence risk at 9 years 3%.  Oncotype DX score 9.     Started endocrine therapy after completion of radiation therapy (dec 2019).  Endocrine therapy will be Arimidex 1 mg daily x 5 years.      DEXA scan 4/18 - shows no significant bone density lost    > continue arimidex  > 3 month with lab   > mammo screen April 2022 no evidence of malignancy.  > ca + Vit D daily     [] History of a AFib   > long-term anticoagulation Eliquis 2.5 mg b.i.d. follow Cardiology    [] Hypertension diabetes   > followed by primary care physicians.    [] Slightly elevated total bili T bili and elevated hemoglobin.  > will continue monitor for now.      Dictation software used in this note.  Expect a typo graphic error

## 2022-07-08 DIAGNOSIS — I10 ESSENTIAL HYPERTENSION: ICD-10-CM

## 2022-07-08 DIAGNOSIS — E78.5 HYPERLIPIDEMIA, UNSPECIFIED HYPERLIPIDEMIA TYPE: ICD-10-CM

## 2022-07-11 RX ORDER — LOSARTAN POTASSIUM 100 MG/1
100 TABLET ORAL NIGHTLY
Qty: 90 TABLET | Refills: 1 | Status: SHIPPED | OUTPATIENT
Start: 2022-07-11

## 2022-07-11 RX ORDER — ATORVASTATIN CALCIUM 80 MG/1
40 TABLET, FILM COATED ORAL NIGHTLY
Qty: 45 TABLET | Refills: 0 | Status: SHIPPED | OUTPATIENT
Start: 2022-07-11 | End: 2022-10-17

## 2022-07-11 RX ORDER — HYDROCHLOROTHIAZIDE 25 MG/1
25 TABLET ORAL DAILY
Qty: 90 TABLET | Refills: 1 | Status: SHIPPED | OUTPATIENT
Start: 2022-07-11

## 2022-09-21 ENCOUNTER — LAB VISIT (OUTPATIENT)
Dept: LAB | Facility: HOSPITAL | Age: 72
End: 2022-09-21
Attending: INTERNAL MEDICINE
Payer: MEDICARE

## 2022-09-21 DIAGNOSIS — Z79.811 USE OF ANASTROZOLE (ARIMIDEX): ICD-10-CM

## 2022-09-21 DIAGNOSIS — Z79.01 LONG TERM (CURRENT) USE OF ANTICOAGULANTS: ICD-10-CM

## 2022-09-21 DIAGNOSIS — I48.20 CHRONIC ATRIAL FIBRILLATION: ICD-10-CM

## 2022-09-21 LAB
ALBUMIN SERPL BCP-MCNC: 4.3 G/DL (ref 3.5–5.2)
ALP SERPL-CCNC: 68 U/L (ref 55–135)
ALT SERPL W/O P-5'-P-CCNC: 25 U/L (ref 10–44)
ANION GAP SERPL CALC-SCNC: 4 MMOL/L (ref 8–16)
AST SERPL-CCNC: 20 U/L (ref 10–40)
BASOPHILS # BLD AUTO: 0.06 K/UL (ref 0–0.2)
BASOPHILS NFR BLD: 0.8 % (ref 0–1.9)
BILIRUB SERPL-MCNC: 0.7 MG/DL (ref 0.1–1)
BUN SERPL-MCNC: 25 MG/DL (ref 8–23)
CALCIUM SERPL-MCNC: 10.2 MG/DL (ref 8.7–10.5)
CHLORIDE SERPL-SCNC: 107 MMOL/L (ref 95–110)
CO2 SERPL-SCNC: 34 MMOL/L (ref 23–29)
CREAT SERPL-MCNC: 0.8 MG/DL (ref 0.5–1.4)
DIFFERENTIAL METHOD: ABNORMAL
EOSINOPHIL # BLD AUTO: 0.1 K/UL (ref 0–0.5)
EOSINOPHIL NFR BLD: 1.4 % (ref 0–8)
ERYTHROCYTE [DISTWIDTH] IN BLOOD BY AUTOMATED COUNT: 14 % (ref 11.5–14.5)
EST. GFR  (NO RACE VARIABLE): >60 ML/MIN/1.73 M^2
GLUCOSE SERPL-MCNC: 116 MG/DL (ref 70–110)
HCT VFR BLD AUTO: 48.9 % (ref 37–48.5)
HGB BLD-MCNC: 15.6 G/DL (ref 12–16)
IMM GRANULOCYTES # BLD AUTO: 0.05 K/UL (ref 0–0.04)
IMM GRANULOCYTES NFR BLD AUTO: 0.6 % (ref 0–0.5)
LYMPHOCYTES # BLD AUTO: 2.3 K/UL (ref 1–4.8)
LYMPHOCYTES NFR BLD: 29.6 % (ref 18–48)
MCH RBC QN AUTO: 29.6 PG (ref 27–31)
MCHC RBC AUTO-ENTMCNC: 31.9 G/DL (ref 32–36)
MCV RBC AUTO: 93 FL (ref 82–98)
MONOCYTES # BLD AUTO: 0.7 K/UL (ref 0.3–1)
MONOCYTES NFR BLD: 9 % (ref 4–15)
NEUTROPHILS # BLD AUTO: 4.6 K/UL (ref 1.8–7.7)
NEUTROPHILS NFR BLD: 58.6 % (ref 38–73)
NRBC BLD-RTO: 0 /100 WBC
PLATELET # BLD AUTO: 276 K/UL (ref 150–450)
PMV BLD AUTO: 9.1 FL (ref 9.2–12.9)
POTASSIUM SERPL-SCNC: 4.5 MMOL/L (ref 3.5–5.1)
PROT SERPL-MCNC: 7.1 G/DL (ref 6–8.4)
RBC # BLD AUTO: 5.27 M/UL (ref 4–5.4)
SODIUM SERPL-SCNC: 145 MMOL/L (ref 136–145)
WBC # BLD AUTO: 7.81 K/UL (ref 3.9–12.7)

## 2022-09-21 PROCEDURE — 80053 COMPREHEN METABOLIC PANEL: CPT | Performed by: INTERNAL MEDICINE

## 2022-09-21 PROCEDURE — 85025 COMPLETE CBC W/AUTO DIFF WBC: CPT | Performed by: INTERNAL MEDICINE

## 2022-09-21 PROCEDURE — 36415 COLL VENOUS BLD VENIPUNCTURE: CPT | Performed by: INTERNAL MEDICINE

## 2022-09-23 ENCOUNTER — TELEPHONE (OUTPATIENT)
Dept: HEMATOLOGY/ONCOLOGY | Facility: CLINIC | Age: 72
End: 2022-09-23

## 2022-09-23 ENCOUNTER — OFFICE VISIT (OUTPATIENT)
Dept: HEMATOLOGY/ONCOLOGY | Facility: CLINIC | Age: 72
End: 2022-09-23
Payer: MEDICARE

## 2022-09-23 VITALS
SYSTOLIC BLOOD PRESSURE: 111 MMHG | WEIGHT: 235.44 LBS | OXYGEN SATURATION: 96 % | BODY MASS INDEX: 37.84 KG/M2 | TEMPERATURE: 97 F | RESPIRATION RATE: 12 BRPM | HEIGHT: 66 IN | HEART RATE: 63 BPM | DIASTOLIC BLOOD PRESSURE: 63 MMHG

## 2022-09-23 DIAGNOSIS — I48.20 CHRONIC ATRIAL FIBRILLATION: ICD-10-CM

## 2022-09-23 DIAGNOSIS — C50.912 INFILTRATING DUCTAL CARCINOMA OF LEFT BREAST: ICD-10-CM

## 2022-09-23 DIAGNOSIS — Z79.01 LONG TERM (CURRENT) USE OF ANTICOAGULANTS: ICD-10-CM

## 2022-09-23 DIAGNOSIS — Z79.811 USE OF ANASTROZOLE (ARIMIDEX): Primary | ICD-10-CM

## 2022-09-23 PROCEDURE — 99214 OFFICE O/P EST MOD 30 MIN: CPT | Mod: S$GLB,,, | Performed by: INTERNAL MEDICINE

## 2022-09-23 PROCEDURE — 99499 UNLISTED E&M SERVICE: CPT | Mod: S$GLB,,, | Performed by: INTERNAL MEDICINE

## 2022-09-23 PROCEDURE — 1159F MED LIST DOCD IN RCRD: CPT | Mod: CPTII,S$GLB,, | Performed by: INTERNAL MEDICINE

## 2022-09-23 PROCEDURE — 3044F HG A1C LEVEL LT 7.0%: CPT | Mod: CPTII,S$GLB,, | Performed by: INTERNAL MEDICINE

## 2022-09-23 PROCEDURE — 99214 PR OFFICE/OUTPT VISIT, EST, LEVL IV, 30-39 MIN: ICD-10-PCS | Mod: S$GLB,,, | Performed by: INTERNAL MEDICINE

## 2022-09-23 PROCEDURE — 1157F ADVNC CARE PLAN IN RCRD: CPT | Mod: CPTII,S$GLB,, | Performed by: INTERNAL MEDICINE

## 2022-09-23 PROCEDURE — 3008F PR BODY MASS INDEX (BMI) DOCUMENTED: ICD-10-PCS | Mod: CPTII,S$GLB,, | Performed by: INTERNAL MEDICINE

## 2022-09-23 PROCEDURE — 3288F FALL RISK ASSESSMENT DOCD: CPT | Mod: CPTII,S$GLB,, | Performed by: INTERNAL MEDICINE

## 2022-09-23 PROCEDURE — 3008F BODY MASS INDEX DOCD: CPT | Mod: CPTII,S$GLB,, | Performed by: INTERNAL MEDICINE

## 2022-09-23 PROCEDURE — 3288F PR FALLS RISK ASSESSMENT DOCUMENTED: ICD-10-PCS | Mod: CPTII,S$GLB,, | Performed by: INTERNAL MEDICINE

## 2022-09-23 PROCEDURE — 1101F PR PT FALLS ASSESS DOC 0-1 FALLS W/OUT INJ PAST YR: ICD-10-PCS | Mod: CPTII,S$GLB,, | Performed by: INTERNAL MEDICINE

## 2022-09-23 PROCEDURE — 1159F PR MEDICATION LIST DOCUMENTED IN MEDICAL RECORD: ICD-10-PCS | Mod: CPTII,S$GLB,, | Performed by: INTERNAL MEDICINE

## 2022-09-23 PROCEDURE — 1157F PR ADVANCE CARE PLAN OR EQUIV PRESENT IN MEDICAL RECORD: ICD-10-PCS | Mod: CPTII,S$GLB,, | Performed by: INTERNAL MEDICINE

## 2022-09-23 PROCEDURE — 4010F ACE/ARB THERAPY RXD/TAKEN: CPT | Mod: CPTII,S$GLB,, | Performed by: INTERNAL MEDICINE

## 2022-09-23 PROCEDURE — 99999 PR PBB SHADOW E&M-EST. PATIENT-LVL III: CPT | Mod: PBBFAC,,, | Performed by: INTERNAL MEDICINE

## 2022-09-23 PROCEDURE — 1126F AMNT PAIN NOTED NONE PRSNT: CPT | Mod: CPTII,S$GLB,, | Performed by: INTERNAL MEDICINE

## 2022-09-23 PROCEDURE — 1126F PR PAIN SEVERITY QUANTIFIED, NO PAIN PRESENT: ICD-10-PCS | Mod: CPTII,S$GLB,, | Performed by: INTERNAL MEDICINE

## 2022-09-23 PROCEDURE — 3078F DIAST BP <80 MM HG: CPT | Mod: CPTII,S$GLB,, | Performed by: INTERNAL MEDICINE

## 2022-09-23 PROCEDURE — 3074F SYST BP LT 130 MM HG: CPT | Mod: CPTII,S$GLB,, | Performed by: INTERNAL MEDICINE

## 2022-09-23 PROCEDURE — 1101F PT FALLS ASSESS-DOCD LE1/YR: CPT | Mod: CPTII,S$GLB,, | Performed by: INTERNAL MEDICINE

## 2022-09-23 PROCEDURE — 3074F PR MOST RECENT SYSTOLIC BLOOD PRESSURE < 130 MM HG: ICD-10-PCS | Mod: CPTII,S$GLB,, | Performed by: INTERNAL MEDICINE

## 2022-09-23 PROCEDURE — 3078F PR MOST RECENT DIASTOLIC BLOOD PRESSURE < 80 MM HG: ICD-10-PCS | Mod: CPTII,S$GLB,, | Performed by: INTERNAL MEDICINE

## 2022-09-23 PROCEDURE — 4010F PR ACE/ARB THEARPY RXD/TAKEN: ICD-10-PCS | Mod: CPTII,S$GLB,, | Performed by: INTERNAL MEDICINE

## 2022-09-23 PROCEDURE — 3044F PR MOST RECENT HEMOGLOBIN A1C LEVEL <7.0%: ICD-10-PCS | Mod: CPTII,S$GLB,, | Performed by: INTERNAL MEDICINE

## 2022-09-23 PROCEDURE — 99999 PR PBB SHADOW E&M-EST. PATIENT-LVL III: ICD-10-PCS | Mod: PBBFAC,,, | Performed by: INTERNAL MEDICINE

## 2022-09-23 NOTE — PROGRESS NOTES
HPI    A 72 years old  female accompanied by family member is here in Oncology Clinic for evaluation of breast cancer.  Patient recently had ultrasound-guided biopsy left breast found to have invasive ductal carcinoma.  Path ER/NE positive HER2 negative by FISH.  Status post lumpectomy plus lymph node dissection plus XRT.  Surgery completed on June 13, 2019.  Oncotype DX recurrence score 9, node negative.  Arimidex started early December 2019.  No issues with medication.    Past medical history of AFib taking Eliquis 2.5 mg p.o. b.i.d.    Review of system:   Deny chest pain shortness of breath.  Denies abdominal pain nausea vomiting or diarrhea.  Denies any fever or chills.  Denies any weight loss.  Denies any change appetite change.      Past Medical History:   Diagnosis Date    Atrial fibrillation     4/11/13-recent dx sceduled to see cardiology Monday- Dr Lynn-dm    Breast cancer     Cancer     breast    Diabetes mellitus     Diabetes mellitus type I     Exposure to STD     Hyperlipemia     Hypertension     Limb alert care status     NO BP/ IV IN LEFT ARM    Obesity      Social History     Socioeconomic History    Marital status:    Tobacco Use    Smoking status: Never    Smokeless tobacco: Never   Substance and Sexual Activity    Alcohol use: No    Drug use: No    Sexual activity: Not Currently       Objective    Physical Exam   There were no vitals filed for this visit.      Awake alert no acute distress  Normocephalic atraumatic pupils equal round reactive to light extraocular muscle intact  Regular rate and rhythm  Clear to auscultate  Soft nontender nondistended bowel sounds x4  Move all 4 extremity distal pulses intact  Cranial nerves 2-12 grossly intact sensorimotor grossly intact no focal deficit  No rash no lesions    CMP  Sodium   Date Value Ref Range Status   09/21/2022 145 136 - 145 mmol/L Final     Potassium   Date Value Ref Range Status   09/21/2022 4.5 3.5 - 5.1 mmol/L Final      Chloride   Date Value Ref Range Status   09/21/2022 107 95 - 110 mmol/L Final     CO2   Date Value Ref Range Status   09/21/2022 34 (H) 23 - 29 mmol/L Final     Glucose   Date Value Ref Range Status   09/21/2022 116 (H) 70 - 110 mg/dL Final     BUN   Date Value Ref Range Status   09/21/2022 25 (H) 8 - 23 mg/dL Final     Creatinine   Date Value Ref Range Status   09/21/2022 0.8 0.5 - 1.4 mg/dL Final     Calcium   Date Value Ref Range Status   09/21/2022 10.2 8.7 - 10.5 mg/dL Final     Total Protein   Date Value Ref Range Status   09/21/2022 7.1 6.0 - 8.4 g/dL Final     Albumin   Date Value Ref Range Status   09/21/2022 4.3 3.5 - 5.2 g/dL Final     Total Bilirubin   Date Value Ref Range Status   09/21/2022 0.7 0.1 - 1.0 mg/dL Final     Comment:     For infants and newborns, interpretation of results should be based  on gestational age, weight and in agreement with clinical  observations.    Premature Infant recommended reference ranges:  Up to 24 hours.............<8.0 mg/dL  Up to 48 hours............<12.0 mg/dL  3-5 days..................<15.0 mg/dL  6-29 days.................<15.0 mg/dL       Alkaline Phosphatase   Date Value Ref Range Status   09/21/2022 68 55 - 135 U/L Final     AST   Date Value Ref Range Status   09/21/2022 20 10 - 40 U/L Final     ALT   Date Value Ref Range Status   09/21/2022 25 10 - 44 U/L Final     Anion Gap   Date Value Ref Range Status   09/21/2022 4 (L) 8 - 16 mmol/L Final     eGFR if    Date Value Ref Range Status   06/22/2022 >60.0 >60 mL/min/1.73 m^2 Final     eGFR if non    Date Value Ref Range Status   06/22/2022 >60.0 >60 mL/min/1.73 m^2 Final     Comment:     Calculation used to obtain the estimated glomerular filtration  rate (eGFR) is the CKD-EPI equation.        Lab Results   Component Value Date    WBC 7.81 09/21/2022    HGB 15.6 09/21/2022    HCT 48.9 (H) 09/21/2022    MCV 93 09/21/2022     09/21/2022     FINAL PATHOLOGIC DIAGNOSIS  biopsy 04/10/2019  Left breast mass, core needle biopsy:  - Invasive ductal carcinoma, [place] grade 2 (T=3, N=3, M=1), 5mm in greatest dimension  - No lymphovascular invasion identified on sections examined  Note: Prognostic/Predictive markers for ER, [place], Her-2 and Ki-67 are ordered, the results of which will be reported  in an addendum. Dr. JORDYN Sierra has reviewed the case and agrees with the above diagnosis    Assessment Plan    [] Breast cancer ERPR positive HER2 negative by FISH.  Mass measuring 7 mm left side breast.  Denies any bone pain, denies any pulmonary symptoms and denies any GI issues.  Alkaline phosphate within normal limits.    Status post surgical approach lumpectomy, tumor is invasive ductal carcinoma size 1 cm margin free of invasive carcinoma.    Left axillary sentinel lymph nodes no evidence of malignancy.    Patient declined chemotherapy,Oncotype DX study shows absolute chemotherapy benefit is less than 1% and distant recurrence risk at 9 years 3%.  Oncotype DX score 9.     Started endocrine therapy after completion of radiation therapy (dec 2019).  Endocrine therapy will be Arimidex 1 mg daily x 5 years.      DEXA scan 4/18 - shows no significant bone density lost    > continue arimidex  > 3 month with lab   > mammo screen April 2022 no evidence of malignancy.  > ca + Vit D daily     [] History of a AFib   > long-term anticoagulation Eliquis 2.5 mg b.i.d. follow Cardiology    [] Hypertension diabetes   > followed by primary care physicians.    [] Slightly elevated total bili T bili now resolved   > will continue monitor for now.      Dictation software used in this note.  Expect a typo graphic error

## 2022-09-23 NOTE — TELEPHONE ENCOUNTER
Patient now PRN per her moving to [place]      ----- Message from Laz Marin MD sent at 9/23/2022  1:14 PM CDT -----  RTC 3 months with lab

## 2023-03-13 NOTE — TELEPHONE ENCOUNTER
Pt will be in Wednesday 04/24/19 to speak with dr gill about results   S/P orthotopic heart transplant

## 2023-04-27 NOTE — ASSESSMENT & PLAN NOTE
Likely secondary to vasovagal versus hypoglycemia. Check orthostatic vital. Telemetry. Fall precautions.        Reviewed

## 2024-03-01 ENCOUNTER — TELEPHONE (OUTPATIENT)
Dept: HEMATOLOGY/ONCOLOGY | Facility: CLINIC | Age: 74
End: 2024-03-01
Payer: MEDICARE

## 2024-03-01 NOTE — TELEPHONE ENCOUNTER
Faxed request to [facility] medical records [facility].  Randy is requesting visit notes for 2023 be faxed to them
